# Patient Record
Sex: MALE | Race: WHITE | Employment: FULL TIME | URBAN - METROPOLITAN AREA
[De-identification: names, ages, dates, MRNs, and addresses within clinical notes are randomized per-mention and may not be internally consistent; named-entity substitution may affect disease eponyms.]

---

## 2017-01-01 ENCOUNTER — TELEPHONE (OUTPATIENT)
Dept: FAMILY MEDICINE CLINIC | Facility: CLINIC | Age: 47
End: 2017-01-01

## 2017-01-01 ENCOUNTER — HOSPITAL ENCOUNTER (OUTPATIENT)
Age: 47
Discharge: HOME | End: 2017-01-01
Attending: FAMILY MEDICINE

## 2017-01-01 VITALS
SYSTOLIC BLOOD PRESSURE: 135 MMHG | HEIGHT: 73 IN | DIASTOLIC BLOOD PRESSURE: 81 MMHG | OXYGEN SATURATION: 96 % | BODY MASS INDEX: 39.76 KG/M2 | HEART RATE: 69 BPM | RESPIRATION RATE: 16 BRPM | WEIGHT: 300 LBS | TEMPERATURE: 98 F

## 2017-01-01 DIAGNOSIS — H10.31 ACUTE CONJUNCTIVITIS OF RIGHT EYE, UNSPECIFIED ACUTE CONJUNCTIVITIS TYPE: Primary | ICD-10-CM

## 2017-01-01 PROCEDURE — 99204 OFFICE O/P NEW MOD 45 MIN: CPT

## 2017-01-01 PROCEDURE — 99213 OFFICE O/P EST LOW 20 MIN: CPT

## 2017-01-01 RX ORDER — DIPHENHYDRAMINE HCL 25 MG
25 CAPSULE ORAL EVERY 6 HOURS PRN
COMMUNITY

## 2017-01-01 RX ORDER — MULTIVITAMIN
1 TABLET ORAL DAILY
COMMUNITY

## 2017-01-01 RX ORDER — KETOROLAC TROMETHAMINE 4 MG/ML
1 SOLUTION/ DROPS OPHTHALMIC 4 TIMES DAILY
Qty: 1 BOTTLE | Refills: 0 | Status: SHIPPED | OUTPATIENT
Start: 2017-01-01 | End: 2017-01-04

## 2017-01-01 NOTE — ED PROVIDER NOTES
Patient Seen in: [facility]    History   Patient presents with:   Eye Visual Problem (opthalmic)    Stated Complaint: eye swelling    HPI    Zeus Segovia is a 42year old male who presented with chief complaint of right low Tab EC,  Take 1 tablet (81 mg total) by mouth daily. DiltiaZEM HCl 120 MG Oral Tab,  Take 180 mg by mouth daily.      Enalapril Maleate (VASOTEC) 20 MG Oral Tab,  TAKE 1 TABLET BY MOUTH TWICE DAILY   furosemide (LASIX) 40 MG Oral Tab,  TAKE 1 TABLET BY MO is normal in examination   Neck: Neck supple. Cardiovascular: Normal rate, regular rhythm and normal heart sounds. Pulmonary/Chest: Effort normal and breath sounds normal. No respiratory distress. He has no wheezes. Abdominal: Soft.  Bowel sounds are medications    Ketorolac Tromethamine 0.4 % Ophthalmic Solution  Place 1 drop into the right eye 4 (four) times daily. , Normal, Disp-1 Bottle, R-0

## 2017-01-01 NOTE — ED INITIAL ASSESSMENT (HPI)
Eye infection started on Thursday. Was treated on 12/30/16 at the [facility] for sinus infection and right eye conjunctivitis. Was prescribed Tobrex and Doxycycline. Presents today stating that his eye is not any better.   Continues to be red, painful, watery,

## 2017-01-01 NOTE — TELEPHONE ENCOUNTER
Pt calls [facility] of concerns of worsening eye sx. Pt dx with conjunctivitis on 12/30. Prescribed Tobramycin eye drops. Since starting eye drops pt states he has had intermittent continued eye discomfort, pain and blurriness.   Referred to IC for eye exam. In

## 2017-01-03 ENCOUNTER — TELEPHONE (OUTPATIENT)
Dept: FAMILY MEDICINE CLINIC | Facility: CLINIC | Age: 47
End: 2017-01-03

## 2017-01-03 DIAGNOSIS — H10.9 CONJUNCTIVITIS OF RIGHT EYE, UNSPECIFIED CONJUNCTIVITIS TYPE: Primary | ICD-10-CM

## 2017-01-03 NOTE — TELEPHONE ENCOUNTER
[facility] for Dr. [facility] - I spoke with pt. Pt needed the referral asap. Denies new symptoms since seen in [facility]. Pt informed me that he already spoke with Dr. Rosalina White, ophthalmologist and received an rx from him until he can be seen in his office.  I reviewed the

## 2017-01-03 NOTE — TELEPHONE ENCOUNTER
Pt states has blurred vision, pink eye, getting worse  Seen [facility] for pink eye yesterday  Advised to f/u with eye doctor- wont see pt without referral  Declined appt with pcp-wants to see opthalmology    Call dropped

## 2017-01-03 NOTE — TELEPHONE ENCOUNTER
Pt reports went to [address] [address] [facility] visit for right eye reddness, swelling and milky drainage. He was dx with conjunctivitis and sinusitis, taking eye gtts and oral abx.  Referred to f/u with opthalmologist Dr Ephraim Keith in [place], but needs referral befo

## 2017-04-06 ENCOUNTER — OFFICE VISIT (OUTPATIENT)
Dept: FAMILY MEDICINE CLINIC | Facility: CLINIC | Age: 47
End: 2017-04-06

## 2017-04-06 VITALS
SYSTOLIC BLOOD PRESSURE: 116 MMHG | DIASTOLIC BLOOD PRESSURE: 70 MMHG | HEART RATE: 65 BPM | BODY MASS INDEX: 40 KG/M2 | WEIGHT: 300 LBS

## 2017-04-06 DIAGNOSIS — I48.0 PAROXYSMAL ATRIAL FIBRILLATION (HCC): ICD-10-CM

## 2017-04-06 DIAGNOSIS — M17.11 PRIMARY OSTEOARTHRITIS OF RIGHT KNEE: Primary | ICD-10-CM

## 2017-04-06 PROCEDURE — 20610 DRAIN/INJ JOINT/BURSA W/O US: CPT | Performed by: FAMILY MEDICINE

## 2017-04-06 RX ORDER — DILTIAZEM HYDROCHLORIDE 180 MG/1
1 CAPSULE, EXTENDED RELEASE ORAL DAILY
COMMUNITY
Start: 2017-02-27 | End: 2020-08-12

## 2017-04-06 NOTE — PROGRESS NOTES
On my feet all day and I'm fine. When I go to sit up or all night it will wake me up.   Rt knee    Informed symptoms obtained patient's questions were answered he was sterilely prepped draped and anesthetized with 1% lidocaine we then injected his right kn

## 2017-06-06 ENCOUNTER — PRIOR ORIGINAL RECORDS (OUTPATIENT)
Dept: OTHER | Age: 47
End: 2017-06-06

## 2017-06-07 ENCOUNTER — PRIOR ORIGINAL RECORDS (OUTPATIENT)
Dept: OTHER | Age: 47
End: 2017-06-07

## 2017-06-07 ENCOUNTER — OFFICE VISIT (OUTPATIENT)
Dept: CARDIOLOGY CLINIC | Facility: HOSPITAL | Age: 47
End: 2017-06-07
Attending: INTERNAL MEDICINE
Payer: COMMERCIAL

## 2017-06-07 VITALS
OXYGEN SATURATION: 97 % | DIASTOLIC BLOOD PRESSURE: 74 MMHG | BODY MASS INDEX: 41 KG/M2 | HEART RATE: 73 BPM | WEIGHT: 310.69 LBS | SYSTOLIC BLOOD PRESSURE: 135 MMHG

## 2017-06-07 DIAGNOSIS — I10 BENIGN ESSENTIAL HTN: ICD-10-CM

## 2017-06-07 DIAGNOSIS — I48.0 PAF (PAROXYSMAL ATRIAL FIBRILLATION) (HCC): ICD-10-CM

## 2017-06-07 DIAGNOSIS — E86.0 DEHYDRATION: ICD-10-CM

## 2017-06-07 DIAGNOSIS — I48.0 PAROXYSMAL ATRIAL FIBRILLATION (HCC): ICD-10-CM

## 2017-06-07 DIAGNOSIS — I10 HTN (HYPERTENSION), BENIGN: Chronic | ICD-10-CM

## 2017-06-07 DIAGNOSIS — I48.91 ATRIAL FIBRILLATION (HCC): Primary | ICD-10-CM

## 2017-06-07 DIAGNOSIS — R00.0 RACING HEART BEAT: ICD-10-CM

## 2017-06-07 PROCEDURE — 93005 ELECTROCARDIOGRAM TRACING: CPT

## 2017-06-07 PROCEDURE — 36415 COLL VENOUS BLD VENIPUNCTURE: CPT | Performed by: NURSE PRACTITIONER

## 2017-06-07 PROCEDURE — 84443 ASSAY THYROID STIM HORMONE: CPT | Performed by: NURSE PRACTITIONER

## 2017-06-07 PROCEDURE — 99214 OFFICE O/P EST MOD 30 MIN: CPT | Performed by: NURSE PRACTITIONER

## 2017-06-07 PROCEDURE — 83735 ASSAY OF MAGNESIUM: CPT | Performed by: NURSE PRACTITIONER

## 2017-06-07 PROCEDURE — 85025 COMPLETE CBC W/AUTO DIFF WBC: CPT | Performed by: NURSE PRACTITIONER

## 2017-06-07 PROCEDURE — 93010 ELECTROCARDIOGRAM REPORT: CPT | Performed by: NURSE PRACTITIONER

## 2017-06-07 PROCEDURE — 99212 OFFICE O/P EST SF 10 MIN: CPT | Performed by: NURSE PRACTITIONER

## 2017-06-07 PROCEDURE — 80053 COMPREHEN METABOLIC PANEL: CPT | Performed by: NURSE PRACTITIONER

## 2017-06-07 NOTE — PATIENT INSTRUCTIONS
Continue all your same medications except only take furosemide with weight gain 3 pounds or more overnight or 5 pounds or more in a week or significant swelling or bloating.     Call if having any dizziness, lightheadedness, heart racing, palpitations, ches

## 2017-06-07 NOTE — PROGRESS NOTES
Max Son Patient Status:  Outpatient    1970 MRN [id number]   Location [address] [place] [address]  MD Yesenia Sommersasl Cates is a 42year old male who presents to clinic pe 0.4 06/07/2017 03:01 PM   TP 6.7 06/07/2017 03:01 PM   AST 27 06/07/2017 03:01 PM   ALT 42 06/07/2017 03:01 PM   TSH 1.42 11/14/2013 10:45 AM   DDIMER <0.27 11/14/2016 11:55 AM   MG 2.1 06/07/2017 03:01 PM   TROP <0.046 11/15/2016 07:02 AM       Clinical l chest pain in November 2016 with normal EF on echo and normal lexiscan stress test. Blood pressure normal. No sign of fluid overload, weight stable at home. Bun 38, cr 0.95, LFT's and electrolytes are normal. Wbc slightly elevated, hgb nml. Tsh nml.    Like

## 2017-08-24 ENCOUNTER — TELEPHONE (OUTPATIENT)
Dept: FAMILY MEDICINE CLINIC | Facility: CLINIC | Age: 47
End: 2017-08-24

## 2017-08-24 DIAGNOSIS — I48.0 PAROXYSMAL ATRIAL FIBRILLATION (HCC): Primary | ICD-10-CM

## 2017-09-22 ENCOUNTER — PRIOR ORIGINAL RECORDS (OUTPATIENT)
Dept: OTHER | Age: 47
End: 2017-09-22

## 2017-09-22 ENCOUNTER — OFFICE VISIT (OUTPATIENT)
Dept: CARDIOLOGY CLINIC | Facility: HOSPITAL | Age: 47
End: 2017-09-22
Attending: INTERNAL MEDICINE
Payer: COMMERCIAL

## 2017-09-22 ENCOUNTER — TELEPHONE (OUTPATIENT)
Dept: CARDIOLOGY CLINIC | Facility: HOSPITAL | Age: 47
End: 2017-09-22

## 2017-09-22 VITALS
OXYGEN SATURATION: 96 % | SYSTOLIC BLOOD PRESSURE: 137 MMHG | HEART RATE: 103 BPM | DIASTOLIC BLOOD PRESSURE: 86 MMHG | BODY MASS INDEX: 42 KG/M2 | WEIGHT: 315 LBS

## 2017-09-22 DIAGNOSIS — E66.09 CLASS 2 OBESITY DUE TO EXCESS CALORIES WITHOUT SERIOUS COMORBIDITY WITH BODY MASS INDEX (BMI) OF 39.0 TO 39.9 IN ADULT: ICD-10-CM

## 2017-09-22 DIAGNOSIS — I10 BENIGN ESSENTIAL HTN: ICD-10-CM

## 2017-09-22 DIAGNOSIS — R00.2 PALPITATIONS: ICD-10-CM

## 2017-09-22 DIAGNOSIS — I48.19 ATRIAL FIBRILLATION, PERSISTENT (HCC): Primary | ICD-10-CM

## 2017-09-22 DIAGNOSIS — R00.0 RACING HEART BEAT: ICD-10-CM

## 2017-09-22 DIAGNOSIS — I10 HTN (HYPERTENSION), BENIGN: ICD-10-CM

## 2017-09-22 DIAGNOSIS — G47.33 OSA (OBSTRUCTIVE SLEEP APNEA): ICD-10-CM

## 2017-09-22 LAB
ANION GAP SERPL CALC-SCNC: 6 MMOL/L (ref 0–18)
BASOPHILS # BLD: 0.1 K/UL (ref 0–0.2)
BASOPHILS NFR BLD: 1 %
BUN SERPL-MCNC: 22 MG/DL (ref 8–20)
BUN/CREAT SERPL: 25.6 (ref 10–20)
CALCIUM SERPL-MCNC: 9 MG/DL (ref 8.5–10.5)
CHLORIDE SERPL-SCNC: 107 MMOL/L (ref 95–110)
CO2 SERPL-SCNC: 24 MMOL/L (ref 22–32)
CREAT SERPL-MCNC: 0.86 MG/DL (ref 0.5–1.5)
EOSINOPHIL # BLD: 0.4 K/UL (ref 0–0.7)
EOSINOPHIL NFR BLD: 4 %
ERYTHROCYTE [DISTWIDTH] IN BLOOD BY AUTOMATED COUNT: 14.7 % (ref 11–15)
GLUCOSE SERPL-MCNC: 134 MG/DL (ref 70–99)
HCT VFR BLD AUTO: 46.4 % (ref 41–52)
HGB BLD-MCNC: 15.4 G/DL (ref 13.5–17.5)
LYMPHOCYTES # BLD: 3.5 K/UL (ref 1–4)
LYMPHOCYTES NFR BLD: 31 %
MAGNESIUM SERPL-MCNC: 2 MG/DL (ref 1.8–2.5)
MCH RBC QN AUTO: 27.8 PG (ref 27–32)
MCHC RBC AUTO-ENTMCNC: 33.2 G/DL (ref 32–37)
MCV RBC AUTO: 83.7 FL (ref 80–100)
MONOCYTES # BLD: 0.9 K/UL (ref 0–1)
MONOCYTES NFR BLD: 8 %
NEUTROPHILS # BLD AUTO: 6.4 K/UL (ref 1.8–7.7)
NEUTROPHILS NFR BLD: 56 %
OSMOLALITY UR CALC.SUM OF ELEC: 289 MOSM/KG (ref 275–295)
PLATELET # BLD AUTO: 171 K/UL (ref 140–400)
PMV BLD AUTO: 9.2 FL (ref 7.4–10.3)
POTASSIUM SERPL-SCNC: 3.9 MMOL/L (ref 3.3–5.1)
RBC # BLD AUTO: 5.54 M/UL (ref 4.5–5.9)
SODIUM SERPL-SCNC: 137 MMOL/L (ref 136–144)
WBC # BLD AUTO: 11.4 K/UL (ref 4–11)

## 2017-09-22 PROCEDURE — 99214 OFFICE O/P EST MOD 30 MIN: CPT | Performed by: NURSE PRACTITIONER

## 2017-09-22 PROCEDURE — 80048 BASIC METABOLIC PNL TOTAL CA: CPT | Performed by: NURSE PRACTITIONER

## 2017-09-22 PROCEDURE — 83735 ASSAY OF MAGNESIUM: CPT | Performed by: NURSE PRACTITIONER

## 2017-09-22 PROCEDURE — 99212 OFFICE O/P EST SF 10 MIN: CPT | Performed by: NURSE PRACTITIONER

## 2017-09-22 PROCEDURE — 85025 COMPLETE CBC W/AUTO DIFF WBC: CPT | Performed by: NURSE PRACTITIONER

## 2017-09-22 PROCEDURE — 36415 COLL VENOUS BLD VENIPUNCTURE: CPT | Performed by: NURSE PRACTITIONER

## 2017-09-22 PROCEDURE — 93010 ELECTROCARDIOGRAM REPORT: CPT | Performed by: NURSE PRACTITIONER

## 2017-09-22 PROCEDURE — 93005 ELECTROCARDIOGRAM TRACING: CPT

## 2017-09-22 NOTE — PROGRESS NOTES
Max Son Patient Status:  Outpatient    1970 MRN [id number]   Location [address] [place] [address]  MD Miranda Josue is a 42year old male who presents to clinic pe TP 6.7 06/07/2017 03:01 PM   AST 27 06/07/2017 03:01 PM   ALT 42 06/07/2017 03:01 PM   TSH 2.29 06/07/2017 03:01 PM   DDIMER <0.27 11/14/2016 11:55 AM   MG 2.0 09/22/2017 11:52 AM   TROP <0.046 11/15/2016 07:02 AM       Clinical labs drawn by MA: cbc, bm after hydrating with 4 L of fluid.   He had recurrent symptoms after taking half a dose of his furosemide for the last 2 days for increased bloating but denied associated shortness of breath, dyspnea on exertion, chest pain, lightheadedness, dizziness or sw same medications,except avoid furosemide    Call if having any dizziness, lightheadedness, heart racing, palpitations, chest pain, shortness of breath, coughing, swelling, weight gain or worsening symptoms.      Weigh yourself daily in the morning before br

## 2017-09-22 NOTE — TELEPHONE ENCOUNTER
Message left with Jovan young: his recent BMP and magnesium lab results were WNL, per NP Seun Stark.

## 2017-09-22 NOTE — PATIENT INSTRUCTIONS
Continue all your same medications,except avoid furosemide    Call if having any dizziness, lightheadedness, heart racing, palpitations, chest pain, shortness of breath, coughing, swelling, weight gain or worsening symptoms.      Weigh yourself daily in the

## 2017-10-12 LAB
ALBUMIN: 3.9 G/DL
BILIRUBIN TOTAL: 0.4 MG/DL
BUN: 38 MG/DL
CALCIUM: 9.3 MG/DL
CHLORIDE: 110 MEQ/L
CREATININE, SERUM: 0.95 MG/DL
GLOBULIN: 2.8 G/DL
GLUCOSE: 95 MG/DL
HEMATOCRIT: 44.9 %
HEMOGLOBIN: 14.8 G/DL
MAGNESIUM: 2.1 MG/DL
MCH: 27.8 PG
MCHC: 33 G/DL
MCV: 84.2 FL
PLATELETS: 178 K/UL
POTASSIUM, SERUM: 4.4 MEQ/L
PROTEIN, TOTAL: 6.7 G/DL
RED BLOOD COUNT: 5.34 X 10-6/U
SGOT (AST): 27 IU/L
SGPT (ALT): 42 IU/L
SODIUM: 141 MEQ/L
WHITE BLOOD COUNT: 12.5 X 10-3/U

## 2017-10-13 ENCOUNTER — PRIOR ORIGINAL RECORDS (OUTPATIENT)
Dept: OTHER | Age: 47
End: 2017-10-13

## 2017-10-13 ENCOUNTER — MYAURORA ACCOUNT LINK (OUTPATIENT)
Dept: OTHER | Age: 47
End: 2017-10-13

## 2018-03-09 ENCOUNTER — TELEPHONE (OUTPATIENT)
Dept: FAMILY MEDICINE CLINIC | Facility: CLINIC | Age: 48
End: 2018-03-09

## 2018-03-09 NOTE — TELEPHONE ENCOUNTER
Received letter from [facility] stating he is due for his physical due to atrial fibrillation.  Please schedule a physical with [facility]

## 2018-03-19 ENCOUNTER — OFFICE VISIT (OUTPATIENT)
Dept: FAMILY MEDICINE CLINIC | Facility: CLINIC | Age: 48
End: 2018-03-19

## 2018-03-19 VITALS
OXYGEN SATURATION: 98 % | HEART RATE: 74 BPM | SYSTOLIC BLOOD PRESSURE: 136 MMHG | RESPIRATION RATE: 16 BRPM | BODY MASS INDEX: 41.75 KG/M2 | WEIGHT: 315 LBS | TEMPERATURE: 98 F | HEIGHT: 73 IN | DIASTOLIC BLOOD PRESSURE: 76 MMHG

## 2018-03-19 DIAGNOSIS — J01.00 ACUTE NON-RECURRENT MAXILLARY SINUSITIS: Primary | ICD-10-CM

## 2018-03-19 PROCEDURE — 99213 OFFICE O/P EST LOW 20 MIN: CPT | Performed by: PHYSICIAN ASSISTANT

## 2018-03-19 RX ORDER — FLUTICASONE PROPIONATE 50 MCG
2 SPRAY, SUSPENSION (ML) NASAL DAILY
Qty: 1 INHALER | Refills: 0 | Status: SHIPPED | OUTPATIENT
Start: 2018-03-19 | End: 2018-03-19

## 2018-03-19 RX ORDER — DOXYCYCLINE HYCLATE 100 MG
100 TABLET ORAL 2 TIMES DAILY
Qty: 20 TABLET | Refills: 0 | Status: SHIPPED | OUTPATIENT
Start: 2018-03-19 | End: 2018-03-29

## 2018-03-19 RX ORDER — FLUTICASONE PROPIONATE 50 MCG
2 SPRAY, SUSPENSION (ML) NASAL DAILY
Qty: 1 INHALER | Refills: 0 | Status: SHIPPED | OUTPATIENT
Start: 2018-03-19 | End: 2021-11-17

## 2018-03-19 NOTE — PROGRESS NOTES
CHIEF COMPLAINT:   Patient presents with:  Sinusitis      HPI:   Zeus Segovia is a 41year old male who presents for sinus sxs for  5-6 days. Patient reports nasal congestion, maxillary and frontal sinus pain/pressure, sore throat, cough- typically dry. • Unspecified essential hypertension    • Ventral hernia 2012      Past Surgical History:  No date: APPENDECTOMY  04-: ELECTROCARDIOGRAM, COMPLETE      Comment: Scanned to Media Tab - Date of Service                04-  No date: HERNIA SURGER No results found for this or any previous visit (from the past 24 hour(s)).     ASSESSMENT AND PLAN:   Leda Gonsalez is a 41year old male who presents with symptoms that are consistent with    ASSESSMENT:   Acute non-recurrent maxillary sinusitis  (primary · Nasal congestion or blockage  · Pain or pressure in the face  · Thick, colored drainage from the nose  Other symptoms may include:  · Runny nose  · Fluid draining from the nose down the throat (postnasal drip)  · Headache  · Cough  · Pain  · Fever  Diagn · Confusion or trouble staying awake   Date Last Reviewed: 5/1/2017  © 8484-7005 [employer]. [address] [facility], [facility] [place]. All rights reserved. This information is not intended as a substitute for professional medical care.  Nic Tapia

## 2018-03-19 NOTE — PATIENT INSTRUCTIONS
-Cool mist humidifier at night  -Warm tea with honey  -Mucinex  -Flonase  -Tylenol    Acute Bacterial Rhinosinusitis (ABRS)    Acute bacterial rhinosinusitis (ABRS) is an infection of your nasal cavity and sinuses. It’s caused by bacteria.  Acute means that · Antibiotic medicine. This is for symptoms that last for at least 10 to 14 days. · Nasal corticosteroid medicine. Drops or spray used in the nose can lessen swelling and congestion. · Over-the-counter pain medicine.  This is to lessen sinus pain and pres

## 2018-04-25 LAB
BUN: 22 MG/DL
CALCIUM: 9 MG/DL
CHLORIDE: 107 MEQ/L
CREATININE, SERUM: 0.86 MG/DL
GLUCOSE: 134 MG/DL
HEMATOCRIT: 46.4 %
HEMOGLOBIN: 15.4 G/DL
MAGNESIUM: 2 MG/DL
PLATELETS: 171 K/UL
POTASSIUM, SERUM: 3.9 MEQ/L
RED BLOOD COUNT: 5.54 X 10-6/U
SODIUM: 137 MEQ/L
WHITE BLOOD COUNT: 11.4 X 10-3/U

## 2019-02-26 ENCOUNTER — PRIOR ORIGINAL RECORDS (OUTPATIENT)
Dept: OTHER | Age: 49
End: 2019-02-26

## 2019-02-28 VITALS
HEART RATE: 67 BPM | HEIGHT: 73 IN | BODY MASS INDEX: 41.75 KG/M2 | SYSTOLIC BLOOD PRESSURE: 124 MMHG | WEIGHT: 315 LBS | DIASTOLIC BLOOD PRESSURE: 82 MMHG

## 2019-03-18 ENCOUNTER — TELEPHONE (OUTPATIENT)
Dept: CARDIOLOGY | Age: 49
End: 2019-03-18

## 2019-03-21 RX ORDER — ENALAPRIL MALEATE 20 MG/1
TABLET ORAL
COMMUNITY
Start: 2019-02-18 | End: 2019-04-02 | Stop reason: SDUPTHER

## 2019-03-21 RX ORDER — DILTIAZEM HYDROCHLORIDE 180 MG/1
CAPSULE, EXTENDED RELEASE ORAL
COMMUNITY
Start: 2018-03-05 | End: 2019-04-02 | Stop reason: SDUPTHER

## 2019-03-21 RX ORDER — FUROSEMIDE 40 MG/1
1 TABLET ORAL DAILY
COMMUNITY
Start: 2017-11-27 | End: 2019-04-02 | Stop reason: CLARIF

## 2019-03-21 RX ORDER — CARVEDILOL 25 MG/1
TABLET ORAL
COMMUNITY
Start: 2018-03-05 | End: 2019-03-28 | Stop reason: SDUPTHER

## 2019-03-21 RX ORDER — FLECAINIDE ACETATE 150 MG/1
TABLET ORAL
COMMUNITY
Start: 2018-11-19 | End: 2019-04-02 | Stop reason: SDUPTHER

## 2019-03-25 PROBLEM — I48.91 AF (ATRIAL FIBRILLATION) (CMD): Status: ACTIVE | Noted: 2019-03-25

## 2019-03-25 PROBLEM — R55 SYNCOPE: Status: ACTIVE | Noted: 2019-03-25

## 2019-03-28 RX ORDER — CARVEDILOL 25 MG/1
TABLET ORAL
Qty: 60 TABLET | Refills: 0 | Status: SHIPPED | OUTPATIENT
Start: 2019-03-28 | End: 2019-04-02 | Stop reason: SDUPTHER

## 2019-04-02 ENCOUNTER — OFFICE VISIT (OUTPATIENT)
Dept: CARDIOLOGY | Age: 49
End: 2019-04-02

## 2019-04-02 ENCOUNTER — TELEPHONE (OUTPATIENT)
Dept: CARDIOLOGY | Age: 49
End: 2019-04-02

## 2019-04-02 VITALS
WEIGHT: 315 LBS | OXYGEN SATURATION: 96 % | BODY MASS INDEX: 41.75 KG/M2 | DIASTOLIC BLOOD PRESSURE: 80 MMHG | SYSTOLIC BLOOD PRESSURE: 120 MMHG | HEIGHT: 73 IN | HEART RATE: 65 BPM

## 2019-04-02 DIAGNOSIS — I10 BENIGN ESSENTIAL HTN: ICD-10-CM

## 2019-04-02 DIAGNOSIS — I48.0 PAROXYSMAL ATRIAL FIBRILLATION (CMD): ICD-10-CM

## 2019-04-02 DIAGNOSIS — I48.91 ATRIAL FIBRILLATION, UNSPECIFIED TYPE (CMD): Primary | ICD-10-CM

## 2019-04-02 PROCEDURE — 99214 OFFICE O/P EST MOD 30 MIN: CPT | Performed by: INTERNAL MEDICINE

## 2019-04-02 PROCEDURE — 93000 ELECTROCARDIOGRAM COMPLETE: CPT | Performed by: INTERNAL MEDICINE

## 2019-04-02 PROCEDURE — 3074F SYST BP LT 130 MM HG: CPT | Performed by: INTERNAL MEDICINE

## 2019-04-02 PROCEDURE — 3079F DIAST BP 80-89 MM HG: CPT | Performed by: INTERNAL MEDICINE

## 2019-04-02 RX ORDER — DILTIAZEM HYDROCHLORIDE 180 MG/1
180 CAPSULE, EXTENDED RELEASE ORAL DAILY
Qty: 90 CAPSULE | Refills: 3 | Status: SHIPPED | OUTPATIENT
Start: 2019-04-02 | End: 2021-02-22 | Stop reason: DRUGHIGH

## 2019-04-02 RX ORDER — CARVEDILOL 25 MG/1
25 TABLET ORAL 2 TIMES DAILY WITH MEALS
Qty: 180 TABLET | Refills: 3 | Status: SHIPPED | OUTPATIENT
Start: 2019-04-02 | End: 2019-07-01

## 2019-04-02 RX ORDER — ENALAPRIL MALEATE 20 MG/1
10 TABLET ORAL 2 TIMES DAILY
Qty: 180 TABLET | Refills: 3 | Status: SHIPPED | OUTPATIENT
Start: 2019-04-02 | End: 2019-04-02

## 2019-04-02 RX ORDER — FLECAINIDE ACETATE 150 MG/1
150 TABLET ORAL 2 TIMES DAILY
Qty: 180 TABLET | Refills: 3 | Status: SHIPPED | OUTPATIENT
Start: 2019-04-02 | End: 2019-04-02 | Stop reason: CLARIF

## 2019-04-02 RX ORDER — ENALAPRIL MALEATE 20 MG/1
20 TABLET ORAL 2 TIMES DAILY
Qty: 180 TABLET | Refills: 2 | Status: SHIPPED | OUTPATIENT
Start: 2019-04-02 | End: 2019-05-19 | Stop reason: SDUPTHER

## 2019-04-02 RX ORDER — DILTIAZEM HYDROCHLORIDE 180 MG/1
180 CAPSULE, COATED, EXTENDED RELEASE ORAL DAILY
Qty: 90 CAPSULE | Refills: 3 | Status: SHIPPED | OUTPATIENT
Start: 2019-04-02 | End: 2020-02-26 | Stop reason: SDUPTHER

## 2019-04-02 RX ORDER — CARVEDILOL 25 MG/1
25 TABLET ORAL 2 TIMES DAILY WITH MEALS
Qty: 180 TABLET | Refills: 3 | Status: SHIPPED | OUTPATIENT
Start: 2019-04-02 | End: 2019-04-02 | Stop reason: CLARIF

## 2019-04-02 RX ORDER — DILTIAZEM HYDROCHLORIDE EXTENDED-RELEASE TABLETS 180 MG/1
180 TABLET, EXTENDED RELEASE ORAL DAILY
Qty: 7 TABLET | Refills: 0 | Status: SHIPPED | OUTPATIENT
Start: 2019-04-02 | End: 2019-04-02 | Stop reason: CLARIF

## 2019-04-02 RX ORDER — FLECAINIDE ACETATE 150 MG/1
150 TABLET ORAL 2 TIMES DAILY
Qty: 180 TABLET | Refills: 1 | Status: SHIPPED | OUTPATIENT
Start: 2019-04-02 | End: 2019-07-01

## 2019-05-20 RX ORDER — ENALAPRIL MALEATE 20 MG/1
TABLET ORAL
Qty: 180 TABLET | Refills: 2 | Status: SHIPPED | OUTPATIENT
Start: 2019-05-20 | End: 2019-11-21 | Stop reason: SDUPTHER

## 2019-10-24 RX ORDER — FLECAINIDE ACETATE 150 MG/1
TABLET ORAL
Qty: 180 TABLET | Refills: 0 | Status: SHIPPED | OUTPATIENT
Start: 2019-10-24 | End: 2020-01-14

## 2019-10-24 RX ORDER — FLECAINIDE ACETATE 150 MG/1
1 TABLET ORAL 2 TIMES DAILY
Refills: 3 | COMMUNITY
Start: 2019-08-15 | End: 2020-01-14 | Stop reason: SDUPTHER

## 2019-11-22 RX ORDER — ENALAPRIL MALEATE 20 MG/1
20 TABLET ORAL 2 TIMES DAILY
Qty: 28 TABLET | Refills: 0 | Status: SHIPPED | OUTPATIENT
Start: 2019-11-22 | End: 2019-12-06

## 2020-01-14 ENCOUNTER — OFFICE VISIT (OUTPATIENT)
Dept: CARDIOLOGY | Age: 50
End: 2020-01-14

## 2020-01-14 VITALS
HEIGHT: 73 IN | DIASTOLIC BLOOD PRESSURE: 100 MMHG | HEART RATE: 84 BPM | BODY MASS INDEX: 41.75 KG/M2 | SYSTOLIC BLOOD PRESSURE: 142 MMHG | OXYGEN SATURATION: 96 % | WEIGHT: 315 LBS

## 2020-01-14 DIAGNOSIS — R55 SYNCOPE, UNSPECIFIED SYNCOPE TYPE: ICD-10-CM

## 2020-01-14 DIAGNOSIS — I10 BENIGN ESSENTIAL HTN: ICD-10-CM

## 2020-01-14 DIAGNOSIS — I48.0 PAROXYSMAL ATRIAL FIBRILLATION (CMD): Primary | ICD-10-CM

## 2020-01-14 PROCEDURE — 99214 OFFICE O/P EST MOD 30 MIN: CPT | Performed by: INTERNAL MEDICINE

## 2020-01-14 PROCEDURE — 3077F SYST BP >= 140 MM HG: CPT | Performed by: INTERNAL MEDICINE

## 2020-01-14 PROCEDURE — 3080F DIAST BP >= 90 MM HG: CPT | Performed by: INTERNAL MEDICINE

## 2020-01-14 PROCEDURE — 93000 ELECTROCARDIOGRAM COMPLETE: CPT | Performed by: INTERNAL MEDICINE

## 2020-01-14 RX ORDER — FLECAINIDE ACETATE 150 MG/1
150 TABLET ORAL 2 TIMES DAILY
COMMUNITY
End: 2020-01-27 | Stop reason: SDUPTHER

## 2020-01-14 ASSESSMENT — PATIENT HEALTH QUESTIONNAIRE - PHQ9
SUM OF ALL RESPONSES TO PHQ9 QUESTIONS 1 AND 2: 0
1. LITTLE INTEREST OR PLEASURE IN DOING THINGS: NOT AT ALL
2. FEELING DOWN, DEPRESSED OR HOPELESS: NOT AT ALL
SUM OF ALL RESPONSES TO PHQ9 QUESTIONS 1 AND 2: 0

## 2020-01-27 ENCOUNTER — TELEPHONE (OUTPATIENT)
Dept: FAMILY MEDICINE CLINIC | Facility: CLINIC | Age: 50
End: 2020-01-27

## 2020-01-27 RX ORDER — FLECAINIDE ACETATE 150 MG/1
TABLET ORAL
Qty: 180 TABLET | Refills: 1 | Status: SHIPPED | OUTPATIENT
Start: 2020-01-27 | End: 2020-07-20

## 2020-01-27 NOTE — TELEPHONE ENCOUNTER
[phone] Per [facility] would like to have patient come in for physical.     Please schedule appointment once patient returns call.

## 2020-02-12 DIAGNOSIS — I48.0 PAROXYSMAL ATRIAL FIBRILLATION (CMD): Primary | ICD-10-CM

## 2020-02-13 RX ORDER — ENALAPRIL MALEATE 20 MG/1
TABLET ORAL
Qty: 180 TABLET | Refills: 0 | Status: SHIPPED | OUTPATIENT
Start: 2020-02-13 | End: 2020-02-14 | Stop reason: SDUPTHER

## 2020-02-14 RX ORDER — ENALAPRIL MALEATE 20 MG/1
20 TABLET ORAL 2 TIMES DAILY
Qty: 60 TABLET | Refills: 0 | Status: SHIPPED | OUTPATIENT
Start: 2020-02-14 | End: 2020-05-13

## 2020-02-28 RX ORDER — DILTIAZEM HYDROCHLORIDE 180 MG/1
CAPSULE, EXTENDED RELEASE ORAL
Qty: 90 CAPSULE | Refills: 3 | Status: SHIPPED | OUTPATIENT
Start: 2020-02-28 | End: 2021-02-22

## 2020-03-27 RX ORDER — CARVEDILOL 25 MG/1
TABLET ORAL
Qty: 180 TABLET | Refills: 3 | Status: SHIPPED | OUTPATIENT
Start: 2020-03-27 | End: 2021-03-22

## 2020-05-13 RX ORDER — ENALAPRIL MALEATE 20 MG/1
TABLET ORAL
Qty: 180 TABLET | Refills: 3 | Status: SHIPPED | OUTPATIENT
Start: 2020-05-13 | End: 2021-05-07 | Stop reason: SDUPTHER

## 2020-07-20 RX ORDER — FLECAINIDE ACETATE 150 MG/1
TABLET ORAL
Qty: 180 TABLET | Refills: 2 | Status: SHIPPED | OUTPATIENT
Start: 2020-07-20 | End: 2020-09-30 | Stop reason: SDUPTHER

## 2020-08-10 NOTE — H&P (VIEW-ONLY)
Brook Mobley is a 54year old male. Patient referred from [facility]. Patient presents for complaints of pain and a bulge in the umbilical region. He underwent repair of an umbilical hernia in 9363. A ventralex patch was used at that time.  He has bruising or excessive bleeding    EXAM:    GENERAL: well developed, well nourished male, in no apparent distress  SKIN: anicteric  EYES: PERRLA, EOMI, sclera anicteric  HEENT: normocephalic; normal nose  NECK: supple, no JVD  RESPIRATORY: clear to percussi mm. Left kidney lower pole 18 mm  intermediate density structure may be a hemorrhagic cyst. Similarly left kidney upper pole 11.4 mm  intermediate density structure may be a hemorrhagic cyst. Less than 5 mm bilateral kidney low  density foci are too small to work/activity, etc.  We discussed the expectations after surgery including activity restrictions, weight limitations.  All of the patients questions were answered    The patient was given the booklet about hernia surgery      Thanks for referring the diane

## 2020-08-12 RX ORDER — KRILL/OM-3/DHA/EPA/PHOSPHO/AST 500MG-86MG
1 CAPSULE ORAL DAILY
COMMUNITY

## 2020-08-12 RX ORDER — COVID-19 ANTIGEN TEST
220 KIT MISCELLANEOUS AS NEEDED
COMMUNITY
End: 2021-11-17

## 2020-08-14 ENCOUNTER — APPOINTMENT (OUTPATIENT)
Dept: LAB | Facility: HOSPITAL | Age: 50
End: 2020-08-14
Payer: COMMERCIAL

## 2020-08-14 DIAGNOSIS — K43.9 VENTRAL HERNIA WITHOUT OBSTRUCTION OR GANGRENE: ICD-10-CM

## 2020-08-14 LAB
ANION GAP SERPL CALC-SCNC: 4 MMOL/L (ref 0–18)
ATRIAL RATE: 75 BPM
BUN BLD-MCNC: 31 MG/DL (ref 7–18)
BUN/CREAT SERPL: 28.2 (ref 10–20)
CALCIUM BLD-MCNC: 9.5 MG/DL (ref 8.5–10.1)
CHLORIDE SERPL-SCNC: 108 MMOL/L (ref 98–112)
CO2 SERPL-SCNC: 27 MMOL/L (ref 21–32)
CREAT BLD-MCNC: 1.1 MG/DL (ref 0.7–1.3)
GLUCOSE BLD-MCNC: 115 MG/DL (ref 70–99)
OSMOLALITY SERPL CALC.SUM OF ELEC: 295 MOSM/KG (ref 275–295)
P AXIS: 61 DEGREES
P-R INTERVAL: 194 MS
PATIENT FASTING Y/N/NP: YES
POTASSIUM SERPL-SCNC: 4.2 MMOL/L (ref 3.5–5.1)
Q-T INTERVAL: 416 MS
QRS DURATION: 134 MS
QTC CALCULATION (BEZET): 464 MS
R AXIS: -70 DEGREES
SODIUM SERPL-SCNC: 139 MMOL/L (ref 136–145)
T AXIS: 43 DEGREES
VENTRICULAR RATE: 75 BPM

## 2020-08-14 PROCEDURE — 36415 COLL VENOUS BLD VENIPUNCTURE: CPT

## 2020-08-14 PROCEDURE — 80048 BASIC METABOLIC PNL TOTAL CA: CPT

## 2020-08-14 PROCEDURE — 93005 ELECTROCARDIOGRAM TRACING: CPT

## 2020-08-14 PROCEDURE — 93010 ELECTROCARDIOGRAM REPORT: CPT | Performed by: INTERNAL MEDICINE

## 2020-08-17 ENCOUNTER — LAB ENCOUNTER (OUTPATIENT)
Dept: LAB | Facility: HOSPITAL | Age: 50
End: 2020-08-17
Attending: SURGERY
Payer: COMMERCIAL

## 2020-08-17 DIAGNOSIS — K43.9 VENTRAL HERNIA WITHOUT OBSTRUCTION OR GANGRENE: ICD-10-CM

## 2020-08-18 LAB — SARS-COV-2 RNA RESP QL NAA+PROBE: NOT DETECTED

## 2020-08-20 ENCOUNTER — ANESTHESIA (OUTPATIENT)
Dept: SURGERY | Facility: HOSPITAL | Age: 50
End: 2020-08-20
Payer: COMMERCIAL

## 2020-08-20 ENCOUNTER — ANESTHESIA EVENT (OUTPATIENT)
Dept: SURGERY | Facility: HOSPITAL | Age: 50
End: 2020-08-20
Payer: COMMERCIAL

## 2020-08-20 ENCOUNTER — HOSPITAL ENCOUNTER (OUTPATIENT)
Facility: HOSPITAL | Age: 50
Setting detail: HOSPITAL OUTPATIENT SURGERY
Discharge: HOME | End: 2020-08-20
Attending: SURGERY | Admitting: SURGERY
Payer: COMMERCIAL

## 2020-08-20 VITALS
RESPIRATION RATE: 18 BRPM | SYSTOLIC BLOOD PRESSURE: 150 MMHG | BODY MASS INDEX: 41.75 KG/M2 | HEART RATE: 77 BPM | DIASTOLIC BLOOD PRESSURE: 97 MMHG | OXYGEN SATURATION: 94 % | TEMPERATURE: 98 F | WEIGHT: 315 LBS | HEIGHT: 73 IN

## 2020-08-20 DIAGNOSIS — K43.9 VENTRAL HERNIA WITHOUT OBSTRUCTION OR GANGRENE: Primary | ICD-10-CM

## 2020-08-20 PROCEDURE — 8E0W4CZ ROBOTIC ASSISTED PROCEDURE OF TRUNK REGION, PERCUTANEOUS ENDOSCOPIC APPROACH: ICD-10-PCS | Performed by: SURGERY

## 2020-08-20 PROCEDURE — 0WUF4JZ SUPPLEMENT ABDOMINAL WALL WITH SYNTHETIC SUBSTITUTE, PERCUTANEOUS ENDOSCOPIC APPROACH: ICD-10-PCS | Performed by: SURGERY

## 2020-08-20 PROCEDURE — 88300 SURGICAL PATH GROSS: CPT | Performed by: SURGERY

## 2020-08-20 DEVICE — VENTRIO ST HERNIA PATCH
Type: IMPLANTABLE DEVICE | Site: ABDOMEN | Status: FUNCTIONAL
Brand: VENTRIO ST HERNIA PATCH

## 2020-08-20 RX ORDER — LIDOCAINE HYDROCHLORIDE 10 MG/ML
INJECTION, SOLUTION INFILTRATION; PERINEURAL AS NEEDED
Status: DISCONTINUED | OUTPATIENT
Start: 2020-08-20 | End: 2020-08-20 | Stop reason: HOSPADM

## 2020-08-20 RX ORDER — SODIUM CHLORIDE, SODIUM LACTATE, POTASSIUM CHLORIDE, CALCIUM CHLORIDE 600; 310; 30; 20 MG/100ML; MG/100ML; MG/100ML; MG/100ML
INJECTION, SOLUTION INTRAVENOUS CONTINUOUS
Status: DISCONTINUED | OUTPATIENT
Start: 2020-08-20 | End: 2020-08-20

## 2020-08-20 RX ORDER — ACETAMINOPHEN 500 MG
1000 TABLET ORAL ONCE
Status: DISCONTINUED | OUTPATIENT
Start: 2020-08-20 | End: 2020-08-20 | Stop reason: HOSPADM

## 2020-08-20 RX ORDER — TRAMADOL HYDROCHLORIDE 50 MG/1
50 TABLET ORAL EVERY 6 HOURS PRN
Qty: 30 TABLET | Refills: 0 | Status: SHIPPED | OUTPATIENT
Start: 2020-08-20 | End: 2020-08-30

## 2020-08-20 RX ORDER — ONDANSETRON 2 MG/ML
INJECTION INTRAMUSCULAR; INTRAVENOUS AS NEEDED
Status: DISCONTINUED | OUTPATIENT
Start: 2020-08-20 | End: 2020-08-20 | Stop reason: SURG

## 2020-08-20 RX ORDER — NALOXONE HYDROCHLORIDE 0.4 MG/ML
80 INJECTION, SOLUTION INTRAMUSCULAR; INTRAVENOUS; SUBCUTANEOUS AS NEEDED
Status: DISCONTINUED | OUTPATIENT
Start: 2020-08-20 | End: 2020-08-20

## 2020-08-20 RX ORDER — IBUPROFEN 800 MG/1
800 TABLET ORAL EVERY 8 HOURS PRN
Qty: 24 TABLET | Refills: 0 | Status: SHIPPED | OUTPATIENT
Start: 2020-08-20 | End: 2020-10-19

## 2020-08-20 RX ORDER — NEOSTIGMINE METHYLSULFATE 1 MG/ML
INJECTION INTRAVENOUS AS NEEDED
Status: DISCONTINUED | OUTPATIENT
Start: 2020-08-20 | End: 2020-08-20 | Stop reason: SURG

## 2020-08-20 RX ORDER — BUPIVACAINE HYDROCHLORIDE 5 MG/ML
INJECTION, SOLUTION EPIDURAL; INTRACAUDAL AS NEEDED
Status: DISCONTINUED | OUTPATIENT
Start: 2020-08-20 | End: 2020-08-20 | Stop reason: HOSPADM

## 2020-08-20 RX ORDER — GLYCOPYRROLATE 0.2 MG/ML
INJECTION, SOLUTION INTRAMUSCULAR; INTRAVENOUS AS NEEDED
Status: DISCONTINUED | OUTPATIENT
Start: 2020-08-20 | End: 2020-08-20 | Stop reason: SURG

## 2020-08-20 RX ORDER — TRAMADOL HYDROCHLORIDE 50 MG/1
50 TABLET ORAL EVERY 6 HOURS PRN
Status: DISCONTINUED | OUTPATIENT
Start: 2020-08-20 | End: 2020-08-20

## 2020-08-20 RX ORDER — METOCLOPRAMIDE HYDROCHLORIDE 5 MG/ML
INJECTION INTRAMUSCULAR; INTRAVENOUS AS NEEDED
Status: DISCONTINUED | OUTPATIENT
Start: 2020-08-20 | End: 2020-08-20 | Stop reason: SURG

## 2020-08-20 RX ORDER — LIDOCAINE HYDROCHLORIDE 10 MG/ML
INJECTION, SOLUTION EPIDURAL; INFILTRATION; INTRACAUDAL; PERINEURAL AS NEEDED
Status: DISCONTINUED | OUTPATIENT
Start: 2020-08-20 | End: 2020-08-20 | Stop reason: SURG

## 2020-08-20 RX ORDER — EPHEDRINE SULFATE 50 MG/ML
INJECTION, SOLUTION INTRAVENOUS AS NEEDED
Status: DISCONTINUED | OUTPATIENT
Start: 2020-08-20 | End: 2020-08-20 | Stop reason: SURG

## 2020-08-20 RX ORDER — ESMOLOL HYDROCHLORIDE 10 MG/ML
INJECTION INTRAVENOUS AS NEEDED
Status: DISCONTINUED | OUTPATIENT
Start: 2020-08-20 | End: 2020-08-20 | Stop reason: SURG

## 2020-08-20 RX ORDER — CISATRACURIUM BESYLATE 2 MG/ML
INJECTION INTRAVENOUS AS NEEDED
Status: DISCONTINUED | OUTPATIENT
Start: 2020-08-20 | End: 2020-08-20 | Stop reason: SURG

## 2020-08-20 RX ORDER — KETOROLAC TROMETHAMINE 30 MG/ML
INJECTION, SOLUTION INTRAMUSCULAR; INTRAVENOUS AS NEEDED
Status: DISCONTINUED | OUTPATIENT
Start: 2020-08-20 | End: 2020-08-20 | Stop reason: SURG

## 2020-08-20 RX ORDER — DEXAMETHASONE SODIUM PHOSPHATE 4 MG/ML
VIAL (ML) INJECTION AS NEEDED
Status: DISCONTINUED | OUTPATIENT
Start: 2020-08-20 | End: 2020-08-20 | Stop reason: SURG

## 2020-08-20 RX ORDER — ACETAMINOPHEN 500 MG
1000 TABLET ORAL ONCE
COMMUNITY

## 2020-08-20 RX ADMIN — SODIUM CHLORIDE, SODIUM LACTATE, POTASSIUM CHLORIDE, CALCIUM CHLORIDE: 600; 310; 30; 20 INJECTION, SOLUTION INTRAVENOUS at 12:36:00

## 2020-08-20 RX ADMIN — CISATRACURIUM BESYLATE 2 MG: 2 INJECTION INTRAVENOUS at 11:02:00

## 2020-08-20 RX ADMIN — EPHEDRINE SULFATE 10 MG: 50 INJECTION, SOLUTION INTRAVENOUS at 12:05:00

## 2020-08-20 RX ADMIN — DEXAMETHASONE SODIUM PHOSPHATE 8 MG: 4 MG/ML VIAL (ML) INJECTION at 11:06:00

## 2020-08-20 RX ADMIN — GLYCOPYRROLATE 0.2 MG: 0.2 INJECTION, SOLUTION INTRAMUSCULAR; INTRAVENOUS at 12:23:00

## 2020-08-20 RX ADMIN — NEOSTIGMINE METHYLSULFATE 2 MG: 1 INJECTION INTRAVENOUS at 12:23:00

## 2020-08-20 RX ADMIN — METOCLOPRAMIDE HYDROCHLORIDE 10 MG: 5 INJECTION INTRAMUSCULAR; INTRAVENOUS at 11:06:00

## 2020-08-20 RX ADMIN — EPHEDRINE SULFATE 10 MG: 50 INJECTION, SOLUTION INTRAVENOUS at 12:19:00

## 2020-08-20 RX ADMIN — CISATRACURIUM BESYLATE 4 MG: 2 INJECTION INTRAVENOUS at 11:05:00

## 2020-08-20 RX ADMIN — ONDANSETRON 4 MG: 2 INJECTION INTRAMUSCULAR; INTRAVENOUS at 12:24:00

## 2020-08-20 RX ADMIN — ESMOLOL HYDROCHLORIDE 30 MG: 10 INJECTION INTRAVENOUS at 11:02:00

## 2020-08-20 RX ADMIN — LIDOCAINE HYDROCHLORIDE 50 MG: 10 INJECTION, SOLUTION EPIDURAL; INFILTRATION; INTRACAUDAL; PERINEURAL at 11:02:00

## 2020-08-20 RX ADMIN — CISATRACURIUM BESYLATE 2 MG: 2 INJECTION INTRAVENOUS at 11:40:00

## 2020-08-20 RX ADMIN — KETOROLAC TROMETHAMINE 30 MG: 30 INJECTION, SOLUTION INTRAMUSCULAR; INTRAVENOUS at 12:24:00

## 2020-08-20 NOTE — ANESTHESIA POSTPROCEDURE EVALUATION
[address] Patient Status:  Hospital Outpatient Surgery   Age/Gender 54year old male MRN [id number]   Location [address] [facility] Attending Yamile Eisenberg MD   Hosp Day # 0 PCP Radha Agrawal.  DO Rosie       Anesthesia Post-op

## 2020-08-20 NOTE — BRIEF OP NOTE
Pre-Operative Diagnosis: Ventral hernia without obstruction or gangrene [K43.9]     Post-Operative Diagnosis: Ventral hernia without obstruction or gangrene [K43.9]      Procedure Performed:   Procedure(s):  XI ROBOT-ASSISTED LAPAROSCOPIC RECURRENT VENTRAL

## 2020-08-20 NOTE — INTERVAL H&P NOTE
Pre-op Diagnosis: Ventral hernia without obstruction or gangrene [K43.9]    The above referenced H&P was reviewed by Pricilla Quispe MD on 8/20/2020, the patient was examined and no significant changes have occurred in the patient's condition since the H&P

## 2020-08-20 NOTE — ANESTHESIA PREPROCEDURE EVALUATION
PRE-OP EVALUATION    Patient Name: Durga Davidsongary Barrett.    Pre-op Diagnosis: Ventral hernia without obstruction or gangrene [K43.9]    Procedure(s):  XI ROBOT-ASSISTED LAPAROSCOPIC RECURRENT VENTRAL HERNIA REPAIR WITH MESH    Surgeon(s) and Role:     Hoda Guy, Neuro/Psych    Negative neuro/psych ROS.                                 Past Surgical History:   Procedure Laterality Date   • APPENDECTOMY     • ELECTROCARDIOGRAM, COMPLETE  04-    Scanned to Media Tab - Date of Service 04-   • HERNIA CHACON

## 2020-08-20 NOTE — OPERATIVE REPORT
[facility]                                                         Operative Note    Ary Antunez Location: [facility]  Perioperative   CSN [id number] MRN [id number]   Admission Date 8/20/2020 Procedure Date 8/20/2020   Attending Physician No att. provider This provided coverage of the defect along with a several centimeter overlap.hemostasis achieved. Trochars were removed. Wounds were closed per the patient was awoken and taken to the recovery room in satisfactory condition.         Elizabeth Chilel MD  8/20/

## 2020-09-30 ENCOUNTER — TELEPHONE (OUTPATIENT)
Dept: CARDIOLOGY | Age: 50
End: 2020-09-30

## 2020-09-30 RX ORDER — FLECAINIDE ACETATE 150 MG/1
150 TABLET ORAL 2 TIMES DAILY
Qty: 60 TABLET | Refills: 0 | Status: SHIPPED | OUTPATIENT
Start: 2020-09-30 | End: 2020-12-16 | Stop reason: SDUPTHER

## 2020-12-16 RX ORDER — FLECAINIDE ACETATE 150 MG/1
150 TABLET ORAL 2 TIMES DAILY
Qty: 60 TABLET | Refills: 0 | Status: SHIPPED | OUTPATIENT
Start: 2020-12-16 | End: 2021-03-29

## 2021-01-12 ENCOUNTER — APPOINTMENT (OUTPATIENT)
Dept: CARDIOLOGY | Age: 51
End: 2021-01-12

## 2021-02-22 RX ORDER — DILTIAZEM HYDROCHLORIDE 180 MG/1
CAPSULE, COATED, EXTENDED RELEASE ORAL
Qty: 90 CAPSULE | Refills: 0 | Status: SHIPPED | OUTPATIENT
Start: 2021-02-22 | End: 2021-05-24

## 2021-03-09 ENCOUNTER — OFFICE VISIT (OUTPATIENT)
Dept: CARDIOLOGY | Age: 51
End: 2021-03-09

## 2021-03-09 VITALS
DIASTOLIC BLOOD PRESSURE: 88 MMHG | OXYGEN SATURATION: 95 % | HEIGHT: 73 IN | HEART RATE: 75 BPM | BODY MASS INDEX: 41.75 KG/M2 | SYSTOLIC BLOOD PRESSURE: 140 MMHG | WEIGHT: 315 LBS

## 2021-03-09 DIAGNOSIS — I48.0 PAROXYSMAL ATRIAL FIBRILLATION (CMD): Primary | ICD-10-CM

## 2021-03-09 DIAGNOSIS — I10 BENIGN ESSENTIAL HTN: ICD-10-CM

## 2021-03-09 DIAGNOSIS — Z79.899 ENCOUNTER FOR LONG-TERM (CURRENT) DRUG USE: ICD-10-CM

## 2021-03-09 PROCEDURE — 93000 ELECTROCARDIOGRAM COMPLETE: CPT | Performed by: INTERNAL MEDICINE

## 2021-03-09 PROCEDURE — 3077F SYST BP >= 140 MM HG: CPT | Performed by: INTERNAL MEDICINE

## 2021-03-09 PROCEDURE — 3079F DIAST BP 80-89 MM HG: CPT | Performed by: INTERNAL MEDICINE

## 2021-03-09 PROCEDURE — 99214 OFFICE O/P EST MOD 30 MIN: CPT | Performed by: INTERNAL MEDICINE

## 2021-03-09 ASSESSMENT — PATIENT HEALTH QUESTIONNAIRE - PHQ9
1. LITTLE INTEREST OR PLEASURE IN DOING THINGS: NOT AT ALL
SUM OF ALL RESPONSES TO PHQ9 QUESTIONS 1 AND 2: 0
CLINICAL INTERPRETATION OF PHQ9 SCORE: NO FURTHER SCREENING NEEDED
2. FEELING DOWN, DEPRESSED OR HOPELESS: NOT AT ALL
SUM OF ALL RESPONSES TO PHQ9 QUESTIONS 1 AND 2: 0
CLINICAL INTERPRETATION OF PHQ2 SCORE: NO FURTHER SCREENING NEEDED

## 2021-03-19 ENCOUNTER — APPOINTMENT (OUTPATIENT)
Dept: CARDIOLOGY | Age: 51
End: 2021-03-19
Attending: INTERNAL MEDICINE

## 2021-03-22 RX ORDER — CARVEDILOL 25 MG/1
TABLET ORAL
Qty: 180 TABLET | Refills: 3 | Status: SHIPPED | OUTPATIENT
Start: 2021-03-22

## 2021-03-26 ENCOUNTER — ANCILLARY PROCEDURE (OUTPATIENT)
Dept: CARDIOLOGY | Age: 51
End: 2021-03-26
Attending: INTERNAL MEDICINE

## 2021-03-26 DIAGNOSIS — I48.0 PAROXYSMAL ATRIAL FIBRILLATION (CMD): ICD-10-CM

## 2021-03-28 PROCEDURE — 95800 SLP STDY UNATTENDED: CPT | Performed by: SPECIALIST

## 2021-03-28 PROCEDURE — 95800 SLP STDY UNATTENDED: CPT | Performed by: INTERNAL MEDICINE

## 2021-03-29 RX ORDER — FLECAINIDE ACETATE 150 MG/1
150 TABLET ORAL 2 TIMES DAILY
Qty: 180 TABLET | Refills: 1 | Status: SHIPPED | OUTPATIENT
Start: 2021-03-29 | End: 2021-06-10 | Stop reason: SDUPTHER

## 2021-03-30 ENCOUNTER — TELEPHONE (OUTPATIENT)
Dept: CARDIOLOGY | Age: 51
End: 2021-03-30

## 2021-04-02 ENCOUNTER — IMMUNIZATION (OUTPATIENT)
Dept: LAB | Age: 51
End: 2021-04-02
Attending: HOSPITALIST
Payer: COMMERCIAL

## 2021-04-02 DIAGNOSIS — Z23 NEED FOR VACCINATION: Primary | ICD-10-CM

## 2021-04-02 PROCEDURE — 0001A SARSCOV2 VAC 30MCG/0.3ML IM: CPT

## 2021-04-24 ENCOUNTER — IMMUNIZATION (OUTPATIENT)
Dept: LAB | Age: 51
End: 2021-04-24
Attending: HOSPITALIST
Payer: COMMERCIAL

## 2021-04-24 DIAGNOSIS — Z23 NEED FOR VACCINATION: Primary | ICD-10-CM

## 2021-04-24 PROCEDURE — 0002A SARSCOV2 VAC 30MCG/0.3ML IM: CPT

## 2021-05-07 ENCOUNTER — TELEPHONE (OUTPATIENT)
Dept: CARDIOLOGY | Age: 51
End: 2021-05-07

## 2021-05-07 RX ORDER — ENALAPRIL MALEATE 20 MG/1
20 TABLET ORAL 2 TIMES DAILY
Qty: 60 TABLET | Refills: 3 | Status: SHIPPED | OUTPATIENT
Start: 2021-05-07 | End: 2021-05-10

## 2021-05-10 RX ORDER — ENALAPRIL MALEATE 20 MG/1
20 TABLET ORAL 2 TIMES DAILY
Qty: 180 TABLET | Refills: 3 | Status: SHIPPED | OUTPATIENT
Start: 2021-05-10

## 2021-05-24 RX ORDER — DILTIAZEM HYDROCHLORIDE 180 MG/1
CAPSULE, COATED, EXTENDED RELEASE ORAL
Qty: 90 CAPSULE | Refills: 3 | Status: SHIPPED | OUTPATIENT
Start: 2021-05-24

## 2021-06-10 ENCOUNTER — TELEPHONE (OUTPATIENT)
Dept: CARDIOLOGY | Age: 51
End: 2021-06-10

## 2021-06-10 ENCOUNTER — E-ADVICE (OUTPATIENT)
Dept: CARDIOLOGY | Age: 51
End: 2021-06-10

## 2021-06-10 RX ORDER — FLECAINIDE ACETATE 150 MG/1
150 TABLET ORAL 2 TIMES DAILY
Qty: 60 TABLET | Refills: 0 | Status: SHIPPED | OUTPATIENT
Start: 2021-06-10

## 2021-11-17 PROBLEM — E86.0 DEHYDRATION: Status: RESOLVED | Noted: 2017-06-07 | Resolved: 2021-11-17

## 2021-11-17 PROBLEM — I48.0 PAF (PAROXYSMAL ATRIAL FIBRILLATION) (HCC): Status: RESOLVED | Noted: 2017-06-07 | Resolved: 2021-11-17

## 2021-11-17 PROBLEM — I10 HTN (HYPERTENSION), BENIGN: Chronic | Status: RESOLVED | Noted: 2017-06-07 | Resolved: 2021-11-17

## 2021-11-17 PROBLEM — R00.2 PALPITATIONS: Chronic | Status: RESOLVED | Noted: 2017-09-22 | Resolved: 2021-11-17

## 2021-11-17 PROBLEM — R00.0 RACING HEART BEAT: Status: RESOLVED | Noted: 2017-06-07 | Resolved: 2021-11-17

## 2022-03-15 ENCOUNTER — APPOINTMENT (OUTPATIENT)
Dept: CARDIOLOGY | Age: 52
End: 2022-03-15

## 2022-05-31 ENCOUNTER — TELEPHONE (OUTPATIENT)
Dept: SURGERY | Facility: CLINIC | Age: 52
End: 2022-05-31

## 2022-05-31 NOTE — TELEPHONE ENCOUNTER
S/W nurse Eloise Duran  from the CV floor at [address] [place] [address] who was calling at the advice of DONALD for an appt for consult with [facility] for a second opinion for her friend who was seen and treated at another hospital for had a spontaneous kidney bleed and now scrotal swelling from a hydrocele. She is asking for an appt this week. I told her that I will have to ask ZH if he is willing to add pt on his schd this week as he has no available consult appts. I told her that I will call pt with an appt. I tried to send [facility] a secure chat msg but he was unavailable. I tasked this msg and I also placed this msg on his desk.

## 2022-06-01 NOTE — TELEPHONE ENCOUNTER
Tried to reach pt on the cell # and had to [place] urgently regarding an appt for today. Also tried the work # listed and was told he was not there today. I placed the pt in the 2:30 pm slot for today to hold it and I also sent him a my chart message. Meir Moore MD  Priyanka, Sonia Sa, APRN; [facility] Clinical Staff  Caller: Unspecified (Yesterday, 10:32 AM)  If they have records and discs from that hospitalization OK. Otherwise, offer a later appointment so that they can obtain the outside records as visit would be useless without them.

## 2024-09-25 PROBLEM — R00.2 PALPITATIONS: Status: ACTIVE | Noted: 2017-09-22

## 2024-09-25 PROBLEM — E11.65 TYPE 2 DIABETES MELLITUS WITH HYPERGLYCEMIA  (CMD): Status: ACTIVE | Noted: 2024-09-25

## 2024-09-25 PROBLEM — Z79.899 ENCOUNTER FOR LONG-TERM (CURRENT) DRUG USE: Status: RESOLVED | Noted: 2021-03-09 | Resolved: 2024-09-25

## 2024-11-18 ENCOUNTER — APPOINTMENT (OUTPATIENT)
Dept: ULTRASOUND IMAGING | Facility: HOSPITAL | Age: 54
End: 2024-11-18
Attending: EMERGENCY MEDICINE
Payer: COMMERCIAL

## 2024-11-18 ENCOUNTER — HOSPITAL ENCOUNTER (INPATIENT)
Facility: HOSPITAL | Age: 54
LOS: 21 days | Discharge: HOME HEALTH | End: 2024-12-09
Attending: EMERGENCY MEDICINE | Admitting: INTERNAL MEDICINE
Payer: COMMERCIAL

## 2024-11-18 DIAGNOSIS — F32.A DEPRESSION, UNSPECIFIED DEPRESSION TYPE: ICD-10-CM

## 2024-11-18 DIAGNOSIS — L03.116 CELLULITIS OF LEFT LOWER LEG: Primary | ICD-10-CM

## 2024-11-18 DIAGNOSIS — I82.4Y9 ACUTE DEEP VEIN THROMBOSIS (DVT) OF PROXIMAL VEIN OF LOWER EXTREMITY, UNSPECIFIED LATERALITY (HCC): ICD-10-CM

## 2024-11-18 LAB
ALBUMIN SERPL-MCNC: 4.1 G/DL (ref 3.2–4.8)
ALBUMIN/GLOB SERPL: 1.4 {RATIO} (ref 1–2)
ALP LIVER SERPL-CCNC: 72 U/L
ALT SERPL-CCNC: 38 U/L
ANION GAP SERPL CALC-SCNC: 5 MMOL/L (ref 0–18)
AST SERPL-CCNC: 29 U/L (ref ?–34)
BASOPHILS # BLD AUTO: 0.03 X10(3) UL (ref 0–0.2)
BASOPHILS NFR BLD AUTO: 0.3 %
BILIRUB SERPL-MCNC: 0.3 MG/DL (ref 0.3–1.2)
BUN BLD-MCNC: 18 MG/DL (ref 9–23)
BUN/CREAT SERPL: 17 (ref 10–20)
CALCIUM BLD-MCNC: 10.8 MG/DL (ref 8.7–10.4)
CHLORIDE SERPL-SCNC: 104 MMOL/L (ref 98–112)
CO2 SERPL-SCNC: 29 MMOL/L (ref 21–32)
CREAT BLD-MCNC: 1.06 MG/DL
DEPRECATED RDW RBC AUTO: 50.4 FL (ref 35.1–46.3)
EGFRCR SERPLBLD CKD-EPI 2021: 84 ML/MIN/1.73M2 (ref 60–?)
EOSINOPHIL # BLD AUTO: 0.06 X10(3) UL (ref 0–0.7)
EOSINOPHIL NFR BLD AUTO: 0.6 %
ERYTHROCYTE [DISTWIDTH] IN BLOOD BY AUTOMATED COUNT: 15.5 % (ref 11–15)
EST. AVERAGE GLUCOSE BLD GHB EST-MCNC: 117 MG/DL (ref 68–126)
GLOBULIN PLAS-MCNC: 3 G/DL (ref 2–3.5)
GLUCOSE BLD-MCNC: 98 MG/DL (ref 70–99)
GLUCOSE BLDC GLUCOMTR-MCNC: 145 MG/DL (ref 70–99)
HBA1C MFR BLD: 5.7 % (ref ?–5.7)
HCT VFR BLD AUTO: 40.9 %
HGB BLD-MCNC: 13.1 G/DL
IMM GRANULOCYTES # BLD AUTO: 0.06 X10(3) UL (ref 0–1)
IMM GRANULOCYTES NFR BLD: 0.6 %
LYMPHOCYTES # BLD AUTO: 1.53 X10(3) UL (ref 1–4)
LYMPHOCYTES NFR BLD AUTO: 15.1 %
MCH RBC QN AUTO: 28.1 PG (ref 26–34)
MCHC RBC AUTO-ENTMCNC: 32 G/DL (ref 31–37)
MCV RBC AUTO: 87.6 FL
MONOCYTES # BLD AUTO: 1.11 X10(3) UL (ref 0.1–1)
MONOCYTES NFR BLD AUTO: 10.9 %
NEUTROPHILS # BLD AUTO: 7.35 X10 (3) UL (ref 1.5–7.7)
NEUTROPHILS # BLD AUTO: 7.35 X10(3) UL (ref 1.5–7.7)
NEUTROPHILS NFR BLD AUTO: 72.5 %
NT-PROBNP SERPL-MCNC: 580 PG/ML (ref ?–125)
OSMOLALITY SERPL CALC.SUM OF ELEC: 288 MOSM/KG (ref 275–295)
PLATELET # BLD AUTO: 155 10(3)UL (ref 150–450)
POTASSIUM SERPL-SCNC: 3.8 MMOL/L (ref 3.5–5.1)
PROT SERPL-MCNC: 7.1 G/DL (ref 5.7–8.2)
RBC # BLD AUTO: 4.67 X10(6)UL
SODIUM SERPL-SCNC: 138 MMOL/L (ref 136–145)
WBC # BLD AUTO: 10.1 X10(3) UL (ref 4–11)

## 2024-11-18 PROCEDURE — 93971 EXTREMITY STUDY: CPT | Performed by: EMERGENCY MEDICINE

## 2024-11-18 RX ORDER — HYDRALAZINE HYDROCHLORIDE 50 MG/1
50 TABLET, FILM COATED ORAL 2 TIMES DAILY
Status: DISCONTINUED | OUTPATIENT
Start: 2024-11-18 | End: 2024-12-09

## 2024-11-18 RX ORDER — HYDRALAZINE HYDROCHLORIDE 10 MG/1
10 TABLET, FILM COATED ORAL 2 TIMES DAILY
COMMUNITY
End: 2024-11-18 | Stop reason: CLARIF

## 2024-11-18 RX ORDER — HYDROMORPHONE HYDROCHLORIDE 1 MG/ML
0.5 INJECTION, SOLUTION INTRAMUSCULAR; INTRAVENOUS; SUBCUTANEOUS EVERY 2 HOUR PRN
Status: DISCONTINUED | OUTPATIENT
Start: 2024-11-18 | End: 2024-12-09

## 2024-11-18 RX ORDER — CHLORAL HYDRATE 500 MG
1000 CAPSULE ORAL DAILY
COMMUNITY

## 2024-11-18 RX ORDER — FEXOFENADINE HCL 180 MG/1
180 TABLET ORAL DAILY
COMMUNITY

## 2024-11-18 RX ORDER — SENNOSIDES 8.6 MG
17.2 TABLET ORAL NIGHTLY PRN
Status: DISCONTINUED | OUTPATIENT
Start: 2024-11-18 | End: 2024-12-09

## 2024-11-18 RX ORDER — FLECAINIDE ACETATE 150 MG/1
150 TABLET ORAL 2 TIMES DAILY
COMMUNITY

## 2024-11-18 RX ORDER — HYDRALAZINE HYDROCHLORIDE 50 MG/1
50 TABLET, FILM COATED ORAL 2 TIMES DAILY
COMMUNITY

## 2024-11-18 RX ORDER — FLECAINIDE ACETATE 50 MG/1
150 TABLET ORAL 2 TIMES DAILY
Status: DISCONTINUED | OUTPATIENT
Start: 2024-11-18 | End: 2024-12-09

## 2024-11-18 RX ORDER — DILTIAZEM HYDROCHLORIDE 180 MG/1
180 CAPSULE, EXTENDED RELEASE ORAL NIGHTLY
Status: DISCONTINUED | OUTPATIENT
Start: 2024-11-18 | End: 2024-12-09

## 2024-11-18 RX ORDER — CARVEDILOL 25 MG/1
25 TABLET ORAL 2 TIMES DAILY WITH MEALS
Status: DISCONTINUED | OUTPATIENT
Start: 2024-11-18 | End: 2024-12-09

## 2024-11-18 RX ORDER — POLYETHYLENE GLYCOL 3350 17 G/17G
17 POWDER, FOR SOLUTION ORAL DAILY PRN
Status: DISCONTINUED | OUTPATIENT
Start: 2024-11-18 | End: 2024-12-09

## 2024-11-18 RX ORDER — ENALAPRIL MALEATE 20 MG/1
20 TABLET ORAL 2 TIMES DAILY
Status: DISCONTINUED | OUTPATIENT
Start: 2024-11-18 | End: 2024-12-09

## 2024-11-18 RX ORDER — IBUPROFEN 200 MG
4 TABLET ORAL DAILY PRN
COMMUNITY
End: 2024-12-09

## 2024-11-18 RX ORDER — ENOXAPARIN SODIUM 100 MG/ML
90 INJECTION SUBCUTANEOUS EVERY 12 HOURS SCHEDULED
Status: DISCONTINUED | OUTPATIENT
Start: 2024-11-18 | End: 2024-11-29

## 2024-11-18 RX ORDER — DILTIAZEM HYDROCHLORIDE 180 MG/1
180 CAPSULE, EXTENDED RELEASE ORAL DAILY
Status: DISCONTINUED | OUTPATIENT
Start: 2024-11-19 | End: 2024-11-18

## 2024-11-18 RX ORDER — FUROSEMIDE 40 MG/1
40 TABLET ORAL DAILY
Status: DISCONTINUED | OUTPATIENT
Start: 2024-11-19 | End: 2024-11-18

## 2024-11-18 RX ORDER — MAGNESIUM OXIDE 400 MG/1
200 TABLET ORAL NIGHTLY
Status: DISCONTINUED | OUTPATIENT
Start: 2024-11-18 | End: 2024-12-09

## 2024-11-18 RX ORDER — ASPIRIN 81 MG/1
81 TABLET ORAL DAILY
Status: DISCONTINUED | OUTPATIENT
Start: 2024-11-19 | End: 2024-11-19

## 2024-11-18 RX ORDER — FUROSEMIDE 40 MG/1
40 TABLET ORAL DAILY PRN
COMMUNITY

## 2024-11-18 RX ORDER — OXYCODONE HYDROCHLORIDE 5 MG/1
5 TABLET ORAL EVERY 4 HOURS PRN
Status: DISCONTINUED | OUTPATIENT
Start: 2024-11-18 | End: 2024-11-22

## 2024-11-18 RX ORDER — ACETAMINOPHEN 500 MG
500 TABLET ORAL EVERY 4 HOURS PRN
Status: DISCONTINUED | OUTPATIENT
Start: 2024-11-18 | End: 2024-12-09

## 2024-11-18 RX ORDER — ONDANSETRON 2 MG/ML
4 INJECTION INTRAMUSCULAR; INTRAVENOUS EVERY 6 HOURS PRN
Status: DISCONTINUED | OUTPATIENT
Start: 2024-11-18 | End: 2024-12-09

## 2024-11-18 RX ORDER — ADALIMUMAB 40MG/0.4ML
KIT SUBCUTANEOUS
COMMUNITY

## 2024-11-18 RX ORDER — HYDROMORPHONE HYDROCHLORIDE 1 MG/ML
1 INJECTION, SOLUTION INTRAMUSCULAR; INTRAVENOUS; SUBCUTANEOUS EVERY 2 HOUR PRN
Status: DISCONTINUED | OUTPATIENT
Start: 2024-11-18 | End: 2024-12-02

## 2024-11-18 RX ORDER — PROCHLORPERAZINE EDISYLATE 5 MG/ML
5 INJECTION INTRAMUSCULAR; INTRAVENOUS EVERY 8 HOURS PRN
Status: DISCONTINUED | OUTPATIENT
Start: 2024-11-18 | End: 2024-12-09

## 2024-11-18 RX ORDER — SIMETHICONE 80 MG
80 TABLET,CHEWABLE ORAL 3 TIMES DAILY PRN
Status: DISCONTINUED | OUTPATIENT
Start: 2024-11-18 | End: 2024-12-09

## 2024-11-18 RX ORDER — KETOROLAC TROMETHAMINE 15 MG/ML
15 INJECTION, SOLUTION INTRAMUSCULAR; INTRAVENOUS ONCE
Status: COMPLETED | OUTPATIENT
Start: 2024-11-18 | End: 2024-11-18

## 2024-11-18 NOTE — ED QUICK NOTES
Orders for admission, patient is aware of plan and ready to go upstairs. Any questions, please call ED CHELSEY Dobson at extension [phone].     Patient Covid vaccination status: Fully vaccinated     COVID Test Ordered in ED: None    COVID Suspicion at Admission: N/A    Running Infusions:      Mental Status/LOC at time of transport: A&Ox4    Other pertinent information:   CIWA score: N/A   NIH score:  N/A

## 2024-11-18 NOTE — ED PROVIDER NOTES
Patient Seen in: [facility] Emergency Department      History     Chief Complaint   Patient presents with    Swelling Edema     Stated Complaint: Leg Pain    Subjective:   HPI      Patient is a 54-year-old male with a history of type 2 diabetes psoriasis hypertension who had a quadricep injury repaired in 2023.  Over the last 2 to 3 days he is comparative redness and pain to his left lower leg.  He has had a fever.  No known injury.    Objective:     Past Medical History:    Appendicitis    Arrhythmia    AF    Atrial fibrillation (HCC)    Congestive heart disease (HCC)    age 30 yrs.  1 episode    Esophageal reflux    High blood pressure    NEREIDA (obstructive sleep apnea)    Intolerant to cpap    Osteoarthritis    Unspecified essential hypertension    Ventral hernia    Visual impairment    glasses for driving              Past Surgical History:   Procedure Laterality Date    Appendectomy      Electrocardiogram, complete  2014    Scanned to Media Tab - Date of Service 2014    Hernia surgery      () Ventral Hernia Repaired                Social History     Socioeconomic History    Marital status:    Tobacco Use    Smoking status: Former     Current packs/day: 0.00     Types: Cigarettes     Quit date: 2012     Years since quittin.2    Smokeless tobacco: Never   Vaping Use    Vaping status: Never Used   Substance and Sexual Activity    Alcohol use: Yes     Alcohol/week: 8.0 standard drinks of alcohol     Types: 8 Standard drinks or equivalent per week     Comment: rarely    Drug use: No                  Physical Exam     ED Triage Vitals [24 1112]   /75   Pulse 80   Resp 16   Temp (S) 98.8 °F (37.1 °C)   Temp src    SpO2 93 %   O2 Device None (Room air)       Current Vitals:   Vital Signs  BP: 135/75  Pulse: 80  Resp: 16  Temp: (S) 98.8 °F (37.1 °C) (tylenol taken at 0930)    Oxygen Therapy  SpO2: 93 %  O2 Device: None (Room air)      Constitutional: Oriented  to person, place, and time. Appears well-developed and well-nourished.   HEENT:   Head: Normocephalic and atraumatic.   Right Ear: External ear normal.   Left Ear: External ear normal.   Nose: Nose normal.   Mouth/Throat: Oropharynx is clear and moist.   Eyes: Conjunctivae and EOM are normal. Pupils are equal, round, and reactive to light.   Neck: Neck supple.   Cardiovascular: Normal rate, regular rhythm, normal heart sounds and intact distal pulses.    Pulmonary/Chest: Effort normal and breath sounds normal. No respiratory distress.   Abdominal: Soft. Bowel sounds are normal. Exhibits no distension and no mass. There is no tenderness. There is no rebound and no guarding.   Musculoskeletal: Left lower leg is erythematous in the pretibial area.  There is some minimal skin breakdown.  There is tenderness in this area.  There is some mild calf tenderness as well with some swelling.  Distal capillary refill sensation motor functions intact neurological: Alert and oriented to person, place, and time. Normal reflexes. No cranial nerve deficit. No motor os sensory defecits noted Coordination normal.   Skin: Skin is warm and dry.   Psychiatric: Normal mood and affect. Behavior is normal. Judgment and thought content normal.   Nursing note and vitals reviewed.      ED Course     Labs Reviewed   COMP METABOLIC PANEL (14) - Abnormal; Notable for the following components:       Result Value    Calcium, Total 10.8 (*)     All other components within normal limits   PRO BETA NATRIURETIC PEPTIDE - Abnormal; Notable for the following components:    Pro-Beta Natriuretic Peptide 580 (*)     All other components within normal limits   CBC WITH DIFFERENTIAL WITH PLATELET - Abnormal; Notable for the following components:    RDW-SD 50.4 (*)     RDW 15.5 (*)     Monocyte Absolute 1.11 (*)     All other components within normal limits   RAINBOW DRAW LAVENDER   RAINBOW DRAW LIGHT GREEN   RAINBOW DRAW BLUE                   MDM      Use of  independent historian:     I personally reviewed and interpreted the images :     No results found.    Vitals:    11/18/24 1112   BP: 135/75   Pulse: 80   Resp: 16   Temp: (S) 98.8 °F (37.1 °C)   SpO2: 93%   Weight: (!) 179.2 kg   Height: 185.4 cm (6' 1\")     *I personally reviewed and interpreted all ED vitals.    Pulse Ox: 93%, Room air, Normal         Differential Diagnosis/ Diagnostic Considerations: Left leg pain redness and swelling with history of fever consider cellulitis consider DVT consider consider arterial insufficiency.  Consider hyperglycemia    Medical Record Review: I personally reviewed available prior medical records for any recent pertinent discharge summaries, testing, and procedures and reviewed those reports and found [facility] endocrinology 9/25/2024 notes reviewed.  Started on Mounjaro..    Complicating Factors: The patient already has type 2 diabetes which contribute to the complexity of this ED evaluation.    Social determinants of health:    Prescription drug management:      Shared Decision Making:    ED Course: Antibiotics initiated patient seen in the ER by Dr. Parra the [facility] hospitalist will admit    Discussion of management with other healthcare providers:    Condition upon leaving the department: Stable          Medical Decision Making      Disposition and Plan     Clinical Impression:  1. Cellulitis of left lower leg         Disposition:  There is no disposition on file for this visit.  There is no disposition time on file for this visit.    Follow-up:  No follow-up provider specified.        Medications Prescribed:  Current Discharge Medication List              Supplementary Documentation:

## 2024-11-18 NOTE — RESPIRATORY THERAPY NOTE
Pt. has NEREIDA but does not use cpap or any treatment for it. Pt. refused hospital cpap machine while staying.

## 2024-11-18 NOTE — H&P
[facility]   INTERNAL MEDICINE HISTORY & PHYSICAL      CHIEF COMPLAINT   Swelling Edema    HISTORY OF THE PRESENT ILLNESS    Jovan Merino Sr. - 54 year old male presenting with above CC.    History obtained from patient w/ additional history from review of outside records in care everywhere.  Progressive pain, redness, swelling of LLE for past 5 days. Assoc with fevers. Progressively worse. No specific open sores or lesions but does have psoriasis and has dry flaky skin w some plaques. Is on humira for this    In ED, T 98.8, VSS. CMP/CBC okay. . Given IVP toradol, IV ancef. LLE doppler ordered. D/w Dr. Horne, admitting for cellulitis.    REVIEW OF SYSTEMS   Remainder of 12-point ROS negative unless discussed in HPI.     PATIENT HISTORIES  PMHx:  He has a past medical history of Appendicitis, Arrhythmia, Atrial fibrillation (HCC), Congestive heart disease (HCC), Esophageal reflux, High blood pressure, NEREIDA (obstructive sleep apnea) (9/22/2017), Osteoarthritis, Unspecified essential hypertension, Ventral hernia (2012), and Visual impairment.  PSHx:  He has a past surgical history that includes hernia surgery; appendectomy; and electrocardiogram, complete (04-).   FHx:  His family history includes Cancer in his maternal grandfather and maternal grandmother; Other (age of onset: 60) in his father.   SHx: He reports that he quit smoking about 12 years ago. He has never used smokeless tobacco. He reports current alcohol use of about 8.0 standard drinks of alcohol per week. He reports that he does not use drugs.    Allergies: He is allergic to hydrocodone.     Current Outpatient Medications   Medication Instructions    acetaminophen (TYLENOL EXTRA STRENGTH) 1,000 mg, Oral, Once    Adalimumab (HUMIRA, 2 PEN,) 40 MG/0.4ML Subcutaneous Auto-injector Kit Every 14 days    aspirin 81 mg, Oral, Daily    carvedilol (COREG) 25 mg, Oral, 2 times daily with meals, take 1 tablet (25MG)  by oral route 2 times  every day with food    dilTIAZem HCl ER Coated Beads 180 MG Oral Capsule SR 24 Hr TAKE 1 CAPSULE DAILY    diphenhydrAMINE (BENADRYL) 25 mg, Oral, Every 6 hours PRN    Enalapril Maleate (VASOTEC) 20 MG Oral Tab TAKE 1 TABLET BY MOUTH TWICE DAILY    Fexofenadine HCl (ALLEGRA OR) 180 mg, Oral, Daily    flecainide (TAMBOCOR) 150 mg, Oral, 2 times daily    furosemide (LASIX) 40 mg, Daily    Guselkumab (TREMFYA) 100 MG/ML Subcutaneous Solution Pen-injector Inject 100 mg subcutaneous every 8 weeks    Guselkumab (TREMFYA) 100 MG/ML Subcutaneous Solution Pen-injector Inject 100 mg subcutaneous every 8 weeks    hydrALAZINE (APRESOLINE) 10 mg, 2 times daily    Krill Oil 500 MG Oral Cap 1 capsule, Oral, Daily    Multiple Vitamin Oral Tab 1 tablet, Oral, Daily    triamcinolone 0.1 % External Cream APPLY TO THE AFFECTED AREA TWICE DAILY FOR 4 WEEKS       PHYSICAL EXAMINATION   Vitals: /75   Pulse 80   Temp (S) 98.8 °F (37.1 °C)   Resp 16   Ht 6' 1\" (1.854 m)   Wt (!) 395 lb (179.2 kg)   SpO2 93%   BMI 52.11 kg/m²   Gen: Appears ill, rigors, appears in pain  Eyes: PERRLA, normal conjunctivae  ENMT: Moist mucous membranes, trachea midline, no pharyngeal erythema, no thyromegaly  CV: RRR, no M/R/G, no peripheral edema  Resp: CTAB, non-labored respirations, symmetric expansion  GI: Soft, NT, ND, + BS, no rebound, no guarding  MSK:  No C/C/E, normal active/passive ROM in C-spine, 5/5 strength in all extremities  Skin: Psoriasis plaques. LLE swollen, warm, diffuse areas of demarcated erythema, very TTP  Neuro: CN 2-12 grossly intact, sensation intact   Psych: A&Ox3, appropriate mood, conversant w/ linear thought process     DATA REVIEW: LABORATORY VALUES & DIAGNOSTICS  Recent Labs   Lab 11/18/24  1132      K 3.8      CO2 29.0   BUN 18   CREATSERUM 1.06   ANIONGAP 5   GLU 98   CA 10.8*   TP 7.1   ALB 4.1   AST 29   ALT 38   ALKPHO 72   BILT 0.3   EGFRCR 84     Recent Labs   Lab 11/18/24  1132   WBC 10.1   HGB  13.1   HCT 40.9   .0   MCV 87.6   MOPERCENT 10.9   EOPERCENT 0.6   BAPERCENT 0.3     ASSESSMENT & PLAN   WilberJovan aldana - 54 year old male w MO, HTN, Afib, HFpEF, psoriasis admitted 11/18/2024 for LLE cellulitis, started on IV ancef.    LLE edema/erythema -> LLE cellulitis  Immunosuppression 2/2 psoriasis/humira  - Febrile at home. 98.8 in ER after APAP. HR 80s, WBC 10  [  ] F/u doppler to r/o DVT  - . Known HFpEF. Feels like legs aren't swollen & has lost weight. Lower c/f decompensated CHF  - Pain control: IVP dilaudid, PO oxy, APAP PRN  - Elevate extremity  - IV ancef (11/18 -  )    HFpEF - compensated  HTN  Parox Afib - low CV, not on AC  - Continue PTA lasix, flecainide, coreg, hydral, enalapril    Psoriasis  - On humira OP. Would hold until leg better    Morbid obesity w [facility]  - Body mass index is 52.11 kg/m².   - Healthy diet & wt loss encouraged  - Has lost quite a bit of weight with Mounjaro already    DM2 - currently controlled w mounjaro  - Okay for reg diet, follow BG on AM labs. Can hold on SSI/accuchecks for now, can add if issues controlling    ACP: Full Code  Ppx: DVT: Enox  Dispo  EDoD:  ~11/20    F/u:   - PCP:  Alvino Velez, DO    Available via epic secure chat or perfect serve 7A - 7P.    Narayan Parra MD   Internal Medicine - Hospitalist  [facility]

## 2024-11-19 LAB
ANION GAP SERPL CALC-SCNC: 7 MMOL/L (ref 0–18)
BUN BLD-MCNC: 16 MG/DL (ref 9–23)
BUN/CREAT SERPL: 15.2 (ref 10–20)
CALCIUM BLD-MCNC: 9.7 MG/DL (ref 8.7–10.4)
CHLORIDE SERPL-SCNC: 102 MMOL/L (ref 98–112)
CO2 SERPL-SCNC: 27 MMOL/L (ref 21–32)
CREAT BLD-MCNC: 1.05 MG/DL
DEPRECATED RDW RBC AUTO: 50.4 FL (ref 35.1–46.3)
EGFRCR SERPLBLD CKD-EPI 2021: 85 ML/MIN/1.73M2 (ref 60–?)
ERYTHROCYTE [DISTWIDTH] IN BLOOD BY AUTOMATED COUNT: 15.7 % (ref 11–15)
GLUCOSE BLD-MCNC: 109 MG/DL (ref 70–99)
GLUCOSE BLDC GLUCOMTR-MCNC: 107 MG/DL (ref 70–99)
GLUCOSE BLDC GLUCOMTR-MCNC: 110 MG/DL (ref 70–99)
GLUCOSE BLDC GLUCOMTR-MCNC: 124 MG/DL (ref 70–99)
GLUCOSE BLDC GLUCOMTR-MCNC: 95 MG/DL (ref 70–99)
HCT VFR BLD AUTO: 36 %
HGB BLD-MCNC: 11.6 G/DL
MCH RBC QN AUTO: 28.1 PG (ref 26–34)
MCHC RBC AUTO-ENTMCNC: 32.2 G/DL (ref 31–37)
MCV RBC AUTO: 87.2 FL
OSMOLALITY SERPL CALC.SUM OF ELEC: 284 MOSM/KG (ref 275–295)
PLATELET # BLD AUTO: 163 10(3)UL (ref 150–450)
POTASSIUM SERPL-SCNC: 3.6 MMOL/L (ref 3.5–5.1)
POTASSIUM SERPL-SCNC: 3.9 MMOL/L (ref 3.5–5.1)
RBC # BLD AUTO: 4.13 X10(6)UL
SODIUM SERPL-SCNC: 136 MMOL/L (ref 136–145)
WBC # BLD AUTO: 13.5 X10(3) UL (ref 4–11)

## 2024-11-19 RX ORDER — CALCIUM CARBONATE 500 MG/1
1000 TABLET, CHEWABLE ORAL 3 TIMES DAILY PRN
Status: DISCONTINUED | OUTPATIENT
Start: 2024-11-19 | End: 2024-12-09

## 2024-11-19 RX ORDER — KETOROLAC TROMETHAMINE 15 MG/ML
15 INJECTION, SOLUTION INTRAMUSCULAR; INTRAVENOUS EVERY 6 HOURS SCHEDULED
Status: DISPENSED | OUTPATIENT
Start: 2024-11-19 | End: 2024-11-21

## 2024-11-19 RX ORDER — POTASSIUM CHLORIDE 1500 MG/1
40 TABLET, EXTENDED RELEASE ORAL EVERY 4 HOURS
Status: COMPLETED | OUTPATIENT
Start: 2024-11-19 | End: 2024-11-19

## 2024-11-19 RX ORDER — TRAMADOL HYDROCHLORIDE 50 MG/1
50 TABLET ORAL EVERY 6 HOURS PRN
Status: DISCONTINUED | OUTPATIENT
Start: 2024-11-19 | End: 2024-12-02

## 2024-11-19 RX ORDER — FAMOTIDINE 10 MG/ML
20 INJECTION, SOLUTION INTRAVENOUS ONCE
Status: COMPLETED | OUTPATIENT
Start: 2024-11-19 | End: 2024-11-19

## 2024-11-19 RX ORDER — FAMOTIDINE 20 MG/1
20 TABLET, FILM COATED ORAL 2 TIMES DAILY PRN
Status: DISCONTINUED | OUTPATIENT
Start: 2024-11-19 | End: 2024-12-09

## 2024-11-19 NOTE — PAYOR COMM NOTE
ADMISSION REVIEW     Payor: [employer] OUT OF STATE PPO  Subscriber #:  [id number]  Authorization Number: [id number]    Admit date: 24  Admit time:  4:58 PM       REVIEW DOCUMENTATION:     ED Provider Notes        ED Provider Notes signed by Mert Horne MD at 2024  1:46 PM       Author: Mert Horne MD Service: -- Author Type: Physician    Filed: 2024  1:46 PM Date of Service: 2024  1:15 PM Status: Addendum    : Mert Horne MD (Physician)    Related Notes: Original Note by Mert Horne MD (Physician) filed at 2024  1:40 PM           Patient Seen in: [facility] Emergency Department      History     Chief Complaint   Patient presents with    Swelling Edema     Stated Complaint: Leg Pain    Subjective:   HPI      Patient is a 54-year-old male with a history of type 2 diabetes psoriasis hypertension who had a quadricep injury repaired in 2023.  Over the last 2 to 3 days he is comparative redness and pain to his left lower leg.  He has had a fever.  No known injury.    Objective:     Past Medical History:    Appendicitis    Arrhythmia    AF    Atrial fibrillation (HCC)    Congestive heart disease (HCC)    age 30 yrs.  1 episode    Esophageal reflux    High blood pressure    NEREIDA (obstructive sleep apnea)    Intolerant to cpap    Osteoarthritis    Unspecified essential hypertension    Ventral hernia    Visual impairment    glasses for driving              Past Surgical History:   Procedure Laterality Date    Appendectomy      Electrocardiogram, complete  2014    Scanned to Media Tab - Date of Service 2014    Hernia surgery      () Ventral Hernia Repaired                Social History     Socioeconomic History    Marital status:    Tobacco Use    Smoking status: Former     Current packs/day: 0.00     Types: Cigarettes     Quit date: 2012     Years since quittin.2    Smokeless tobacco: Never   Vaping Use    Vaping status: Never Used   Substance  and Sexual Activity    Alcohol use: Yes     Alcohol/week: 8.0 standard drinks of alcohol     Types: 8 Standard drinks or equivalent per week     Comment: rarely    Drug use: No                  Physical Exam     ED Triage Vitals [11/18/24 1112]   /75   Pulse 80   Resp 16   Temp (S) 98.8 °F (37.1 °C)   Temp src    SpO2 93 %   O2 Device None (Room air)       Current Vitals:   Vital Signs  BP: 135/75  Pulse: 80  Resp: 16  Temp: (S) 98.8 °F (37.1 °C) (tylenol taken at 0930)    Oxygen Therapy  SpO2: 93 %  O2 Device: None (Room air)      Constitutional: Oriented to person, place, and time. Appears well-developed and well-nourished.   HEENT:   Head: Normocephalic and atraumatic.   Right Ear: External ear normal.   Left Ear: External ear normal.   Nose: Nose normal.   Mouth/Throat: Oropharynx is clear and moist.   Eyes: Conjunctivae and EOM are normal. Pupils are equal, round, and reactive to light.   Neck: Neck supple.   Cardiovascular: Normal rate, regular rhythm, normal heart sounds and intact distal pulses.    Pulmonary/Chest: Effort normal and breath sounds normal. No respiratory distress.   Abdominal: Soft. Bowel sounds are normal. Exhibits no distension and no mass. There is no tenderness. There is no rebound and no guarding.   Musculoskeletal: Left lower leg is erythematous in the pretibial area.  There is some minimal skin breakdown.  There is tenderness in this area.  There is some mild calf tenderness as well with some swelling.  Distal capillary refill sensation motor functions intact neurological: Alert and oriented to person, place, and time. Normal reflexes. No cranial nerve deficit. No motor os sensory defecits noted Coordination normal.   Skin: Skin is warm and dry.   Psychiatric: Normal mood and affect. Behavior is normal. Judgment and thought content normal.   Nursing note and vitals reviewed.      ED Course     Labs Reviewed   COMP METABOLIC PANEL (14) - Abnormal; Notable for the following  components:       Result Value    Calcium, Total 10.8 (*)     All other components within normal limits   PRO BETA NATRIURETIC PEPTIDE - Abnormal; Notable for the following components:    Pro-Beta Natriuretic Peptide 580 (*)     All other components within normal limits   CBC WITH DIFFERENTIAL WITH PLATELET - Abnormal; Notable for the following components:    RDW-SD 50.4 (*)     RDW 15.5 (*)     Monocyte Absolute 1.11 (*)     All other components within normal limits   RAINBOW DRAW LAVENDER   RAINBOW DRAW LIGHT GREEN   RAINBOW DRAW BLUE       MDM      Use of independent historian:     I personally reviewed and interpreted the images :     No results found.    Vitals:    11/18/24 1112   BP: 135/75   Pulse: 80   Resp: 16   Temp: (S) 98.8 °F (37.1 °C)   SpO2: 93%   Weight: (!) 179.2 kg   Height: 185.4 cm (6' 1\")     *I personally reviewed and interpreted all ED vitals.    Pulse Ox: 93%, Room air, Normal         Differential Diagnosis/ Diagnostic Considerations: Left leg pain redness and swelling with history of fever consider cellulitis consider DVT consider consider arterial insufficiency.  Consider hyperglycemia    Medical Record Review: I personally reviewed available prior medical records for any recent pertinent discharge summaries, testing, and procedures and reviewed those reports and found [facility] endocrinology 9/25/2024 notes reviewed.  Started on Mounjaro..    Complicating Factors: The patient already has type 2 diabetes which contribute to the complexity of this ED evaluation.    Social determinants of health:    Prescription drug management:      Shared Decision Making:    ED Course: Antibiotics initiated patient seen in the ER by Dr. Parra the [facility] hospitalist will admit    Discussion of management with other healthcare providers:    Condition upon leaving the department: Stable          Medical Decision Making      Disposition and Plan     Clinical Impression:  1. Cellulitis of left lower leg          Disposition:  There is no disposition on file for this visit.  There is no disposition time on file for this visit.    Follow-up:  No follow-up provider specified.        Medications Prescribed:  Current Discharge Medication List              Supplementary Documentation:                                                             Signed by Mert Horne MD on 11/18/2024  1:46 PM         MEDICATIONS ADMINISTERED IN LAST 1 DAY:  acetaminophen (Tylenol Extra Strength) tab 500 mg       Date Action Dose Route User    11/19/2024 0651 Given 500 mg Oral Minnie Kelly RN    11/18/2024 1836 Given 500 mg Oral Zulema Simon RN          carvedilol (Coreg) tab 25 mg       Date Action Dose Route User    11/19/2024 0957 Given 25 mg Oral Zulema Simon RN    11/18/2024 1836 Given 25 mg Oral Zulema Simon RN          ceFAZolin (Ancef) 2g in 10mL IV syringe premix       Date Action Dose Route User    11/19/2024 1245 New Bag 2 g Intravenous Zulema Simon RN    11/19/2024 0515 New Bag 2 g Intravenous Minnie Kelly RN    11/18/2024 2123 New Bag 2 g Intravenous Minnie Kelly RN          dilTIAZem ER (CardIZEM CD) 24 hr cap 180 mg       Date Action Dose Route User    11/18/2024 2214 Given 180 mg Oral Minnie Kelly RN          enalapril (Vasotec) tab 20 mg       Date Action Dose Route User    11/19/2024 0957 Given 20 mg Oral Zulema Simon RN    11/18/2024 2206 Given 20 mg Oral Minnie Kelly RN          enoxaparin (Lovenox) 100 MG/ML SUBQ injection 90 mg       Date Action Dose Route User    11/19/2024 0958 Given 90 mg Subcutaneous (Right Lower Abdomen) Zulema Simon RN    11/18/2024 2122 Given 90 mg Subcutaneous (Left Lower Abdomen) Minnie Kelly RN          flecainide (Tambocor) tab 150 mg       Date Action Dose Route User    11/19/2024 0956 Given 150 mg Oral Zulema Simon RN    11/18/2024 2121 Given 150 mg Oral Minnie Kelly RN          hydrALAZINE (Apresoline) tab 50 mg       Date Action Dose Route User    11/19/2024  0957 Given 50 mg Oral Zulema Simon RN    11/18/2024 2122 Given 50 mg Oral Minnie Kelly RN          HYDROmorphone (Dilaudid) 1 MG/ML injection 1 mg       Date Action Dose Route User    11/19/2024 0956 Given 1 mg Intravenous Zulema Simon RN    11/19/2024 0209 Given 1 mg Intravenous Minnie Kelly RN    11/18/2024 2214 Given 1 mg Intravenous Minnie Kelly RN    11/18/2024 1836 Given 1 mg Intravenous Zulema Simon RN          HYDROmorphone (Dilaudid) 1 MG/ML injection 0.5 mg       Date Action Dose Route User    11/19/2024 0526 Given 0.5 mg Intravenous Minnie Kelly RN          magnesium oxide (Mag-Ox) tab 200 mg       Date Action Dose Route User    11/18/2024 2205 Given 200 mg Oral Minnie Kelly RN          potassium chloride (Klor-Con M20) tab 40 mEq       Date Action Dose Route User    11/19/2024 1127 Given 40 mEq Oral Zulema Simon RN          simethicone (Mylicon) chewable tab 80 mg       Date Action Dose Route User    11/18/2024 2205 Given 80 mg Oral Minnie Kelly RN          sodium chloride 0.9 % IV bolus 1,000 mL       Date Action Dose Route User    11/19/2024 0959 New Bag 1,000 mL Intravenous Zulema Simon RN          traMADol (Ultram) tab 50 mg       Date Action Dose Route User    11/19/2024 1126 Given 50 mg Oral Zulema Simon RN            Vitals (last day)       Date/Time Temp Pulse Resp BP SpO2 Weight O2 Device O2 Flow Rate (L/min) [facility]    11/19/24 1104 99.8 °F (37.7 °C) -- -- -- -- -- -- --     11/19/24 0952 100.2 °F (37.9 °C) 90 20 139/73 93 % -- None (Room air) -- AD    11/19/24 0615 -- -- -- -- -- 395 lb 3.2 oz (179.3 kg) -- -- AA    11/19/24 0615 99.8 °F (37.7 °C) -- -- -- -- -- -- --     11/19/24 0500 100.8 °F (38.2 °C) 92 20 133/73 95 % -- Nasal cannula 2 L/min     11/18/24 2234 100.2 °F (37.9 °C) -- -- -- -- -- -- --     11/18/24 2222 -- -- -- -- 90 % -- Nasal cannula 2 L/min     11/18/24 2218 -- -- -- -- 89 % -- None (Room air) --     11/18/24 2216 -- -- -- -- 88 % --  None (Room air) -- SH    11/18/24 2118 101.8 °F (38.8 °C) 93 19 152/84 90 % -- None (Room air) -- SH    11/18/24 1705 -- -- -- -- -- 394 lb 3.2 oz (178.8 kg) -- -- YG    11/18/24 1659 102 °F (38.9 °C) 101 16 169/78 92 % -- None (Room air) -- YG    11/18/24 1643 -- 82 -- 164/73 95 % -- None (Room air) -- HT    11/18/24 1112 98.8 °F (37.1 °C)  80 16 135/75 93 % 395 lb (179.2 kg) None (Room air) -- BD       11/18/2024 H&P  [facility]   INTERNAL MEDICINE HISTORY & PHYSICAL       CHIEF COMPLAINT   Swelling Edema     HISTORY OF THE PRESENT ILLNESS    Jovan GUTHRIE Wilber Barrett. - 54 year old male presenting with above CC.     History obtained from patient w/ additional history from review of outside records in care everywhere.  Progressive pain, redness, swelling of LLE for past 5 days. Assoc with fevers. Progressively worse. No specific open sores or lesions but does have psoriasis and has dry flaky skin w some plaques. Is on humira for this     In ED, T 98.8, VSS. CMP/CBC okay. . Given IVP toradol, IV ancef. LLE doppler ordered. D/w Dr. Horne, admitting for cellulitis.     REVIEW OF SYSTEMS   Remainder of 12-point ROS negative unless discussed in HPI.      PATIENT HISTORIES  PMHx:      He has a past medical history of Appendicitis, Arrhythmia, Atrial fibrillation (HCC), Congestive heart disease (HCC), Esophageal reflux, High blood pressure, NEREIDA (obstructive sleep apnea) (9/22/2017), Osteoarthritis, Unspecified essential hypertension, Ventral hernia (2012), and Visual impairment.  PSHx:      He has a past surgical history that includes hernia surgery; appendectomy; and electrocardiogram, complete (04-).   FHx:         His family history includes Cancer in his maternal grandfather and maternal grandmother; Other (age of onset: 60) in his father.   SHx:         He reports that he quit smoking about 12 years ago. He has never used smokeless tobacco. He reports current alcohol use of about 8.0 standard drinks of  alcohol per week. He reports that he does not use drugs.    Allergies:  He is allergic to hydrocodone.           Current Outpatient Medications   Medication Instructions    acetaminophen (TYLENOL EXTRA STRENGTH) 1,000 mg, Oral, Once    Adalimumab (HUMIRA, 2 PEN,) 40 MG/0.4ML Subcutaneous Auto-injector Kit Every 14 days    aspirin 81 mg, Oral, Daily    carvedilol (COREG) 25 mg, Oral, 2 times daily with meals, take 1 tablet (25MG)  by oral route 2 times every day with food    dilTIAZem HCl ER Coated Beads 180 MG Oral Capsule SR 24 Hr TAKE 1 CAPSULE DAILY    diphenhydrAMINE (BENADRYL) 25 mg, Oral, Every 6 hours PRN    Enalapril Maleate (VASOTEC) 20 MG Oral Tab TAKE 1 TABLET BY MOUTH TWICE DAILY    Fexofenadine HCl (ALLEGRA OR) 180 mg, Oral, Daily    flecainide (TAMBOCOR) 150 mg, Oral, 2 times daily    furosemide (LASIX) 40 mg, Daily    Guselkumab (TREMFYA) 100 MG/ML Subcutaneous Solution Pen-injector Inject 100 mg subcutaneous every 8 weeks    Guselkumab (TREMFYA) 100 MG/ML Subcutaneous Solution Pen-injector Inject 100 mg subcutaneous every 8 weeks    hydrALAZINE (APRESOLINE) 10 mg, 2 times daily    Krill Oil 500 MG Oral Cap 1 capsule, Oral, Daily    Multiple Vitamin Oral Tab 1 tablet, Oral, Daily    triamcinolone 0.1 % External Cream APPLY TO THE AFFECTED AREA TWICE DAILY FOR 4 WEEKS       PHYSICAL EXAMINATION   Vitals:       /75   Pulse 80   Temp (S) 98.8 °F (37.1 °C)   Resp 16   Ht 6' 1\" (1.854 m)   Wt (!) 395 lb (179.2 kg)   SpO2 93%   BMI 52.11 kg/m²   Gen:         Appears ill, rigors, appears in pain  Eyes:PERRLA, normal conjunctivae  ENMT:Moist mucous membranes, trachea midline, no pharyngeal erythema, no thyromegaly  CV:RRR, no M/R/G, no peripheral edema  Resp:CTAB, non-labored respirations, symmetric expansion  GI:Soft, NT, ND, + BS, no rebound, no guarding  MSK:        No C/C/E, normal active/passive ROM in C-spine, 5/5 strength in all extremities  Skin:         Psoriasis plaques. LLE swollen,  warm, diffuse areas of demarcated erythema, very TTP  Neuro:      CN 2-12 grossly intact, sensation intact   Psych:      A&Ox3, appropriate mood, conversant w/ linear thought process      DATA REVIEW: LABORATORY VALUES & DIAGNOSTICS      Recent Labs   Lab 11/18/24  1132      K 3.8      CO2 29.0   BUN 18   CREATSERUM 1.06   ANIONGAP 5   GLU 98   CA 10.8*   TP 7.1   ALB 4.1   AST 29   ALT 38   ALKPHO 72   BILT 0.3   EGFRCR 84          Recent Labs   Lab 11/18/24  1132   WBC 10.1   HGB 13.1   HCT 40.9   .0   MCV 87.6   MOPERCENT 10.9   EOPERCENT 0.6   BAPERCENT 0.3      ASSESSMENT & PLAN   Jovan Merino - 54 year old male w MO, HTN, Afib, HFpEF, psoriasis admitted 11/18/2024 for LLE cellulitis, started on IV ancef.     LLE edema/erythema -> LLE cellulitis  Immunosuppression 2/2 psoriasis/humira  - Febrile at home. 98.8 in ER after APAP. HR 80s, WBC 10  [  ] F/u doppler to r/o DVT  - . Known HFpEF. Feels like legs aren't swollen & has lost weight. Lower c/f decompensated CHF  - Pain control: IVP dilaudid, PO oxy, APAP PRN  - Elevate extremity  - IV ancef (11/18 -  )     HFpEF - compensated  HTN  Parox Afib - low CV, not on AC  - Continue PTA lasix, flecainide, coreg, hydral, enalapril     Psoriasis  - On humira OP. Would hold until leg better     [place] [facility] [place]  - Body mass index is 52.11 kg/m².   - Healthy diet & wt loss encouraged  - Has lost quite a bit of weight with Mounjaro already     DM2 - currently controlled w mounjaro  - Okay for reg diet, follow BG on AM labs. Can hold on SSI/accuchecks for now, can add if issues controlling     ACP: Full Code  Ppx: DVT: Enox  Dispo  EDoD:  ~11/20     F/u:   - PCP:  Alvino Velez, DO     Available via epic secure chat or perfect serve 7A - 7P.     Narayan Parra MD   Internal Medicine - Hospitalist    11/19/2024 Hospitalist     [facility]   INTERNAL MEDICINE PROGRESS NOTE     CHIEF COMPLAINT   Swelling Edema     SUBJECTIVE  24 HR  EVENTS   Febrile overnight  Still with pain  Needing IVP dilaudid  Headaches     INPATIENT MEDICATIONS  Scheduled Medications:    Scheduled Meds   enalapril, 20 mg, BID  flecainide, 150 mg, BID  hydrALAZINE, 50 mg, BID  carvedilol, 25 mg, BID with meals  enoxaparin, 90 mg, 2 times per day  ceFAZolin, 2 g, Q8H  magnesium oxide, 200 mg, Nightly  dilTIAZem HCl ER Coated Beads, 180 mg, Nightly        Drips:   IV Meds          PRN Medications:     PRN Meds   acetaminophen, 500 mg, Q4H PRN  melatonin, 3 mg, Nightly PRN  polyethylene glycol (PEG 3350), 17 g, Daily PRN  sennosides, 17.2 mg, Nightly PRN  guaiFENesin, 200 mg, Q4H PRN  ondansetron, 4 mg, Q6H PRN  prochlorperazine, 5 mg, Q8H PRN  HYDROmorphone, 1 mg, Q2H PRN  HYDROmorphone, 0.5 mg, Q2H PRN  oxyCODONE, 5 mg, Q4H PRN  simethicone, 80 mg, TID PRN          PHYSICAL EXAMINATION   Vitals:       /73 (BP Location: Right arm)   Pulse 92   Temp 99.8 °F (37.7 °C) (Oral)   Resp 20   Ht 6' 1\" (1.854 m)   Wt (!) 395 lb 3.2 oz (179.3 kg)   SpO2 95%   BMI 52.14 kg/m²   Gen:Appears ill, rigors, appears in pain  Eyes:PERRLA, normal conjunctivae  ENMT:Moist mucous membranes, trachea midline  CV:RRR, no M/R/G, no peripheral edema  Resp:CTAB, non-labored respirations, symmetric expansion  GI:Soft, NT, ND, + BS, no rebound, no guarding  MSK:        No C/C/E, normal active/passive ROM in C-spine, 5/5 strength in all extremities  Skin:Psoriasis plaques. LLE swollen, warm, diffuse areas of demarcated erythema, very TTP  Neuro:      CN 2-12 grossly intact, sensation intact   Psych:      A&Ox3, appropriate mood, conversant w/ linear thought process      DATA REVIEW: LABORATORY VALUES & DIAGNOSTICS       Recent Labs   Lab 11/18/24  1132 11/19/24  0534    136   K 3.8 3.6    102   CO2 29.0 27.0   BUN 18 16   CREATSERUM 1.06 1.05   ANIONGAP 5 7   GLU 98 109*   CA 10.8* 9.7   TP 7.1  --    ALB 4.1  --    AST 29  --    ALT 38  --    ALKPHO 72  --    BILT 0.3  --     EGFRCR 84 85           Recent Labs   Lab 11/18/24  1132 11/19/24  0534   WBC 10.1 13.5*   HGB 13.1 11.6*   HCT 40.9 36.0*   .0 163.0   MCV 87.6 87.2   MOPERCENT 10.9  --    EOPERCENT 0.6  --    BAPERCENT 0.3  --       ASSESSMENT & PLAN   Jovan Merino - 54 year old male w MO, HTN, Afib, HFpEF, psoriasis admitted 11/18/2024 for LLE cellulitis, started on IV ancef.     LLE edema/erythema -> LLE cellulitis  Immunosuppression 2/2 psoriasis/humira  - Febrile at home. 98.8 in ER after APAP. HR 80s, WBC 10  [  ] blood cx pending  - doppler neg. No DVT  - . Known HFpEF. Feels like legs aren't swollen & has lost weight. Lower c/f decompensated CHF  - Pain control: IVP dilaudid, PO oxy, APAP PRN  - Elevate extremity  - IV ancef (11/18 -  )  - 11/19 febrile, check Bcx. Lot of pain - add tramadol (itching w oxy/norc), add IV toradol scheduled. Headaches, feels dehydrated - will give 1L NS bolus     HFpEF - compensated  HTN  Parox Afib - low CV, not on AC  - Continue PTA lasix, flecainide, coreg, hydral, enalapril     Psoriasis  - On humira OP. Would hold until leg better     Morbid obesity w [facility]  - Body mass index is 52.14 kg/m².   - Healthy diet & wt loss encouraged  - Has lost quite a bit of weight with Mounjaro already     DM2 - currently controlled w mounjaro  - Okay for reg diet, follow BG on AM labs. Can hold on SSI/accuchecks for now, can add if issues controlling     ACP: Full Code  Ppx: DVT: Enox  Dispo  EDoD:  ~11/21     F/u:   - PCP:  Alvino Velez, DO     Available via epic secure chat or perfect serve 7A - 7P.     Narayan Parra MD   Internal Medicine - Hospitalist  [facility]     ADDENDUM  Friend updated per request  Continue with ancef.  Hold off on ID eval at this time.  Changes to pain regimen as above  Monitor electrolytes & vitals. Pt prone to Afib. No need for tele at this time, can check EKG & add tele if any changes to HR     Narayan Parra MD  11/19/24 11:46 AM

## 2024-11-19 NOTE — PROGRESS NOTES
[facility]   INTERNAL MEDICINE PROGRESS NOTE    CHIEF COMPLAINT   Swelling Edema    SUBJECTIVE  24 HR EVENTS   Febrile overnight  Still with pain  Needing IVP dilaudid  Headaches    INPATIENT MEDICATIONS  Scheduled Medications:  ketorolac, 15 mg, 4 times per day  potassium chloride, 40 mEq, Q4H  enalapril, 20 mg, BID  flecainide, 150 mg, BID  hydrALAZINE, 50 mg, BID  carvedilol, 25 mg, BID with meals  enoxaparin, 90 mg, 2 times per day  ceFAZolin, 2 g, Q8H  magnesium oxide, 200 mg, Nightly  dilTIAZem HCl ER Coated Beads, 180 mg, Nightly     Drips:       PRN Medications:   traMADol, 50 mg, Q6H PRN  acetaminophen, 500 mg, Q4H PRN  melatonin, 3 mg, Nightly PRN  polyethylene glycol (PEG 3350), 17 g, Daily PRN  sennosides, 17.2 mg, Nightly PRN  guaiFENesin, 200 mg, Q4H PRN  ondansetron, 4 mg, Q6H PRN  prochlorperazine, 5 mg, Q8H PRN  HYDROmorphone, 1 mg, Q2H PRN  HYDROmorphone, 0.5 mg, Q2H PRN  oxyCODONE, 5 mg, Q4H PRN  simethicone, 80 mg, TID PRN       PHYSICAL EXAMINATION   Vitals: /73 (BP Location: Right arm)   Pulse 90   Temp 99.8 °F (37.7 °C) (Oral)   Resp 20   Ht 6' 1\" (1.854 m)   Wt (!) 395 lb 3.2 oz (179.3 kg)   SpO2 93%   BMI 52.14 kg/m²   Gen: Appears ill, rigors, appears in pain  Eyes: PERRLA, normal conjunctivae  ENMT: Moist mucous membranes, trachea midline  CV: RRR, no M/R/G, no peripheral edema  Resp: CTAB, non-labored respirations, symmetric expansion  GI: Soft, NT, ND, + BS, no rebound, no guarding  MSK:  No C/C/E, normal active/passive ROM in C-spine, 5/5 strength in all extremities  Skin: Psoriasis plaques. LLE swollen, warm, diffuse areas of demarcated erythema, very TTP  Neuro: CN 2-12 grossly intact, sensation intact   Psych: A&Ox3, appropriate mood, conversant w/ linear thought process     DATA REVIEW: LABORATORY VALUES & DIAGNOSTICS  Recent Labs   Lab 11/18/24  1132 11/19/24  0534    136   K 3.8 3.6    102   CO2 29.0 27.0   BUN 18 16   CREATSERUM 1.06 1.05    ANIONGAP 5 7   GLU 98 109*   CA 10.8* 9.7   TP 7.1  --    ALB 4.1  --    AST 29  --    ALT 38  --    ALKPHO 72  --    BILT 0.3  --    EGFRCR 84 85     Recent Labs   Lab 11/18/24  1132 11/19/24  0534   WBC 10.1 13.5*   HGB 13.1 11.6*   HCT 40.9 36.0*   .0 163.0   MCV 87.6 87.2   MOPERCENT 10.9  --    EOPERCENT 0.6  --    BAPERCENT 0.3  --      ASSESSMENT & PLAN   WilberJovan aldana - 54 year old male w MO, HTN, Afib, HFpEF, psoriasis admitted 11/18/2024 for LLE cellulitis, started on IV ancef.    LLE edema/erythema -> LLE cellulitis  Immunosuppression 2/2 psoriasis/humira  - Febrile at home. 98.8 in ER after APAP. HR 80s, WBC 10  [  ] blood cx pending  - doppler neg. No DVT  - . Known HFpEF. Feels like legs aren't swollen & has lost weight. Lower c/f decompensated CHF  - Pain control: IVP dilaudid, PO oxy, APAP PRN  - Elevate extremity  - IV ancef (11/18 -  )  - 11/19 febrile, check Bcx. Lot of pain - add tramadol (itching w oxy/norc), add IV toradol scheduled. Headaches, feels dehydrated - will give 1L NS bolus    HFpEF - compensated  HTN  Parox Afib - low CV, not on AC  - Continue PTA lasix, flecainide, coreg, hydral, enalapril    Psoriasis  - On humira OP. Would hold until leg better    Morbid obesity w [facility] [place]  - Body mass index is 52.14 kg/m².   - Healthy diet & wt loss encouraged  - Has lost quite a bit of weight with Mounjaro already    DM2 - currently controlled w mounjaro  - Paty for reg diet, follow BG on AM labs. Can hold on SSI/accuchecks for now, can add if issues controlling    ACP: Full Code  Ppx: DVT: Enox  Dispo  EDoD:  ~11/21    F/u:   - PCP:  Alvino Velez, DO    Available via epic secure chat or perfect serve 7A - 7P.    Narayan Parra MD   Internal Medicine - Hospitalist  [facility]

## 2024-11-19 NOTE — PLAN OF CARE
Problem: Patient Centered Care  Goal: Patient preferences are identified and integrated in the patient's plan of care  Description: Interventions:  - What would you like us to know as we care for you? From home alone  - Provide timely, complete, and accurate information to patient/family  - Incorporate patient and family knowledge, values, beliefs, and cultural backgrounds into the planning and delivery of care  - Encourage patient/family to participate in care and decision-making at the level they choose  - Honor patient and family perspectives and choices  Outcome: Progressing     Problem: CARDIOVASCULAR - ADULT  Goal: Maintains optimal cardiac output and hemodynamic stability  Description: INTERVENTIONS:  - Monitor vital signs, rhythm, and trends  - Monitor for bleeding, hypotension and signs of decreased cardiac output  - Evaluate effectiveness of vasoactive medications to optimize hemodynamic stability  - Monitor arterial and/or venous puncture sites for bleeding and/or hematoma  - Assess quality of pulses, skin color and temperature  - Assess for signs of decreased coronary artery perfusion - ex. Angina  - Evaluate fluid balance, assess for edema, trend weights  Outcome: Progressing     Problem: RESPIRATORY - ADULT  Goal: Achieves optimal ventilation and oxygenation  Description: INTERVENTIONS:  - Assess for changes in respiratory status  - Assess for changes in mentation and behavior  - Position to facilitate oxygenation and minimize respiratory effort  - Oxygen supplementation based on oxygen saturation or ABGs  - Provide Smoking Cessation handout, if applicable  - Encourage broncho-pulmonary hygiene including cough, deep breathe, Incentive Spirometry  - Assess the need for suctioning and perform as needed  - Assess and instruct to report SOB or any respiratory difficulty  - Respiratory Therapy support as indicated  - Manage/alleviate anxiety  - Monitor for signs/symptoms of CO2 retention  Outcome:  Progressing     Problem: GASTROINTESTINAL - ADULT  Goal: Minimal or absence of nausea and vomiting  Description: INTERVENTIONS:  - Maintain adequate hydration with IV or PO as ordered and tolerated  - Nasogastric tube to low intermittent suction as ordered  - Evaluate effectiveness of ordered antiemetic medications  - Provide nonpharmacologic comfort measures as appropriate  - Advance diet as tolerated, if ordered  - Obtain nutritional consult as needed  - Evaluate fluid balance  Outcome: Progressing     Problem: GENITOURINARY - ADULT  Goal: Absence of urinary retention  Description: INTERVENTIONS:  - Assess patient’s ability to void and empty bladder  - Monitor intake/output and perform bladder scan as needed  - Follow urinary retention protocol/standard of care  - Consider collaborating with pharmacy to review patient's medication profile  - Implement strategies to promote bladder emptying  Outcome: Progressing     Problem: METABOLIC/FLUID AND ELECTROLYTES - ADULT  Goal: Glucose maintained within prescribed range  Description: INTERVENTIONS:  - Monitor Blood Glucose as ordered  - Assess for signs and symptoms of hyperglycemia and hypoglycemia  - Administer ordered medications to maintain glucose within target range  - Assess barriers to adequate nutritional intake and initiate nutrition consult as needed  - Instruct patient on self management of diabetes  Outcome: Progressing  Goal: Electrolytes maintained within normal limits  Description: INTERVENTIONS:  - Monitor labs and rhythm and assess patient for signs and symptoms of electrolyte imbalances  - Administer electrolyte replacement as ordered  - Monitor response to electrolyte replacements, including rhythm and repeat lab results as appropriate  - Fluid restriction as ordered  - Instruct patient on fluid and nutrition restrictions as appropriate  Outcome: Progressing     Problem: SKIN/TISSUE INTEGRITY - ADULT  Goal: Skin integrity remains intact  Description:  INTERVENTIONS  - Assess and document risk factors for pressure ulcer development  - Assess and document skin integrity  - Monitor for areas of redness and/or skin breakdown  - Initiate interventions, skin care algorithm/standards of care as needed  Outcome: Progressing  Goal: Incision(s), wounds(s) or drain site(s) healing without S/S of infection  Description: INTERVENTIONS:  - Assess and document risk factors for pressure ulcer development  - Assess and document skin integrity  - Assess and document dressing/incision, wound bed, drain sites and surrounding tissue  - Implement wound care per orders  - Initiate isolation precautions as appropriate  - Initiate Pressure Ulcer prevention bundle as indicated  Outcome: Progressing     Problem: MUSCULOSKELETAL - ADULT  Goal: Return mobility to safest level of function  Description: INTERVENTIONS:  - Assess patient stability and activity tolerance for standing, transferring and ambulating w/ or w/o assistive devices  - Assist with transfers and ambulation using safe patient handling equipment as needed  - Ensure adequate protection for wounds/incisions during mobilization  - Obtain PT/OT consults as needed  - Advance activity as appropriate  - Communicate ordered activity level and limitations with patient/family  Outcome: Progressing     Problem: PAIN - ADULT  Goal: Verbalizes/displays adequate comfort level or patient's stated pain goal  Description: INTERVENTIONS:  - Encourage pt to monitor pain and request assistance  - Assess pain using appropriate pain scale  - Administer analgesics based on type and severity of pain and evaluate response  - Implement non-pharmacological measures as appropriate and evaluate response  - Consider cultural and social influences on pain and pain management  - Manage/alleviate anxiety  - Utilize distraction and/or relaxation techniques  - Monitor for opioid side effects  - Notify MD/LIP if interventions unsuccessful or patient reports new  pain  - Anticipate increased pain with activity and pre-medicate as appropriate  Outcome: Progressing     Problem: SAFETY ADULT - FALL  Goal: Free from fall injury  Description: INTERVENTIONS:  - Assess pt frequently for physical needs  - Identify cognitive and physical deficits and behaviors that affect risk of falls.  - [place] fall precautions as indicated by assessment.  - Educate pt/family on patient safety including physical limitations  - Instruct pt to call for assistance with activity based on assessment  - Modify environment to reduce risk of injury  - Provide assistive devices as appropriate  - Consider OT/PT consult to assist with strengthening/mobility  - Encourage toileting schedule  Outcome: Progressing     Problem: DISCHARGE PLANNING  Goal: Discharge to home or other facility with appropriate resources  Description: INTERVENTIONS:  - Identify barriers to discharge w/pt and caregiver  - Include patient/family/discharge partner in discharge planning  - Arrange for needed discharge resources and transportation as appropriate  - Identify discharge learning needs (meds, wound care, etc)  - Arrange for interpreters to assist at discharge as needed  - Consider post-discharge preferences of patient/family/discharge partner  - Complete POLST form as appropriate  - Assess patient's ability to be responsible for managing their own health  - Refer to Case Management Department for coordinating discharge planning if the patient needs post-hospital services based on physician/LIP order or complex needs related to functional status, cognitive ability or social support system  Outcome: Progressing     Problem: Impaired Functional Mobility  Goal: Achieve highest/safest level of mobility/gait  Description: Interventions:  - Assess patient's functional ability and stability  - Promote increasing activity/tolerance for mobility and gait  - Educate and engage patient/family in tolerated activity level and  precautions  - Recommend use of  RW for transfers and ambulation  - Recommend patient transfer to bedside chair toward strongest side  - When transferring patient, block weaker knee for safety  - Recommend use of chair position in bed 3 times per day  Outcome: Progressing     Problem: Impaired Activities of Daily Living  Goal: Achieve highest/safest level of independence in self care  Description: Interventions:  - Assess ability and encourage patient to participate in ADLs to maximize function  - Promote sitting position while performing ADLs such as feeding, grooming, and bathing  - Educate and encourage patient/family in tolerated functional activity level and precautions during self-care  Outcome: Progressing     Problem: Patient/Family Goals  Goal: Patient/Family Long Term Goal  Description: Patient's Long Term Goal: Discharge from the hospital    Interventions:  - Monitor vital signs  - Monitor appropriate labs  - Monitor blood glucose levels  - Pain management  - Administer medications per order  - Follow MD orders  - Diagnostics per order  - Update / inform patient and family on plan of care  - Discharge planning  - See additional Care Plan goals for specific interventions  Outcome: Progressing  Goal: Patient/Family Short Term Goal  Description: Patient's Short Term Goal: Improve redness, swelling, and pain to left lower extremity     Interventions:   - Monitor vital signs  - Monitor appropriate labs  - Monitor blood glucose levels  - Pain management  - Administer medications per order  - Follow MD orders  - Diagnostics per order  - Update / inform patient and family on plan of care  - See additional Care Plan goals for specific interventions  Outcome: Progressing

## 2024-11-19 NOTE — PHYSICAL THERAPY NOTE
PHYSICAL THERAPY EVALUATION - INPATIENT     Room Number: 529/529-A  Evaluation Date: 11/19/2024  Type of Evaluation: Initial   Physician Order: PT Eval and Treat    Presenting Problem: left leg pain and cellulitis  Co-Morbidities : appendicitis, arrhythmia, atrial fibrillation, congestive heart disease, high blood pressure, NEREIDA, osteoarthritis, essential hypertension  Reason for Therapy: Mobility Dysfunction and Discharge Planning    PHYSICAL THERAPY ASSESSMENT   Patient is a 54 year old male admitted 11/18/2024 for left leg pain and cellulitis  Prior to admission, patient's baseline is Mod I for functional mobility with use of bariatric rolling walker for longer distances. Patient is currently functioning below baseline with bed mobility, transfers, gait, stair negotiation, standing prolonged periods, and performing household tasks.  Patient is requiring moderate assist as a result of the following impairments: decreased functional strength, decreased endurance/aerobic capacity, pain, decreased muscular endurance, and medical status.  Physical Therapy will continue to follow for duration of hospitalization.    Patient will benefit from continued skilled PT Services at discharge, however, anticipate discharge needs currently undetermined pending progress and pain management.    PLAN DURING HOSPITALIZATION  Nursing Mobility Recommendation : Lift Equipment  PT Device Recommendation: Mechanical lift  PT Treatment Plan: Bed mobility;Body mechanics;Coordination;Endurance;Energy conservation;Patient education;Gait training;Strengthening;Transfer training;Balance training  Rehab Potential : Good  Frequency (Obs): 3-5x/week     PHYSICAL THERAPY MEDICAL/SOCIAL HISTORY     Problem List  Principal Problem:    Cellulitis of left lower leg    HOME SITUATION  Type of Home: House  Home Layout: One level  Stairs to Enter : 2   Railing: No    Lives With: Alone    Drives: Yes   Patient Regularly Uses:  (bariatric rolling walker for  longer distances)     SUBJECTIVE  Agreeable     PHYSICAL THERAPY EXAMINATION   OBJECTIVE  Precautions: Bed/chair alarm  Fall Risk: High fall risk    PAIN ASSESSMENT  Rating: Unable to rate  Location: LLE  Management Techniques: Activity promotion;Body mechanics;Repositioning    COGNITION  Overall Cognitive Status:  WFL - within functional limits    RANGE OF MOTION AND STRENGTH ASSESSMENT  Upper extremity ROM and strength are within functional limits   Lower extremity ROM is within functional limits; LLE limited due to pain   Lower extremity strength is within functional limits; LLE not formally assessed due to increased pain     BALANCE  Static Sitting: Fair  Dynamic Sitting: Fair -  Static Standing: Not tested  Dynamic Standing: Not tested    AM-PAC '6-Clicks' INPATIENT SHORT FORM - BASIC MOBILITY  How much difficulty does the patient currently have...  Patient Difficulty: Turning over in bed (including adjusting bedclothes, sheets and blankets)?: A Lot   Patient Difficulty: Sitting down on and standing up from a chair with arms (e.g., wheelchair, bedside commode, etc.): A Lot   Patient Difficulty: Moving from lying on back to sitting on the side of the bed?: A Lot   How much help from another person does the patient currently need...   Help from Another: Moving to and from a bed to a chair (including a wheelchair)?: A Lot   Help from Another: Need to walk in hospital room?: Total   Help from Another: Climbing 3-5 steps with a railing?: Total     AM-PAC Score:  Raw Score: 10   Approx Degree of Impairment: 76.75%   Standardized Score (AM-PAC Scale): 32.29   CMS Modifier (G-Code): CL    FUNCTIONAL ABILITY STATUS  Functional Mobility/Gait Assessment  Gait Assistance: Not tested  Rolling: moderate assist  Supine to Sit: moderate assist, assist with LLE primarily. Patient tolerated static sitting approx 6 minutes with BUE support and CGA in order to maintain static sitting balance.  Sit to Supine: moderate  assist    Exercise/Education Provided:  Bed mobility  Body mechanics  Posture    Skilled Therapy Provided: Patient in bed upon arrival. RN approved activity. Educated patient on POC and benefits of mobilization. Agreeable to participate. Patient reporting LLE pain, not quantified per the pain scale. Patient benefits from increased time and assistance in order to complete functional mobility tasks. Out of bed mobility deferred per patient request due to pain. Patient tearful regarding current hospitalization and functional mobility decline due to pain; active listening and support provided.    The patient's Approx Degree of Impairment: 76.75% has been calculated based on documentation in the [facility] '6 clicks' Inpatient Basic Mobility Short Form.  Research supports that patients with this level of impairment may benefit from LTAC.  Final disposition will be made by interdisciplinary medical team.    Patient End of Session: In bed;Needs met;Call light within reach;RN aware of session/findings;All patient questions and concerns addressed;Hospital anti-slip socks;Alarm set    CURRENT GOALS  Goals to be met by: 12/3/24  Patient Goal Patient's self-stated goal is: go home   Goal #1 Patient is able to demonstrate supine - sit EOB @ level: minimum assistance     Goal #1   Current Status    Goal #2 Patient is able to demonstrate transfers Sit to/from Stand at assistance level: minimum assistance with walker - rolling     Goal #2  Current Status    Goal #3 Patient is able to ambulate 50 feet with assist device: walker - rolling at assistance level: minimum assistance   Goal #3   Current Status    Goal #4 Stair goal TBD pending progress   Goal #4   Current Status    Goal #5 Patient to demonstrate independence with home activity/exercise instructions provided to patient in preparation for discharge.   Goal #5   Current Status      Patient Evaluation Complexity Level:  History Moderate - 1 or 2 personal factors and/or  co-morbidities   Examination of body systems Moderate - addressing a total of 3 or more elements   Clinical Presentation  Moderate - Evolving   Clinical Decision Making  Moderate Complexity     Therapeutic Activity:  15 minutes

## 2024-11-19 NOTE — PLAN OF CARE
Problem: Patient Centered Care  Goal: Patient preferences are identified and integrated in the patient's plan of care  Description: Interventions:  - Provide timely, complete, and accurate information to patient/family  - Incorporate patient and family knowledge, values, beliefs, and cultural backgrounds into the planning and delivery of care  - Encourage patient/family to participate in care and decision-making at the level they choose  - Honor patient and family perspectives and choices  Outcome: Progressing     Problem: CARDIOVASCULAR - ADULT  Goal: Maintains optimal cardiac output and hemodynamic stability  Description: INTERVENTIONS:  - Monitor vital signs, rhythm, and trends  - Monitor for bleeding, hypotension and signs of decreased cardiac output  - Evaluate effectiveness of vasoactive medications to optimize hemodynamic stability  - Monitor arterial and/or venous puncture sites for bleeding and/or hematoma  - Assess quality of pulses, skin color and temperature  - Assess for signs of decreased coronary artery perfusion - ex. Angina  - Evaluate fluid balance, assess for edema, trend weights  Outcome: Progressing     Problem: RESPIRATORY - ADULT  Goal: Achieves optimal ventilation and oxygenation  Description: INTERVENTIONS:  - Assess for changes in respiratory status  - Assess for changes in mentation and behavior  - Position to facilitate oxygenation and minimize respiratory effort  - Oxygen supplementation based on oxygen saturation or ABGs  - Provide Smoking Cessation handout, if applicable  - Encourage broncho-pulmonary hygiene including cough, deep breathe, Incentive Spirometry  - Assess the need for suctioning and perform as needed  - Assess and instruct to report SOB or any respiratory difficulty  - Respiratory Therapy support as indicated  - Manage/alleviate anxiety  - Monitor for signs/symptoms of CO2 retention  Outcome: Progressing     Problem: GASTROINTESTINAL - ADULT  Goal: Minimal or absence  of nausea and vomiting  Description: INTERVENTIONS:  - Maintain adequate hydration with IV or PO as ordered and tolerated  - Nasogastric tube to low intermittent suction as ordered  - Evaluate effectiveness of ordered antiemetic medications  - Provide nonpharmacologic comfort measures as appropriate  - Advance diet as tolerated, if ordered  - Obtain nutritional consult as needed  - Evaluate fluid balance  Outcome: Progressing     Problem: GENITOURINARY - ADULT  Goal: Absence of urinary retention  Description: INTERVENTIONS:  - Assess patient’s ability to void and empty bladder  - Monitor intake/output and perform bladder scan as needed  - Follow urinary retention protocol/standard of care  - Consider collaborating with pharmacy to review patient's medication profile  - Implement strategies to promote bladder emptying  Outcome: Progressing     Problem: METABOLIC/FLUID AND ELECTROLYTES - ADULT  Goal: Glucose maintained within prescribed range  Description: INTERVENTIONS:  - Monitor Blood Glucose as ordered  - Assess for signs and symptoms of hyperglycemia and hypoglycemia  - Administer ordered medications to maintain glucose within target range  - Assess barriers to adequate nutritional intake and initiate nutrition consult as needed  - Instruct patient on self management of diabetes  Outcome: Progressing  Goal: Electrolytes maintained within normal limits  Description: INTERVENTIONS:  - Monitor labs and rhythm and assess patient for signs and symptoms of electrolyte imbalances  - Administer electrolyte replacement as ordered  - Monitor response to electrolyte replacements, including rhythm and repeat lab results as appropriate  - Fluid restriction as ordered  - Instruct patient on fluid and nutrition restrictions as appropriate  Outcome: Progressing     Problem: SKIN/TISSUE INTEGRITY - ADULT  Goal: Skin integrity remains intact  Description: INTERVENTIONS  - Assess and document risk factors for pressure ulcer  development  - Assess and document skin integrity  - Monitor for areas of redness and/or skin breakdown  - Initiate interventions, skin care algorithm/standards of care as needed  Outcome: Progressing     Problem: MUSCULOSKELETAL - ADULT  Goal: Return mobility to safest level of function  Description: INTERVENTIONS:  - Assess patient stability and activity tolerance for standing, transferring and ambulating w/ or w/o assistive devices  - Assist with transfers and ambulation using safe patient handling equipment as needed  - Ensure adequate protection for wounds/incisions during mobilization  - Obtain PT/OT consults as needed  - Advance activity as appropriate  - Communicate ordered activity level and limitations with patient/family  Outcome: Progressing

## 2024-11-19 NOTE — PROGRESS NOTES
[facility]   INTERNAL MEDICINE PROGRESS NOTE    CHIEF COMPLAINT   Swelling Edema    SUBJECTIVE  24 HR EVENTS   Febrile overnight  Still with pain  Needing IVP dilaudid  Headaches    INPATIENT MEDICATIONS  Scheduled Medications:  enalapril, 20 mg, BID  flecainide, 150 mg, BID  hydrALAZINE, 50 mg, BID  carvedilol, 25 mg, BID with meals  enoxaparin, 90 mg, 2 times per day  ceFAZolin, 2 g, Q8H  magnesium oxide, 200 mg, Nightly  dilTIAZem HCl ER Coated Beads, 180 mg, Nightly     Drips:       PRN Medications:   acetaminophen, 500 mg, Q4H PRN  melatonin, 3 mg, Nightly PRN  polyethylene glycol (PEG 3350), 17 g, Daily PRN  sennosides, 17.2 mg, Nightly PRN  guaiFENesin, 200 mg, Q4H PRN  ondansetron, 4 mg, Q6H PRN  prochlorperazine, 5 mg, Q8H PRN  HYDROmorphone, 1 mg, Q2H PRN  HYDROmorphone, 0.5 mg, Q2H PRN  oxyCODONE, 5 mg, Q4H PRN  simethicone, 80 mg, TID PRN       PHYSICAL EXAMINATION   Vitals: /73 (BP Location: Right arm)   Pulse 92   Temp 99.8 °F (37.7 °C) (Oral)   Resp 20   Ht 6' 1\" (1.854 m)   Wt (!) 395 lb 3.2 oz (179.3 kg)   SpO2 95%   BMI 52.14 kg/m²   Gen: Appears ill, rigors, appears in pain  Eyes: PERRLA, normal conjunctivae  ENMT: Moist mucous membranes, trachea midline  CV: RRR, no M/R/G, no peripheral edema  Resp: CTAB, non-labored respirations, symmetric expansion  GI: Soft, NT, ND, + BS, no rebound, no guarding  MSK:  No C/C/E, normal active/passive ROM in C-spine, 5/5 strength in all extremities  Skin: Psoriasis plaques. LLE swollen, warm, diffuse areas of demarcated erythema, very TTP  Neuro: CN 2-12 grossly intact, sensation intact   Psych: A&Ox3, appropriate mood, conversant w/ linear thought process     DATA REVIEW: LABORATORY VALUES & DIAGNOSTICS  Recent Labs   Lab 11/18/24  1132 11/19/24  0534    136   K 3.8 3.6    102   CO2 29.0 27.0   BUN 18 16   CREATSERUM 1.06 1.05   ANIONGAP 5 7   GLU 98 109*   CA 10.8* 9.7   TP 7.1  --    ALB 4.1  --    AST 29  --    ALT 38  --     ALKPHO 72  --    BILT 0.3  --    EGFRCR 84 85     Recent Labs   Lab 11/18/24  1132 11/19/24  0534   WBC 10.1 13.5*   HGB 13.1 11.6*   HCT 40.9 36.0*   .0 163.0   MCV 87.6 87.2   MOPERCENT 10.9  --    EOPERCENT 0.6  --    BAPERCENT 0.3  --      ASSESSMENT & PLAN   WilberJovan aldana - 54 year old male w MO, HTN, Afib, HFpEF, psoriasis admitted 11/18/2024 for LLE cellulitis, started on IV ancef.    LLE edema/erythema -> LLE cellulitis  Immunosuppression 2/2 psoriasis/humira  - Febrile at home. 98.8 in ER after APAP. HR 80s, WBC 10  [  ] blood cx pending  - doppler neg. No DVT  - . Known HFpEF. Feels like legs aren't swollen & has lost weight. Lower c/f decompensated CHF  - Pain control: IVP dilaudid, PO oxy, APAP PRN  - Elevate extremity  - IV ancef (11/18 -  )  - 11/19 febrile, check Bcx. Lot of pain - add tramadol (itching w oxy/norc), add IV toradol scheduled. Headaches, feels dehydrated - will give 1L NS bolus    HFpEF - compensated  HTN  Parox Afib - low CV, not on AC  - Continue PTA lasix, flecainide, coreg, hydral, enalapril    Psoriasis  - On humira OP. Would hold until leg better    Morbid obesity w [facility]  - Body mass index is 52.14 kg/m².   - Healthy diet & wt loss encouraged  - Has lost quite a bit of weight with Mounjaro already    DM2 - currently controlled w mounjaro  - Okay for reg diet, follow BG on AM labs. Can hold on SSI/accuchecks for now, can add if issues controlling    ACP: Full Code  Ppx: DVT: Enox  Dispo  EDoD:  ~11/21    F/u:   - PCP:  Alvino Velez, DO    Available via epic secure chat or perfect serve 7A - 7P.    Narayan Parra MD   Internal Medicine - Hospitalist  [facility]    ADDENDUM  Friend updated per request  Continue with ancef.  Hold off on ID eval at this time.  Changes to pain regimen as above  Monitor electrolytes & vitals. Pt prone to Afib. No need for tele at this time, can check EKG & add tele if any changes to HR    Narayan Parra MD  11/19/24 11:46 AM
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
## 2024-11-20 LAB
ANION GAP SERPL CALC-SCNC: 3 MMOL/L (ref 0–18)
BUN BLD-MCNC: 15 MG/DL (ref 9–23)
BUN/CREAT SERPL: 16.1 (ref 10–20)
CALCIUM BLD-MCNC: 9.8 MG/DL (ref 8.7–10.4)
CHLORIDE SERPL-SCNC: 105 MMOL/L (ref 98–112)
CO2 SERPL-SCNC: 26 MMOL/L (ref 21–32)
CREAT BLD-MCNC: 0.93 MG/DL
DEPRECATED RDW RBC AUTO: 50.7 FL (ref 35.1–46.3)
EGFRCR SERPLBLD CKD-EPI 2021: 99 ML/MIN/1.73M2 (ref 60–?)
ERYTHROCYTE [DISTWIDTH] IN BLOOD BY AUTOMATED COUNT: 15.7 % (ref 11–15)
GLUCOSE BLD-MCNC: 94 MG/DL (ref 70–99)
GLUCOSE BLDC GLUCOMTR-MCNC: 101 MG/DL (ref 70–99)
GLUCOSE BLDC GLUCOMTR-MCNC: 103 MG/DL (ref 70–99)
GLUCOSE BLDC GLUCOMTR-MCNC: 107 MG/DL (ref 70–99)
GLUCOSE BLDC GLUCOMTR-MCNC: 114 MG/DL (ref 70–99)
HCT VFR BLD AUTO: 35.9 %
HGB BLD-MCNC: 11.4 G/DL
MCH RBC QN AUTO: 27.8 PG (ref 26–34)
MCHC RBC AUTO-ENTMCNC: 31.8 G/DL (ref 31–37)
MCV RBC AUTO: 87.6 FL
OSMOLALITY SERPL CALC.SUM OF ELEC: 279 MOSM/KG (ref 275–295)
PLATELET # BLD AUTO: 158 10(3)UL (ref 150–450)
POTASSIUM SERPL-SCNC: 4 MMOL/L (ref 3.5–5.1)
RBC # BLD AUTO: 4.1 X10(6)UL
SODIUM SERPL-SCNC: 134 MMOL/L (ref 136–145)
UFH PPP CHRO-ACNC: 0.2 IU/ML
WBC # BLD AUTO: 14.5 X10(3) UL (ref 4–11)

## 2024-11-20 RX ORDER — DOCOSANOL 100 MG/G
CREAM TOPICAL 2 TIMES DAILY
Status: DISCONTINUED | OUTPATIENT
Start: 2024-11-20 | End: 2024-12-09

## 2024-11-20 RX ORDER — CETIRIZINE HYDROCHLORIDE 10 MG/1
10 TABLET ORAL DAILY
Status: DISCONTINUED | OUTPATIENT
Start: 2024-11-20 | End: 2024-12-09

## 2024-11-20 NOTE — PROGRESS NOTES
[facility]   INTERNAL MEDICINE PROGRESS NOTE    CHIEF COMPLAINT   Swelling Edema    SUBJECTIVE  24 HR EVENTS   Last febrile 1119 1600 100.9  Still with pain - Needing IVP dilaudid  Headaches, dark urine, still feels very dehydrated. 1L IVF bolus helped yesterday    INPATIENT MEDICATIONS  Scheduled Medications:  ketorolac, 15 mg, 4 times per day  enalapril, 20 mg, BID  flecainide, 150 mg, BID  hydrALAZINE, 50 mg, BID  carvedilol, 25 mg, BID with meals  enoxaparin, 90 mg, 2 times per day  ceFAZolin, 2 g, Q8H  magnesium oxide, 200 mg, Nightly  dilTIAZem HCl ER Coated Beads, 180 mg, Nightly     Drips:       PRN Medications:   traMADol, 50 mg, Q6H PRN  calcium carbonate, 1,000 mg, TID PRN  famotidine, 20 mg, BID PRN  acetaminophen, 500 mg, Q4H PRN  melatonin, 3 mg, Nightly PRN  polyethylene glycol (PEG 3350), 17 g, Daily PRN  sennosides, 17.2 mg, Nightly PRN  guaiFENesin, 200 mg, Q4H PRN  ondansetron, 4 mg, Q6H PRN  prochlorperazine, 5 mg, Q8H PRN  HYDROmorphone, 1 mg, Q2H PRN  HYDROmorphone, 0.5 mg, Q2H PRN  oxyCODONE, 5 mg, Q4H PRN  simethicone, 80 mg, TID PRN       PHYSICAL EXAMINATION   Vitals: /85 (BP Location: Right arm)   Pulse 91   Temp 99.9 °F (37.7 °C) (Oral)   Resp 18   Ht 6' 1\" (1.854 m)   Wt (!) 400 lb 3.2 oz (181.5 kg)   SpO2 95%   BMI 52.80 kg/m²   Gen: Appears ill, rigors, appears in pain  Eyes: PERRLA, normal conjunctivae  ENMT: Moist mucous membranes, trachea midline  CV: RRR, no M/R/G, no peripheral edema  Resp: CTAB, non-labored respirations, symmetric expansion  GI: Soft, NT, ND, + BS, no rebound, no guarding  MSK:  No C/C/E, normal active/passive ROM in C-spine  Skin: Psoriasis plaques. LLE swollen, warm, diffuse areas of demarcated erythema, very TTP  Neuro: CN 2-12 grossly intact, sensation intact   Psych: A&Ox3, appropriate mood, conversant w/ linear thought process     DATA REVIEW: LABORATORY VALUES & DIAGNOSTICS  Recent Labs   Lab 11/18/24  1132 11/19/24  0503  11/19/24  1751 11/20/24  0519    136  --  134*   K 3.8 3.6 3.9 4.0    102  --  105   CO2 29.0 27.0  --  26.0   BUN 18 16  --  15   CREATSERUM 1.06 1.05  --  0.93   ANIONGAP 5 7  --  3   GLU 98 109*  --  94   CA 10.8* 9.7  --  9.8   TP 7.1  --   --   --    ALB 4.1  --   --   --    AST 29  --   --   --    ALT 38  --   --   --    ALKPHO 72  --   --   --    BILT 0.3  --   --   --    EGFRCR 84 85  --  99     Recent Labs   Lab 11/18/24  1132 11/19/24  0534 11/20/24  0519   WBC 10.1 13.5* 14.5*   HGB 13.1 11.6* 11.4*   HCT 40.9 36.0* 35.9*   .0 163.0 158.0   MCV 87.6 87.2 87.6   MOPERCENT 10.9  --   --    EOPERCENT 0.6  --   --    BAPERCENT 0.3  --   --      ASSESSMENT & PLAN   Jovan Merino - 54 year old male w MO, HTN, Afib, HFpEF, psoriasis admitted 11/18/2024 for LLE cellulitis, started on IV ancef.    Sepsis 2/2 LLE cellulitis  Immunosuppression 2/2 psoriasis/humira  - Febrile to 102 here. HR 80s, WBC 14  [  ] blood cx pending  - doppler neg. No DVT  - . Known HFpEF. Feels like legs aren't swollen & has lost weight. Lower c/f decompensated CHF  - Pain control: IVP dilaudid, PO oxy, APAP PRN  - Elevate extremity  - IV ancef (11/18 -  )  - 11/19 febrile, check Bcx. Lot of pain - add tramadol (itching w oxy/norc), add IV toradol scheduled. Headaches, feels dehydrated - will give 1L NS bolus  - 11/20, rash better, still swollen, lot of pain. Still needing IV dilaudid    Headaches  - seems dehydrated. Dark urine. Fevers  - Give another 1L IVF    HFpEF - compensated  HTN  Parox Afib - low CV, not on AC  - Continue PTA lasix, flecainide, coreg, hydral, enalapril    Psoriasis  - On humira OP. Would hold until leg better    Morbid obesity w [facility]  - Body mass index is 52.8 kg/m².   - Healthy diet & wt loss encouraged  - Has lost quite a bit of weight with Mounjaro already    DM2 - currently controlled w mounjaro  - Okay for reg diet, follow BG on AM labs. Can hold on SSI/accuchecks for now, can add if  issues controlling    ACP: Full Code  Ppx: DVT: Enox  Dispo  EDoD:  ~11/22    F/u:   - PCP:  Alvino Velez, DO    Available via epic secure chat or perfect serve 7A - 7P.    Narayan Parra MD   Internal Medicine - Hospitalist  [facility]

## 2024-11-20 NOTE — PLAN OF CARE
Problem: Patient Centered Care  Goal: Patient preferences are identified and integrated in the patient's plan of care  Description: Interventions:  - What would you like us to know as we care for you? From home alone  - Provide timely, complete, and accurate information to patient/family  - Incorporate patient and family knowledge, values, beliefs, and cultural backgrounds into the planning and delivery of care  - Encourage patient/family to participate in care and decision-making at the level they choose  - Honor patient and family perspectives and choices  Outcome: Progressing     Problem: CARDIOVASCULAR - ADULT  Goal: Maintains optimal cardiac output and hemodynamic stability  Description: INTERVENTIONS:  - Monitor vital signs, rhythm, and trends  - Monitor for bleeding, hypotension and signs of decreased cardiac output  - Evaluate effectiveness of vasoactive medications to optimize hemodynamic stability  - Monitor arterial and/or venous puncture sites for bleeding and/or hematoma  - Assess quality of pulses, skin color and temperature  - Assess for signs of decreased coronary artery perfusion - ex. Angina  - Evaluate fluid balance, assess for edema, trend weights  Outcome: Progressing     Problem: RESPIRATORY - ADULT  Goal: Achieves optimal ventilation and oxygenation  Description: INTERVENTIONS:  - Assess for changes in respiratory status  - Assess for changes in mentation and behavior  - Position to facilitate oxygenation and minimize respiratory effort  - Oxygen supplementation based on oxygen saturation or ABGs  - Provide Smoking Cessation handout, if applicable  - Encourage broncho-pulmonary hygiene including cough, deep breathe, Incentive Spirometry  - Assess the need for suctioning and perform as needed  - Assess and instruct to report SOB or any respiratory difficulty  - Respiratory Therapy support as indicated  - Manage/alleviate anxiety  - Monitor for signs/symptoms of CO2 retention  Outcome:  Progressing     Problem: GASTROINTESTINAL - ADULT  Goal: Minimal or absence of nausea and vomiting  Description: INTERVENTIONS:  - Maintain adequate hydration with IV or PO as ordered and tolerated  - Nasogastric tube to low intermittent suction as ordered  - Evaluate effectiveness of ordered antiemetic medications  - Provide nonpharmacologic comfort measures as appropriate  - Advance diet as tolerated, if ordered  - Obtain nutritional consult as needed  - Evaluate fluid balance  Outcome: Progressing     Problem: GENITOURINARY - ADULT  Goal: Absence of urinary retention  Description: INTERVENTIONS:  - Assess patient’s ability to void and empty bladder  - Monitor intake/output and perform bladder scan as needed  - Follow urinary retention protocol/standard of care  - Consider collaborating with pharmacy to review patient's medication profile  - Implement strategies to promote bladder emptying  Outcome: Progressing     Problem: METABOLIC/FLUID AND ELECTROLYTES - ADULT  Goal: Glucose maintained within prescribed range  Description: INTERVENTIONS:  - Monitor Blood Glucose as ordered  - Assess for signs and symptoms of hyperglycemia and hypoglycemia  - Administer ordered medications to maintain glucose within target range  - Assess barriers to adequate nutritional intake and initiate nutrition consult as needed  - Instruct patient on self management of diabetes  Outcome: Progressing  Goal: Electrolytes maintained within normal limits  Description: INTERVENTIONS:  - Monitor labs and rhythm and assess patient for signs and symptoms of electrolyte imbalances  - Administer electrolyte replacement as ordered  - Monitor response to electrolyte replacements, including rhythm and repeat lab results as appropriate  - Fluid restriction as ordered  - Instruct patient on fluid and nutrition restrictions as appropriate  Outcome: Progressing     Problem: SKIN/TISSUE INTEGRITY - ADULT  Goal: Skin integrity remains intact  Description:  INTERVENTIONS  - Assess and document risk factors for pressure ulcer development  - Assess and document skin integrity  - Monitor for areas of redness and/or skin breakdown  - Initiate interventions, skin care algorithm/standards of care as needed  Outcome: Progressing  Goal: Incision(s), wounds(s) or drain site(s) healing without S/S of infection  Description: INTERVENTIONS:  - Assess and document risk factors for pressure ulcer development  - Assess and document skin integrity  - Assess and document dressing/incision, wound bed, drain sites and surrounding tissue  - Implement wound care per orders  - Initiate isolation precautions as appropriate  - Initiate Pressure Ulcer prevention bundle as indicated  Outcome: Progressing     Problem: MUSCULOSKELETAL - ADULT  Goal: Return mobility to safest level of function  Description: INTERVENTIONS:  - Assess patient stability and activity tolerance for standing, transferring and ambulating w/ or w/o assistive devices  - Assist with transfers and ambulation using safe patient handling equipment as needed  - Ensure adequate protection for wounds/incisions during mobilization  - Obtain PT/OT consults as needed  - Advance activity as appropriate  - Communicate ordered activity level and limitations with patient/family  Outcome: Progressing     Problem: PAIN - ADULT  Goal: Verbalizes/displays adequate comfort level or patient's stated pain goal  Description: INTERVENTIONS:  - Encourage pt to monitor pain and request assistance  - Assess pain using appropriate pain scale  - Administer analgesics based on type and severity of pain and evaluate response  - Implement non-pharmacological measures as appropriate and evaluate response  - Consider cultural and social influences on pain and pain management  - Manage/alleviate anxiety  - Utilize distraction and/or relaxation techniques  - Monitor for opioid side effects  - Notify MD/LIP if interventions unsuccessful or patient reports new  pain  - Anticipate increased pain with activity and pre-medicate as appropriate  Outcome: Progressing     Problem: SAFETY ADULT - FALL  Goal: Free from fall injury  Description: INTERVENTIONS:  - Assess pt frequently for physical needs  - Identify cognitive and physical deficits and behaviors that affect risk of falls.  - [place] fall precautions as indicated by assessment.  - Educate pt/family on patient safety including physical limitations  - Instruct pt to call for assistance with activity based on assessment  - Modify environment to reduce risk of injury  - Provide assistive devices as appropriate  - Consider OT/PT consult to assist with strengthening/mobility  - Encourage toileting schedule  Outcome: Progressing     Problem: DISCHARGE PLANNING  Goal: Discharge to home or other facility with appropriate resources  Description: INTERVENTIONS:  - Identify barriers to discharge w/pt and caregiver  - Include patient/family/discharge partner in discharge planning  - Arrange for needed discharge resources and transportation as appropriate  - Identify discharge learning needs (meds, wound care, etc)  - Arrange for interpreters to assist at discharge as needed  - Consider post-discharge preferences of patient/family/discharge partner  - Complete POLST form as appropriate  - Assess patient's ability to be responsible for managing their own health  - Refer to Case Management Department for coordinating discharge planning if the patient needs post-hospital services based on physician/LIP order or complex needs related to functional status, cognitive ability or social support system  Outcome: Progressing     Problem: Impaired Functional Mobility  Goal: Achieve highest/safest level of mobility/gait  Description: Interventions:  - Assess patient's functional ability and stability  - Promote increasing activity/tolerance for mobility and gait  - Educate and engage patient/family in tolerated activity level and  precautions  - Recommend use of  RW for transfers and ambulation  - Recommend patient transfer to bedside chair toward strongest side  - When transferring patient, block weaker knee for safety  - Recommend use of chair position in bed 3 times per day  Outcome: Progressing     Problem: Impaired Activities of Daily Living  Goal: Achieve highest/safest level of independence in self care  Description: Interventions:  - Assess ability and encourage patient to participate in ADLs to maximize function  - Promote sitting position while performing ADLs such as feeding, grooming, and bathing  - Educate and encourage patient/family in tolerated functional activity level and precautions during self-care  Outcome: Progressing     Problem: Patient/Family Goals  Goal: Patient/Family Long Term Goal  Description: Patient's Long Term Goal: Discharge from the hospital    Interventions:  - Monitor vital signs  - Monitor appropriate labs  - Monitor blood glucose levels  - Pain management  - Administer medications per order  - Follow MD orders  - Diagnostics per order  - Update / inform patient and family on plan of care  - Discharge planning  - See additional Care Plan goals for specific interventions  Outcome: Progressing  Goal: Patient/Family Short Term Goal  Description: Patient's Short Term Goal: Improve redness, swelling, and pain to left lower extremity     Interventions:   - Monitor vital signs  - Monitor appropriate labs  - Monitor blood glucose levels  - Pain management  - Administer medications per order  - Follow MD orders  - Diagnostics per order  - Update / inform patient and family on plan of care  - See additional Care Plan goals for specific interventions  Outcome: Progressing

## 2024-11-20 NOTE — PLAN OF CARE
Problem: Patient Centered Care  Goal: Patient preferences are identified and integrated in the patient's plan of care  Description: Interventions:  - What would you like us to know as we care for you? From home alone  - Provide timely, complete, and accurate information to patient/family  - Incorporate patient and family knowledge, values, beliefs, and cultural backgrounds into the planning and delivery of care  - Encourage patient/family to participate in care and decision-making at the level they choose  - Honor patient and family perspectives and choices  Outcome: Progressing     Problem: CARDIOVASCULAR - ADULT  Goal: Maintains optimal cardiac output and hemodynamic stability  Description: INTERVENTIONS:  - Monitor vital signs, rhythm, and trends  - Monitor for bleeding, hypotension and signs of decreased cardiac output  - Evaluate effectiveness of vasoactive medications to optimize hemodynamic stability  - Monitor arterial and/or venous puncture sites for bleeding and/or hematoma  - Assess quality of pulses, skin color and temperature  - Assess for signs of decreased coronary artery perfusion - ex. Angina  - Evaluate fluid balance, assess for edema, trend weights  Outcome: Progressing     Problem: RESPIRATORY - ADULT  Goal: Achieves optimal ventilation and oxygenation  Description: INTERVENTIONS:  - Assess for changes in respiratory status  - Assess for changes in mentation and behavior  - Position to facilitate oxygenation and minimize respiratory effort  - Oxygen supplementation based on oxygen saturation or ABGs  - Provide Smoking Cessation handout, if applicable  - Encourage broncho-pulmonary hygiene including cough, deep breathe, Incentive Spirometry  - Assess the need for suctioning and perform as needed  - Assess and instruct to report SOB or any respiratory difficulty  - Respiratory Therapy support as indicated  - Manage/alleviate anxiety  - Monitor for signs/symptoms of CO2 retention  Outcome:  Progressing     Problem: GASTROINTESTINAL - ADULT  Goal: Minimal or absence of nausea and vomiting  Description: INTERVENTIONS:  - Maintain adequate hydration with IV or PO as ordered and tolerated  - Nasogastric tube to low intermittent suction as ordered  - Evaluate effectiveness of ordered antiemetic medications  - Provide nonpharmacologic comfort measures as appropriate  - Advance diet as tolerated, if ordered  - Obtain nutritional consult as needed  - Evaluate fluid balance  Outcome: Progressing     Problem: GENITOURINARY - ADULT  Goal: Absence of urinary retention  Description: INTERVENTIONS:  - Assess patient’s ability to void and empty bladder  - Monitor intake/output and perform bladder scan as needed  - Follow urinary retention protocol/standard of care  - Consider collaborating with pharmacy to review patient's medication profile  - Implement strategies to promote bladder emptying  Outcome: Progressing     Problem: METABOLIC/FLUID AND ELECTROLYTES - ADULT  Goal: Glucose maintained within prescribed range  Description: INTERVENTIONS:  - Monitor Blood Glucose as ordered  - Assess for signs and symptoms of hyperglycemia and hypoglycemia  - Administer ordered medications to maintain glucose within target range  - Assess barriers to adequate nutritional intake and initiate nutrition consult as needed  - Instruct patient on self management of diabetes  Outcome: Progressing  Goal: Electrolytes maintained within normal limits  Description: INTERVENTIONS:  - Monitor labs and rhythm and assess patient for signs and symptoms of electrolyte imbalances  - Administer electrolyte replacement as ordered  - Monitor response to electrolyte replacements, including rhythm and repeat lab results as appropriate  - Fluid restriction as ordered  - Instruct patient on fluid and nutrition restrictions as appropriate  Outcome: Progressing     Problem: SKIN/TISSUE INTEGRITY - ADULT  Goal: Skin integrity remains intact  Description:  INTERVENTIONS  - Assess and document risk factors for pressure ulcer development  - Assess and document skin integrity  - Monitor for areas of redness and/or skin breakdown  - Initiate interventions, skin care algorithm/standards of care as needed  Outcome: Progressing  Goal: Incision(s), wounds(s) or drain site(s) healing without S/S of infection  Description: INTERVENTIONS:  - Assess and document risk factors for pressure ulcer development  - Assess and document skin integrity  - Assess and document dressing/incision, wound bed, drain sites and surrounding tissue  - Implement wound care per orders  - Initiate isolation precautions as appropriate  - Initiate Pressure Ulcer prevention bundle as indicated  Outcome: Progressing     Problem: MUSCULOSKELETAL - ADULT  Goal: Return mobility to safest level of function  Description: INTERVENTIONS:  - Assess patient stability and activity tolerance for standing, transferring and ambulating w/ or w/o assistive devices  - Assist with transfers and ambulation using safe patient handling equipment as needed  - Ensure adequate protection for wounds/incisions during mobilization  - Obtain PT/OT consults as needed  - Advance activity as appropriate  - Communicate ordered activity level and limitations with patient/family  Outcome: Progressing     Problem: PAIN - ADULT  Goal: Verbalizes/displays adequate comfort level or patient's stated pain goal  Description: INTERVENTIONS:  - Encourage pt to monitor pain and request assistance  - Assess pain using appropriate pain scale  - Administer analgesics based on type and severity of pain and evaluate response  - Implement non-pharmacological measures as appropriate and evaluate response  - Consider cultural and social influences on pain and pain management  - Manage/alleviate anxiety  - Utilize distraction and/or relaxation techniques  - Monitor for opioid side effects  - Notify MD/LIP if interventions unsuccessful or patient reports new  pain  - Anticipate increased pain with activity and pre-medicate as appropriate  Outcome: Progressing     Problem: SAFETY ADULT - FALL  Goal: Free from fall injury  Description: INTERVENTIONS:  - Assess pt frequently for physical needs  - Identify cognitive and physical deficits and behaviors that affect risk of falls.  - [place] fall precautions as indicated by assessment.  - Educate pt/family on patient safety including physical limitations  - Instruct pt to call for assistance with activity based on assessment  - Modify environment to reduce risk of injury  - Provide assistive devices as appropriate  - Consider OT/PT consult to assist with strengthening/mobility  - Encourage toileting schedule  Outcome: Progressing     Problem: DISCHARGE PLANNING  Goal: Discharge to home or other facility with appropriate resources  Description: INTERVENTIONS:  - Identify barriers to discharge w/pt and caregiver  - Include patient/family/discharge partner in discharge planning  - Arrange for needed discharge resources and transportation as appropriate  - Identify discharge learning needs (meds, wound care, etc)  - Arrange for interpreters to assist at discharge as needed  - Consider post-discharge preferences of patient/family/discharge partner  - Complete POLST form as appropriate  - Assess patient's ability to be responsible for managing their own health  - Refer to Case Management Department for coordinating discharge planning if the patient needs post-hospital services based on physician/LIP order or complex needs related to functional status, cognitive ability or social support system  Outcome: Progressing     Problem: Impaired Functional Mobility  Goal: Achieve highest/safest level of mobility/gait  Description: Interventions:  - Assess patient's functional ability and stability  - Promote increasing activity/tolerance for mobility and gait  - Educate and engage patient/family in tolerated activity level and  precautions  - Recommend use of  RW for transfers and ambulation  - Recommend patient transfer to bedside chair toward strongest side  - When transferring patient, block weaker knee for safety  - Recommend use of chair position in bed 3 times per day  Outcome: Progressing     Problem: Impaired Activities of Daily Living  Goal: Achieve highest/safest level of independence in self care  Description: Interventions:  - Assess ability and encourage patient to participate in ADLs to maximize function  - Promote sitting position while performing ADLs such as feeding, grooming, and bathing  - Educate and encourage patient/family in tolerated functional activity level and precautions during self-care  Outcome: Progressing     Problem: Patient/Family Goals  Goal: Patient/Family Long Term Goal  Description: Patient's Long Term Goal: Discharge from the hospital    Interventions:  - Monitor vital signs  - Monitor appropriate labs  - Monitor blood glucose levels  - Pain management  - Administer medications per order  - Follow MD orders  - Diagnostics per order  - Update / inform patient and family on plan of care  - Discharge planning  - See additional Care Plan goals for specific interventions  Outcome: Progressing  Goal: Patient/Family Short Term Goal  Description: Patient's Short Term Goal: Improve redness, swelling, and pain to left lower extremity     Interventions:   - Monitor vital signs  - Monitor appropriate labs  - Monitor blood glucose levels  - Pain management  - Administer medications per order  - Follow MD orders  - Diagnostics per order  - Update / inform patient and family on plan of care  - See additional Care Plan goals for specific interventions  Outcome: Progressing
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
## 2024-11-20 NOTE — PLAN OF CARE
Problem: Patient Centered Care  Goal: Patient preferences are identified and integrated in the patient's plan of care  Description: Interventions:  - What would you like us to know as we care for you? From home alone  - Provide timely, complete, and accurate information to patient/family  - Incorporate patient and family knowledge, values, beliefs, and cultural backgrounds into the planning and delivery of care  - Encourage patient/family to participate in care and decision-making at the level they choose  - Honor patient and family perspectives and choices  Outcome: Progressing     Problem: CARDIOVASCULAR - ADULT  Goal: Maintains optimal cardiac output and hemodynamic stability  Description: INTERVENTIONS:  - Monitor vital signs, rhythm, and trends  - Monitor for bleeding, hypotension and signs of decreased cardiac output  - Evaluate effectiveness of vasoactive medications to optimize hemodynamic stability  - Monitor arterial and/or venous puncture sites for bleeding and/or hematoma  - Assess quality of pulses, skin color and temperature  - Assess for signs of decreased coronary artery perfusion - ex. Angina  - Evaluate fluid balance, assess for edema, trend weights  Outcome: Progressing     Problem: RESPIRATORY - ADULT  Goal: Achieves optimal ventilation and oxygenation  Description: INTERVENTIONS:  - Assess for changes in respiratory status  - Assess for changes in mentation and behavior  - Position to facilitate oxygenation and minimize respiratory effort  - Oxygen supplementation based on oxygen saturation or ABGs  - Provide Smoking Cessation handout, if applicable  - Encourage broncho-pulmonary hygiene including cough, deep breathe, Incentive Spirometry  - Assess the need for suctioning and perform as needed  - Assess and instruct to report SOB or any respiratory difficulty  - Respiratory Therapy support as indicated  - Manage/alleviate anxiety  - Monitor for signs/symptoms of CO2 retention  Outcome:  Progressing     Problem: GASTROINTESTINAL - ADULT  Goal: Minimal or absence of nausea and vomiting  Description: INTERVENTIONS:  - Maintain adequate hydration with IV or PO as ordered and tolerated  - Nasogastric tube to low intermittent suction as ordered  - Evaluate effectiveness of ordered antiemetic medications  - Provide nonpharmacologic comfort measures as appropriate  - Advance diet as tolerated, if ordered  - Obtain nutritional consult as needed  - Evaluate fluid balance  Outcome: Progressing     Problem: GENITOURINARY - ADULT  Goal: Absence of urinary retention  Description: INTERVENTIONS:  - Assess patient’s ability to void and empty bladder  - Monitor intake/output and perform bladder scan as needed  - Follow urinary retention protocol/standard of care  - Consider collaborating with pharmacy to review patient's medication profile  - Implement strategies to promote bladder emptying  Outcome: Progressing     Problem: METABOLIC/FLUID AND ELECTROLYTES - ADULT  Goal: Glucose maintained within prescribed range  Description: INTERVENTIONS:  - Monitor Blood Glucose as ordered  - Assess for signs and symptoms of hyperglycemia and hypoglycemia  - Administer ordered medications to maintain glucose within target range  - Assess barriers to adequate nutritional intake and initiate nutrition consult as needed  - Instruct patient on self management of diabetes  Outcome: Progressing  Goal: Electrolytes maintained within normal limits  Description: INTERVENTIONS:  - Monitor labs and rhythm and assess patient for signs and symptoms of electrolyte imbalances  - Administer electrolyte replacement as ordered  - Monitor response to electrolyte replacements, including rhythm and repeat lab results as appropriate  - Fluid restriction as ordered  - Instruct patient on fluid and nutrition restrictions as appropriate  Outcome: Progressing     Problem: SKIN/TISSUE INTEGRITY - ADULT  Goal: Skin integrity remains intact  Description:  INTERVENTIONS  - Assess and document risk factors for pressure ulcer development  - Assess and document skin integrity  - Monitor for areas of redness and/or skin breakdown  - Initiate interventions, skin care algorithm/standards of care as needed  Outcome: Progressing  Goal: Incision(s), wounds(s) or drain site(s) healing without S/S of infection  Description: INTERVENTIONS:  - Assess and document risk factors for pressure ulcer development  - Assess and document skin integrity  - Assess and document dressing/incision, wound bed, drain sites and surrounding tissue  - Implement wound care per orders  - Initiate isolation precautions as appropriate  - Initiate Pressure Ulcer prevention bundle as indicated  Outcome: Progressing     Problem: MUSCULOSKELETAL - ADULT  Goal: Return mobility to safest level of function  Description: INTERVENTIONS:  - Assess patient stability and activity tolerance for standing, transferring and ambulating w/ or w/o assistive devices  - Assist with transfers and ambulation using safe patient handling equipment as needed  - Ensure adequate protection for wounds/incisions during mobilization  - Obtain PT/OT consults as needed  - Advance activity as appropriate  - Communicate ordered activity level and limitations with patient/family  Outcome: Progressing     Problem: PAIN - ADULT  Goal: Verbalizes/displays adequate comfort level or patient's stated pain goal  Description: INTERVENTIONS:  - Encourage pt to monitor pain and request assistance  - Assess pain using appropriate pain scale  - Administer analgesics based on type and severity of pain and evaluate response  - Implement non-pharmacological measures as appropriate and evaluate response  - Consider cultural and social influences on pain and pain management  - Manage/alleviate anxiety  - Utilize distraction and/or relaxation techniques  - Monitor for opioid side effects  - Notify MD/LIP if interventions unsuccessful or patient reports new  pain  - Anticipate increased pain with activity and pre-medicate as appropriate  Outcome: Progressing     Problem: SAFETY ADULT - FALL  Goal: Free from fall injury  Description: INTERVENTIONS:  - Assess pt frequently for physical needs  - Identify cognitive and physical deficits and behaviors that affect risk of falls.  - [place] fall precautions as indicated by assessment.  - Educate pt/family on patient safety including physical limitations  - Instruct pt to call for assistance with activity based on assessment  - Modify environment to reduce risk of injury  - Provide assistive devices as appropriate  - Consider OT/PT consult to assist with strengthening/mobility  - Encourage toileting schedule  Outcome: Progressing     Problem: DISCHARGE PLANNING  Goal: Discharge to home or other facility with appropriate resources  Description: INTERVENTIONS:  - Identify barriers to discharge w/pt and caregiver  - Include patient/family/discharge partner in discharge planning  - Arrange for needed discharge resources and transportation as appropriate  - Identify discharge learning needs (meds, wound care, etc)  - Arrange for interpreters to assist at discharge as needed  - Consider post-discharge preferences of patient/family/discharge partner  - Complete POLST form as appropriate  - Assess patient's ability to be responsible for managing their own health  - Refer to Case Management Department for coordinating discharge planning if the patient needs post-hospital services based on physician/LIP order or complex needs related to functional status, cognitive ability or social support system  Outcome: Progressing     Problem: Impaired Functional Mobility  Goal: Achieve highest/safest level of mobility/gait  Description: Interventions:  - Assess patient's functional ability and stability  - Promote increasing activity/tolerance for mobility and gait  - Educate and engage patient/family in tolerated activity level and  precautions  - Recommend use of  RW for transfers and ambulation  - Recommend patient transfer to bedside chair toward strongest side  - When transferring patient, block weaker knee for safety  - Recommend use of chair position in bed 3 times per day  Outcome: Progressing     Problem: Impaired Activities of Daily Living  Goal: Achieve highest/safest level of independence in self care  Description: Interventions:  - Assess ability and encourage patient to participate in ADLs to maximize function  - Promote sitting position while performing ADLs such as feeding, grooming, and bathing  - Educate and encourage patient/family in tolerated functional activity level and precautions during self-care  Outcome: Progressing     Problem: Patient/Family Goals  Goal: Patient/Family Long Term Goal  Description: Patient's Long Term Goal: Discharge from the hospital    Interventions:  - Monitor vital signs  - Monitor appropriate labs  - Monitor blood glucose levels  - Pain management  - Administer medications per order  - Follow MD orders  - Diagnostics per order  - Update / inform patient and family on plan of care  - Discharge planning  - See additional Care Plan goals for specific interventions  Outcome: Progressing  Goal: Patient/Family Short Term Goal  Description: Patient's Short Term Goal: Improve redness, swelling, and pain to left lower extremity     Interventions:   - Monitor vital signs  - Monitor appropriate labs  - Monitor blood glucose levels  - Pain management  - Administer medications per order  - Follow MD orders  - Diagnostics per order  - Update / inform patient and family on plan of care  - See additional Care Plan goals for specific interventions  Outcome: Progressing

## 2024-11-20 NOTE — DISCHARGE INSTRUCTIONS
Sometimes managing your health at home requires assistance.  The [facility] team has recognized your preference to use [facility].  They can be reached at [phone].  A representative from the home health agency will contact you or your family to schedule your first visit.          IV antibiotic pharmacy:     [facility]  Phone: [phone]  Fax: [phone]      Needs INR drawn in 2 days. Results to be sent to Dr. Sethi, please scheduled follow-up in 1-2 weeks after discharge.       Referrals for Individual Counseling & Psychiatry are listed below. All providers are in network with your insurance and offer virtual services. Please call to schedule an appointment.     Lv Mishra MA,   Master's-Level Clinician  [address]  [place], [place] [id number] [phone] phone    Ashley Navarro MA  Master's-Level Clinician  [address]  [place] [address]  [phone] phone    Jordyn Chavarria MA  Master's-Level Clinician  [address]  [place] [address]  [phone] phone    Asuncion Marrufo PSYD  Master's-Level Clinician, Psychologist  [address] [place] [address] [address]  [place], [place] [id number] [phone] phone    Adelina Almonte MA, DEVI, PHD  Master's-Level Clinician  [address]  [place], [place] [id number] [phone] phone    Psychiatry Referrals:    [facility]  [address]  [place] [address]  [phone]     [facility]   [address]  [place], [place] [address] [phone]

## 2024-11-20 NOTE — OCCUPATIONAL THERAPY NOTE
Attempted to see patient for follow up treat. Patient declining service at this time, stating that he is in too much pain in LLE and his L knee is not feeling up for it. Educated on importance of OOB activity and role of therapy in the hospital. Patient verbalizes understanding. Will continue to follow and follow up as appropriate.    Lorenza Lo, OTR/L  [facility]  [id number]

## 2024-11-20 NOTE — PAYOR COMM NOTE
--------------  CONTINUED STAY REVIEW  11/20  Payor: [employer] OUT OF STATE PPO  Subscriber #:  [id number]  Authorization Number: [id number]    Admit date: 11/18/24  Admit time:  4:58 PM    REVIEW DOCUMENTATION:      CHIEF COMPLAINT   Swelling Edema     SUBJECTIVE  24 HR EVENTS   Last febrile 1119 1600 100.9  Still with pain - Needing IVP dilaudid  Headaches, dark urine, still feels very dehydrated. 1L IVF bolus helped yesterday     INPATIENT MEDICATIONS  Scheduled Medications:    Scheduled Meds   ketorolac, 15 mg, 4 times per day  enalapril, 20 mg, BID  flecainide, 150 mg, BID  hydrALAZINE, 50 mg, BID  carvedilol, 25 mg, BID with meals  enoxaparin, 90 mg, 2 times per day  ceFAZolin, 2 g, Q8H  magnesium oxide, 200 mg, Nightly  dilTIAZem HCl ER Coated Beads, 180 mg, Nightly        Drips:   IV Meds          PRN Medications:     PRN Meds   traMADol, 50 mg, Q6H PRN  calcium carbonate, 1,000 mg, TID PRN  famotidine, 20 mg, BID PRN  acetaminophen, 500 mg, Q4H PRN  melatonin, 3 mg, Nightly PRN  polyethylene glycol (PEG 3350), 17 g, Daily PRN  sennosides, 17.2 mg, Nightly PRN  guaiFENesin, 200 mg, Q4H PRN  ondansetron, 4 mg, Q6H PRN  prochlorperazine, 5 mg, Q8H PRN  HYDROmorphone, 1 mg, Q2H PRN  HYDROmorphone, 0.5 mg, Q2H PRN  oxyCODONE, 5 mg, Q4H PRN  simethicone, 80 mg, TID PRN          PHYSICAL EXAMINATION   Vitals:       /85 (BP Location: Right arm)   Pulse 91   Temp 99.9 °F (37.7 °C) (Oral)   Resp 18   Ht 6' 1\" (1.854 m)   Wt (!) 400 lb 3.2 oz (181.5 kg)   SpO2 95%   BMI 52.80 kg/m²   Gen:Appears ill, rigors, appears in pain  Eyes:PERRLA, normal conjunctivae  ENMT:Moist mucous membranes, trachea midline  CV:RRR, no M/R/G, no peripheral edema  Resp:CTAB, non-labored respirations, symmetric expansion  GI:Soft, NT, ND, + BS, no rebound, no guarding  MSK:        No C/C/E, normal active/passive ROM in C-spine  Skin:Psoriasis plaques. LLE swollen, warm, diffuse areas of demarcated erythema, very TTP  Neuro:       CN 2-12 grossly intact, sensation intact   Psych:      A&Ox3, appropriate mood, conversant w/ linear thought process      DATA REVIEW: LABORATORY VALUES & DIAGNOSTICS         Recent Labs   Lab 11/18/24  1132 11/19/24  0534 11/19/24  1751 11/20/24  0519    136  --  134*   K 3.8 3.6 3.9 4.0    102  --  105   CO2 29.0 27.0  --  26.0   BUN 18 16  --  15   CREATSERUM 1.06 1.05  --  0.93   ANIONGAP 5 7  --  3   GLU 98 109*  --  94   CA 10.8* 9.7  --  9.8   TP 7.1  --   --   --    ALB 4.1  --   --   --    AST 29  --   --   --    ALT 38  --   --   --    ALKPHO 72  --   --   --    BILT 0.3  --   --   --    EGFRCR 84 85  --  99            Recent Labs   Lab 11/18/24  1132 11/19/24  0534 11/20/24  0519   WBC 10.1 13.5* 14.5*   HGB 13.1 11.6* 11.4*   HCT 40.9 36.0* 35.9*   .0 163.0 158.0   MCV 87.6 87.2 87.6   MOPERCENT 10.9  --   --    EOPERCENT 0.6  --   --    BAPERCENT 0.3  --   --       ASSESSMENT & PLAN   Wilber Jovan - 54 year old male w MO, HTN, Afib, HFpEF, psoriasis admitted 11/18/2024 for LLE cellulitis, started on IV ancef.     Sepsis 2/2 LLE cellulitis  Immunosuppression 2/2 psoriasis/humira  - Febrile to 102 here. HR 80s, WBC 14  [  ] blood cx pending  - doppler neg. No DVT  - . Known HFpEF. Feels like legs aren't swollen & has lost weight. Lower c/f decompensated CHF  - Pain control: IVP dilaudid, PO oxy, APAP PRN  - Elevate extremity  - IV ancef (11/18 -  )  - 11/19 febrile, check Bcx. Lot of pain - add tramadol (itching w oxy/norc), add IV toradol scheduled. Headaches, feels dehydrated - will give 1L NS bolus  - 11/20, rash better, still swollen, lot of pain. Still needing IV dilaudid     Headaches  - seems dehydrated. Dark urine. Fevers  - Give another 1L IVF     HFpEF - compensated  HTN  Parox Afib - low CV, not on AC  - Continue PTA lasix, flecainide, coreg, hydral, enalapril     Psoriasis  - On humira OP. Would hold until leg better     [place] [facility] [place]  - Body mass index is 52.8  kg/m².   - Healthy diet & wt loss encouraged  - Has lost quite a bit of weight with Mounjaro already     DM2 - currently controlled w mounjaro  - Okay for reg diet, follow BG on AM labs. Can hold on SSI/accuchecks for now, can add if issues controlling     ACP: Full Code  Ppx: DVT: Enox  Dispo  EDoD:  ~11/22     F/u:   - PCP:  Alvino Velez, DO     Available via epic secure chat or perfect serve 7A - 7P.       MEDICATIONS ADMINISTERED IN LAST 1 DAY:  acetaminophen (Tylenol Extra Strength) tab 500 mg       Date Action Dose Route User    11/19/2024 1620 Given 500 mg Oral Zulema Simon RN          carvedilol (Coreg) tab 25 mg       Date Action Dose Route User    11/20/2024 0900 Given 25 mg Oral Indira Hayward RN    11/19/2024 1825 Given 25 mg Oral Zulema Simon RN          ceFAZolin (Ancef) 2g in 10mL IV syringe premix       Date Action Dose Route User    11/20/2024 0547 New Bag 2 g Intravenous Minnie Kelly RN    11/19/2024 2123 New Bag 2 g Intravenous Minnie Kelly RN    11/19/2024 1245 New Bag 2 g Intravenous Zulema Simon RN          cetirizine (ZyrTEC) tab 10 mg       Date Action Dose Route User    11/20/2024 1032 Given 10 mg Oral Indira Hayward RN          dilTIAZem ER (CardIZEM CD) 24 hr cap 180 mg       Date Action Dose Route User    11/19/2024 2122 Given 180 mg Oral Minnie Kelly RN          enalapril (Vasotec) tab 20 mg       Date Action Dose Route User    11/20/2024 0956 Given 20 mg Oral Indira Hayward RN    11/19/2024 2122 Given 20 mg Oral Minnie Kelly RN          enoxaparin (Lovenox) 100 MG/ML SUBQ injection 90 mg       Date Action Dose Route User    11/20/2024 0900 Given 90 mg Subcutaneous (Left Upper Arm) Indira Hayward RN    11/19/2024 2123 Given 90 mg Subcutaneous (Left Lower Abdomen) Pustulka, Minnie, RN          famotidine (Pepcid) 20 mg/2mL injection 20 mg       Date Action Dose Route User    11/19/2024 1620 Given 20 mg Intravenous Zulema Simon, RN           flecainide (Tambocor) tab 150 mg       Date Action Dose Route User    11/20/2024 0956 Given 150 mg Oral Indira Hayward RN    11/19/2024 2123 Given 150 mg Oral Minnie Kelly RN          hydrALAZINE (Apresoline) tab 50 mg       Date Action Dose Route User    11/20/2024 0956 Given 50 mg Oral Indira Hayward RN    11/19/2024 2122 Given 50 mg Oral Minnie Kelly RN          HYDROmorphone (Dilaudid) 1 MG/ML injection 1 mg       Date Action Dose Route User    11/20/2024 0957 Given 1 mg Intravenous Indira Hayward RN    11/19/2024 2145 Given 1 mg Intravenous Minnie Kelly RN    11/19/2024 1620 Given 1 mg Intravenous Zulema Simon RN          ketorolac (Toradol) 15 MG/ML injection 15 mg       Date Action Dose Route User    11/20/2024 0547 Given 15 mg Intravenous Minnie Kelly RN    11/20/2024 0025 Given 15 mg Intravenous Minnie Kelly RN    11/19/2024 1825 Given 15 mg Intravenous Zulema Simon RN          magnesium oxide (Mag-Ox) tab 200 mg       Date Action Dose Route User    11/19/2024 2144 Given 200 mg Oral Minnie Kelly RN          oxyCODONE immediate release tab 5 mg       Date Action Dose Route User    11/20/2024 0359 Given 5 mg Oral Alecia Amado RN          potassium chloride (Klor-Con M20) tab 40 mEq       Date Action Dose Route User    11/19/2024 1619 Given 40 mEq Oral Zulema Simon RN    11/19/2024 1127 Given 40 mEq Oral Zulema Simon RN          sodium chloride 0.9 % IV bolus 1,000 mL       Date Action Dose Route User    11/20/2024 1032 New Bag 1,000 mL Intravenous Indira Hayward RN          traMADol (Ultram) tab 50 mg       Date Action Dose Route User    11/19/2024 1126 Given 50 mg Oral Zulema Simon RN            Vitals (last day)       Date/Time Temp Pulse Resp BP SpO2 Weight O2 Device O2 Flow Rate (L/min) [facility]    11/20/24 0940 97.5 °F (36.4 °C) -- 18 150/80 95 % -- Nasal cannula 2 L/min Y    11/20/24 0545 99.9 °F (37.7 °C) 91 18 160/85 95 % -- Nasal cannula 2  L/min OP    11/20/24 0528 -- -- -- -- -- 400 lb 3.2 oz (181.5 kg) -- -- DJ    11/19/24 2100 99 °F (37.2 °C) 79 20 129/79 97 % -- Nasal cannula 2 L/min OP    11/19/24 1935 -- 89 -- 125/69 -- -- -- -- AD    11/19/24 1905 100.1 °F (37.8 °C) -- -- -- -- -- -- --     11/19/24 1900 -- 95 -- -- -- -- -- -- MO    11/19/24 1822 -- 97 -- 167/78 -- -- -- --     11/19/24 1700 -- 104 -- -- -- -- -- --     11/19/24 1604 100.9 °F (38.3 °C) 106 20 161/86 92 % -- Nasal cannula 3 L/min AD    11/19/24 1500 -- 108 -- -- -- -- -- -- AD    11/19/24 1300 -- 105 -- -- -- -- -- --     11/19/24 1104 99.8 °F (37.7 °C) -- -- -- -- -- -- --     11/19/24 1030 -- 105 -- -- -- -- -- --     11/19/24 0952 100.2 °F (37.9 °C) 90 20 139/73 93 % -- None (Room air) -- AD    11/19/24 0615 -- -- -- -- -- 395 lb 3.2 oz (179.3 kg) -- -- AA    11/19/24 0615 99.8 °F (37.7 °C) -- -- -- -- -- -- --     11/19/24 0500 100.8 °F (38.2 °C) 92 20 133/73 95 % -- Nasal cannula 2 L/min OP          CIWA Scores (since admission)       None

## 2024-11-21 ENCOUNTER — APPOINTMENT (OUTPATIENT)
Dept: CT IMAGING | Facility: HOSPITAL | Age: 54
End: 2024-11-21
Attending: INTERNAL MEDICINE
Payer: COMMERCIAL

## 2024-11-21 LAB
ANION GAP SERPL CALC-SCNC: 5 MMOL/L (ref 0–18)
BUN BLD-MCNC: 17 MG/DL (ref 9–23)
BUN/CREAT SERPL: 20.7 (ref 10–20)
CALCIUM BLD-MCNC: 9.8 MG/DL (ref 8.7–10.4)
CHLORIDE SERPL-SCNC: 102 MMOL/L (ref 98–112)
CK SERPL-CCNC: 89 U/L
CO2 SERPL-SCNC: 28 MMOL/L (ref 21–32)
CREAT BLD-MCNC: 0.82 MG/DL
DEPRECATED RDW RBC AUTO: 49 FL (ref 35.1–46.3)
EGFRCR SERPLBLD CKD-EPI 2021: 106 ML/MIN/1.73M2 (ref 60–?)
ERYTHROCYTE [DISTWIDTH] IN BLOOD BY AUTOMATED COUNT: 15.2 % (ref 11–15)
GLUCOSE BLD-MCNC: 95 MG/DL (ref 70–99)
GLUCOSE BLDC GLUCOMTR-MCNC: 105 MG/DL (ref 70–99)
GLUCOSE BLDC GLUCOMTR-MCNC: 113 MG/DL (ref 70–99)
GLUCOSE BLDC GLUCOMTR-MCNC: 115 MG/DL (ref 70–99)
GLUCOSE BLDC GLUCOMTR-MCNC: 119 MG/DL (ref 70–99)
HCT VFR BLD AUTO: 34 %
HGB BLD-MCNC: 11.3 G/DL
MCH RBC QN AUTO: 28.8 PG (ref 26–34)
MCHC RBC AUTO-ENTMCNC: 33.2 G/DL (ref 31–37)
MCV RBC AUTO: 86.7 FL
OSMOLALITY SERPL CALC.SUM OF ELEC: 281 MOSM/KG (ref 275–295)
PLATELET # BLD AUTO: 169 10(3)UL (ref 150–450)
POTASSIUM SERPL-SCNC: 3.9 MMOL/L (ref 3.5–5.1)
RBC # BLD AUTO: 3.92 X10(6)UL
SODIUM SERPL-SCNC: 135 MMOL/L (ref 136–145)
WBC # BLD AUTO: 15.5 X10(3) UL (ref 4–11)

## 2024-11-21 PROCEDURE — 73701 CT LOWER EXTREMITY W/DYE: CPT | Performed by: INTERNAL MEDICINE

## 2024-11-21 RX ORDER — CLOTRIMAZOLE AND BETAMETHASONE DIPROPIONATE 10; .64 MG/G; MG/G
CREAM TOPICAL 2 TIMES DAILY
Status: DISCONTINUED | OUTPATIENT
Start: 2024-11-21 | End: 2024-12-09

## 2024-11-21 RX ORDER — VANCOMYCIN 1.75 GRAM/500 ML IN 0.9 % SODIUM CHLORIDE INTRAVENOUS
15 EVERY 12 HOURS
Status: DISCONTINUED | OUTPATIENT
Start: 2024-11-21 | End: 2024-11-21

## 2024-11-21 NOTE — CONSULTS
[facility]  part of [facility] Infectious Disease  Report of Consultation    Jovan Merino Sr. Patient Status:  Inpatient    1970 MRN [id number]   Location [facility] 5SW/SE Attending Narayan Parra MD   Hosp Day # 3 PCP Alvino Velez DO     Date of Admission:  2024  Date of Consult:  2024    Reason for Consultation:  LLE cellulitis    History of Present Illness:  Jovan Merino Sr. is a a(n) 54 year old male being seen at your request regarding complicated LLE cellulitis.  Patient is morbidly obese and presented to the hospital with progressive pain, swelling, erythema to the LLE x 5 days PTA.  Patient does have a h/o psoriasis also complicating presentation.      Patient has been on IV ancef since admission but pain continues and he had a fever again last night.  Patient was empirically escalated to IV zosyn and vancomycin today because of this.    Last temp was to 100.9F on 24.  Blood cultures are negative.  We are asked to see and assist.    History:  Past Medical History:    Appendicitis    Arrhythmia    AF    Atrial fibrillation (HCC)    Congestive heart disease (HCC)    age 30 yrs.  1 episode    Esophageal reflux    High blood pressure    NEREIDA (obstructive sleep apnea)    Intolerant to cpap    Osteoarthritis    Unspecified essential hypertension    Ventral hernia    Visual impairment    glasses for driving     Past Surgical History:   Procedure Laterality Date    Appendectomy      Electrocardiogram, complete  2014    Scanned to Media Tab - Date of Service 2014    Hernia surgery      () Ventral Hernia Repaired     Family History   Problem Relation Age of Onset    Other (Other[Other]) Father 60        Heart attack    Cancer Maternal Grandmother         Stomach CA    Cancer Maternal Grandfather         Ling CA- Smoker      reports that he quit smoking about 12 years ago. He has never used smokeless tobacco. He reports  current alcohol use of about 8.0 standard drinks of alcohol per week. He reports that he does not use drugs.    Allergies:  Allergies[1]    Medications:    Current Facility-Administered Medications:     vancomycin (Vancocin) 1.75 g in sodium chloride 0.9% 500mL IVPB premix, 15 mg/kg (Adjusted), Intravenous, Q12H    piperacillin-tazobactam (Zosyn) 3.375 g in dextrose 5% 100 mL IVPB-ADDV, 3.375 g, Intravenous, Q8H    magnesium hydroxide (Milk of Magnesia) 400 MG/5ML oral suspension 30 mL, 30 mL, Oral, Q6H PRN    docosanol (Abreva) 10 % cream, , Topical, BID    cetirizine (ZyrTEC) tab 10 mg, 10 mg, Oral, Daily    traMADol (Ultram) tab 50 mg, 50 mg, Oral, Q6H PRN    calcium carbonate (Tums) chewable tab 1,000 mg, 1,000 mg, Oral, TID PRN    famotidine (Pepcid) tab 20 mg, 20 mg, Oral, BID PRN    enalapril (Vasotec) tab 20 mg, 20 mg, Oral, BID    flecainide (Tambocor) tab 150 mg, 150 mg, Oral, BID    hydrALAZINE (Apresoline) tab 50 mg, 50 mg, Oral, BID    carvedilol (Coreg) tab 25 mg, 25 mg, Oral, BID with meals    acetaminophen (Tylenol Extra Strength) tab 500 mg, 500 mg, Oral, Q4H PRN    melatonin tab 3 mg, 3 mg, Oral, Nightly PRN    polyethylene glycol (PEG 3350) (Miralax) 17 g oral packet 17 g, 17 g, Oral, Daily PRN    sennosides (Senokot) tab 17.2 mg, 17.2 mg, Oral, Nightly PRN    guaiFENesin (Robitussin) 100 MG/5 ML oral liquid 200 mg, 200 mg, Oral, Q4H PRN    enoxaparin (Lovenox) 100 MG/ML SUBQ injection 90 mg, 90 mg, Subcutaneous, 2 times per day    ondansetron (Zofran) 4 MG/2ML injection 4 mg, 4 mg, Intravenous, Q6H PRN    prochlorperazine (Compazine) 10 MG/2ML injection 5 mg, 5 mg, Intravenous, Q8H PRN    HYDROmorphone (Dilaudid) 1 MG/ML injection 1 mg, 1 mg, Intravenous, Q2H PRN    HYDROmorphone (Dilaudid) 1 MG/ML injection 0.5 mg, 0.5 mg, Intravenous, Q2H PRN    oxyCODONE immediate release tab 5 mg, 5 mg, Oral, Q4H PRN    magnesium oxide (Mag-Ox) tab 200 mg, 200 mg, Oral, Nightly    simethicone (Mylicon)  chewable tab 80 mg, 80 mg, Oral, TID PRN    dilTIAZem ER (CardIZEM CD) 24 hr cap 180 mg, 180 mg, Oral, Nightly    Review of Systems:    Constitutional:  No fevers, chills, diaphoresis, weight changes.   HEENT:  No visual changes, oral ulcers, sore throat, difficulty swallowing.   Respiratory: Negative for cough, sputum, hemoptysis, chest pain, wheezing, dyspnea on exertion, or stridor.   Cardiovascular: Negative for chest pain, palpitations, irregular heart beats.   Gastrointestinal:  No abdominal pain, nausea, vomiting, diarrhea, or constipation.   Genitourinary:  No dysuria, hematuria, urine urgency or frequency.   Integument/breast: Negative for rash, skin lesions, and pruritus.   Hematologic/lymphatic: Negative for easy bruising, bleeding, and lymphadenopathy.   Musculoskeletal:  LLE pain, swelling, erythema.   Neurological: No focal neurologic deficits, seizures, tremors.   Psych:  No h/o anxiety, depression, other psych d/o.   Endocrine: No history of of diabetes, thyroid disorder.    Remainder of 12 point review of systems otherwise negative.    Vital signs in last 24 hours:  Patient Vitals for the past 24 hrs:   BP Temp Temp src Pulse Resp SpO2   11/21/24 0800 160/60 99.1 °F (37.3 °C) Oral -- 18 94 %   11/21/24 0636 154/84 -- -- -- -- --   11/21/24 0502 (!) 182/80 99.7 °F (37.6 °C) Oral 87 18 92 %   11/20/24 2159 (!) 163/86 99.5 °F (37.5 °C) Oral 89 18 92 %   11/20/24 1753 150/75 98 °F (36.7 °C) Oral -- 18 94 %   11/20/24 1633 113/80 100 °F (37.8 °C) Oral 91 20 94 %       Intake/Output:  I/O this shift:  In: 180 [P.O.:180]  Out: 700 [Urine:700]    Physical Exam:   General: Awake, alert, non-tox and in NAD.   Head: Normocephalic, without obvious abnormality, atraumatic.   Eyes: Conjunctivae/corneas clear.  No scleral icterus.  No conjunctival     hemorrhage.   Nose: Nares normal.   Throat:  Oropharynx clear, MMs moist.   Neck: Trachea ML, no masses.   Lungs: CTA b/l no rhonchi, rales, wheezes.   Chest wall:  No tenderness or deformity.   Heart: Regular rate and rhythm, normal S1S2, no murmurs.   Abdomen: Soft, NT/ND.  Bowel sounds present.  No organomegaly.   Extremity: LLE pain, erythema.   Skin: No rashes or lesions.   Neurological: No focal neurologic deficits.    Lab Data Review:  Lab Results   Component Value Date    WBC 15.5 11/21/2024    HGB 11.3 11/21/2024    HCT 34.0 11/21/2024    .0 11/21/2024    CREATSERUM 0.82 11/21/2024    BUN 17 11/21/2024     11/21/2024    K 3.9 11/21/2024     11/21/2024    CO2 28.0 11/21/2024    GLU 95 11/21/2024    CA 9.8 11/21/2024    CK 89 11/21/2024      Cultures:   Blood cultures x 2 negative    Radiology:  CONCLUSION:   1. No evidence of deep venous thrombosis in the left lower extremity.  The peroneal veins could not be visualized.     Assessment and Plan:    Sepsis presenting with fevers, leukocytosis, and LLE cellulitis as etiology of events  - Tm 102F during this admission  - Blood cultures are negative  - Dopplers negative for DVT  - CT ordered to evaluate for underlying abscess or deeper seated process - results pending  - IV ancef x 4 days given without significant improvement, escalated to IV vancomycin and zosyn today    2.  Leukocytosis due to the above  - At 15K today and slightly trending up  - Will trend    3.  Underlying psoriasis as a potential nidus for entry of bacteria  - At risk for MDROs due to humira use  - Check MRSA swab    4.  Morbid obesity complicating clinical course    5.  Disposition - inpatient.  I agree with empiric escalation of antibiotics but will switch from vancomycin to cubicin given likely difficulty to achieve adequate blood levels in this obese gentleman.  Will also switch zosyn to cefepime. IV.  Check MRSA swab.  Compression and elevation encouraged.  Add topical lotrisone.  Trending temps and WBCs.  Duration of IV antibiotics to be determined.  Will follow.    Kailee Calloway DO, [facility] Infectious  Disease  [phone]    11/21/2024  1:30 PM         [1]   Allergies  Allergen Reactions    Hydrocodone ITCHING and RASH

## 2024-11-21 NOTE — PHYSICAL THERAPY NOTE
PHYSICAL THERAPY TREATMENT NOTE - INPATIENT     Room Number: 529/529-A       Presenting Problem: left leg pain and cellulitis  Co-Morbidities : appendicitis, arrhythmia, atrial fibrillation, congestive heart disease, high blood pressure, NEREIDA, osteoarthritis, essential hypertension    Problem List  Principal Problem:    Cellulitis of left lower leg      PHYSICAL THERAPY ASSESSMENT   Primary care MD at [facility] approving therapy participation and mobility with therapy prior to left LE CT imaging allowing WBAT on left  LE at this time. Pt with need for BM  , RN staff also present during session for fxn transfer training.   Therapy may need updated WB status pending imaging results.     Patient demonstrates limited progress this session, goals  remain in progress.      Patient is requiring moderate assist as a result of the following impairments: decreased functional strength, decreased endurance/aerobic capacity, pain, impaired standing balance, decreased muscular endurance, medical status, and limited left LE  ROM.     Patient continues to function below baseline with bed mobility, transfers, standing prolonged periods, and performing household tasks.  Next session anticipate patient to progress bed mobility, transfers, gait, stair negotiation, standing prolonged periods, and performing household tasks.  Physical Therapy will continue to follow patient for duration of hospitalization.    Patient continues to benefit from continued skilled PT services this admission.  Pt pain of left LE is a significant limiting factor in progression of mobility . Pt left LE with swelling / redness .   DC Plan is currently undetermined and will be pending further acute management /pending LE CT imaging and progress with therapy. .    PLAN DURING HOSPITALIZATION  Nursing Mobility Recommendation :  (2A for SPT bed to )  PT Device Recommendation: Mechanical lift  PT Treatment Plan: Bed mobility;Body mechanics;Patient  education;Transfer training;Balance training;Stair training;Endurance;Energy conservation;Family education;Gait training  Frequency (Obs): 5x/week     SUBJECTIVE    \" It hurts \"     OBJECTIVE  Precautions: Bed/chair alarm    WEIGHT BEARING RESTRICTION  L Lower Extremity: -- (discussed with primary care MD 11/21 approved for today WBAT on left LE for mobility OOB ---MD Is ordering CT scan for left LE approving participation prior to results.   May need updated WB for left LE pending results)      PAIN ASSESSMENT   Rating: Other (Comment)  Location: \"severe\"  increased with activity left knee and lateral left distal LE  Management Techniques: Activity promotion;Body mechanics;Breathing techniques;Relaxation;Repositioning    BALANCE  Static Sitting: Fair +  Dynamic Sitting: Fair  Static Standing: Poor +  Dynamic Standing: Poor +         AM-PAC '6-Clicks' INPATIENT SHORT FORM - BASIC MOBILITY  How much difficulty does the patient currently have...  Patient Difficulty: Turning over in bed (including adjusting bedclothes, sheets and blankets)?: A Little   Patient Difficulty: Sitting down on and standing up from a chair with arms (e.g., wheelchair, bedside commode, etc.): A Lot   Patient Difficulty: Moving from lying on back to sitting on the side of the bed?: A Lot   How much help from another person does the patient currently need...   Help from Another: Moving to and from a bed to a chair (including a wheelchair)?: A Lot   Help from Another: Need to walk in hospital room?: Total   Help from Another: Climbing 3-5 steps with a railing?: Total     AM-PAC Score:  Raw Score: 11   Approx Degree of Impairment: 72.57%   Standardized Score (AM-PAC Scale): 33.86   CMS Modifier (G-Code): CL    FUNCTIONAL ABILITY STATUS  Functional Mobility/Gait Assessment  Gait Assistance: Not tested  Rolling: minimal assist  Supine to Sit: maximum assist--full support left LE   Sit to Supine: maximum assist--full support left LE   Sit to Stand:   Pt able to complete squat pivot transfers on right LE with very minimal tolerance for any WB on left LE for bed to from RC transfers ---2A was used for pt safety ---Min assist of 2 overall provided for pt safety     Skilled Therapy Provided: Fxn transfer training > Pt spent non billable time in bathroom --- Pt education  Education Provided To: Patient   Patient Education: Role of Physical Therapy;Plan of Care;Functional Transfer Techniques;Weight Bear Status          The patient's Approx Degree of Impairment: 72.57% has been calculated based on documentation in the [facility] '6 clicks' Inpatient Daily Activity Short Form.  Research supports that patients with this level of impairment may benefit from rehab facility   Final disposition will be made by interdisciplinary medical team.      Patient End of Session: In bed;Needs met;Call light within reach;RN aware of session/findings;All patient questions and concerns addressed    CURRENT GOALS   Goals to be met by: 12/3/24  Patient Goal Patient's self-stated goal is: go home   Goal #1 Patient is able to demonstrate supine - sit EOB @ level: minimum assistance      Goal #1   Current Status  in progress    Goal #2 Patient is able to demonstrate transfers Sit to/from Stand at assistance level: minimum assistance with walker - rolling      Goal #2  Current Status  in progress    Goal #3 Patient is able to ambulate 50 feet with assist device: walker - rolling at assistance level: minimum assistance   Goal #3   Current Status  NA pain is limiting factor    Goal #4 Stair goal TBD pending progress   Goal #4   Current Status  NA    Goal #5 Patient to demonstrate independence with home activity/exercise instructions provided to patient in preparation for discharge.   Goal #5   Current Status  awaiting CT imaging for left LE    Therapy activity 1 unit

## 2024-11-21 NOTE — PROGRESS NOTES
[facility] Pharmacy Dosing Service      Initial Pharmacokinetic Consult for Vancomycin Dosing     Jovan Merino Sr. is a 54 year old male who is being initiated on vancomycin therapy for cellulitis.  Pharmacy has been asked to dose vancomycin by MD Parra.  The initial treatment and monitoring approach will be steady state AUC strategy.        Weight and Temperature:    Wt Readings from Last 1 Encounters:   24 (!) 181.5 kg (400 lb 3.2 oz)        Temp Readings from Last 1 Encounters:   24 99.1 °F (37.3 °C) (Oral)      Labs:   Recent Labs   Lab 24  0534 24  0519 24  0525   CREATSERUM 1.05 0.93 0.82      Estimated Creatinine Clearance: 119.1 mL/min (based on SCr of 0.82 mg/dL).     Recent Labs   Lab 24  0534 24  0519 24  0525   WBC 13.5* 14.5* 15.5*          The Pharmacokinetic Target is:     to 600 mg-h/L and trough <=15 mg/L    Renal Dosing Considerations:    None     Assessment/Plan:   Initial/Loading dose: Will receive 1750 mg IV (15 mg/kg, capped at 2250 mg) x 1 initial dose.      Maintenance dose: Pharmacy will dose vancomycin at 1750 mg IV every 12 hours    Monitorin) Plan for vancomycin peak and trough to be obtained at steady state    2) Pharmacy will order SCr as clinically indicated to assess renal function.    3) Pharmacy will monitor for toxicity and efficacy, adjust vancomycin dose and/or frequency, and order vancomycin levels as appropriate per the Pharmacy and Therapeutics Committee approved protocol until discontinuation of the medication.       We appreciate the opportunity to assist in the care of this patient.     Lupe Helton, PharmD  2024  11:32 AM  [place]  Pharmacy Extension: [phone]

## 2024-11-21 NOTE — PLAN OF CARE
Problem: Patient Centered Care  Goal: Patient preferences are identified and integrated in the patient's plan of care  Description: Interventions:  - What would you like us to know as we care for you? From home alone  - Provide timely, complete, and accurate information to patient/family  - Incorporate patient and family knowledge, values, beliefs, and cultural backgrounds into the planning and delivery of care  - Encourage patient/family to participate in care and decision-making at the level they choose  - Honor patient and family perspectives and choices  Outcome: Progressing     Problem: CARDIOVASCULAR - ADULT  Goal: Maintains optimal cardiac output and hemodynamic stability  Description: INTERVENTIONS:  - Monitor vital signs, rhythm, and trends  - Monitor for bleeding, hypotension and signs of decreased cardiac output  - Evaluate effectiveness of vasoactive medications to optimize hemodynamic stability  - Monitor arterial and/or venous puncture sites for bleeding and/or hematoma  - Assess quality of pulses, skin color and temperature  - Assess for signs of decreased coronary artery perfusion - ex. Angina  - Evaluate fluid balance, assess for edema, trend weights  Outcome: Progressing     Problem: RESPIRATORY - ADULT  Goal: Achieves optimal ventilation and oxygenation  Description: INTERVENTIONS:  - Assess for changes in respiratory status  - Assess for changes in mentation and behavior  - Position to facilitate oxygenation and minimize respiratory effort  - Oxygen supplementation based on oxygen saturation or ABGs  - Provide Smoking Cessation handout, if applicable  - Encourage broncho-pulmonary hygiene including cough, deep breathe, Incentive Spirometry  - Assess the need for suctioning and perform as needed  - Assess and instruct to report SOB or any respiratory difficulty  - Respiratory Therapy support as indicated  - Manage/alleviate anxiety  - Monitor for signs/symptoms of CO2 retention  Outcome:  Progressing     Problem: GASTROINTESTINAL - ADULT  Goal: Minimal or absence of nausea and vomiting  Description: INTERVENTIONS:  - Maintain adequate hydration with IV or PO as ordered and tolerated  - Nasogastric tube to low intermittent suction as ordered  - Evaluate effectiveness of ordered antiemetic medications  - Provide nonpharmacologic comfort measures as appropriate  - Advance diet as tolerated, if ordered  - Obtain nutritional consult as needed  - Evaluate fluid balance  Outcome: Progressing     Problem: GENITOURINARY - ADULT  Goal: Absence of urinary retention  Description: INTERVENTIONS:  - Assess patient’s ability to void and empty bladder  - Monitor intake/output and perform bladder scan as needed  - Follow urinary retention protocol/standard of care  - Consider collaborating with pharmacy to review patient's medication profile  - Implement strategies to promote bladder emptying  Outcome: Progressing     Problem: METABOLIC/FLUID AND ELECTROLYTES - ADULT  Goal: Glucose maintained within prescribed range  Description: INTERVENTIONS:  - Monitor Blood Glucose as ordered  - Assess for signs and symptoms of hyperglycemia and hypoglycemia  - Administer ordered medications to maintain glucose within target range  - Assess barriers to adequate nutritional intake and initiate nutrition consult as needed  - Instruct patient on self management of diabetes  Outcome: Progressing  Goal: Electrolytes maintained within normal limits  Description: INTERVENTIONS:  - Monitor labs and rhythm and assess patient for signs and symptoms of electrolyte imbalances  - Administer electrolyte replacement as ordered  - Monitor response to electrolyte replacements, including rhythm and repeat lab results as appropriate  - Fluid restriction as ordered  - Instruct patient on fluid and nutrition restrictions as appropriate  Outcome: Progressing     Problem: SKIN/TISSUE INTEGRITY - ADULT  Goal: Skin integrity remains intact  Description:  INTERVENTIONS  - Assess and document risk factors for pressure ulcer development  - Assess and document skin integrity  - Monitor for areas of redness and/or skin breakdown  - Initiate interventions, skin care algorithm/standards of care as needed  Outcome: Progressing  Goal: Incision(s), wounds(s) or drain site(s) healing without S/S of infection  Description: INTERVENTIONS:  - Assess and document risk factors for pressure ulcer development  - Assess and document skin integrity  - Assess and document dressing/incision, wound bed, drain sites and surrounding tissue  - Implement wound care per orders  - Initiate isolation precautions as appropriate  - Initiate Pressure Ulcer prevention bundle as indicated  Outcome: Progressing     Problem: MUSCULOSKELETAL - ADULT  Goal: Return mobility to safest level of function  Description: INTERVENTIONS:  - Assess patient stability and activity tolerance for standing, transferring and ambulating w/ or w/o assistive devices  - Assist with transfers and ambulation using safe patient handling equipment as needed  - Ensure adequate protection for wounds/incisions during mobilization  - Obtain PT/OT consults as needed  - Advance activity as appropriate  - Communicate ordered activity level and limitations with patient/family  Outcome: Progressing     Problem: PAIN - ADULT  Goal: Verbalizes/displays adequate comfort level or patient's stated pain goal  Description: INTERVENTIONS:  - Encourage pt to monitor pain and request assistance  - Assess pain using appropriate pain scale  - Administer analgesics based on type and severity of pain and evaluate response  - Implement non-pharmacological measures as appropriate and evaluate response  - Consider cultural and social influences on pain and pain management  - Manage/alleviate anxiety  - Utilize distraction and/or relaxation techniques  - Monitor for opioid side effects  - Notify MD/LIP if interventions unsuccessful or patient reports new  pain  - Anticipate increased pain with activity and pre-medicate as appropriate  Outcome: Progressing     Problem: SAFETY ADULT - FALL  Goal: Free from fall injury  Description: INTERVENTIONS:  - Assess pt frequently for physical needs  - Identify cognitive and physical deficits and behaviors that affect risk of falls.  - [place] fall precautions as indicated by assessment.  - Educate pt/family on patient safety including physical limitations  - Instruct pt to call for assistance with activity based on assessment  - Modify environment to reduce risk of injury  - Provide assistive devices as appropriate  - Consider OT/PT consult to assist with strengthening/mobility  - Encourage toileting schedule  Outcome: Progressing     Problem: DISCHARGE PLANNING  Goal: Discharge to home or other facility with appropriate resources  Description: INTERVENTIONS:  - Identify barriers to discharge w/pt and caregiver  - Include patient/family/discharge partner in discharge planning  - Arrange for needed discharge resources and transportation as appropriate  - Identify discharge learning needs (meds, wound care, etc)  - Arrange for interpreters to assist at discharge as needed  - Consider post-discharge preferences of patient/family/discharge partner  - Complete POLST form as appropriate  - Assess patient's ability to be responsible for managing their own health  - Refer to Case Management Department for coordinating discharge planning if the patient needs post-hospital services based on physician/LIP order or complex needs related to functional status, cognitive ability or social support system  Outcome: Progressing     Problem: Impaired Functional Mobility  Goal: Achieve highest/safest level of mobility/gait  Description: Interventions:  - Assess patient's functional ability and stability  - Promote increasing activity/tolerance for mobility and gait  - Educate and engage patient/family in tolerated activity level and  precautions  - Recommend use of  RW for transfers and ambulation  - Recommend patient transfer to bedside chair toward strongest side  - When transferring patient, block weaker knee for safety  - Recommend use of chair position in bed 3 times per day  Outcome: Progressing     Problem: Impaired Activities of Daily Living  Goal: Achieve highest/safest level of independence in self care  Description: Interventions:  - Assess ability and encourage patient to participate in ADLs to maximize function  - Promote sitting position while performing ADLs such as feeding, grooming, and bathing  - Educate and encourage patient/family in tolerated functional activity level and precautions during self-care  Outcome: Progressing     Problem: Patient/Family Goals  Goal: Patient/Family Long Term Goal  Description: Patient's Long Term Goal: Discharge from the hospital    Interventions:  - Monitor vital signs  - Monitor appropriate labs  - Monitor blood glucose levels  - Pain management  - Administer medications per order  - Follow MD orders  - Diagnostics per order  - Update / inform patient and family on plan of care  - Discharge planning  - See additional Care Plan goals for specific interventions  Outcome: Progressing  Goal: Patient/Family Short Term Goal  Description: Patient's Short Term Goal: Improve redness, swelling, and pain to left lower extremity     Interventions:   - Monitor vital signs  - Monitor appropriate labs  - Monitor blood glucose levels  - Pain management  - Administer medications per order  - Follow MD orders  - Diagnostics per order  - Update / inform patient and family on plan of care  - See additional Care Plan goals for specific interventions  Outcome: Progressing

## 2024-11-21 NOTE — PROGRESS NOTES
[facility]   INTERNAL MEDICINE PROGRESS NOTE    CHIEF COMPLAINT   Swelling Edema    SUBJECTIVE  24 HR EVENTS   LLE more painful, swollen, WBC higher  Still with pain - Needing IVP dilaudid  Headaches, dark urine, still feels very dehydrated. 1L IVF bolus helped yesterday    INPATIENT MEDICATIONS  Scheduled Medications:  vancomycin, 20 mg/kg, Q12H  piperacillin-tazobactam, 3.375 g, Q8H  docosanol, , BID  cetirizine, 10 mg, Daily  ketorolac, 15 mg, 4 times per day  enalapril, 20 mg, BID  flecainide, 150 mg, BID  hydrALAZINE, 50 mg, BID  carvedilol, 25 mg, BID with meals  enoxaparin, 90 mg, 2 times per day  magnesium oxide, 200 mg, Nightly  dilTIAZem HCl ER Coated Beads, 180 mg, Nightly     Drips:       PRN Medications:   traMADol, 50 mg, Q6H PRN  calcium carbonate, 1,000 mg, TID PRN  famotidine, 20 mg, BID PRN  acetaminophen, 500 mg, Q4H PRN  melatonin, 3 mg, Nightly PRN  polyethylene glycol (PEG 3350), 17 g, Daily PRN  sennosides, 17.2 mg, Nightly PRN  guaiFENesin, 200 mg, Q4H PRN  ondansetron, 4 mg, Q6H PRN  prochlorperazine, 5 mg, Q8H PRN  HYDROmorphone, 1 mg, Q2H PRN  HYDROmorphone, 0.5 mg, Q2H PRN  oxyCODONE, 5 mg, Q4H PRN  simethicone, 80 mg, TID PRN       PHYSICAL EXAMINATION   Vitals: /84 (BP Location: Right arm)   Pulse 87   Temp 99.7 °F (37.6 °C) (Oral)   Resp 18   Ht 6' 1\" (1.854 m)   Wt (!) 400 lb 3.2 oz (181.5 kg)   SpO2 92%   BMI 52.80 kg/m²   Gen: Appears ill, appears in pain  Eyes: PERRLA, normal conjunctivae  ENMT: Moist mucous membranes, trachea midline  CV: RRR, no M/R/G, no peripheral edema  Resp: CTAB, non-labored respirations, symmetric expansion  GI: Soft, NT, ND, + BS, no rebound, no guarding  MSK:  No C/C/E, normal active/passive ROM in C-spine  Skin: Psoriasis plaques. LLE swollen, warm, diffuse areas of demarcated erythema, very TTP  Neuro: CN 2-12 grossly intact, sensation intact   Psych: A&Ox3, appropriate mood, conversant w/ linear thought process     DATA REVIEW:  LABORATORY VALUES & DIAGNOSTICS  Recent Labs   Lab 11/18/24  1132 11/19/24  0534 11/19/24  1751 11/20/24  0519 11/21/24  0525    136  --  134* 135*   K 3.8 3.6 3.9 4.0 3.9    102  --  105 102   CO2 29.0 27.0  --  26.0 28.0   BUN 18 16  --  15 17   CREATSERUM 1.06 1.05  --  0.93 0.82   ANIONGAP 5 7  --  3 5   GLU 98 109*  --  94 95   CA 10.8* 9.7  --  9.8 9.8   TP 7.1  --   --   --   --    ALB 4.1  --   --   --   --    AST 29  --   --   --   --    ALT 38  --   --   --   --    ALKPHO 72  --   --   --   --    BILT 0.3  --   --   --   --    EGFRCR 84 85  --  99 106     Recent Labs   Lab 11/18/24  1132 11/19/24  0534 11/20/24  0519 11/21/24  0525   WBC 10.1 13.5* 14.5* 15.5*   HGB 13.1 11.6* 11.4* 11.3*   HCT 40.9 36.0* 35.9* 34.0*   .0 163.0 158.0 169.0   MCV 87.6 87.2 87.6 86.7   MOPERCENT 10.9  --   --   --    EOPERCENT 0.6  --   --   --    BAPERCENT 0.3  --   --   --      ASSESSMENT & PLAN   Jovan Merino - 54 year old male w MO, HTN, Afib, HFpEF, psoriasis admitted 11/18/2024 for LLE cellulitis, started on IV ancef. Initially improving however 11/21 worse - abx broadened to vanc/zosyn, CT ordered. ID consulted    Sepsis 2/2 LLE cellulitis  Immunosuppression 2/2 psoriasis/humira  - Febrile to 102 here. HR 80s, WBC 14  [  ] blood cx pending  - doppler neg. No DVT  - . Known HFpEF. Feels like legs aren't swollen & has lost weight. Lower c/f decompensated CHF  - Pain control: IVP dilaudid, PO oxy, APAP PRN  - Elevate extremity  - IV ancef (11/18 - 21 )  - 11/19 febrile, check Bcx. Lot of pain - add tramadol (itching w oxy/norc), add IV toradol scheduled. Headaches, feels dehydrated - will give 1L NS bolus  - 11/20, rash better, still swollen, lot of pain. Still needing IV dilaudid  - 11/21: slightly worse, LE very swollen, WBC higher. Will switch ancef to vanco/zosyn for now. Consult ID - will d/w Dr. Calloway. Will check CT to eval for underlying abscess    Headaches  - seems dehydrated. Dark  urine. Fevers  - Give another 1L IVF    HFpEF - compensated  HTN  Parox Afib - low CV, not on AC  - Continue PTA lasix, flecainide, coreg, hydral, enalapril    Psoriasis  - On humira OP. Would hold until leg better    [place] [facility] [place] mass index is 52.8 kg/m².   - Healthy diet & wt loss encouraged  - Has lost quite a bit of weight with Mounjaro already    DM2 - currently controlled w mounjaro  - Okay for reg diet, follow BG on AM labs. Can hold on SSI/accuchecks for now, can add if issues controlling    ACP: Full Code  Ppx: DVT: Enox  Dispo  EDoD:  ~11/23 - 11/24    F/u:   - PCP:  Alvino Velez, DO    Available via epic secure chat or perfect serve 7A - 7P.    Narayan Parra MD   Internal Medicine - Hospitalist  [facility]    ADDENDUM  Friend Adelina BARBOSA updated per pt request.    Narayan Parra MD  11/21/24 9:23 AM

## 2024-11-21 NOTE — PAYOR COMM NOTE
CONTINUED STAY REVIEW    Payor: [employer] OUT [employer] STATE PPO  Subscriber #:  [id number]  Authorization Number: [id number]    Admit date: 11/18/24  Admit time:  4:58 PM    REVIEW DOCUMENTATION: 11/21    [facility]   INTERNAL MEDICINE PROGRESS NOTE     CHIEF COMPLAINT   Swelling Edema     SUBJECTIVE  24 HR EVENTS   LLE more painful, swollen, WBC higher  Still with pain - Needing IVP dilaudid  Headaches, dark urine, still feels very dehydrated. 1L IVF bolus helped yesterday     INPATIENT MEDICATIONS  Scheduled Medications:    Scheduled Meds   vancomycin, 20 mg/kg, Q12H  piperacillin-tazobactam, 3.375 g, Q8H  docosanol, , BID  cetirizine, 10 mg, Daily  ketorolac, 15 mg, 4 times per day  enalapril, 20 mg, BID  flecainide, 150 mg, BID  hydrALAZINE, 50 mg, BID  carvedilol, 25 mg, BID with meals  enoxaparin, 90 mg, 2 times per day  magnesium oxide, 200 mg, Nightly  dilTIAZem HCl ER Coated Beads, 180 mg, Nightly        Drips:   IV Meds          PRN Medications:     PRN Meds   traMADol, 50 mg, Q6H PRN  calcium carbonate, 1,000 mg, TID PRN  famotidine, 20 mg, BID PRN  acetaminophen, 500 mg, Q4H PRN  melatonin, 3 mg, Nightly PRN  polyethylene glycol (PEG 3350), 17 g, Daily PRN  sennosides, 17.2 mg, Nightly PRN  guaiFENesin, 200 mg, Q4H PRN  ondansetron, 4 mg, Q6H PRN  prochlorperazine, 5 mg, Q8H PRN  HYDROmorphone, 1 mg, Q2H PRN  HYDROmorphone, 0.5 mg, Q2H PRN  oxyCODONE, 5 mg, Q4H PRN  simethicone, 80 mg, TID PRN          PHYSICAL EXAMINATION   Vitals:       /84 (BP Location: Right arm)   Pulse 87   Temp 99.7 °F (37.6 °C) (Oral)   Resp 18   Ht 6' 1\" (1.854 m)   Wt (!) 400 lb 3.2 oz (181.5 kg)   SpO2 92%   BMI 52.80 kg/m²   Gen:Appears ill, appears in pain  Eyes:PERRLA, normal conjunctivae  ENMT:Moist mucous membranes, trachea midline  CV:RRR, no M/R/G, no peripheral edema  Resp:CTAB, non-labored respirations, symmetric expansion  GI:Soft, NT, ND, + BS, no rebound, no guarding  MSK:        No C/C/E, normal  active/passive ROM in C-spine  Skin:Psoriasis plaques. LLE swollen, warm, diffuse areas of demarcated erythema, very TTP  Neuro:      CN 2-12 grossly intact, sensation intact   Psych:      A&Ox3, appropriate mood, conversant w/ linear thought process      DATA REVIEW: LABORATORY VALUES & DIAGNOSTICS          Recent Labs   Lab 11/18/24  1132 11/19/24  0534 11/19/24  1751 11/20/24  0519 11/21/24  0525    136  --  134* 135*   K 3.8 3.6 3.9 4.0 3.9    102  --  105 102   CO2 29.0 27.0  --  26.0 28.0   BUN 18 16  --  15 17   CREATSERUM 1.06 1.05  --  0.93 0.82   ANIONGAP 5 7  --  3 5   GLU 98 109*  --  94 95   CA 10.8* 9.7  --  9.8 9.8   TP 7.1  --   --   --   --    ALB 4.1  --   --   --   --    AST 29  --   --   --   --    ALT 38  --   --   --   --    ALKPHO 72  --   --   --   --    BILT 0.3  --   --   --   --    EGFRCR 84 85  --  99 106             Recent Labs   Lab 11/18/24  1132 11/19/24  0534 11/20/24  0519 11/21/24  0525   WBC 10.1 13.5* 14.5* 15.5*   HGB 13.1 11.6* 11.4* 11.3*   HCT 40.9 36.0* 35.9* 34.0*   .0 163.0 158.0 169.0   MCV 87.6 87.2 87.6 86.7   MOPERCENT 10.9  --   --   --    EOPERCENT 0.6  --   --   --    BAPERCENT 0.3  --   --   --       ASSESSMENT & PLAN   Jovan Merino - 54 year old male w MO, HTN, Afib, HFpEF, psoriasis admitted 11/18/2024 for LLE cellulitis, started on IV ancef. Initially improving however 11/21 worse - abx broadened to vanc/zosyn, CT ordered. ID consulted     Sepsis 2/2 LLE cellulitis  Immunosuppression 2/2 psoriasis/humira  - Febrile to 102 here. HR 80s, WBC 14  [  ] blood cx pending  - doppler neg. No DVT  - . Known HFpEF. Feels like legs aren't swollen & has lost weight. Lower c/f decompensated CHF  - Pain control: IVP dilaudid, PO oxy, APAP PRN  - Elevate extremity  - IV ancef (11/18 - 21 )  - 11/19 febrile, check Bcx. Lot of pain - add tramadol (itching w oxy/norc), add IV toradol scheduled. Headaches, feels dehydrated - will give 1L NS bolus  -  11/20, rash better, still swollen, lot of pain. Still needing IV dilaudid  - 11/21: slightly worse, LE very swollen, WBC higher. Will switch ancef to vanco/zosyn for now. Consult ID - will d/w Dr. Calloway. Will check CT to eval for underlying abscess     Headaches  - seems dehydrated. Dark urine. Fevers  - Give another 1L IVF     HFpEF - compensated  HTN  Parox Afib - low CV, not on AC  - Continue PTA lasix, flecainide, coreg, hydral, enalapril     Psoriasis  - On humira OP. Would hold until leg better     [place] [facility] [place]  - Body mass index is 52.8 kg/m².   - Healthy diet & wt loss encouraged  - Has lost quite a bit of weight with Mounjaro already     DM2 - currently controlled w mounjaro  - Okay for reg diet, follow BG on AM labs. Can hold on SSI/accuchecks for now, can add if issues controlling     ACP: Full Code  Ppx: DVT: Enox  Dispo  EDoD:  ~11/23 - 11/24     F/u:   - PCP:  Alvino Velez, DO     Available via epic secure chat or perfect serve 7A - 7P.     Narayan Parra MD   Internal Medicine - [facility]     ADDENDUM  Friend Adelina BARBOSA updated per pt request.     Narayan Parra MD  11/21/24 9:23 AM          MEDICATIONS ADMINISTERED IN LAST 1 DAY:  calcium carbonate (Tums) chewable tab 1,000 mg       Date Action Dose Route User    11/20/2024 2218 Given 1,000 mg Oral Missy Mcconnell RN    11/20/2024 1755 Given 1,000 mg Oral Indira Hayward RN          carvedilol (Coreg) tab 25 mg       Date Action Dose Route User    11/21/2024 0815 Given 25 mg Oral Indira Hayward RN    11/20/2024 1755 Given 25 mg Oral Indira Hayward RN          ceFAZolin (Ancef) 2g in 10mL IV syringe premix       Date Action Dose Route User    11/21/2024 0504 New Bag 2 g Intravenous Missy Mcconnell RN    11/20/2024 2205 New Bag 2 g Intravenous Missy Mcconnell RN          ceFEPIme (Maxpime) 2 g in sodium chloride 0.9% 100 mL IVPB-MBP       Date Action Dose Route User    11/21/2024 1533 New Bag 2 g Intravenous  Indira Hayward RN          cetirizine (ZyrTEC) tab 10 mg       Date Action Dose Route User    11/21/2024 0815 Given 10 mg Oral Indira Hayward RN          DAPTOmycin (Cubicin) 1,000 mg in sodium chloride 0.9% PF IV syringe       Date Action Dose Route User    11/21/2024 1533 New Bag 1,000 mg Intravenous Indira Hayward RN          dilTIAZem ER (CardIZEM CD) 24 hr cap 180 mg       Date Action Dose Route User    11/20/2024 2205 Given 180 mg Oral Missy Mcconnell RN          docosanol (Abreva) 10 % cream       Date Action Dose Route User    11/21/2024 1023 Given (none) Topical Indira Hayward RN          enalapril (Vasotec) tab 20 mg       Date Action Dose Route User    11/21/2024 0815 Given 20 mg Oral Indira Hayward RN    11/20/2024 2204 Given 20 mg Oral Missy Mcconnell RN          enoxaparin (Lovenox) 100 MG/ML SUBQ injection 90 mg       Date Action Dose Route User    11/21/2024 0900 Given 90 mg Subcutaneous (Left Lower Abdomen) Indira Hayward RN    11/20/2024 2205 Given 90 mg Subcutaneous (Right Lower Abdomen) Missy Mcconnell RN          flecainide (Tambocor) tab 150 mg       Date Action Dose Route User    11/21/2024 0815 Given 150 mg Oral Indira Hayward RN    11/20/2024 2204 Given 150 mg Oral Missy Mcconnell RN          hydrALAZINE (Apresoline) tab 50 mg       Date Action Dose Route User    11/21/2024 0815 Given 50 mg Oral Indira Hayward RN    11/20/2024 2205 Given 50 mg Oral Missy Mcconnell RN          HYDROmorphone (Dilaudid) 1 MG/ML injection 1 mg       Date Action Dose Route User    11/21/2024 1354 Given 1 mg Intravenous Indira Hayward RN    11/21/2024 1009 Given 1 mg Intravenous Indira Hayward RN    11/21/2024 0342 Given 1 mg Intravenous Missy Mcconnell RN    11/20/2024 2205 Given 1 mg Intravenous Missy Mcconnell, RN          iopamidol 76% (ISOVUE-370) injection for power injector       Date Action Dose Route User    11/21/2024 1338 Given 100 mL Intravenous Woods,  Ann Marie          ketorolac (Toradol) 15 MG/ML injection 15 mg       Date Action Dose Route User    11/21/2024 0504 Given 15 mg Intravenous Missy Mcconnell RN    11/21/2024 0007 Given 15 mg Intravenous Missy Mcconnell RN    11/20/2024 1756 Given 15 mg Intravenous Indira Hayward RN          magnesium hydroxide (Milk of Magnesia) 400 MG/5ML oral suspension 30 mL       Date Action Dose Route User    11/21/2024 1009 Given 30 mL Oral Indira Hayward RN          magnesium oxide (Mag-Ox) tab 200 mg       Date Action Dose Route User    11/20/2024 2218 Given 200 mg Oral Missy Mcconnell RN          oxyCODONE immediate release tab 5 mg       Date Action Dose Route User    11/21/2024 0814 Given 5 mg Oral Indira Hayward RN          piperacillin-tazobactam (Zosyn) 3.375 g in dextrose 5% 100 mL IVPB-ADDV       Date Action Dose Route User    11/21/2024 1010 New Bag 3.375 g Intravenous Indira Hayward RN            Vitals (last day)       Date/Time Temp Pulse Resp BP SpO2 Weight O2 Device O2 Flow Rate (L/min) [facility]    11/21/24 0800 99.1 °F (37.3 °C) -- 18 160/60 94 % -- None (Room air) --     11/21/24 0636 -- -- -- 154/84 -- -- -- -- KG    11/21/24 0502 99.7 °F (37.6 °C) 87 18 182/80 92 % -- None (Room air) -- KG    11/20/24 2159 99.5 °F (37.5 °C) 89 18 163/86 92 % -- None (Room air) -- KG    11/20/24 1753 98 °F (36.7 °C) -- 18 150/75 94 % -- Nasal cannula 2 L/min     11/20/24 1633 100 °F (37.8 °C) 91 20 113/80 94 % -- Nasal cannula 2 L/min AL    11/20/24 0940 97.5 °F (36.4 °C) -- 18 150/80 95 % -- Nasal cannula 2 L/min     11/20/24 0545 99.9 °F (37.7 °C) 91 18 160/85 95 % -- Nasal cannula 2 L/min OP    11/20/24 0528 -- -- -- -- -- 400 lb 3.2 oz (181.5 kg) -- -- DJ

## 2024-11-21 NOTE — PLAN OF CARE
Problem: Patient Centered Care  Goal: Patient preferences are identified and integrated in the patient's plan of care  Description: Interventions:  - What would you like us to know as we care for you? From home alone  - Provide timely, complete, and accurate information to patient/family  - Incorporate patient and family knowledge, values, beliefs, and cultural backgrounds into the planning and delivery of care  - Encourage patient/family to participate in care and decision-making at the level they choose  - Honor patient and family perspectives and choices  Outcome: Progressing     Problem: CARDIOVASCULAR - ADULT  Goal: Maintains optimal cardiac output and hemodynamic stability  Description: INTERVENTIONS:  - Monitor vital signs, rhythm, and trends  - Monitor for bleeding, hypotension and signs of decreased cardiac output  - Evaluate effectiveness of vasoactive medications to optimize hemodynamic stability  - Monitor arterial and/or venous puncture sites for bleeding and/or hematoma  - Assess quality of pulses, skin color and temperature  - Assess for signs of decreased coronary artery perfusion - ex. Angina  - Evaluate fluid balance, assess for edema, trend weights  Outcome: Progressing     Problem: RESPIRATORY - ADULT  Goal: Achieves optimal ventilation and oxygenation  Description: INTERVENTIONS:  - Assess for changes in respiratory status  - Assess for changes in mentation and behavior  - Position to facilitate oxygenation and minimize respiratory effort  - Oxygen supplementation based on oxygen saturation or ABGs  - Provide Smoking Cessation handout, if applicable  - Encourage broncho-pulmonary hygiene including cough, deep breathe, Incentive Spirometry  - Assess the need for suctioning and perform as needed  - Assess and instruct to report SOB or any respiratory difficulty  - Respiratory Therapy support as indicated  - Manage/alleviate anxiety  - Monitor for signs/symptoms of CO2 retention  Outcome:  Progressing     Problem: GASTROINTESTINAL - ADULT  Goal: Minimal or absence of nausea and vomiting  Description: INTERVENTIONS:  - Maintain adequate hydration with IV or PO as ordered and tolerated  - Nasogastric tube to low intermittent suction as ordered  - Evaluate effectiveness of ordered antiemetic medications  - Provide nonpharmacologic comfort measures as appropriate  - Advance diet as tolerated, if ordered  - Obtain nutritional consult as needed  - Evaluate fluid balance  Outcome: Progressing     Problem: GENITOURINARY - ADULT  Goal: Absence of urinary retention  Description: INTERVENTIONS:  - Assess patient’s ability to void and empty bladder  - Monitor intake/output and perform bladder scan as needed  - Follow urinary retention protocol/standard of care  - Consider collaborating with pharmacy to review patient's medication profile  - Implement strategies to promote bladder emptying  Outcome: Progressing     Problem: METABOLIC/FLUID AND ELECTROLYTES - ADULT  Goal: Glucose maintained within prescribed range  Description: INTERVENTIONS:  - Monitor Blood Glucose as ordered  - Assess for signs and symptoms of hyperglycemia and hypoglycemia  - Administer ordered medications to maintain glucose within target range  - Assess barriers to adequate nutritional intake and initiate nutrition consult as needed  - Instruct patient on self management of diabetes  Outcome: Progressing  Goal: Electrolytes maintained within normal limits  Description: INTERVENTIONS:  - Monitor labs and rhythm and assess patient for signs and symptoms of electrolyte imbalances  - Administer electrolyte replacement as ordered  - Monitor response to electrolyte replacements, including rhythm and repeat lab results as appropriate  - Fluid restriction as ordered  - Instruct patient on fluid and nutrition restrictions as appropriate  Outcome: Progressing     Problem: SKIN/TISSUE INTEGRITY - ADULT  Goal: Skin integrity remains intact  Description:  INTERVENTIONS  - Assess and document risk factors for pressure ulcer development  - Assess and document skin integrity  - Monitor for areas of redness and/or skin breakdown  - Initiate interventions, skin care algorithm/standards of care as needed  Outcome: Progressing  Goal: Incision(s), wounds(s) or drain site(s) healing without S/S of infection  Description: INTERVENTIONS:  - Assess and document risk factors for pressure ulcer development  - Assess and document skin integrity  - Assess and document dressing/incision, wound bed, drain sites and surrounding tissue  - Implement wound care per orders  - Initiate isolation precautions as appropriate  - Initiate Pressure Ulcer prevention bundle as indicated  Outcome: Progressing     Problem: MUSCULOSKELETAL - ADULT  Goal: Return mobility to safest level of function  Description: INTERVENTIONS:  - Assess patient stability and activity tolerance for standing, transferring and ambulating w/ or w/o assistive devices  - Assist with transfers and ambulation using safe patient handling equipment as needed  - Ensure adequate protection for wounds/incisions during mobilization  - Obtain PT/OT consults as needed  - Advance activity as appropriate  - Communicate ordered activity level and limitations with patient/family  Outcome: Progressing     Problem: PAIN - ADULT  Goal: Verbalizes/displays adequate comfort level or patient's stated pain goal  Description: INTERVENTIONS:  - Encourage pt to monitor pain and request assistance  - Assess pain using appropriate pain scale  - Administer analgesics based on type and severity of pain and evaluate response  - Implement non-pharmacological measures as appropriate and evaluate response  - Consider cultural and social influences on pain and pain management  - Manage/alleviate anxiety  - Utilize distraction and/or relaxation techniques  - Monitor for opioid side effects  - Notify MD/LIP if interventions unsuccessful or patient reports new  pain  - Anticipate increased pain with activity and pre-medicate as appropriate  Outcome: Progressing     Problem: SAFETY ADULT - FALL  Goal: Free from fall injury  Description: INTERVENTIONS:  - Assess pt frequently for physical needs  - Identify cognitive and physical deficits and behaviors that affect risk of falls.  - [place] fall precautions as indicated by assessment.  - Educate pt/family on patient safety including physical limitations  - Instruct pt to call for assistance with activity based on assessment  - Modify environment to reduce risk of injury  - Provide assistive devices as appropriate  - Consider OT/PT consult to assist with strengthening/mobility  - Encourage toileting schedule  Outcome: Progressing     Problem: DISCHARGE PLANNING  Goal: Discharge to home or other facility with appropriate resources  Description: INTERVENTIONS:  - Identify barriers to discharge w/pt and caregiver  - Include patient/family/discharge partner in discharge planning  - Arrange for needed discharge resources and transportation as appropriate  - Identify discharge learning needs (meds, wound care, etc)  - Arrange for interpreters to assist at discharge as needed  - Consider post-discharge preferences of patient/family/discharge partner  - Complete POLST form as appropriate  - Assess patient's ability to be responsible for managing their own health  - Refer to Case Management Department for coordinating discharge planning if the patient needs post-hospital services based on physician/LIP order or complex needs related to functional status, cognitive ability or social support system  Outcome: Progressing     Problem: Impaired Functional Mobility  Goal: Achieve highest/safest level of mobility/gait  Description: Interventions:  - Assess patient's functional ability and stability  - Promote increasing activity/tolerance for mobility and gait  - Educate and engage patient/family in tolerated activity level and  precautions  - Recommend use of  RW for transfers and ambulation  - Recommend patient transfer to bedside chair toward strongest side  - When transferring patient, block weaker knee for safety  - Recommend use of chair position in bed 3 times per day  Outcome: Progressing     Problem: Impaired Activities of Daily Living  Goal: Achieve highest/safest level of independence in self care  Description: Interventions:  - Assess ability and encourage patient to participate in ADLs to maximize function  - Promote sitting position while performing ADLs such as feeding, grooming, and bathing  - Educate and encourage patient/family in tolerated functional activity level and precautions during self-care  Outcome: Progressing     Problem: Patient/Family Goals  Goal: Patient/Family Long Term Goal  Description: Patient's Long Term Goal: Discharge from the hospital    Interventions:  - Monitor vital signs  - Monitor appropriate labs  - Monitor blood glucose levels  - Pain management  - Administer medications per order  - Follow MD orders  - Diagnostics per order  - Update / inform patient and family on plan of care  - Discharge planning  - See additional Care Plan goals for specific interventions  Outcome: Progressing  Goal: Patient/Family Short Term Goal  Description: Patient's Short Term Goal: Improve redness, swelling, and pain to left lower extremity     Interventions:   - Monitor vital signs  - Monitor appropriate labs  - Monitor blood glucose levels  - Pain management  - Administer medications per order  - Follow MD orders  - Diagnostics per order  - Update / inform patient and family on plan of care  - See additional Care Plan goals for specific interventions  Outcome: Progressing

## 2024-11-21 NOTE — OCCUPATIONAL THERAPY NOTE
Attempt made for occupational therapy treatment. Pt currently off unit for CT of left lower extremity. Will continue to follow.

## 2024-11-22 LAB
ANION GAP SERPL CALC-SCNC: 6 MMOL/L (ref 0–18)
BUN BLD-MCNC: 14 MG/DL (ref 9–23)
BUN/CREAT SERPL: 17.7 (ref 10–20)
CALCIUM BLD-MCNC: 9.7 MG/DL (ref 8.7–10.4)
CHLORIDE SERPL-SCNC: 102 MMOL/L (ref 98–112)
CO2 SERPL-SCNC: 27 MMOL/L (ref 21–32)
CREAT BLD-MCNC: 0.79 MG/DL
CRP SERPL-MCNC: 18.9 MG/DL (ref ?–1)
DEPRECATED RDW RBC AUTO: 48.8 FL (ref 35.1–46.3)
EGFRCR SERPLBLD CKD-EPI 2021: 107 ML/MIN/1.73M2 (ref 60–?)
ERYTHROCYTE [DISTWIDTH] IN BLOOD BY AUTOMATED COUNT: 15.3 % (ref 11–15)
ERYTHROCYTE [SEDIMENTATION RATE] IN BLOOD: 102 MM/HR
GLUCOSE BLD-MCNC: 112 MG/DL (ref 70–99)
GLUCOSE BLDC GLUCOMTR-MCNC: 117 MG/DL (ref 70–99)
GLUCOSE BLDC GLUCOMTR-MCNC: 119 MG/DL (ref 70–99)
GLUCOSE BLDC GLUCOMTR-MCNC: 123 MG/DL (ref 70–99)
GLUCOSE BLDC GLUCOMTR-MCNC: 123 MG/DL (ref 70–99)
HCT VFR BLD AUTO: 34.9 %
HGB BLD-MCNC: 11 G/DL
MCH RBC QN AUTO: 27.3 PG (ref 26–34)
MCHC RBC AUTO-ENTMCNC: 31.5 G/DL (ref 31–37)
MCV RBC AUTO: 86.6 FL
MRSA DNA SPEC QL NAA+PROBE: NEGATIVE
OSMOLALITY SERPL CALC.SUM OF ELEC: 281 MOSM/KG (ref 275–295)
PLATELET # BLD AUTO: 238 10(3)UL (ref 150–450)
POTASSIUM SERPL-SCNC: 4.1 MMOL/L (ref 3.5–5.1)
RBC # BLD AUTO: 4.03 X10(6)UL
SODIUM SERPL-SCNC: 135 MMOL/L (ref 136–145)
WBC # BLD AUTO: 16 X10(3) UL (ref 4–11)

## 2024-11-22 RX ORDER — OXYCODONE HYDROCHLORIDE 5 MG/1
10 TABLET ORAL EVERY 4 HOURS PRN
Status: DISCONTINUED | OUTPATIENT
Start: 2024-11-22 | End: 2024-11-28

## 2024-11-22 RX ORDER — OXYCODONE HYDROCHLORIDE 5 MG/1
5 TABLET ORAL EVERY 4 HOURS PRN
Status: DISCONTINUED | OUTPATIENT
Start: 2024-11-22 | End: 2024-11-28

## 2024-11-22 RX ORDER — FUROSEMIDE 10 MG/ML
20 INJECTION INTRAMUSCULAR; INTRAVENOUS ONCE
Status: COMPLETED | OUTPATIENT
Start: 2024-11-22 | End: 2024-11-22

## 2024-11-22 NOTE — CONSULTS
[facility]  Orthopedic Surgery  Report of Consultation    Jovan Merino Sr. Patient Status:  Inpatient    1970 MRN [id number]   Location [facility] 5SW/SE Attending Linda Savage MD   Hosp Day # 4 PCP Eric Sethi DO     Reason for Consultation:  Right lower leg infection, evaluate for septic arthritis    History of Present Illness:  Jovan Merino Sr. is a a(n) 54 year old male with a history of congestive heart failure and psoriasis who presented to the hospital with right lower leg swelling and pain.  He reports that on  he began having pain in hs lower leg.  Denies any known inciting event.  Pain progressed and reports fevers on  and  and presented to the hospital on .  Admitted and placed on antibiotics.  Reports minimal if any improvement in the last 5 days, ID consulted yesterday and abx changed,  Knee CT scan done yesterday demonstrated a knee effusion and ortho consulted to rule out a septic knee.  Denies a history of lower leg infections in the past.    Of note, he had a Humira injection on  for his psoriasis    History:  Past Medical History:    Appendicitis    Arrhythmia    AF    Atrial fibrillation (HCC)    Congestive heart disease (HCC)    age 30 yrs.  1 episode    Esophageal reflux    High blood pressure    NEREIDA (obstructive sleep apnea)    Intolerant to cpap    Osteoarthritis    Unspecified essential hypertension    Ventral hernia    Visual impairment    glasses for driving     Past Surgical History:   Procedure Laterality Date    Appendectomy      Electrocardiogram, complete  2014    Scanned to Media Tab - Date of Service 2014    Hernia surgery      () Ventral Hernia Repaired     Family History   Problem Relation Age of Onset    Other (Other[Other]) Father 60        Heart attack    Cancer Maternal Grandmother         Stomach CA    Cancer Maternal Grandfather         Ling CA- Smoker      reports that he  quit smoking about 12 years ago. He has never used smokeless tobacco. He reports current alcohol use of about 8.0 standard drinks of alcohol per week. He reports that he does not use drugs.    Allergies:  Allergies[1]    Medications:  Prior to Admission medications    Medication Sig Start Date End Date Taking? Authorizing Provider   Adalimumab (HUMIRA, 2 PEN,) 40 MG/0.4ML Subcutaneous Auto-injector Kit Inject into the skin every 14 (fourteen) days.   Yes External/Patient, Reported   furosemide 40 MG Oral Tab Take 1 tablet (40 mg total) by mouth daily as needed.   Yes External/Patient, Reported   ibuprofen 200 MG Oral Tab Take 4 tablets (800 mg total) by mouth daily as needed for Pain.   Yes External/Patient, Reported   fexofenadine 180 MG Oral Tab Take 1 tablet (180 mg total) by mouth daily.   Yes External/Patient, Reported   Flecainide Acetate 150 MG Oral Tab Take 1 tablet (150 mg total) by mouth 2 (two) times daily.   Yes External/Patient, Reported   hydrALAZINE 50 MG Oral Tab Take 1 tablet (50 mg total) by mouth 2 (two) times daily.   Yes External/Patient, Reported   omega-3 fatty acids 1000 MG Oral Cap Take 1,000 mg by mouth daily.   Yes External/Patient, Reported   dilTIAZem HCl ER Coated Beads 180 MG Oral Capsule SR 24 Hr Take 1 capsule (180 mg total) by mouth daily.   Yes External/Patient, Reported   Multiple Vitamin Oral Tab Take 1 tablet by mouth daily.   Yes External/Patient, Reported   DiphenhydrAMINE HCl 25 MG Oral Cap Take 1 capsule (25 mg total) by mouth nightly as needed.   Yes External/Patient, Reported   Enalapril Maleate (VASOTEC) 20 MG Oral Tab TAKE 1 TABLET BY MOUTH TWICE DAILY 3/5/15  Yes Eric Stephenson MD   carvedilol 25 MG Oral Tab Take 1 tablet (25 mg total) by mouth 2 (two) times daily.   Yes External/Patient, Reported       Current Facility-Administered Medications:     oxyCODONE immediate release tab 5 mg, 5 mg, Oral, Q4H PRN **OR** oxyCODONE immediate release tab 10 mg, 10 mg, Oral,  Q4H PRN    magnesium hydroxide (Milk of Magnesia) 400 MG/5ML oral suspension 30 mL, 30 mL, Oral, Q6H PRN    ceFEPIme (Maxpime) 2 g in sodium chloride 0.9% 100 mL IVPB-MBP, 2 g, Intravenous, Q8H    DAPTOmycin (Cubicin) 1,000 mg in sodium chloride 0.9% PF IV syringe, 1,000 mg, Intravenous, Q24H    clotrimazole-betamethasone (Lotrisone) 1-0.05 % cream, , Topical, BID    docosanol (Abreva) 10 % cream, , Topical, BID    cetirizine (ZyrTEC) tab 10 mg, 10 mg, Oral, Daily    traMADol (Ultram) tab 50 mg, 50 mg, Oral, Q6H PRN    calcium carbonate (Tums) chewable tab 1,000 mg, 1,000 mg, Oral, TID PRN    famotidine (Pepcid) tab 20 mg, 20 mg, Oral, BID PRN    enalapril (Vasotec) tab 20 mg, 20 mg, Oral, BID    flecainide (Tambocor) tab 150 mg, 150 mg, Oral, BID    hydrALAZINE (Apresoline) tab 50 mg, 50 mg, Oral, BID    carvedilol (Coreg) tab 25 mg, 25 mg, Oral, BID with meals    acetaminophen (Tylenol Extra Strength) tab 500 mg, 500 mg, Oral, Q4H PRN    melatonin tab 3 mg, 3 mg, Oral, Nightly PRN    polyethylene glycol (PEG 3350) (Miralax) 17 g oral packet 17 g, 17 g, Oral, Daily PRN    sennosides (Senokot) tab 17.2 mg, 17.2 mg, Oral, Nightly PRN    guaiFENesin (Robitussin) 100 MG/5 ML oral liquid 200 mg, 200 mg, Oral, Q4H PRN    enoxaparin (Lovenox) 100 MG/ML SUBQ injection 90 mg, 90 mg, Subcutaneous, 2 times per day    ondansetron (Zofran) 4 MG/2ML injection 4 mg, 4 mg, Intravenous, Q6H PRN    prochlorperazine (Compazine) 10 MG/2ML injection 5 mg, 5 mg, Intravenous, Q8H PRN    HYDROmorphone (Dilaudid) 1 MG/ML injection 1 mg, 1 mg, Intravenous, Q2H PRN    HYDROmorphone (Dilaudid) 1 MG/ML injection 0.5 mg, 0.5 mg, Intravenous, Q2H PRN    magnesium oxide (Mag-Ox) tab 200 mg, 200 mg, Oral, Nightly    simethicone (Mylicon) chewable tab 80 mg, 80 mg, Oral, TID PRN    dilTIAZem ER (CardIZEM CD) 24 hr cap 180 mg, 180 mg, Oral, Nightly    Review of Systems:  Constitutional: negative  Eyes: negative  Ears, nose, mouth, throat, and face:  negative  Respiratory: negative  Cardiovascular: negative  Gastrointestinal: negative  Musculoskeletal:right lower leg swelling and pain  Neurological: negative    Physical Exam:  AOx3, NAD  L leg:  - significant swelling and redness overlying lower leg, no redness overlying knee  - minimal warmth over knee  - Active and passive ROM 0 - 60 degrees, minimal pain with passive knee motion  - 4 out of 5 motor strength quadriceps and hamstrings    L ankle:  - mild diffuse swelling, no significant redness  - no pain with passive motion  - firing gastroc and tibialis anterior musculature  - SILT sp, dp, tib, saph, and jina distribution    Vital Signs:  Blood pressure 138/69, pulse 84, temperature 99 °F (37.2 °C), temperature source Axillary, resp. rate 18, height 6' 1\" (1.854 m), weight (!) 400 lb 3.2 oz (181.5 kg), SpO2 92%.    Laboratory Data:  Recent Labs   Lab 11/18/24  1132 11/19/24  0534 11/22/24  0505   RBC 4.67   < > 4.03*   HGB 13.1   < > 11.0*   HCT 40.9   < > 34.9*   MCV 87.6   < > 86.6   MCH 28.1   < > 27.3   MCHC 32.0   < > 31.5   RDW 15.5*   < > 15.3*   NEPRELIM 7.35  --   --    WBC 10.1   < > 16.0*   .0   < > 238.0    < > = values in this interval not displayed.      No results for input(s): \"PTP\", \"INR\" in the last 168 hours.    Diagnostics:   US - no evidence of DVT  CT leg with contrast - Diffuse subcutaneous edema without evidence of abscess, knee effusion, partial torn quadriceps tendon, and varicose veins      Impression and Plan: 54M with a right lower leg infection, no concern for ankle or knee septic arthritis noted.    - continue abx per ID recs  - edema control  - continue medical management    Jorge Urrutia MD  11/22/2024         [1]   Allergies  Allergen Reactions    Hydrocodone ITCHING and RASH

## 2024-11-22 NOTE — PHYSICAL THERAPY NOTE
Attempted follow up treatment this AM, chart reviewed. Per CT result from 11/21 pt with \"Partially torn distal quadriceps tendon\"  . Ortho consult placed. Will defer treatment at this time until updated activity orders received.

## 2024-11-22 NOTE — PAYOR COMM NOTE
--------------  11/22: CONTINUED STAY REVIEW    Payor: [employer]  Subscriber #:  [id number]  Authorization Number: [id number]    Admit date: 11/18/24  Admit time:  4:58 PM    ID:    Subjective:  Patient seen/examined.  He is up in bed in NAD.  CT reviewed - torn quadriceps tendon noted with h/o trauma to this knee as well.  Large effusion noted.     Objective:  Blood pressure 152/82, pulse 91, temperature 97.7 °F (36.5 °C), temperature source Axillary, resp. rate 18, height 6' 1\" (1.854 m), weight (!) 400 lb 3.2 oz (181.5 kg), SpO2 90%.     Intake/Output:     Intake/Output Summary (Last 24 hours) at 11/22/2024 1148  Last data filed at 11/22/2024 1000      Gross per 24 hour   Intake 880 ml   Output 1800 ml   Net -920 ml         Physical Exam:  General: Awake, alert, non-tox, NAD.  HEENT:  Oropharynx clear, trachea ML.  Heart: RRR S1S2 no murmurs.  Lungs: Essentially CTA b/l, no rhonchi, rales, wheezes.  Abdomen: Soft, NT/ND.  BS present.  No organomegaly.  Extremity: LLE slightly less intense erythema today.  Neurological: No focal deficits.  Derm:  Psoriasis.     Lab Data Review:        Lab Results   Component Value Date     WBC 16.0 11/22/2024     HGB 11.0 11/22/2024     HCT 34.9 11/22/2024     .0 11/22/2024     CREATSERUM 0.79 11/22/2024           Cultures:   Blood cultures x 2 negative  MRSA screen pending    Radiology:  CONCLUSION:   1. Diffuse edema in the subcutaneous tissues compatible with history of cellulitis.  No abscess.   2. Moderate to large knee joint effusion.   3. Partially torn distal quadriceps tendon .   4. Multiple varicose veins.           Assessment and Plan:     Sepsis presenting with fevers, leukocytosis, and LLE cellulitis as etiology of events  - Tm 102F during this admission  - Blood cultures are negative  - Dopplers negative for DVT  - CT with R knee effusion and torn quadriceps tendon  - IV ancef x 4 days given without significant improvement->cubicin + cefepime on  11/21/24     2.  Leukocytosis due to the above  - At 16K today and continues to trend up  - Will trend     3.  Underlying psoriasis as a potential nidus for entry of bacteria  - At risk for MDROs due to humira use  - Check MRSA swab - pending     4.  Morbid obesity complicating clinical course     5.  Disposition - inpatient.  Continue IV daptomycin and cefepime as Rx.  Check MRSA swab.  Ortho to see and evaluate.  With large knee effusion ?septic arthritis component.  Orthopedics to see.  Trending temps and WBCs.  Will follow.     Kailee Calloway DO, FACOI  [facility] Infectious Disease    MEDICATIONS ADMINISTERED IN LAST 1 DAY:  calcium carbonate (Tums) chewable tab 1,000 mg       Date Action Dose Route User    11/21/2024 1728 Given 1,000 mg Oral Indira Hayward RN          carvedilol (Coreg) tab 25 mg       Date Action Dose Route User    11/22/2024 0807 Given 25 mg Oral Henny Rojas RN    11/21/2024 1728 Given 25 mg Oral Indira Hayward RN          ceFEPIme (Maxpime) 2 g in sodium chloride 0.9% 100 mL IVPB-MBP       Date Action Dose Route User    11/22/2024 1349 New Bag 2 g Intravenous Henny Rojas RN    11/22/2024 0502 New Bag 2 g Intravenous Isabel Ramsey RN    11/21/2024 2124 New Bag 2 g Intravenous Isabel Ramsey RN          cetirizine (ZyrTEC) tab 10 mg       Date Action Dose Route User    11/22/2024 0807 Given 10 mg Oral Henny Rojas RN          clotrimazole-betamethasone (Lotrisone) 1-0.05 % cream       Date Action Dose Route User    11/21/2024 2214 Given (none) Topical Isabel Ramsey RN          dilTIAZem ER (CardIZEM CD) 24 hr cap 180 mg       Date Action Dose Route User    11/21/2024 2124 Given 180 mg Oral Isabel Ramsey RN          docosanol (Abreva) 10 % cream       Date Action Dose Route User    11/22/2024 0816 Given (none) Topical Henny Rojas RN    11/22/2024 0452 Given (none) Topical Isabel Ramsey, RN           enalapril (Vasotec) tab 20 mg       Date Action Dose Route User    11/22/2024 0807 Given 20 mg Oral Henny Rojas, RN    11/21/2024 2124 Given 20 mg Oral Isabel Ramsey RN          enoxaparin (Lovenox) 100 MG/ML SUBQ injection 90 mg       Date Action Dose Route User    11/22/2024 0807 Given 90 mg Subcutaneous (Right Lower Abdomen) Henny Rojas, RN    11/21/2024 2124 Given 90 mg Subcutaneous (Right Lower Abdomen) Isabel Ramsey RN          flecainide (Tambocor) tab 150 mg       Date Action Dose Route User    11/22/2024 0807 Given 150 mg Oral Henny Rojas, RN    11/21/2024 2124 Given 150 mg Oral Isabel Ramsey RN          furosemide (Lasix) 10 mg/mL injection 20 mg       Date Action Dose Route User    11/22/2024 1213 Given 20 mg Intravenous Henny Rojas RN          hydrALAZINE (Apresoline) tab 50 mg       Date Action Dose Route User    11/22/2024 0807 Given 50 mg Oral Henny Rojas, RN    11/21/2024 2124 Given 50 mg Oral Isabel Ramsey RN          HYDROmorphone (Dilaudid) 1 MG/ML injection 1 mg       Date Action Dose Route User    11/22/2024 1317 Given 1 mg Intravenous Henny Rojas, RN    11/22/2024 1008 Given 1 mg Intravenous Henny Rojas RN    11/22/2024 0807 Given 1 mg Intravenous Henny Rojas RN    11/22/2024 0601 Given 1 mg Intravenous Isabel Ramsey RN    11/22/2024 0354 Given 1 mg Intravenous Isabel Ramsey RN    11/22/2024 0128 Given 1 mg Intravenous Isabel Ramsey RN    11/21/2024 2326 Given 1 mg Intravenous Isabel Ramsey RN    11/21/2024 2119 Given 1 mg Intravenous Isabel Ramsey RN    11/21/2024 1718 Given 1 mg Intravenous Indira Hayward, RN          magnesium hydroxide (Milk of Magnesia) 400 MG/5ML oral suspension 30 mL       Date Action Dose Route User    11/22/2024 1010 Given 30 mL Oral Henny Rojas, RN          magnesium oxide (Mag-Ox) tab 200 mg       Date Action Dose  Route User    11/21/2024 2214 Given 200 mg Oral Isabel Ramsey RN          oxyCODONE immediate release tab 5 mg       Date Action Dose Route User    11/21/2024 2219 Given 5 mg Oral Isabel Ramsey RN          oxyCODONE immediate release tab 10 mg       Date Action Dose Route User    11/22/2024 1135 Given 10 mg Oral Henny Rojas RN            Vitals (last day)       Date/Time Temp Pulse Resp BP SpO2 Weight O2 Device O2 Flow Rate (L/min) [facility]    11/22/24 1346 99 °F (37.2 °C) 84 18 138/69 92 % -- None (Room air) -- AP    11/22/24 0804 97.7 °F (36.5 °C) 91 18 152/82 90 % -- None (Room air) -- AP    11/22/24 0359 99.7 °F (37.6 °C) 87 18 125/81 93 % -- Nasal cannula 2 L/min     11/21/24 2119 99.9 °F (37.7 °C) 88 18 148/62 92 % -- None (Room air) -- GD    11/21/24 1806 99.1 °F (37.3 °C) -- -- -- -- -- -- --     11/21/24 1725 100 °F (37.8 °C) -- 18 161/76 94 % -- Nasal cannula 2 L/min     11/21/24 0800 99.1 °F (37.3 °C) -- 18 160/60 94 % -- None (Room air) -- YG    11/21/24 0636 -- -- -- 154/84 -- -- -- -- KG    11/21/24 0502 99.7 °F (37.6 °C) 87 18 182/80 92 % -- None (Room air) -- KG

## 2024-11-22 NOTE — PROGRESS NOTES
[facility]  part of [facility] Infectious Disease  Progress Note    Jovan Merino  Patient Status:  Inpatient    1970 MRN [id number]   Location [facility] 5SW/SE Attending Linda Savage MD   Hosp Day # 4 PCP Eric Sethi,      Subjective:  Patient seen/examined.  He is up in bed in NAD.  CT reviewed - torn quadriceps tendon noted with h/o trauma to this knee as well.  Large effusion noted.    Objective:  Blood pressure 152/82, pulse 91, temperature 97.7 °F (36.5 °C), temperature source Axillary, resp. rate 18, height 6' 1\" (1.854 m), weight (!) 400 lb 3.2 oz (181.5 kg), SpO2 90%.    Intake/Output:    Intake/Output Summary (Last 24 hours) at 2024 1148  Last data filed at 2024 1000  Gross per 24 hour   Intake 880 ml   Output 1800 ml   Net -920 ml       Physical Exam:  General: Awake, alert, non-tox, NAD.  HEENT:  Oropharynx clear, trachea ML.  Heart: RRR S1S2 no murmurs.  Lungs: Essentially CTA b/l, no rhonchi, rales, wheezes.  Abdomen: Soft, NT/ND.  BS present.  No organomegaly.  Extremity: LLE slightly less intense erythema today.  Neurological: No focal deficits.  Derm:  Psoriasis.    Lab Data Review:  Lab Results   Component Value Date    WBC 16.0 2024    HGB 11.0 2024    HCT 34.9 2024    .0 2024    CREATSERUM 0.79 2024    BUN 14 2024     2024    K 4.1 2024     2024    CO2 27.0 2024     2024    CA 9.7 2024    CRP 18.90 2024      Cultures:   Blood cultures x 2 negative  MRSA screen pending    Radiology:  CONCLUSION:   1. Diffuse edema in the subcutaneous tissues compatible with history of cellulitis.  No abscess.   2. Moderate to large knee joint effusion.   3. Partially torn distal quadriceps tendon .   4. Multiple varicose veins.           Assessment and Plan:     Sepsis presenting with fevers, leukocytosis, and LLE cellulitis  as etiology of events  - Tm 102F during this admission  - Blood cultures are negative  - Dopplers negative for DVT  - CT with R knee effusion and torn quadriceps tendon  - IV ancef x 4 days given without significant improvement->cubicin + cefepime on 11/21/24     2.  Leukocytosis due to the above  - At 16K today and continues to trend up  - Will trend     3.  Underlying psoriasis as a potential nidus for entry of bacteria  - At risk for MDROs due to humira use  - Check MRSA swab - pending     4.  Morbid obesity complicating clinical course     5.  Disposition - inpatient.  Continue IV daptomycin and cefepime as Rx.  Check MRSA swab.  Ortho to see and evaluate.  With large knee effusion ?septic arthritis component.  Orthopedics to see.  Trending temps and WBCs.  Will follow.    Kailee Calloway DO, [facility] Infectious Disease  [phone]    11/22/2024  11:48 AM

## 2024-11-22 NOTE — PROGRESS NOTES
[facility]   INTERNAL MEDICINE PROGRESS NOTE    CHIEF COMPLAINT   Swelling Edema    SUBJECTIVE  24 HR EVENTS     Still having a lot of LLE pain, very severe. Tmax 99 overnight.     INPATIENT MEDICATIONS  Scheduled Medications:  cefepime, 2 g, Q8H  DAPTOmycin, 1,000 mg, Q24H  clotrimazole-betamethasone, , BID  docosanol, , BID  cetirizine, 10 mg, Daily  enalapril, 20 mg, BID  flecainide, 150 mg, BID  hydrALAZINE, 50 mg, BID  carvedilol, 25 mg, BID with meals  enoxaparin, 90 mg, 2 times per day  magnesium oxide, 200 mg, Nightly  dilTIAZem HCl ER Coated Beads, 180 mg, Nightly     Drips:       PRN Medications:   oxyCODONE, 5 mg, Q4H PRN   Or  oxyCODONE, 10 mg, Q4H PRN  magnesium hydroxide, 30 mL, Q6H PRN  traMADol, 50 mg, Q6H PRN  calcium carbonate, 1,000 mg, TID PRN  famotidine, 20 mg, BID PRN  acetaminophen, 500 mg, Q4H PRN  melatonin, 3 mg, Nightly PRN  polyethylene glycol (PEG 3350), 17 g, Daily PRN  sennosides, 17.2 mg, Nightly PRN  guaiFENesin, 200 mg, Q4H PRN  ondansetron, 4 mg, Q6H PRN  prochlorperazine, 5 mg, Q8H PRN  HYDROmorphone, 1 mg, Q2H PRN  HYDROmorphone, 0.5 mg, Q2H PRN  simethicone, 80 mg, TID PRN       PHYSICAL EXAMINATION   Vitals: /82 (BP Location: Right arm)   Pulse 91   Temp 97.7 °F (36.5 °C) (Axillary)   Resp 18   Ht 6' 1\" (1.854 m)   Wt (!) 400 lb 3.2 oz (181.5 kg)   SpO2 90%   BMI 52.80 kg/m²     GEN: obese male, uncomfortable  HEENT: EOMI  Pulm: CTAB, no crackles or wheezes  CV: RRR, no murmurs  ABD: Soft, non-tender, non-distended, +BS  MSK: LLE swelling, very TTP  Neuro: Grossly normal, CN intact, sensory intact  SKIN: warm, dry, psoriatic plaques, LLE with edema, erythema  EXT: no edema    DATA REVIEW: LABORATORY VALUES & DIAGNOSTICS  Recent Labs   Lab 11/18/24  1132 11/19/24  0534 11/19/24  1751 11/20/24  0519 11/21/24  0525 11/22/24  0505    136  --  134* 135* 135*   K 3.8 3.6 3.9 4.0 3.9 4.1    102  --  105 102 102   CO2 29.0 27.0  --  26.0 28.0 27.0    BUN 18 16  --  15 17 14   CREATSERUM 1.06 1.05  --  0.93 0.82 0.79   ANIONGAP 5 7  --  3 5 6   GLU 98 109*  --  94 95 112*   CA 10.8* 9.7  --  9.8 9.8 9.7   TP 7.1  --   --   --   --   --    ALB 4.1  --   --   --   --   --    AST 29  --   --   --   --   --    ALT 38  --   --   --   --   --    ALKPHO 72  --   --   --   --   --    BILT 0.3  --   --   --   --   --    EGFRCR 84 85  --  99 106 107     Recent Labs   Lab 11/18/24  1132 11/19/24  0534 11/20/24  0519 11/21/24  0525 11/22/24  0505   WBC 10.1 13.5* 14.5* 15.5* 16.0*   HGB 13.1 11.6* 11.4* 11.3* 11.0*   HCT 40.9 36.0* 35.9* 34.0* 34.9*   .0 163.0 158.0 169.0 238.0   MCV 87.6 87.2 87.6 86.7 86.6   MOPERCENT 10.9  --   --   --   --    EOPERCENT 0.6  --   --   --   --    BAPERCENT 0.3  --   --   --   --      ASSESSMENT & PLAN   Jovan Merino - 54 year old male w MO, HTN, Afib, HFpEF, psoriasis admitted 11/18/2024 for LLE cellulitis, started on IV ancef. Initially improving however 11/21 worse - abx broadened to vanc/zosyn, CT ordered. ID consulted    Sepsis 2/2 LLE cellulitis  Immunosuppression 2/2 psoriasis/humira  - Febrile to 102 here. HR 80s, WBC 14  [  ] blood cx pending  - doppler neg. No DVT  - . Known HFpEF. Feels like legs aren't swollen & has lost weight. Lower c/f decompensated CHF  - Pain control: IVP dilaudid, PO oxy, APAP PRN  - Elevate extremity  - IV ancef (11/18 - 21 )  - 11/19 febrile, check Bcx. Lot of pain - add tramadol (itching w oxy/norc), add IV toradol scheduled. Headaches, feels dehydrated - will give 1L NS bolus  - 11/20, rash better, still swollen, lot of pain. Still needing IV dilaudid  - 11/21: slightly worse, LE very swollen, WBC higher. Will switch ancef to vanco/zosyn for now. Consult ID - will d/w Dr. Calloway. Will check CT to eval for underlying abscess  - elevated LLE on pillows  - will give dose of IV lasix    Partial quad tear  L knee effusion  - ortho consulted    Headaches  - seems dehydrated. Dark urine.  Fevers  - Give another 1L IVF    HFpEF - compensated  HTN  Parox Afib - low CV, not on AC  - Continue PTA lasix, flecainide, coreg, hydral, enalapril    Psoriasis  - On humira OP. Would hold until leg better    Morbid obesity w [facility]  - Body mass index is 52.8 kg/m².   - Healthy diet & wt loss encouraged  - Has lost quite a bit of weight with Mounjaro already    DM2 - currently controlled w mounjaro  - Okay for reg diet, follow BG on AM labs. Can hold on SSI/accuchecks for now, can add if issues controlling    ACP: Full Code  Ppx: DVT: Enox  Dispo  EDoD:  ~11/23 - 11/24    F/u:   - PCP:  Eric Sethi,     Available via epic secure chat or perfect serve 7A - 7P.    Linda Savage MD   Internal Medicine - Hospitalist  [facility]

## 2024-11-22 NOTE — PLAN OF CARE
Problem: Patient Centered Care  Goal: Patient preferences are identified and integrated in the patient's plan of care  Description: Interventions:  - What would you like us to know as we care for you? From home alone  - Provide timely, complete, and accurate information to patient/family  - Incorporate patient and family knowledge, values, beliefs, and cultural backgrounds into the planning and delivery of care  - Encourage patient/family to participate in care and decision-making at the level they choose  - Honor patient and family perspectives and choices  Outcome: Progressing     Problem: CARDIOVASCULAR - ADULT  Goal: Maintains optimal cardiac output and hemodynamic stability  Description: INTERVENTIONS:  - Monitor vital signs, rhythm, and trends  - Monitor for bleeding, hypotension and signs of decreased cardiac output  - Evaluate effectiveness of vasoactive medications to optimize hemodynamic stability  - Monitor arterial and/or venous puncture sites for bleeding and/or hematoma  - Assess quality of pulses, skin color and temperature  - Assess for signs of decreased coronary artery perfusion - ex. Angina  - Evaluate fluid balance, assess for edema, trend weights  Outcome: Progressing     Problem: RESPIRATORY - ADULT  Goal: Achieves optimal ventilation and oxygenation  Description: INTERVENTIONS:  - Assess for changes in respiratory status  - Assess for changes in mentation and behavior  - Position to facilitate oxygenation and minimize respiratory effort  - Oxygen supplementation based on oxygen saturation or ABGs  - Provide Smoking Cessation handout, if applicable  - Encourage broncho-pulmonary hygiene including cough, deep breathe, Incentive Spirometry  - Assess the need for suctioning and perform as needed  - Assess and instruct to report SOB or any respiratory difficulty  - Respiratory Therapy support as indicated  - Manage/alleviate anxiety  - Monitor for signs/symptoms of CO2 retention  Outcome:  Progressing     Problem: GASTROINTESTINAL - ADULT  Goal: Minimal or absence of nausea and vomiting  Description: INTERVENTIONS:  - Maintain adequate hydration with IV or PO as ordered and tolerated  - Nasogastric tube to low intermittent suction as ordered  - Evaluate effectiveness of ordered antiemetic medications  - Provide nonpharmacologic comfort measures as appropriate  - Advance diet as tolerated, if ordered  - Obtain nutritional consult as needed  - Evaluate fluid balance  Outcome: Progressing     Problem: GENITOURINARY - ADULT  Goal: Absence of urinary retention  Description: INTERVENTIONS:  - Assess patient’s ability to void and empty bladder  - Monitor intake/output and perform bladder scan as needed  - Follow urinary retention protocol/standard of care  - Consider collaborating with pharmacy to review patient's medication profile  - Implement strategies to promote bladder emptying  Outcome: Progressing     Problem: METABOLIC/FLUID AND ELECTROLYTES - ADULT  Goal: Glucose maintained within prescribed range  Description: INTERVENTIONS:  - Monitor Blood Glucose as ordered  - Assess for signs and symptoms of hyperglycemia and hypoglycemia  - Administer ordered medications to maintain glucose within target range  - Assess barriers to adequate nutritional intake and initiate nutrition consult as needed  - Instruct patient on self management of diabetes  Outcome: Progressing  Goal: Electrolytes maintained within normal limits  Description: INTERVENTIONS:  - Monitor labs and rhythm and assess patient for signs and symptoms of electrolyte imbalances  - Administer electrolyte replacement as ordered  - Monitor response to electrolyte replacements, including rhythm and repeat lab results as appropriate  - Fluid restriction as ordered  - Instruct patient on fluid and nutrition restrictions as appropriate  Outcome: Progressing     Problem: SKIN/TISSUE INTEGRITY - ADULT  Goal: Skin integrity remains intact  Description:  INTERVENTIONS  - Assess and document risk factors for pressure ulcer development  - Assess and document skin integrity  - Monitor for areas of redness and/or skin breakdown  - Initiate interventions, skin care algorithm/standards of care as needed  Outcome: Progressing  Goal: Incision(s), wounds(s) or drain site(s) healing without S/S of infection  Description: INTERVENTIONS:  - Assess and document risk factors for pressure ulcer development  - Assess and document skin integrity  - Assess and document dressing/incision, wound bed, drain sites and surrounding tissue  - Implement wound care per orders  - Initiate isolation precautions as appropriate  - Initiate Pressure Ulcer prevention bundle as indicated  Outcome: Progressing     Problem: MUSCULOSKELETAL - ADULT  Goal: Return mobility to safest level of function  Description: INTERVENTIONS:  - Assess patient stability and activity tolerance for standing, transferring and ambulating w/ or w/o assistive devices  - Assist with transfers and ambulation using safe patient handling equipment as needed  - Ensure adequate protection for wounds/incisions during mobilization  - Obtain PT/OT consults as needed  - Advance activity as appropriate  - Communicate ordered activity level and limitations with patient/family  Outcome: Progressing     Problem: Impaired Functional Mobility  Goal: Achieve highest/safest level of mobility/gait  Description: Interventions:  - Assess patient's functional ability and stability  - Promote increasing activity/tolerance for mobility and gait  - Educate and engage patient/family in tolerated activity level and precautions  - Recommend use of  RW for transfers and ambulation  - Recommend patient transfer to bedside chair toward strongest side  - When transferring patient, block weaker knee for safety  - Recommend use of chair position in bed 3 times per day  Outcome: Progressing     Problem: Impaired Activities of Daily Living  Goal: Achieve  highest/safest level of independence in self care  Description: Interventions:  - Assess ability and encourage patient to participate in ADLs to maximize function  - Promote sitting position while performing ADLs such as feeding, grooming, and bathing  - Educate and encourage patient/family in tolerated functional activity level and precautions during self-care  Outcome: Progressing     Problem: PAIN - ADULT  Goal: Verbalizes/displays adequate comfort level or patient's stated pain goal  Description: INTERVENTIONS:  - Encourage pt to monitor pain and request assistance  - Assess pain using appropriate pain scale  - Administer analgesics based on type and severity of pain and evaluate response  - Implement non-pharmacological measures as appropriate and evaluate response  - Consider cultural and social influences on pain and pain management  - Manage/alleviate anxiety  - Utilize distraction and/or relaxation techniques  - Monitor for opioid side effects  - Notify MD/LIP if interventions unsuccessful or patient reports new pain  - Anticipate increased pain with activity and pre-medicate as appropriate  Outcome: Progressing     Problem: SAFETY ADULT - FALL  Goal: Free from fall injury  Description: INTERVENTIONS:  - Assess pt frequently for physical needs  - Identify cognitive and physical deficits and behaviors that affect risk of falls.  - [place] fall precautions as indicated by assessment.  - Educate pt/family on patient safety including physical limitations  - Instruct pt to call for assistance with activity based on assessment  - Modify environment to reduce risk of injury  - Provide assistive devices as appropriate  - Consider OT/PT consult to assist with strengthening/mobility  - Encourage toileting schedule  Outcome: Progressing     Problem: Patient/Family Goals  Goal: Patient/Family Long Term Goal  Description: Patient's Long Term Goal: Discharge from the hospital    Interventions:  - Monitor vital signs  -  Monitor appropriate labs  - Monitor blood glucose levels  - Pain management  - Administer medications per order  - Follow MD orders  - Diagnostics per order  - Update / inform patient and family on plan of care  - Discharge planning  - See additional Care Plan goals for specific interventions  Outcome: Progressing  Goal: Patient/Family Short Term Goal  Description: Patient's Short Term Goal: Improve redness, swelling, and pain to left lower extremity     Interventions:   - Monitor vital signs  - Monitor appropriate labs  - Monitor blood glucose levels  - Pain management  - Administer medications per order  - Follow MD orders  - Diagnostics per order  - Update / inform patient and family on plan of care  - See additional Care Plan goals for specific interventions  Outcome: Progressing

## 2024-11-22 NOTE — PLAN OF CARE
Problem: Patient Centered Care  Goal: Patient preferences are identified and integrated in the patient's plan of care  Description: Interventions:  - What would you like us to know as we care for you? From home alone  - Provide timely, complete, and accurate information to patient/family  - Incorporate patient and family knowledge, values, beliefs, and cultural backgrounds into the planning and delivery of care  - Encourage patient/family to participate in care and decision-making at the level they choose  - Honor patient and family perspectives and choices  Outcome: Progressing     Problem: CARDIOVASCULAR - ADULT  Goal: Maintains optimal cardiac output and hemodynamic stability  Description: INTERVENTIONS:  - Monitor vital signs, rhythm, and trends  - Monitor for bleeding, hypotension and signs of decreased cardiac output  - Evaluate effectiveness of vasoactive medications to optimize hemodynamic stability  - Monitor arterial and/or venous puncture sites for bleeding and/or hematoma  - Assess quality of pulses, skin color and temperature  - Assess for signs of decreased coronary artery perfusion - ex. Angina  - Evaluate fluid balance, assess for edema, trend weights  Outcome: Progressing     Problem: RESPIRATORY - ADULT  Goal: Achieves optimal ventilation and oxygenation  Description: INTERVENTIONS:  - Assess for changes in respiratory status  - Assess for changes in mentation and behavior  - Position to facilitate oxygenation and minimize respiratory effort  - Oxygen supplementation based on oxygen saturation or ABGs  - Provide Smoking Cessation handout, if applicable  - Encourage broncho-pulmonary hygiene including cough, deep breathe, Incentive Spirometry  - Assess the need for suctioning and perform as needed  - Assess and instruct to report SOB or any respiratory difficulty  - Respiratory Therapy support as indicated  - Manage/alleviate anxiety  - Monitor for signs/symptoms of CO2 retention  Outcome:  Progressing     Problem: GASTROINTESTINAL - ADULT  Goal: Minimal or absence of nausea and vomiting  Description: INTERVENTIONS:  - Maintain adequate hydration with IV or PO as ordered and tolerated  - Nasogastric tube to low intermittent suction as ordered  - Evaluate effectiveness of ordered antiemetic medications  - Provide nonpharmacologic comfort measures as appropriate  - Advance diet as tolerated, if ordered  - Obtain nutritional consult as needed  - Evaluate fluid balance  Outcome: Progressing     Problem: GENITOURINARY - ADULT  Goal: Absence of urinary retention  Description: INTERVENTIONS:  - Assess patient’s ability to void and empty bladder  - Monitor intake/output and perform bladder scan as needed  - Follow urinary retention protocol/standard of care  - Consider collaborating with pharmacy to review patient's medication profile  - Implement strategies to promote bladder emptying  Outcome: Progressing     Problem: METABOLIC/FLUID AND ELECTROLYTES - ADULT  Goal: Glucose maintained within prescribed range  Description: INTERVENTIONS:  - Monitor Blood Glucose as ordered  - Assess for signs and symptoms of hyperglycemia and hypoglycemia  - Administer ordered medications to maintain glucose within target range  - Assess barriers to adequate nutritional intake and initiate nutrition consult as needed  - Instruct patient on self management of diabetes  Outcome: Progressing  Goal: Electrolytes maintained within normal limits  Description: INTERVENTIONS:  - Monitor labs and rhythm and assess patient for signs and symptoms of electrolyte imbalances  - Administer electrolyte replacement as ordered  - Monitor response to electrolyte replacements, including rhythm and repeat lab results as appropriate  - Fluid restriction as ordered  - Instruct patient on fluid and nutrition restrictions as appropriate  Outcome: Progressing     Problem: SKIN/TISSUE INTEGRITY - ADULT  Goal: Skin integrity remains intact  Description:  INTERVENTIONS  - Assess and document risk factors for pressure ulcer development  - Assess and document skin integrity  - Monitor for areas of redness and/or skin breakdown  - Initiate interventions, skin care algorithm/standards of care as needed  Outcome: Progressing  Goal: Incision(s), wounds(s) or drain site(s) healing without S/S of infection  Description: INTERVENTIONS:  - Assess and document risk factors for pressure ulcer development  - Assess and document skin integrity  - Assess and document dressing/incision, wound bed, drain sites and surrounding tissue  - Implement wound care per orders  - Initiate isolation precautions as appropriate  - Initiate Pressure Ulcer prevention bundle as indicated  Outcome: Progressing     Problem: MUSCULOSKELETAL - ADULT  Goal: Return mobility to safest level of function  Description: INTERVENTIONS:  - Assess patient stability and activity tolerance for standing, transferring and ambulating w/ or w/o assistive devices  - Assist with transfers and ambulation using safe patient handling equipment as needed  - Ensure adequate protection for wounds/incisions during mobilization  - Obtain PT/OT consults as needed  - Advance activity as appropriate  - Communicate ordered activity level and limitations with patient/family  Outcome: Progressing     Problem: PAIN - ADULT  Goal: Verbalizes/displays adequate comfort level or patient's stated pain goal  Description: INTERVENTIONS:  - Encourage pt to monitor pain and request assistance  - Assess pain using appropriate pain scale  - Administer analgesics based on type and severity of pain and evaluate response  - Implement non-pharmacological measures as appropriate and evaluate response  - Consider cultural and social influences on pain and pain management  - Manage/alleviate anxiety  - Utilize distraction and/or relaxation techniques  - Monitor for opioid side effects  - Notify MD/LIP if interventions unsuccessful or patient reports new  pain  - Anticipate increased pain with activity and pre-medicate as appropriate  Outcome: Progressing     Problem: SAFETY ADULT - FALL  Goal: Free from fall injury  Description: INTERVENTIONS:  - Assess pt frequently for physical needs  - Identify cognitive and physical deficits and behaviors that affect risk of falls.  - [place] fall precautions as indicated by assessment.  - Educate pt/family on patient safety including physical limitations  - Instruct pt to call for assistance with activity based on assessment  - Modify environment to reduce risk of injury  - Provide assistive devices as appropriate  - Consider OT/PT consult to assist with strengthening/mobility  - Encourage toileting schedule  Outcome: Progressing     Problem: DISCHARGE PLANNING  Goal: Discharge to home or other facility with appropriate resources  Description: INTERVENTIONS:  - Identify barriers to discharge w/pt and caregiver  - Include patient/family/discharge partner in discharge planning  - Arrange for needed discharge resources and transportation as appropriate  - Identify discharge learning needs (meds, wound care, etc)  - Arrange for interpreters to assist at discharge as needed  - Consider post-discharge preferences of patient/family/discharge partner  - Complete POLST form as appropriate  - Assess patient's ability to be responsible for managing their own health  - Refer to Case Management Department for coordinating discharge planning if the patient needs post-hospital services based on physician/LIP order or complex needs related to functional status, cognitive ability or social support system  Outcome: Progressing     Problem: Impaired Functional Mobility  Goal: Achieve highest/safest level of mobility/gait  Description: Interventions:  - Assess patient's functional ability and stability  - Promote increasing activity/tolerance for mobility and gait  - Educate and engage patient/family in tolerated activity level and  precautions  - Recommend use of  RW for transfers and ambulation  - Recommend patient transfer to bedside chair toward strongest side  - When transferring patient, block weaker knee for safety  - Recommend use of chair position in bed 3 times per day  Outcome: Progressing     Problem: Impaired Activities of Daily Living  Goal: Achieve highest/safest level of independence in self care  Description: Interventions:  - Assess ability and encourage patient to participate in ADLs to maximize function  - Promote sitting position while performing ADLs such as feeding, grooming, and bathing  - Educate and encourage patient/family in tolerated functional activity level and precautions during self-care  Outcome: Progressing     Problem: Patient/Family Goals  Goal: Patient/Family Long Term Goal  Description: Patient's Long Term Goal: Discharge from the hospital    Interventions:  - Monitor vital signs  - Monitor appropriate labs  - Monitor blood glucose levels  - Pain management  - Administer medications per order  - Follow MD orders  - Diagnostics per order  - Update / inform patient and family on plan of care  - Discharge planning  - See additional Care Plan goals for specific interventions  Outcome: Progressing  Goal: Patient/Family Short Term Goal  Description: Patient's Short Term Goal: Improve redness, swelling, and pain to left lower extremity     Interventions:   - Monitor vital signs  - Monitor appropriate labs  - Monitor blood glucose levels  - Pain management  - Administer medications per order  - Follow MD orders  - Diagnostics per order  - Update / inform patient and family on plan of care  - See additional Care Plan goals for specific interventions  Outcome: Progressing

## 2024-11-22 NOTE — PROGRESS NOTES
24 1334   Wound 24 Leg Left;Lateral;Lower   Date First Assessed/Time First Assessed: 24 1115   Present on Original Admission: Yes  Primary Wound Type: Cellulitis  Location: Leg  Wound Location Orientation: Left;Lateral;Lower  Wound Description (Comments): redness, some leakage from small ...   Wound Image    Site Assessment Fragile;Moist;Painful;Red;Edema   Closure Not approximated   Drainage Amount Scant   Drainage Description Serous   Treatments Cleansed   Dressing Aquacel Foam;Kerlix roll;Xeroform   Dressing Changed New   Non-staged Wound Description Partial thickness   Elizabeth-wound Assessment Clean;Dry;Fragile;Painful;Pink;Swelling   Wound Granulation Tissue Red   Wound Odor None   Wound Follow Up   Follow up needed Yes       WOUND CARE NOTE    History:  Past Medical History:    Appendicitis    Arrhythmia    AF    Atrial fibrillation (HCC)    Congestive heart disease (HCC)    age 30 yrs.  1 episode    Esophageal reflux    High blood pressure    NEREIDA (obstructive sleep apnea)    Intolerant to cpap    Osteoarthritis    Unspecified essential hypertension    Ventral hernia    Visual impairment    glasses for driving     Past Surgical History:   Procedure Laterality Date    Appendectomy      Electrocardiogram, complete  2014    Scanned to Media Tab - Date of Service 2014    Hernia surgery      () Ventral Hernia Repaired      Social History     Socioeconomic History    Marital status:    Tobacco Use    Smoking status: Former     Current packs/day: 0.00     Types: Cigarettes     Quit date: 2012     Years since quittin.2    Smokeless tobacco: Never   Vaping Use    Vaping status: Never Used   Substance and Sexual Activity    Alcohol use: Yes     Alcohol/week: 8.0 standard drinks of alcohol     Types: 8 Standard drinks or equivalent per week     Comment: rarely    Drug use: No     Social Drivers of Health     Food Insecurity: No Food Insecurity (2024)    Food  Insecurity     Food Insecurity: Never true   Transportation Needs: No Transportation Needs (11/18/2024)    Transportation Needs     Lack of Transportation: No   Housing Stability: Low Risk  (11/18/2024)    Housing Stability     Housing Instability: No     PLAN   Recommendations:  Dr. RODRIGUEZ currently on consult  Heels elevated using pillows, heel wedge or heel boots to offload heels    Wound(s)  Location: LEFT LOWER LEG  Cleansing  Saline   Dressings XEROFORM, 6X6 UNBORDERED FOAM  Secure with KERLIX ROLL  Frequency DAILY     OBJECTIVE   RN Consult new  Reason for Consult LEFT LEG      ASSESSMENT   Greyson Score:  Greyson Scale Score: 16    Chart Reviewed: yes    Wound(s):  Pt seen for above LLE wound. The skin is peeling off and weeping. Area cleansed with chg soap, saline. Xeroform was applied to open area, covered with 6x6 foam and secured with kerlix roll. Leg is elevated on a pillow. Discussed dressing/elevation w pt and rn.       Allergies: Hydrocodone    Labs:   Lab Results   Component Value Date    WBC 16.0 (H) 11/22/2024    HGB 11.0 (L) 11/22/2024    HCT 34.9 (L) 11/22/2024    .0 11/22/2024    CREATSERUM 0.79 11/22/2024    BUN 14 11/22/2024     (L) 11/22/2024    K 4.1 11/22/2024     11/22/2024    CO2 27.0 11/22/2024     (H) 11/22/2024    CA 9.7 11/22/2024    ALB 4.1 11/18/2024    ALKPHO 72 11/18/2024    BILT 0.3 11/18/2024    TP 7.1 11/18/2024    AST 29 11/18/2024    ALT 38 11/18/2024    MG 2.0 09/22/2017     No results found for: \"PREALBUMIN\"      Time Spent 30 Minutes.    Billie Keller, RN, BSN, [facility]  [facility]  [phone]

## 2024-11-23 LAB
ANION GAP SERPL CALC-SCNC: 4 MMOL/L (ref 0–18)
BUN BLD-MCNC: 16 MG/DL (ref 9–23)
BUN/CREAT SERPL: 19 (ref 10–20)
CALCIUM BLD-MCNC: 9.5 MG/DL (ref 8.7–10.4)
CHLORIDE SERPL-SCNC: 100 MMOL/L (ref 98–112)
CO2 SERPL-SCNC: 29 MMOL/L (ref 21–32)
CREAT BLD-MCNC: 0.84 MG/DL
DEPRECATED RDW RBC AUTO: 49.4 FL (ref 35.1–46.3)
EGFRCR SERPLBLD CKD-EPI 2021: 105 ML/MIN/1.73M2 (ref 60–?)
ERYTHROCYTE [DISTWIDTH] IN BLOOD BY AUTOMATED COUNT: 15.4 % (ref 11–15)
GLUCOSE BLD-MCNC: 117 MG/DL (ref 70–99)
GLUCOSE BLDC GLUCOMTR-MCNC: 109 MG/DL (ref 70–99)
GLUCOSE BLDC GLUCOMTR-MCNC: 123 MG/DL (ref 70–99)
GLUCOSE BLDC GLUCOMTR-MCNC: 127 MG/DL (ref 70–99)
GLUCOSE BLDC GLUCOMTR-MCNC: 130 MG/DL (ref 70–99)
HCT VFR BLD AUTO: 33.4 %
HGB BLD-MCNC: 10.4 G/DL
MCH RBC QN AUTO: 27.2 PG (ref 26–34)
MCHC RBC AUTO-ENTMCNC: 31.1 G/DL (ref 31–37)
MCV RBC AUTO: 87.4 FL
OSMOLALITY SERPL CALC.SUM OF ELEC: 278 MOSM/KG (ref 275–295)
PLATELET # BLD AUTO: 252 10(3)UL (ref 150–450)
POTASSIUM SERPL-SCNC: 4.4 MMOL/L (ref 3.5–5.1)
PROCALCITONIN SERPL-MCNC: 0.35 NG/ML (ref ?–0.05)
RBC # BLD AUTO: 3.82 X10(6)UL
SODIUM SERPL-SCNC: 133 MMOL/L (ref 136–145)
WBC # BLD AUTO: 14.6 X10(3) UL (ref 4–11)

## 2024-11-23 RX ORDER — BUTALBITAL, ACETAMINOPHEN AND CAFFEINE 50; 325; 40 MG/1; MG/1; MG/1
1 TABLET ORAL EVERY 4 HOURS PRN
Status: DISCONTINUED | OUTPATIENT
Start: 2024-11-23 | End: 2024-12-02

## 2024-11-23 RX ORDER — FUROSEMIDE 10 MG/ML
40 INJECTION INTRAMUSCULAR; INTRAVENOUS ONCE
Status: COMPLETED | OUTPATIENT
Start: 2024-11-23 | End: 2024-11-23

## 2024-11-23 RX ORDER — TIRZEPATIDE 5 MG/.5ML
5 INJECTION, SOLUTION SUBCUTANEOUS WEEKLY
COMMUNITY

## 2024-11-23 NOTE — PROGRESS NOTES
[facility]   INTERNAL MEDICINE PROGRESS NOTE    CHIEF COMPLAINT   Swelling Edema    SUBJECTIVE  24 HR EVENTS     Pain about the same. Tmax 99.5 overnight. Did urinate a lot after lasix. Tried elevating leg yesterday    INPATIENT MEDICATIONS  Scheduled Medications:  cefepime, 2 g, Q8H  DAPTOmycin, 1,000 mg, Q24H  clotrimazole-betamethasone, , BID  docosanol, , BID  cetirizine, 10 mg, Daily  enalapril, 20 mg, BID  flecainide, 150 mg, BID  hydrALAZINE, 50 mg, BID  carvedilol, 25 mg, BID with meals  enoxaparin, 90 mg, 2 times per day  magnesium oxide, 200 mg, Nightly  dilTIAZem HCl ER Coated Beads, 180 mg, Nightly     Drips:       PRN Medications:   oxyCODONE, 5 mg, Q4H PRN   Or  oxyCODONE, 10 mg, Q4H PRN  magnesium hydroxide, 30 mL, Q6H PRN  traMADol, 50 mg, Q6H PRN  calcium carbonate, 1,000 mg, TID PRN  famotidine, 20 mg, BID PRN  acetaminophen, 500 mg, Q4H PRN  melatonin, 3 mg, Nightly PRN  polyethylene glycol (PEG 3350), 17 g, Daily PRN  sennosides, 17.2 mg, Nightly PRN  guaiFENesin, 200 mg, Q4H PRN  ondansetron, 4 mg, Q6H PRN  prochlorperazine, 5 mg, Q8H PRN  HYDROmorphone, 1 mg, Q2H PRN  HYDROmorphone, 0.5 mg, Q2H PRN  simethicone, 80 mg, TID PRN       PHYSICAL EXAMINATION   Vitals: /66 (BP Location: Right arm)   Pulse 79   Temp 98.9 °F (37.2 °C) (Oral)   Resp 18   Ht 6' 1\" (1.854 m)   Wt (!) 400 lb 3.2 oz (181.5 kg)   SpO2 93%   BMI 52.80 kg/m²     GEN: obese male in NAD  HEENT: EOMI  Pulm: CTAB, no crackles or wheezes  CV: RRR, no murmurs, DP pulses present  ABD: Soft, non-tender, non-distended, +BS  MSK: LLE swelling, very TTP  Neuro: Grossly normal, CN intact, sensory intact  SKIN: warm, dry, psoriatic plaques, LLE with edema, erythema, warm to touch  EXT: no edema    DATA REVIEW: LABORATORY VALUES & DIAGNOSTICS  Recent Labs   Lab 11/18/24  1132 11/19/24  0534 11/19/24  1751 11/20/24  0519 11/21/24  0525 11/22/24  0505 11/23/24  0533    136  --  134* 135* 135* 133*   K 3.8 3.6 3.9  4.0 3.9 4.1 4.4    102  --  105 102 102 100   CO2 29.0 27.0  --  26.0 28.0 27.0 29.0   BUN 18 16  --  15 17 14 16   CREATSERUM 1.06 1.05  --  0.93 0.82 0.79 0.84   ANIONGAP 5 7  --  3 5 6 4   GLU 98 109*  --  94 95 112* 117*   CA 10.8* 9.7  --  9.8 9.8 9.7 9.5   TP 7.1  --   --   --   --   --   --    ALB 4.1  --   --   --   --   --   --    AST 29  --   --   --   --   --   --    ALT 38  --   --   --   --   --   --    ALKPHO 72  --   --   --   --   --   --    BILT 0.3  --   --   --   --   --   --    EGFRCR 84 85  --  99 106 107 105     Recent Labs   Lab 11/18/24  1132 11/19/24  0534 11/20/24  0519 11/21/24  0525 11/22/24  0505 11/23/24  0533   WBC 10.1 13.5* 14.5* 15.5* 16.0* 14.6*   HGB 13.1 11.6* 11.4* 11.3* 11.0* 10.4*   HCT 40.9 36.0* 35.9* 34.0* 34.9* 33.4*   .0 163.0 158.0 169.0 238.0 252.0   MCV 87.6 87.2 87.6 86.7 86.6 87.4   MOPERCENT 10.9  --   --   --   --   --    EOPERCENT 0.6  --   --   --   --   --    BAPERCENT 0.3  --   --   --   --   --      ASSESSMENT & PLAN   Jovan Merino - 54 year old male w MO, HTN, Afib, HFpEF, psoriasis admitted 11/18/2024 for LLE cellulitis, started on IV ancef. Initially improving however 11/21 worse - abx broadened to vanc/zosyn, CT ordered. ID consulted    Sepsis 2/2 LLE cellulitis  Immunosuppression 2/2 psoriasis/humira  - Febrile to 102 here. HR 80s, WBC 14  [  ] blood cx NGTD  - doppler neg. No DVT  - . Known HFpEF. Feels like legs aren't swollen & has lost weight  - Pain control: IVP dilaudid, PO oxy, APAP PRN  - IV ancef (11/18 - 21 )  - 11/19 febrile, check Bcx. Lot of pain - add tramadol (itching w oxy/norc), add IV toradol scheduled. Headaches, feels dehydrated - will give 1L NS bolus  - 11/20, rash better, still swollen, lot of pain. Still needing IV dilaudid  - 11/21: slightly worse, LE very swollen, WBC higher. Will switch ancef to vanco/zosyn for now. Consult ID - will d/w Dr. Calloway. CT negative 11/21 for underlying abscess  - discussed  elevate extremity on pillows  - will give another dose of IV Lasix today    Partial quad tear  L knee effusion  - ortho consulted, appreciate recs    Headaches  - fioricet PRN    HFpEF - compensated  HTN  Parox Afib - low CV, not on AC  - Continue PTA lasix, flecainide, coreg, hydral, enalapril    Psoriasis  - On humira OP. Would hold until leg better    [place] [facility] [place] Body mass index is 52.8 kg/m².   - Healthy diet & wt loss encouraged  - Has lost quite a bit of weight with Mounjaro already    DM2 - currently controlled w mounjaro  - Okay for reg diet, follow BG on AM labs. Can hold on SSI/accuchecks for now, can add if issues controlling    ACP: Full Code  Ppx: DVT: Enox  Dispo  EDoD:  ~11/23 - 11/24    F/u:   - PCP:  Eric Sethi, DO    Available via epic secure chat or perfect serve 7A - 7P.    Linda Savage MD   Internal Medicine - Hospitalist  [facility]

## 2024-11-23 NOTE — PROGRESS NOTES
[facility]  part of [facility] Infectious Disease Consult    Jovan Merino Sr. Patient Status:  Inpatient    1970 MRN [id number]   Location [facility] 5SW/SE Attending Linda Savage MD   Hosp Day # 5 PCP DO Jovan Agrawal Sr. Seen and examined,   Afebrile,  Previous entries noted,   States pain is better,   Moving leg,     History:  Past Medical History:    Appendicitis    Arrhythmia    AF    Atrial fibrillation (HCC)    Congestive heart disease (HCC)    age 30 yrs.  1 episode    Esophageal reflux    High blood pressure    NEREIDA (obstructive sleep apnea)    Intolerant to cpap    Osteoarthritis    Unspecified essential hypertension    Ventral hernia    Visual impairment    glasses for driving     Past Surgical History:   Procedure Laterality Date    Appendectomy      Electrocardiogram, complete  2014    Scanned to Media Tab - Date of Service 2014    Hernia surgery      () Ventral Hernia Repaired     Family History   Problem Relation Age of Onset    Other (Other[Other]) Father 60        Heart attack    Cancer Maternal Grandmother         Stomach CA    Cancer Maternal Grandfather         Ling CA- Smoker      reports that he quit smoking about 12 years ago. He has never used smokeless tobacco. He reports current alcohol use of about 8.0 standard drinks of alcohol per week. He reports that he does not use drugs.    Allergies:  Allergies[1]    Medications:    Current Facility-Administered Medications:     butalbital-acetaminophen-caffeine (Fioricet) -40 MG per tab 1 tablet, 1 tablet, Oral, Q4H PRN    oxyCODONE immediate release tab 5 mg, 5 mg, Oral, Q4H PRN **OR** oxyCODONE immediate release tab 10 mg, 10 mg, Oral, Q4H PRN    magnesium hydroxide (Milk of Magnesia) 400 MG/5ML oral suspension 30 mL, 30 mL, Oral, Q6H PRN    ceFEPIme (Maxpime) 2 g in sodium chloride 0.9% 100 mL IVPB-MBP, 2 g, Intravenous, Q8H    DAPTOmycin (Cubicin)  1,000 mg in sodium chloride 0.9% PF IV syringe, 1,000 mg, Intravenous, Q24H    clotrimazole-betamethasone (Lotrisone) 1-0.05 % cream, , Topical, BID    docosanol (Abreva) 10 % cream, , Topical, BID    cetirizine (ZyrTEC) tab 10 mg, 10 mg, Oral, Daily    traMADol (Ultram) tab 50 mg, 50 mg, Oral, Q6H PRN    calcium carbonate (Tums) chewable tab 1,000 mg, 1,000 mg, Oral, TID PRN    famotidine (Pepcid) tab 20 mg, 20 mg, Oral, BID PRN    enalapril (Vasotec) tab 20 mg, 20 mg, Oral, BID    flecainide (Tambocor) tab 150 mg, 150 mg, Oral, BID    hydrALAZINE (Apresoline) tab 50 mg, 50 mg, Oral, BID    carvedilol (Coreg) tab 25 mg, 25 mg, Oral, BID with meals    acetaminophen (Tylenol Extra Strength) tab 500 mg, 500 mg, Oral, Q4H PRN    melatonin tab 3 mg, 3 mg, Oral, Nightly PRN    polyethylene glycol (PEG 3350) (Miralax) 17 g oral packet 17 g, 17 g, Oral, Daily PRN    sennosides (Senokot) tab 17.2 mg, 17.2 mg, Oral, Nightly PRN    guaiFENesin (Robitussin) 100 MG/5 ML oral liquid 200 mg, 200 mg, Oral, Q4H PRN    enoxaparin (Lovenox) 100 MG/ML SUBQ injection 90 mg, 90 mg, Subcutaneous, 2 times per day    ondansetron (Zofran) 4 MG/2ML injection 4 mg, 4 mg, Intravenous, Q6H PRN    prochlorperazine (Compazine) 10 MG/2ML injection 5 mg, 5 mg, Intravenous, Q8H PRN    HYDROmorphone (Dilaudid) 1 MG/ML injection 1 mg, 1 mg, Intravenous, Q2H PRN    HYDROmorphone (Dilaudid) 1 MG/ML injection 0.5 mg, 0.5 mg, Intravenous, Q2H PRN    magnesium oxide (Mag-Ox) tab 200 mg, 200 mg, Oral, Nightly    simethicone (Mylicon) chewable tab 80 mg, 80 mg, Oral, TID PRN    dilTIAZem ER (CardIZEM CD) 24 hr cap 180 mg, 180 mg, Oral, Nightly    Review of Systems:   Constitutional: Negative for anorexia, chills, fatigue, fevers, malaise, night sweats and weight loss.  Eyes: Negative for visual disturbance, irritation and redness.  Ears, nose, mouth, throat, and face: Negative for hearing loss, tinnitus, nasal congestion, snoring, sore throat, hoarseness and  voice change.  Respiratory: Negative for cough, sputum, hemoptysis, chest pain, wheezing, dyspnea on exertion, or stridor.  Cardiovascular: Negative for chest pain, palpitations, irregular heart beats, syncope, fatigue, orthopnea, paroxysmal nocturnal dyspnea, lower extremity edema.  Gastrointestinal: Negative for dysphagia, odynophagia, reflux symptoms, nausea, vomiting, change in bowel habits, diarrhea, constipation and abdominal pain.  Integument/breast: Negative for rash, skin lesions, and pruritus.  Hematologic/lymphatic: Negative for easy bruising, bleeding, and lymphadenopathy.  Musculoskeletal: Negative for myalgias, arthralgias, muscle weakness.  Neurological: Negative for headaches, dizziness, seizures, memory problems, trouble swallowing, speech problems, gait problems and weakness.  Behavioral/Psych: Negative for active tobacco use.  Endocrine: No history of of diabetes, thyroid disorder.  All other review of systems are negative.    Vital signs in last 24 hours:  Patient Vitals for the past 24 hrs:   BP Temp Temp src Pulse Resp SpO2   11/23/24 0814 -- -- -- -- -- 93 %   11/23/24 0813 135/66 98.9 °F (37.2 °C) Oral 79 18 91 %   11/23/24 0533 131/67 99.4 °F (37.4 °C) Oral 81 16 92 %   11/23/24 0000 108/53 -- -- -- -- --   11/22/24 2057 100/58 99.5 °F (37.5 °C) Oral 83 16 92 %   11/22/24 1751 -- -- -- 85 -- --       Physical Exam:   General: alert, cooperative, oriented.  No respiratory distress.   Head: Normocephalic, without obvious abnormality, atraumatic.   Eyes: Conjunctivae/corneas clear.  No scleral icterus.  No conjunctival     hemorrhage.   Nose: Nares normal.   Throat: Lips, mucosa, and tongue normal.  No thrush noted.   Neck: Soft, supple neck;    Chest wall: No tenderness or deformity.   Heart: Regular rate and rhythm, normal S1S2, no murmur.   Abdomen: soft, non-tender, positive BS.   Extremity: LLE swelling erythema, ROM of L knee limited due to swelling > pain,    Skin: No rashes or  lesions.   Neurological: Alert, interactive, no focal deficits    Labs:  Lab Results   Component Value Date    WBC 14.6 11/23/2024    HGB 10.4 11/23/2024    HCT 33.4 11/23/2024    .0 11/23/2024    CREATSERUM 0.84 11/23/2024    BUN 16 11/23/2024     11/23/2024    K 4.4 11/23/2024     11/23/2024    CO2 29.0 11/23/2024     11/23/2024    CA 9.5 11/23/2024       Radiology:  Reviewed,       Cultures:  Reviewed,     Assessment and Plan:    1.  Sepsis with fevers, leukocytosis due to LLE Cellulitis,   - Blood cul are NGTD  - CT with no necrotizing Fascitis, abscess, R knee effusion and torn quadricepts,  - Pain management per PCP,   - CRP 18.90,   - On IV Cefepime and Cubicin, 11/21- present,     2.   Leukocytosis: sec to above, improving,     3.   Immunocompromised state: Sec to Psoriasis, Humira,     4.    Morbid obesity complicating clinical course     5.     Disposition - inpatient. A middle aged male with LLE Cellulitis in the presence of Lymphedema, Psoriasis and Humira,   - Follow Pending cul,   - Continue IV Daptomycin and IV Cefepime,Pharm to dose,   - Leg elevation,   - Compression wraps,     Discussed with patient, RN, all questions answered, will follow with further recommendations,     Thank you for consulting [facility] for Jovan Merino Sr..  If you have any questions or concerns please call [facility] Infectious Disease at [phone].     Sudhakar Ludwig MD  11/23/2024  5:02 PM         [1]   Allergies  Allergen Reactions    Hydrocodone ITCHING and RASH

## 2024-11-23 NOTE — PLAN OF CARE
Problem: Patient Centered Care  Goal: Patient preferences are identified and integrated in the patient's plan of care  Description: Interventions:  - What would you like us to know as we care for you?  - Provide timely, complete, and accurate information to patient/family  - Incorporate patient and family knowledge, values, beliefs, and cultural backgrounds into the planning and delivery of care  - Encourage patient/family to participate in care and decision-making at the level they choose  - Honor patient and family perspectives and choices  Outcome: Progressing     Problem: CARDIOVASCULAR - ADULT  Goal: Maintains optimal cardiac output and hemodynamic stability  Description: INTERVENTIONS:  - Monitor vital signs, rhythm, and trends  - Monitor for bleeding, hypotension and signs of decreased cardiac output  - Evaluate effectiveness of vasoactive medications to optimize hemodynamic stability  - Monitor arterial and/or venous puncture sites for bleeding and/or hematoma  - Assess quality of pulses, skin color and temperature  - Assess for signs of decreased coronary artery perfusion - ex. Angina  - Evaluate fluid balance, assess for edema, trend weights  Outcome: Progressing     Problem: RESPIRATORY - ADULT  Goal: Achieves optimal ventilation and oxygenation  Description: INTERVENTIONS:  - Assess for changes in respiratory status  - Assess for changes in mentation and behavior  - Position to facilitate oxygenation and minimize respiratory effort  - Oxygen supplementation based on oxygen saturation or ABGs  - Provide Smoking Cessation handout, if applicable  - Encourage broncho-pulmonary hygiene including cough, deep breathe, Incentive Spirometry  - Assess the need for suctioning and perform as needed  - Assess and instruct to report SOB or any respiratory difficulty  - Respiratory Therapy support as indicated  - Manage/alleviate anxiety  - Monitor for signs/symptoms of CO2 retention  Outcome: Progressing    Problem:  GENITOURINARY - ADULT  Goal: Absence of urinary retention  Description: INTERVENTIONS:  - Assess patient’s ability to void and empty bladder  - Monitor intake/output and perform bladder scan as needed  - Follow urinary retention protocol/standard of care  - Consider collaborating with pharmacy to review patient's medication profile  - Implement strategies to promote bladder emptying  Outcome: Progressing     Problem: METABOLIC/FLUID AND ELECTROLYTES - ADULT  Goal: Glucose maintained within prescribed range  Description: INTERVENTIONS:  - Monitor Blood Glucose as ordered  - Assess for signs and symptoms of hyperglycemia and hypoglycemia  - Administer ordered medications to maintain glucose within target range  - Assess barriers to adequate nutritional intake and initiate nutrition consult as needed  - Instruct patient on self management of diabetes  Outcome: Progressing  Goal: Electrolytes maintained within normal limits  Description: INTERVENTIONS:  - Monitor labs and rhythm and assess patient for signs and symptoms of electrolyte imbalances  - Administer electrolyte replacement as ordered  - Monitor response to electrolyte replacements, including rhythm and repeat lab results as appropriate  - Fluid restriction as ordered  - Instruct patient on fluid and nutrition restrictions as appropriate  Outcome: Progressing     Problem: SKIN/TISSUE INTEGRITY - ADULT  Goal: Skin integrity remains intact  Description: INTERVENTIONS  - Assess and document skin integrity  - Monitor for areas of redness and/or skin breakdown  - Initiate interventions, skin care algorithm/standards of care as needed  Outcome: Progressing  Goal: Incision(s), wounds(s) or drain site(s) healing without S/S of infection  Description: INTERVENTIONS:  - Assess and document skin integrity  - Assess and document dressing/incision, wound bed, drain sites and surrounding tissue  - Implement wound care per orders  - Initiate isolation precautions as  appropriate  - Initiate Pressure Ulcer prevention bundle as indicated  Outcome: Progressing     Problem: MUSCULOSKELETAL - ADULT  Goal: Return mobility to safest level of function  Description: INTERVENTIONS:  - Assess patient stability and activity tolerance for standing, transferring and ambulating w/ or w/o assistive devices  - Assist with transfers and ambulation using safe patient handling equipment as needed  - Ensure adequate protection for wounds/incisions during mobilization  - Obtain PT/OT consults as needed  - Advance activity as appropriate  - Communicate ordered activity level and limitations with patient/family  Outcome: Progressing     Problem: PAIN - ADULT  Goal: Verbalizes/displays adequate comfort level or patient's stated pain goal  Description: INTERVENTIONS:  - Encourage pt to monitor pain and request assistance  - Assess pain using appropriate pain scale  - Administer analgesics based on type and severity of pain and evaluate response  - Implement non-pharmacological measures as appropriate and evaluate response  - Consider cultural and social influences on pain and pain management  - Manage/alleviate anxiety  - Utilize distraction and/or relaxation techniques  - Monitor for opioid side effects  - Notify MD/LIP if interventions unsuccessful or patient reports new pain  - Anticipate increased pain with activity and pre-medicate as appropriate  Outcome: Progressing     Problem: SAFETY ADULT - FALL  Goal: Free from fall injury  Description: INTERVENTIONS:  - Assess pt frequently for physical needs  - Identify cognitive and physical deficits and behaviors that affect risk of falls.  - [place] fall precautions as indicated by assessment.  - Educate pt/family on patient safety including physical limitations  - Instruct pt to call for assistance with activity based on assessment  - Modify environment to reduce risk of injury  - Provide assistive devices as appropriate  - Consider OT/PT consult to  assist with strengthening/mobility  - Encourage toileting schedule  Outcome: Progressing     Problem: DISCHARGE PLANNING  Goal: Discharge to home or other facility with appropriate resources  Description: INTERVENTIONS:  - Identify barriers to discharge w/pt and caregiver  - Include patient/family/discharge partner in discharge planning  - Arrange for needed discharge resources and transportation as appropriate  - Identify discharge learning needs (meds, wound care, etc)  - Arrange for interpreters to assist at discharge as needed  - Consider post-discharge preferences of patient/family/discharge partner  - Complete POLST form as appropriate  - Assess patient's ability to be responsible for managing their own health  - Refer to Case Management Department for coordinating discharge planning if the patient needs post-hospital services based on physician/LIP order or complex needs related to functional status, cognitive ability or social support system  Outcome: Progressing     Problem: Impaired Functional Mobility  Goal: Achieve highest/safest level of mobility/gait  Description: Interventions:  - Assess patient's functional ability and stability  - Promote increasing activity/tolerance for mobility and gait  - Educate and engage patient/family in tolerated activity level and precautions  - Recommend use of  RW for transfers and ambulation  - Recommend patient transfer to bedside chair toward strongest side  - When transferring patient, block weaker knee for safety  - Recommend use of chair position in bed 3 times per day  Outcome: Progressing     Problem: Impaired Activities of Daily Living  Goal: Achieve highest/safest level of independence in self care  Description: Interventions:  - Assess ability and encourage patient to participate in ADLs to maximize function  - Promote sitting position while performing ADLs such as feeding, grooming, and bathing  - Educate and encourage patient/family in tolerated functional  activity level and precautions during self-care  Outcome: Progressing

## 2024-11-23 NOTE — PLAN OF CARE
Problem: Patient Centered Care  Goal: Patient preferences are identified and integrated in the patient's plan of care  Description: Interventions:  - What would you like us to know as we care for you? From home alone  - Provide timely, complete, and accurate information to patient/family  - Incorporate patient and family knowledge, values, beliefs, and cultural backgrounds into the planning and delivery of care  - Encourage patient/family to participate in care and decision-making at the level they choose  - Honor patient and family perspectives and choices  Outcome: Progressing     Problem: CARDIOVASCULAR - ADULT  Goal: Maintains optimal cardiac output and hemodynamic stability  Description: INTERVENTIONS:  - Monitor vital signs, rhythm, and trends  - Monitor for bleeding, hypotension and signs of decreased cardiac output  - Evaluate effectiveness of vasoactive medications to optimize hemodynamic stability  - Monitor arterial and/or venous puncture sites for bleeding and/or hematoma  - Assess quality of pulses, skin color and temperature  - Assess for signs of decreased coronary artery perfusion - ex. Angina  - Evaluate fluid balance, assess for edema, trend weights  Outcome: Progressing     Problem: RESPIRATORY - ADULT  Goal: Achieves optimal ventilation and oxygenation  Description: INTERVENTIONS:  - Assess for changes in respiratory status  - Assess for changes in mentation and behavior  - Position to facilitate oxygenation and minimize respiratory effort  - Oxygen supplementation based on oxygen saturation or ABGs  - Provide Smoking Cessation handout, if applicable  - Encourage broncho-pulmonary hygiene including cough, deep breathe, Incentive Spirometry  - Assess the need for suctioning and perform as needed  - Assess and instruct to report SOB or any respiratory difficulty  - Respiratory Therapy support as indicated  - Manage/alleviate anxiety  - Monitor for signs/symptoms of CO2 retention  Outcome:  Progressing     Problem: GASTROINTESTINAL - ADULT  Goal: Minimal or absence of nausea and vomiting  Description: INTERVENTIONS:  - Maintain adequate hydration with IV or PO as ordered and tolerated  - Nasogastric tube to low intermittent suction as ordered  - Evaluate effectiveness of ordered antiemetic medications  - Provide nonpharmacologic comfort measures as appropriate  - Advance diet as tolerated, if ordered  - Obtain nutritional consult as needed  - Evaluate fluid balance  Outcome: Progressing     Problem: GENITOURINARY - ADULT  Goal: Absence of urinary retention  Description: INTERVENTIONS:  - Assess patient’s ability to void and empty bladder  - Monitor intake/output and perform bladder scan as needed  - Follow urinary retention protocol/standard of care  - Consider collaborating with pharmacy to review patient's medication profile  - Implement strategies to promote bladder emptying  Outcome: Progressing     Problem: SKIN/TISSUE INTEGRITY - ADULT  Goal: Skin integrity remains intact  Description: INTERVENTIONS  - Assess and document risk factors for pressure ulcer development  - Assess and document skin integrity  - Monitor for areas of redness and/or skin breakdown  - Initiate interventions, skin care algorithm/standards of care as needed  Outcome: Progressing  Goal: Incision(s), wounds(s) or drain site(s) healing without S/S of infection  Description: INTERVENTIONS:  - Assess and document risk factors for pressure ulcer development  - Assess and document skin integrity  - Assess and document dressing/incision, wound bed, drain sites and surrounding tissue  - Implement wound care per orders  - Initiate isolation precautions as appropriate  - Initiate Pressure Ulcer prevention bundle as indicated  Outcome: Progressing     Problem: MUSCULOSKELETAL - ADULT  Goal: Return mobility to safest level of function  Description: INTERVENTIONS:  - Assess patient stability and activity tolerance for standing,  transferring and ambulating w/ or w/o assistive devices  - Assist with transfers and ambulation using safe patient handling equipment as needed  - Ensure adequate protection for wounds/incisions during mobilization  - Obtain PT/OT consults as needed  - Advance activity as appropriate  - Communicate ordered activity level and limitations with patient/family  Outcome: Progressing     Problem: Impaired Functional Mobility  Goal: Achieve highest/safest level of mobility/gait  Description: Interventions:  - Assess patient's functional ability and stability  - Promote increasing activity/tolerance for mobility and gait  - Educate and engage patient/family in tolerated activity level and precautions  - Recommend use of  RW for transfers and ambulation  - Recommend patient transfer to bedside chair toward strongest side  - When transferring patient, block weaker knee for safety  - Recommend use of chair position in bed 3 times per day  Outcome: Progressing     Problem: Impaired Activities of Daily Living  Goal: Achieve highest/safest level of independence in self care  Description: Interventions:  - Assess ability and encourage patient to participate in ADLs to maximize function  - Promote sitting position while performing ADLs such as feeding, grooming, and bathing  - Educate and encourage patient/family in tolerated functional activity level and precautions during self-care  Outcome: Progressing     Problem: PAIN - ADULT  Goal: Verbalizes/displays adequate comfort level or patient's stated pain goal  Description: INTERVENTIONS:  - Encourage pt to monitor pain and request assistance  - Assess pain using appropriate pain scale  - Administer analgesics based on type and severity of pain and evaluate response  - Implement non-pharmacological measures as appropriate and evaluate response  - Consider cultural and social influences on pain and pain management  - Manage/alleviate anxiety  - Utilize distraction and/or relaxation  techniques  - Monitor for opioid side effects  - Notify MD/LIP if interventions unsuccessful or patient reports new pain  - Anticipate increased pain with activity and pre-medicate as appropriate  Outcome: Progressing     Problem: SAFETY ADULT - FALL  Goal: Free from fall injury  Description: INTERVENTIONS:  - Assess pt frequently for physical needs  - Identify cognitive and physical deficits and behaviors that affect risk of falls.  - [place] fall precautions as indicated by assessment.  - Educate pt/family on patient safety including physical limitations  - Instruct pt to call for assistance with activity based on assessment  - Modify environment to reduce risk of injury  - Provide assistive devices as appropriate  - Consider OT/PT consult to assist with strengthening/mobility  - Encourage toileting schedule  Outcome: Progressing     Problem: DISCHARGE PLANNING  Goal: Discharge to home or other facility with appropriate resources  Description: INTERVENTIONS:  - Identify barriers to discharge w/pt and caregiver  - Include patient/family/discharge partner in discharge planning  - Arrange for needed discharge resources and transportation as appropriate  - Identify discharge learning needs (meds, wound care, etc)  - Arrange for interpreters to assist at discharge as needed  - Consider post-discharge preferences of patient/family/discharge partner  - Complete POLST form as appropriate  - Assess patient's ability to be responsible for managing their own health  - Refer to Case Management Department for coordinating discharge planning if the patient needs post-hospital services based on physician/LIP order or complex needs related to functional status, cognitive ability or social support system  Outcome: Progressing     Problem: Patient/Family Goals  Goal: Patient/Family Long Term Goal  Description: Patient's Long Term Goal: Discharge from the hospital    Interventions:  - Monitor vital signs  - Monitor appropriate  labs  - Monitor blood glucose levels  - Pain management  - Administer medications per order  - Follow MD orders  - Diagnostics per order  - Update / inform patient and family on plan of care  - Discharge planning  - See additional Care Plan goals for specific interventions  Outcome: Progressing  Goal: Patient/Family Short Term Goal  Description: Patient's Short Term Goal: Improve redness, swelling, and pain to left lower extremity     Interventions:   - Monitor vital signs  - Monitor appropriate labs  - Monitor blood glucose levels  - Pain management  - Administer medications per order  - Follow MD orders  - Diagnostics per order  - Update / inform patient and family on plan of care  - See additional Care Plan goals for specific interventions  Outcome: Progressing

## 2024-11-23 NOTE — PROGRESS NOTES
[facility]  Progress Note    Jovan Merino Sr. Patient Status:  Inpatient    1970 MRN [id number]   Location [facility] 5SW/SE Attending Linda Savage MD   Hosp Day # 5 PCP Eric Sethi DO     SUBJECTIVE:  INTERVAL HISTORY:  54 year old male with left lower leg cellulitis, CT showed large knee effusion and partial quad tendon tear.   Patient continues to struggle with pain in the left lower leg. Oral medications have not been helpful, dilaudid does take the edge off. Patient states he had a quad tendon rupture in 2022 that was repaired surgically. Prior to admission he was able to ambulate and ascend/descend stairs without difficulty.     OBJECTIVE:  Patient Vitals for the past 24 hrs:   BP Temp Temp src Pulse Resp SpO2   24 0814 -- -- -- -- -- 93 %   24 0813 135/66 98.9 °F (37.2 °C) Oral 79 18 91 %   24 0533 131/67 99.4 °F (37.4 °C) Oral 81 16 92 %   24 0000 108/53 -- -- -- -- --   24 2057 100/58 99.5 °F (37.5 °C) Oral 83 16 92 %   24 1751 -- -- -- 85 -- --   24 1346 138/69 99 °F (37.2 °C) Axillary 84 18 92 %       ORTHO EXAM:   Patient sitting comfortably in bed  Left lower extremity:  significant swelling and erythema of the left lower leg, dressing is clean dry and intact. Well healed surgical incision over the left knee. No erythema over the left knee, moderate effusion present. Active and passive ROM of the knee 0-60. Tenderness to palpation medially. Able to fire quad and hamstrings    Swelling of the left ankle and foot, no pain with active and passive range of motion. Light touch sensation in tact.     LABORATORY:  Recent Labs   Lab 24  1132 24  0534 24  0533   RBC 4.67   < > 3.82*   HGB 13.1   < > 10.4*   HCT 40.9   < > 33.4*   MCV 87.6   < > 87.4   MCH 28.1   < > 27.2   MCHC 32.0   < > 31.1   RDW 15.5*   < > 15.4*   NEPRELIM 7.35  --   --    WBC 10.1   < > 14.6*   .0   < > 252.0    < > = values in  this interval not displayed.      No results for input(s): \"PTP\", \"INR\" in the last 168 hours.      ASSESSMENT/PLAN:  Left lower extremity cellulitis, no concern for septic knee or ankle  Continue pain management  Continue DVT prophylaxis   WBAT LLE   Antibiotics per infections disease  Continue medical management      Myrtle Daniels PA-C  11/23/2024  12:15 PM

## 2024-11-24 ENCOUNTER — APPOINTMENT (OUTPATIENT)
Dept: ULTRASOUND IMAGING | Facility: HOSPITAL | Age: 54
End: 2024-11-24
Attending: PHYSICIAN ASSISTANT
Payer: COMMERCIAL

## 2024-11-24 PROBLEM — L40.9 PSORIASIS: Status: ACTIVE | Noted: 2024-11-24

## 2024-11-24 PROBLEM — D84.9 IMMUNOSUPPRESSION (HCC): Status: ACTIVE | Noted: 2024-11-24

## 2024-11-24 LAB
ANION GAP SERPL CALC-SCNC: 4 MMOL/L (ref 0–18)
BUN BLD-MCNC: 18 MG/DL (ref 9–23)
BUN/CREAT SERPL: 22.2 (ref 10–20)
C3 SERPL-MCNC: 188.8 MG/DL (ref 90–170)
C4 SERPL-MCNC: 42.7 MG/DL (ref 12–36)
CALCIUM BLD-MCNC: 9.8 MG/DL (ref 8.7–10.4)
CHLORIDE SERPL-SCNC: 100 MMOL/L (ref 98–112)
CK SERPL-CCNC: 134 U/L
CK SERPL-CCNC: 135 U/L
CO2 SERPL-SCNC: 32 MMOL/L (ref 21–32)
CREAT BLD-MCNC: 0.81 MG/DL
CRP SERPL-MCNC: 11.6 MG/DL (ref ?–1)
DEPRECATED RDW RBC AUTO: 50 FL (ref 35.1–46.3)
EGFRCR SERPLBLD CKD-EPI 2021: 106 ML/MIN/1.73M2 (ref 60–?)
ERYTHROCYTE [DISTWIDTH] IN BLOOD BY AUTOMATED COUNT: 15.2 % (ref 11–15)
GLUCOSE BLD-MCNC: 92 MG/DL (ref 70–99)
GLUCOSE BLDC GLUCOMTR-MCNC: 107 MG/DL (ref 70–99)
GLUCOSE BLDC GLUCOMTR-MCNC: 109 MG/DL (ref 70–99)
GLUCOSE BLDC GLUCOMTR-MCNC: 109 MG/DL (ref 70–99)
GLUCOSE BLDC GLUCOMTR-MCNC: 93 MG/DL (ref 70–99)
HCT VFR BLD AUTO: 35.4 %
HGB BLD-MCNC: 10.9 G/DL
MCH RBC QN AUTO: 27.3 PG (ref 26–34)
MCHC RBC AUTO-ENTMCNC: 30.8 G/DL (ref 31–37)
MCV RBC AUTO: 88.7 FL
OSMOLALITY SERPL CALC.SUM OF ELEC: 284 MOSM/KG (ref 275–295)
PLATELET # BLD AUTO: 256 10(3)UL (ref 150–450)
POTASSIUM SERPL-SCNC: 4.7 MMOL/L (ref 3.5–5.1)
RBC # BLD AUTO: 3.99 X10(6)UL
SODIUM SERPL-SCNC: 136 MMOL/L (ref 136–145)
WBC # BLD AUTO: 14.6 X10(3) UL (ref 4–11)

## 2024-11-24 PROCEDURE — 99223 1ST HOSP IP/OBS HIGH 75: CPT | Performed by: INTERNAL MEDICINE

## 2024-11-24 PROCEDURE — 93971 EXTREMITY STUDY: CPT | Performed by: PHYSICIAN ASSISTANT

## 2024-11-24 RX ORDER — IBUPROFEN 400 MG/1
400 TABLET, FILM COATED ORAL EVERY 6 HOURS PRN
Status: DISCONTINUED | OUTPATIENT
Start: 2024-11-24 | End: 2024-12-02

## 2024-11-24 NOTE — PLAN OF CARE
Problem: Patient Centered Care  Goal: Patient preferences are identified and integrated in the patient's plan of care  Description: Interventions:  - What would you like us to know as we care for you? From home alone  - Provide timely, complete, and accurate information to patient/family  - Incorporate patient and family knowledge, values, beliefs, and cultural backgrounds into the planning and delivery of care  - Encourage patient/family to participate in care and decision-making at the level they choose  - Honor patient and family perspectives and choices  Outcome: Progressing     Problem: CARDIOVASCULAR - ADULT  Goal: Maintains optimal cardiac output and hemodynamic stability  Description: INTERVENTIONS:  - Monitor vital signs, rhythm, and trends  - Monitor for bleeding, hypotension and signs of decreased cardiac output  - Evaluate effectiveness of vasoactive medications to optimize hemodynamic stability  - Monitor arterial and/or venous puncture sites for bleeding and/or hematoma  - Assess quality of pulses, skin color and temperature  - Assess for signs of decreased coronary artery perfusion - ex. Angina  - Evaluate fluid balance, assess for edema, trend weights  Outcome: Progressing     Problem: RESPIRATORY - ADULT  Goal: Achieves optimal ventilation and oxygenation  Description: INTERVENTIONS:  - Assess for changes in respiratory status  - Assess for changes in mentation and behavior  - Position to facilitate oxygenation and minimize respiratory effort  - Oxygen supplementation based on oxygen saturation or ABGs  - Provide Smoking Cessation handout, if applicable  - Encourage broncho-pulmonary hygiene including cough, deep breathe, Incentive Spirometry  - Assess the need for suctioning and perform as needed  - Assess and instruct to report SOB or any respiratory difficulty  - Respiratory Therapy support as indicated  - Manage/alleviate anxiety  - Monitor for signs/symptoms of CO2 retention  Outcome:  Progressing     Problem: GASTROINTESTINAL - ADULT  Goal: Minimal or absence of nausea and vomiting  Description: INTERVENTIONS:  - Maintain adequate hydration with IV or PO as ordered and tolerated  - Nasogastric tube to low intermittent suction as ordered  - Evaluate effectiveness of ordered antiemetic medications  - Provide nonpharmacologic comfort measures as appropriate  - Advance diet as tolerated, if ordered  - Obtain nutritional consult as needed  - Evaluate fluid balance  Outcome: Progressing     Problem: GENITOURINARY - ADULT  Goal: Absence of urinary retention  Description: INTERVENTIONS:  - Assess patient’s ability to void and empty bladder  - Monitor intake/output and perform bladder scan as needed  - Follow urinary retention protocol/standard of care  - Consider collaborating with pharmacy to review patient's medication profile  - Implement strategies to promote bladder emptying  Outcome: Progressing     Problem: METABOLIC/FLUID AND ELECTROLYTES - ADULT  Goal: Glucose maintained within prescribed range  Description: INTERVENTIONS:  - Monitor Blood Glucose as ordered  - Assess for signs and symptoms of hyperglycemia and hypoglycemia  - Administer ordered medications to maintain glucose within target range  - Assess barriers to adequate nutritional intake and initiate nutrition consult as needed  - Instruct patient on self management of diabetes  Outcome: Progressing  Goal: Electrolytes maintained within normal limits  Description: INTERVENTIONS:  - Monitor labs and rhythm and assess patient for signs and symptoms of electrolyte imbalances  - Administer electrolyte replacement as ordered  - Monitor response to electrolyte replacements, including rhythm and repeat lab results as appropriate  - Fluid restriction as ordered  - Instruct patient on fluid and nutrition restrictions as appropriate  Outcome: Progressing     Problem: SKIN/TISSUE INTEGRITY - ADULT  Goal: Skin integrity remains intact  Description:  INTERVENTIONS  - Assess and document risk factors for pressure ulcer development  - Assess and document skin integrity  - Monitor for areas of redness and/or skin breakdown  - Initiate interventions, skin care algorithm/standards of care as needed  Outcome: Progressing  Goal: Incision(s), wounds(s) or drain site(s) healing without S/S of infection  Description: INTERVENTIONS:  - Assess and document risk factors for pressure ulcer development  - Assess and document skin integrity  - Assess and document dressing/incision, wound bed, drain sites and surrounding tissue  - Implement wound care per orders  - Initiate isolation precautions as appropriate  - Initiate Pressure Ulcer prevention bundle as indicated  Outcome: Progressing     Problem: MUSCULOSKELETAL - ADULT  Goal: Return mobility to safest level of function  Description: INTERVENTIONS:  - Assess patient stability and activity tolerance for standing, transferring and ambulating w/ or w/o assistive devices  - Assist with transfers and ambulation using safe patient handling equipment as needed  - Ensure adequate protection for wounds/incisions during mobilization  - Obtain PT/OT consults as needed  - Advance activity as appropriate  - Communicate ordered activity level and limitations with patient/family  Outcome: Progressing     Problem: PAIN - ADULT  Goal: Verbalizes/displays adequate comfort level or patient's stated pain goal  Description: INTERVENTIONS:  - Encourage pt to monitor pain and request assistance  - Assess pain using appropriate pain scale  - Administer analgesics based on type and severity of pain and evaluate response  - Implement non-pharmacological measures as appropriate and evaluate response  - Consider cultural and social influences on pain and pain management  - Manage/alleviate anxiety  - Utilize distraction and/or relaxation techniques  - Monitor for opioid side effects  - Notify MD/LIP if interventions unsuccessful or patient reports new  pain  - Anticipate increased pain with activity and pre-medicate as appropriate  Outcome: Progressing     Problem: SAFETY ADULT - FALL  Goal: Free from fall injury  Description: INTERVENTIONS:  - Assess pt frequently for physical needs  - Identify cognitive and physical deficits and behaviors that affect risk of falls.  - [place] fall precautions as indicated by assessment.  - Educate pt/family on patient safety including physical limitations  - Instruct pt to call for assistance with activity based on assessment  - Modify environment to reduce risk of injury  - Provide assistive devices as appropriate  - Consider OT/PT consult to assist with strengthening/mobility  - Encourage toileting schedule  Outcome: Progressing     Problem: DISCHARGE PLANNING  Goal: Discharge to home or other facility with appropriate resources  Description: INTERVENTIONS:  - Identify barriers to discharge w/pt and caregiver  - Include patient/family/discharge partner in discharge planning  - Arrange for needed discharge resources and transportation as appropriate  - Identify discharge learning needs (meds, wound care, etc)  - Arrange for interpreters to assist at discharge as needed  - Consider post-discharge preferences of patient/family/discharge partner  - Complete POLST form as appropriate  - Assess patient's ability to be responsible for managing their own health  - Refer to Case Management Department for coordinating discharge planning if the patient needs post-hospital services based on physician/LIP order or complex needs related to functional status, cognitive ability or social support system  Outcome: Progressing     Problem: Impaired Functional Mobility  Goal: Achieve highest/safest level of mobility/gait  Description: Interventions:  - Assess patient's functional ability and stability  - Promote increasing activity/tolerance for mobility and gait  - Educate and engage patient/family in tolerated activity level and  precautions  - Recommend use of  RW for transfers and ambulation  - Recommend patient transfer to bedside chair toward strongest side  - When transferring patient, block weaker knee for safety  - Recommend use of chair position in bed 3 times per day  Outcome: Progressing     Problem: Impaired Activities of Daily Living  Goal: Achieve highest/safest level of independence in self care  Description: Interventions:  - Assess ability and encourage patient to participate in ADLs to maximize function  - Promote sitting position while performing ADLs such as feeding, grooming, and bathing  - Educate and encourage patient/family in tolerated functional activity level and precautions during self-care  Outcome: Progressing     Problem: Patient/Family Goals  Goal: Patient/Family Long Term Goal  Description: Patient's Long Term Goal: To discharge from the hospital    Interventions:  - Monitor vital signs  - Monitor appropriate labs  - Monitor blood glucose levels  - Pain management  - Administer medications per order  - Follow MD orders  - Diagnostics per order  - Update / inform patient and family on plan of care  - Discharge planning  - See additional Care Plan goals for specific interventions  Outcome: Progressing  Goal: Patient/Family Short Term Goal  Description: Patient's Short Term Goal: To improve redness, swelling, and pain to left lower extremity     Interventions:   - Monitor vital signs  - Monitor appropriate labs  - Monitor blood glucose levels  - Pain management  - Administer medications per order  - Follow MD orders  - Diagnostics per order  - Update / inform patient and family on plan of care  - See additional Care Plan goals for specific interventions  Outcome: Progressing     Problem: Diabetes/Glucose Control  Goal: Glucose maintained within prescribed range  Description: INTERVENTIONS:  - Monitor Blood Glucose as ordered  - Assess for signs and symptoms of hyperglycemia and hypoglycemia  - Administer ordered  medications to maintain glucose within target range  - Assess barriers to adequate nutritional intake and initiate nutrition consult as needed  - Instruct patient on self management of diabetes  Outcome: Progressing     Monitoring vital signs, stable at this time. No acute changes at this moment. Monitoring blood glucose levels. Pain medication provided as needed. Fall precautions in place- bed alarm on, bed locked in lowest position, call light within reach. Frequent rounding by nursing staff.

## 2024-11-24 NOTE — PLAN OF CARE
Problem: Patient Centered Care  Goal: Patient preferences are identified and integrated in the patient's plan of care  Description: Interventions:  - Provide timely, complete, and accurate information to patient/family  - Incorporate patient and family knowledge, values, beliefs, and cultural backgrounds into the planning and delivery of care  - Encourage patient/family to participate in care and decision-making at the level they choose  - Honor patient and family perspectives and choices  Outcome: Progressing     Problem: CARDIOVASCULAR - ADULT  Goal: Maintains optimal cardiac output and hemodynamic stability  Description: INTERVENTIONS:  - Monitor vital signs, rhythm, and trends  - Monitor for bleeding, hypotension and signs of decreased cardiac output  - Evaluate effectiveness of vasoactive medications to optimize hemodynamic stability  - Monitor arterial and/or venous puncture sites for bleeding and/or hematoma  - Assess quality of pulses, skin color and temperature  - Assess for signs of decreased coronary artery perfusion - ex. Angina  - Evaluate fluid balance, assess for edema, trend weights  Outcome: Progressing     Problem: RESPIRATORY - ADULT  Goal: Achieves optimal ventilation and oxygenation  Description: INTERVENTIONS:  - Assess for changes in respiratory status  - Assess for changes in mentation and behavior  - Position to facilitate oxygenation and minimize respiratory effort  - Oxygen supplementation based on oxygen saturation or ABGs  - Provide Smoking Cessation handout, if applicable  - Encourage broncho-pulmonary hygiene including cough, deep breathe, Incentive Spirometry  - Assess the need for suctioning and perform as needed  - Assess and instruct to report SOB or any respiratory difficulty  - Respiratory Therapy support as indicated  - Manage/alleviate anxiety  - Monitor for signs/symptoms of CO2 retention  Outcome: Progressing     Problem: GASTROINTESTINAL - ADULT  Goal: Minimal or absence  of nausea and vomiting  Description: INTERVENTIONS:  - Maintain adequate hydration with IV or PO as ordered and tolerated  - Nasogastric tube to low intermittent suction as ordered  - Evaluate effectiveness of ordered antiemetic medications  - Provide nonpharmacologic comfort measures as appropriate  - Advance diet as tolerated, if ordered  - Obtain nutritional consult as needed  - Evaluate fluid balance  Outcome: Progressing     Problem: GENITOURINARY - ADULT  Goal: Absence of urinary retention  Description: INTERVENTIONS:  - Assess patient’s ability to void and empty bladder  - Monitor intake/output and perform bladder scan as needed  - Follow urinary retention protocol/standard of care  - Consider collaborating with pharmacy to review patient's medication profile  - Implement strategies to promote bladder emptying  Outcome: Progressing     Problem: METABOLIC/FLUID AND ELECTROLYTES - ADULT  Goal: Glucose maintained within prescribed range  Description: INTERVENTIONS:  - Monitor Blood Glucose as ordered  - Assess for signs and symptoms of hyperglycemia and hypoglycemia  - Administer ordered medications to maintain glucose within target range  - Assess barriers to adequate nutritional intake and initiate nutrition consult as needed  - Instruct patient on self management of diabetes  Outcome: Progressing  Goal: Electrolytes maintained within normal limits  Description: INTERVENTIONS:  - Monitor labs and rhythm and assess patient for signs and symptoms of electrolyte imbalances  - Administer electrolyte replacement as ordered  - Monitor response to electrolyte replacements, including rhythm and repeat lab results as appropriate  - Fluid restriction as ordered  - Instruct patient on fluid and nutrition restrictions as appropriate  Outcome: Progressing     Problem: SKIN/TISSUE INTEGRITY - ADULT  Goal: Skin integrity remains intact  Description: INTERVENTIONS  - Assess and document risk factors for pressure ulcer  development  - Assess and document skin integrity  - Monitor for areas of redness and/or skin breakdown  - Initiate interventions, skin care algorithm/standards of care as needed  Outcome: Progressing    Problem: MUSCULOSKELETAL - ADULT  Goal: Return mobility to safest level of function  Description: INTERVENTIONS:  - Assess patient stability and activity tolerance for standing, transferring and ambulating w/ or w/o assistive devices  - Assist with transfers and ambulation using safe patient handling equipment as needed  - Ensure adequate protection for wounds/incisions during mobilization  - Obtain PT/OT consults as needed  - Advance activity as appropriate  - Communicate ordered activity level and limitations with patient/family  Outcome: Progressing     Problem: PAIN - ADULT  Goal: Verbalizes/displays adequate comfort level or patient's stated pain goal  Description: INTERVENTIONS:  - Encourage pt to monitor pain and request assistance  - Assess pain using appropriate pain scale  - Administer analgesics based on type and severity of pain and evaluate response  - Implement non-pharmacological measures as appropriate and evaluate response  - Consider cultural and social influences on pain and pain management  - Manage/alleviate anxiety  - Utilize distraction and/or relaxation techniques  - Monitor for opioid side effects  - Notify MD/LIP if interventions unsuccessful or patient reports new pain  - Anticipate increased pain with activity and pre-medicate as appropriate  Outcome: Progressing     Problem: SAFETY ADULT - FALL  Goal: Free from fall injury  Description: INTERVENTIONS:  - Assess pt frequently for physical needs  - Identify cognitive and physical deficits and behaviors that affect risk of falls.  - Margaret fall precautions as indicated by assessment.  - Educate pt/family on patient safety including physical limitations  - Instruct pt to call for assistance with activity based on assessment  - Modify  environment to reduce risk of injury  - Provide assistive devices as appropriate  - Consider OT/PT consult to assist with strengthening/mobility  - Encourage toileting schedule  Outcome: Progressing     Problem: DISCHARGE PLANNING  Goal: Discharge to home or other facility with appropriate resources  Description: INTERVENTIONS:  - Identify barriers to discharge w/pt and caregiver  - Include patient/family/discharge partner in discharge planning  - Arrange for needed discharge resources and transportation as appropriate  - Identify discharge learning needs (meds, wound care, etc)  - Arrange for interpreters to assist at discharge as needed  - Consider post-discharge preferences of patient/family/discharge partner  - Complete POLST form as appropriate  - Assess patient's ability to be responsible for managing their own health  - Refer to Case Management Department for coordinating discharge planning if the patient needs post-hospital services based on physician/LIP order or complex needs related to functional status, cognitive ability or social support system  Outcome: Progressing     Problem: Impaired Functional Mobility  Goal: Achieve highest/safest level of mobility/gait  Description: Interventions:  - Assess patient's functional ability and stability  - Promote increasing activity/tolerance for mobility and gait  - Educate and engage patient/family in tolerated activity level and precautions  - Recommend use of  RW for transfers and ambulation  - Recommend patient transfer to bedside chair toward strongest side  - When transferring patient, block weaker knee for safety  - Recommend use of chair position in bed 3 times per day  Outcome: Progressing     Problem: Impaired Activities of Daily Living  Goal: Achieve highest/safest level of independence in self care  Description: Interventions:  - Assess ability and encourage patient to participate in ADLs to maximize function  - Promote sitting position while performing  ADLs such as feeding, grooming, and bathing  - Educate and encourage patient/family in tolerated functional activity level and precautions during self-care  Outcome: Progressing      Problem: Diabetes/Glucose Control  Goal: Glucose maintained within prescribed range  Description: INTERVENTIONS:  - Monitor Blood Glucose as ordered  - Assess for signs and symptoms of hyperglycemia and hypoglycemia  - Administer ordered medications to maintain glucose within target range  - Assess barriers to adequate nutritional intake and initiate nutrition consult as needed  - Instruct patient on self management of diabetes  Outcome: Progressing

## 2024-11-24 NOTE — PROGRESS NOTES
[facility]  part of [facility] Infectious Disease Consult    Jovan Merino Sr. Patient Status:  Inpatient    1970 MRN [id number]   Location [facility] 5SW/SE Attending Linda Savage MD   Hosp Day # 6 PCP DO Jovan Agrawal Sr. Seen and examined,   Afebrile,  Previous entries noted,   States pain is better, 7/10 now instead of being 10/10 persistently,   Moving leg,     History:  Past Medical History:    Appendicitis    Arrhythmia    AF    Atrial fibrillation (HCC)    Congestive heart disease (HCC)    age 30 yrs.  1 episode    Esophageal reflux    High blood pressure    NEREIDA (obstructive sleep apnea)    Intolerant to cpap    Osteoarthritis    Unspecified essential hypertension    Ventral hernia    Visual impairment    glasses for driving     Past Surgical History:   Procedure Laterality Date    Appendectomy      Electrocardiogram, complete  2014    Scanned to Media Tab - Date of Service 2014    Hernia surgery      () Ventral Hernia Repaired     Family History   Problem Relation Age of Onset    Other (Other[Other]) Father 60        Heart attack    Cancer Maternal Grandmother         Stomach CA    Cancer Maternal Grandfather         Ling CA- Smoker      reports that he quit smoking about 12 years ago. He has never used smokeless tobacco. He reports current alcohol use of about 8.0 standard drinks of alcohol per week. He reports that he does not use drugs.    Allergies:  Allergies[1]    Medications:    Current Facility-Administered Medications:     ibuprofen (Motrin) tab 400 mg, 400 mg, Oral, Q6H PRN    butalbital-acetaminophen-caffeine (Fioricet) -40 MG per tab 1 tablet, 1 tablet, Oral, Q4H PRN    oxyCODONE immediate release tab 5 mg, 5 mg, Oral, Q4H PRN **OR** oxyCODONE immediate release tab 10 mg, 10 mg, Oral, Q4H PRN    magnesium hydroxide (Milk of Magnesia) 400 MG/5ML oral suspension 30 mL, 30 mL, Oral, Q6H PRN    ceFEPIme  (Maxpime) 2 g in sodium chloride 0.9% 100 mL IVPB-MBP, 2 g, Intravenous, Q8H    DAPTOmycin (Cubicin) 1,000 mg in sodium chloride 0.9% PF IV syringe, 1,000 mg, Intravenous, Q24H    clotrimazole-betamethasone (Lotrisone) 1-0.05 % cream, , Topical, BID    docosanol (Abreva) 10 % cream, , Topical, BID    cetirizine (ZyrTEC) tab 10 mg, 10 mg, Oral, Daily    traMADol (Ultram) tab 50 mg, 50 mg, Oral, Q6H PRN    calcium carbonate (Tums) chewable tab 1,000 mg, 1,000 mg, Oral, TID PRN    famotidine (Pepcid) tab 20 mg, 20 mg, Oral, BID PRN    enalapril (Vasotec) tab 20 mg, 20 mg, Oral, BID    flecainide (Tambocor) tab 150 mg, 150 mg, Oral, BID    hydrALAZINE (Apresoline) tab 50 mg, 50 mg, Oral, BID    carvedilol (Coreg) tab 25 mg, 25 mg, Oral, BID with meals    acetaminophen (Tylenol Extra Strength) tab 500 mg, 500 mg, Oral, Q4H PRN    melatonin tab 3 mg, 3 mg, Oral, Nightly PRN    polyethylene glycol (PEG 3350) (Miralax) 17 g oral packet 17 g, 17 g, Oral, Daily PRN    sennosides (Senokot) tab 17.2 mg, 17.2 mg, Oral, Nightly PRN    guaiFENesin (Robitussin) 100 MG/5 ML oral liquid 200 mg, 200 mg, Oral, Q4H PRN    enoxaparin (Lovenox) 100 MG/ML SUBQ injection 90 mg, 90 mg, Subcutaneous, 2 times per day    ondansetron (Zofran) 4 MG/2ML injection 4 mg, 4 mg, Intravenous, Q6H PRN    prochlorperazine (Compazine) 10 MG/2ML injection 5 mg, 5 mg, Intravenous, Q8H PRN    HYDROmorphone (Dilaudid) 1 MG/ML injection 1 mg, 1 mg, Intravenous, Q2H PRN    HYDROmorphone (Dilaudid) 1 MG/ML injection 0.5 mg, 0.5 mg, Intravenous, Q2H PRN    magnesium oxide (Mag-Ox) tab 200 mg, 200 mg, Oral, Nightly    simethicone (Mylicon) chewable tab 80 mg, 80 mg, Oral, TID PRN    dilTIAZem ER (CardIZEM CD) 24 hr cap 180 mg, 180 mg, Oral, Nightly    Review of Systems:   Constitutional: Negative for anorexia, chills, fatigue, fevers, malaise, night sweats and weight loss.  Eyes: Negative for visual disturbance, irritation and redness.  Ears, nose, mouth, throat,  and face: Negative for hearing loss, tinnitus, nasal congestion, snoring, sore throat, hoarseness and voice change.  Respiratory: Negative for cough, sputum, hemoptysis, chest pain, wheezing, dyspnea on exertion, or stridor.  Cardiovascular: Negative for chest pain, palpitations, irregular heart beats, syncope, fatigue, orthopnea, paroxysmal nocturnal dyspnea, lower extremity edema.  Gastrointestinal: Negative for dysphagia, odynophagia, reflux symptoms, nausea, vomiting, change in bowel habits, diarrhea, constipation and abdominal pain.  Integument/breast: Negative for rash, skin lesions, and pruritus.  Hematologic/lymphatic: Negative for easy bruising, bleeding, and lymphadenopathy.  Musculoskeletal: Negative for myalgias, arthralgias, muscle weakness.  Neurological: Negative for headaches, dizziness, seizures, memory problems, trouble swallowing, speech problems, gait problems and weakness.  Behavioral/Psych: Negative for active tobacco use.  Endocrine: No history of of diabetes, thyroid disorder.  All other review of systems are negative.    Vital signs in last 24 hours:  Patient Vitals for the past 24 hrs:   BP Temp Temp src Pulse Resp SpO2   11/23/24 0814 -- -- -- -- -- 93 %   11/23/24 0813 135/66 98.9 °F (37.2 °C) Oral 79 18 91 %   11/23/24 0533 131/67 99.4 °F (37.4 °C) Oral 81 16 92 %   11/23/24 0000 108/53 -- -- -- -- --   11/22/24 2057 100/58 99.5 °F (37.5 °C) Oral 83 16 92 %   11/22/24 1751 -- -- -- 85 -- --       Physical Exam:   General: alert, cooperative, oriented.  No respiratory distress.   Head: Normocephalic, without obvious abnormality, atraumatic.   Eyes: Conjunctivae/corneas clear.  No scleral icterus.  No conjunctival     hemorrhage.   Nose: Nares normal.   Throat: Lips, mucosa, and tongue normal.  No thrush noted.   Neck: Soft, supple neck;    Chest wall: No tenderness or deformity.   Heart: Regular rate and rhythm, normal S1S2, no murmur.   Abdomen: soft, non-tender, positive  BS.   Extremity: LLE swelling erythema, ROM of L knee limited due to swelling > pain,    Skin: No rashes or lesions.   Neurological: Alert, interactive, no focal deficits    Labs:  Lab Results   Component Value Date    WBC 14.6 11/24/2024    HGB 10.9 11/24/2024    HCT 35.4 11/24/2024    .0 11/24/2024    CREATSERUM 0.81 11/24/2024    BUN 18 11/24/2024     11/24/2024    K 4.7 11/24/2024     11/24/2024    CO2 32.0 11/24/2024    GLU 92 11/24/2024    CA 9.8 11/24/2024     11/24/2024       Radiology:  Reviewed,       Cultures:  Reviewed,     Assessment and Plan:    1.  Sepsis with fevers, leukocytosis due to LLE Cellulitis,   - Blood cul are NGTD  - CT with no necrotizing Fascitis, abscess, R knee effusion and torn quadricepts,  - Pain management per PCP,   - CRP 18.90, Will repeat,   - No DVT,   - On IV Cefepime and Cubicin, 11/21- present,     2.   Leukocytosis: sec to above, improving,     3.   Immunocompromised state: Sec to Psoriasis, Humira- last dose ~ 11/13,     4.    Morbid obesity complicating clinical course     5.     Disposition - inpatient. A middle aged male with LLE Cellulitis in the presence of Lymphedema, Psoriasis and Humira, persistent pain, venous dopplers noted, will order arterial dopplers too, ? Pain medications seeking behavior, un intentionally rocks both legs but when asked to move the leg, he has severe pain,   - Follow Pending cul,   - Continue IV Daptomycin and IV Cefepime,Pharm to dose,   - Leg elevation,   - Compression wraps,     Discussed with patient, RN, all questions answered, will follow with further recommendations,     Thank you for consulting [facility] for Jovan Merino Sr..  If you have any questions or concerns please call [facility] Infectious Disease at [phone].     Sudhakar Ludwig MD  11/23/2024  5:02 PM         [1]   Allergies  Allergen Reactions    Hydrocodone ITCHING and RASH

## 2024-11-24 NOTE — PROGRESS NOTES
[facility]   INTERNAL MEDICINE PROGRESS NOTE    CHIEF COMPLAINT   Swelling Edema    SUBJECTIVE  24 HR EVENTS     Feels like pain is worsening. Tmax 99.9.     INPATIENT MEDICATIONS  Scheduled Medications:  cefepime, 2 g, Q8H  DAPTOmycin, 1,000 mg, Q24H  clotrimazole-betamethasone, , BID  docosanol, , BID  cetirizine, 10 mg, Daily  enalapril, 20 mg, BID  flecainide, 150 mg, BID  hydrALAZINE, 50 mg, BID  carvedilol, 25 mg, BID with meals  enoxaparin, 90 mg, 2 times per day  magnesium oxide, 200 mg, Nightly  dilTIAZem HCl ER Coated Beads, 180 mg, Nightly     Drips:       PRN Medications:   butalbital-acetaminophen-caffeine, 1 tablet, Q4H PRN  oxyCODONE, 5 mg, Q4H PRN   Or  oxyCODONE, 10 mg, Q4H PRN  magnesium hydroxide, 30 mL, Q6H PRN  traMADol, 50 mg, Q6H PRN  calcium carbonate, 1,000 mg, TID PRN  famotidine, 20 mg, BID PRN  acetaminophen, 500 mg, Q4H PRN  melatonin, 3 mg, Nightly PRN  polyethylene glycol (PEG 3350), 17 g, Daily PRN  sennosides, 17.2 mg, Nightly PRN  guaiFENesin, 200 mg, Q4H PRN  ondansetron, 4 mg, Q6H PRN  prochlorperazine, 5 mg, Q8H PRN  HYDROmorphone, 1 mg, Q2H PRN  HYDROmorphone, 0.5 mg, Q2H PRN  simethicone, 80 mg, TID PRN       PHYSICAL EXAMINATION   Vitals: /74 (BP Location: Right arm)   Pulse 85   Temp 99.4 °F (37.4 °C) (Oral)   Resp 18   Ht 6' 1\" (1.854 m)   Wt (!) 400 lb 3.2 oz (181.5 kg)   SpO2 92%   BMI 52.80 kg/m²     GEN: obese male in NAD  HEENT: EOMI  Pulm: CTAB, no crackles or wheezes  CV: RRR, no murmurs, DP pulses present  ABD: Soft, non-tender, non-distended, +BS  MSK: LLE swelling, very TTP, LLE compartments not tense  Neuro: Grossly normal, CN intact, sensory intact  SKIN: warm, dry, psoriatic plaques, LLE with edema, erythema, warm to touch  EXT: no edema    DATA REVIEW: LABORATORY VALUES & DIAGNOSTICS  Recent Labs   Lab 11/18/24  1132 11/19/24  0534 11/20/24  0519 11/21/24  0525 11/22/24  0505 11/23/24  0533 11/24/24  0443      < > 134* 135* 135*  133* 136   K 3.8   < > 4.0 3.9 4.1 4.4 4.7      < > 105 102 102 100 100   CO2 29.0   < > 26.0 28.0 27.0 29.0 32.0   BUN 18   < > 15 17 14 16 18   CREATSERUM 1.06   < > 0.93 0.82 0.79 0.84 0.81   ANIONGAP 5   < > 3 5 6 4 4   GLU 98   < > 94 95 112* 117* 92   CA 10.8*   < > 9.8 9.8 9.7 9.5 9.8   TP 7.1  --   --   --   --   --   --    ALB 4.1  --   --   --   --   --   --    AST 29  --   --   --   --   --   --    ALT 38  --   --   --   --   --   --    ALKPHO 72  --   --   --   --   --   --    BILT 0.3  --   --   --   --   --   --    EGFRCR 84   < > 99 106 107 105 106    < > = values in this interval not displayed.     Recent Labs   Lab 11/18/24  1132 11/19/24  0534 11/20/24  0519 11/21/24  0525 11/22/24  0505 11/23/24  0533 11/24/24  0443   WBC 10.1   < > 14.5* 15.5* 16.0* 14.6* 14.6*   HGB 13.1   < > 11.4* 11.3* 11.0* 10.4* 10.9*   HCT 40.9   < > 35.9* 34.0* 34.9* 33.4* 35.4*   .0   < > 158.0 169.0 238.0 252.0 256.0   MCV 87.6   < > 87.6 86.7 86.6 87.4 88.7   MOPERCENT 10.9  --   --   --   --   --   --    EOPERCENT 0.6  --   --   --   --   --   --    BAPERCENT 0.3  --   --   --   --   --   --     < > = values in this interval not displayed.     ASSESSMENT & PLAN   Jovan Merino - 54 year old male w MO, HTN, Afib, HFpEF, psoriasis admitted 11/18/2024 for LLE cellulitis, started on IV ancef. Initially improving however 11/21 worse - abx broadened to vanc/zosyn, CT ordered. ID consulted    Sepsis 2/2 LLE cellulitis  Immunosuppression 2/2 psoriasis/humira  - Febrile to 102 here. HR 80s, WBC 14  - blood cx NG x 5 days  - doppler neg. No DVT  - . Known HFpEF. Feels like legs aren't swollen & has lost weight  - Pain control: IVP dilaudid, PO oxy, APAP PRN  - IV ancef (11/18 - 21 )  - 11/19 febrile, check Bcx. Lot of pain - add tramadol (itching w oxy/norc), add IV toradol scheduled. Headaches, feels dehydrated - will give 1L NS bolus  - 11/21: slightly worse, LE very swollen, WBC higher. Will switch ancef  to vanco/zosyn for now. Consult ID - will d/w Dr. Calloway. CT negative 11/21 for underlying abscess  - s/p IV lasix    Severe LLE pain and swelling  - possibly 2/2 cellulitis however not improving with IV abx  - no signs/symptoms of compartment syndrome  - continue pain control with PO and IV susana meds  - eval by ortho  - inflammatory markers significantly elevated  - CPK normal  - will consult rheumatology    Partial quad tear  L knee effusion  - ortho consulted, appreciate recs    Headaches  - fioricet PRN    HFpEF - compensated  HTN  Parox Afib - low CV, not on AC  - Continue PTA lasix, flecainide, coreg, hydral, enalapril    Psoriasis  - On humira OP. Would hold until leg better    Morbid obesity w [facility]  - Body mass index is 52.8 kg/m².   - Healthy diet & wt loss encouraged  - Has lost quite a bit of weight with Mounjaro already    DM2 - currently controlled w mounjaro  - Okay for reg diet, follow BG on AM labs. Can hold on SSI/accuchecks for now, can add if issues controlling    ACP: Full Code  Ppx: DVT: Enox  Dispo  EDoD:  ~11/25-11/27    F/u:   - PCP:  Eric Sethi, DO    Available via epic secure chat or perfect serve 7A - 7P.    Linda Savage MD   Internal Medicine - Hospitalist  [facility]

## 2024-11-24 NOTE — PROGRESS NOTES
[facility]  Progress Note    Jovan Merino Sr. Patient Status:  Inpatient    1970 MRN [id number]   Location [facility] 5SW/SE Attending Linda Savage MD   Hosp Day # 6 PCP Eric Sethi DO     SUBJECTIVE:  INTERVAL HISTORY:  54 year old male with left lower leg cellulitis, CT showed large knee effusion and partial quad tendon tear.   Patient continues to struggle with pain in the left lower leg, actually feels that pain is worse today and dilaudid is not helping as much. Has not been able to get out of bed other than to use the bathroom. Having more swelling into the foot. Also feels that his psoriasis is flaring as he is 1.5 weeks out from his last humira injection and having significant itching today    Patient states he had a quad tendon rupture in 2022 that was repaired surgically. Prior to admission he was able to ambulate and ascend/descend stairs without difficulty.     OBJECTIVE:  Patient Vitals for the past 24 hrs:   BP Temp Temp src Pulse Resp SpO2   24 0814 -- -- -- -- -- 93 %   24 0813 135/66 98.9 °F (37.2 °C) Oral 79 18 91 %   24 0533 131/67 99.4 °F (37.4 °C) Oral 81 16 92 %   24 0000 108/53 -- -- -- -- --   24 2057 100/58 99.5 °F (37.5 °C) Oral 83 16 92 %   24 1751 -- -- -- 85 -- --   24 1346 138/69 99 °F (37.2 °C) Axillary 84 18 92 %       ORTHO EXAM:   Patient sitting comfortably in bed  Left lower extremity:  increased swelling to the left lower leg compared to yesterday. Increased erythema posteriorly. Dressing is clean dry and intact. Well healed surgical incision over the left knee. No erythema over the left knee, moderate effusion present. Active and passive ROM of the knee 0-60. Tenderness to palpation medially. Able to fire quad and hamstrings    Swelling of the left ankle and foot, no pain with active and passive range of motion. Light touch sensation in tact.     LABORATORY:  Recent Labs   Lab 24  1132  11/19/24  0534 11/24/24  0443   RBC 4.67   < > 3.99*   HGB 13.1   < > 10.9*   HCT 40.9   < > 35.4*   MCV 87.6   < > 88.7   MCH 28.1   < > 27.3   MCHC 32.0   < > 30.8*   RDW 15.5*   < > 15.2*   NEPRELIM 7.35  --   --    WBC 10.1   < > 14.6*   .0   < > 256.0    < > = values in this interval not displayed.      No results for input(s): \"PTP\", \"INR\" in the last 168 hours.      ASSESSMENT/PLAN:  Left lower extremity cellulitis, still low concern for septic knee or ankle  Continue pain management  Continue DVT prophylaxis   STAT venous doppler LLE to r/o DVT given increased swelling and worsened pain  WBAT LLE   Antibiotics per infections disease  Continue medical management      Myrtle Daniels PA-C  11/24/2024

## 2024-11-24 NOTE — CONSULTS
RHEUMATOLOGY INPATIENT CONSULTATION    Jovan Merino . is a 54 year old male.    ASSESSMENT/PLAN:     #LLE Rash, suspected cellulitis in an immunocompromised patient       #Torn Quadriceps Tendon w/  Knee Effusion. H/O Ruptured Quad tendon surgical repair   #Sepsis suspected 2/2 Above  #Cutaneous Psoriasis        -Treated w/ Humira Outpt. Follows with [facility]. MARYA Mojica    Chronic:  #DMII   #HFpEF  #HTN  #GERD  #NEREIDA  #OA    DISCUSSION:    Rheumatology consulted for LLE rash suspicious for cellulitis.  Imaging notable for torn quadriceps tendon and associated knee effusion.  Knee is not overtly warm or erythematous but does have a chronic scar from his previous surgery.  Appreciate ID recommendations and currently on antibiotics.  Ortho following as well.  Will check additional autoimmune work.  Otherwise, would continue to hold Humira given infection.    PLAN:  -Continue to hold Humira   -Will check additional autoimmune workup  -Appreciate ID , Ortho recs       -Noting no concern for septic knee/ankle        - s/p IV ancef > IV vanc/zosyn > IV cefepime/cubicin    Thank you for asking us to see this nice patient and participate in their care. We will make further recommendations as their case develops.    Stefan Sutton DO,   11/24/24,   9:38 AM     HPI:     Chief Complaint   Patient presents with    Swelling Edema       I had the pleasure of seeing Jovan Merino . on 11/24/2024 as a new inpatient consultation for fever/rash. The patient was originally referred by Dr. Linda Savage.     54 year old male  w/ PMH Cutaneous Psoriasis on Humira, DMII, Quad Tendon Rupture s/p surgical repair, CHF, GERD, HTN, NEREIDA, OA. Admitted 11/18 for LLE starting ~5d PTA with fever and last fever 11/19.  Has had cutaneous psoriasis for several years without improvement on other immunologic's including Tremfya and Taltz, currently on Humira with dermatology.  Because he has had cutaneous psoriasis lesions for  some time, he is not sure exactly when the distal LLE erythema started.  Has associated pain around the LLE rash but no other significant myalgias/arthralgias including no other joint swelling.  Overall, while the pain still persists, it is slightly better than onset.  Has a scar along his left knee which was, per patient, the exit site of the quadricep tendon rupture surgical repair.  ROS otherwise negative for joint swelling, Raynaud's phenomenon, serositis, oral/nasal ulcers.  Symptoms not associated with morning stiffness.    Current Medications:  Humira q14d  Topical Triamicinolone 0.1%    Medication History:  Tremfya [id number]  Taltz- ineffective    HISTORY:  Past Medical History:    Appendicitis    Arrhythmia    AF    Atrial fibrillation (HCC)    Congestive heart disease (HCC)    age 30 yrs.  1 episode    Esophageal reflux    High blood pressure    NEREIDA (obstructive sleep apnea)    Intolerant to cpap    Osteoarthritis    Unspecified essential hypertension    Ventral hernia    Visual impairment    glasses for driving      Social Hx Reviewed   Family Hx Reviewed     Medications (Active prior to today's visit):  No current outpatient medications on file.     .cmed  Allergies:  Allergies[1]      ROS:   Review of Systems   Constitutional:  Negative for chills and fever.   HENT:  Negative for congestion, hearing loss, mouth sores, nosebleeds and trouble swallowing.    Eyes:  Negative for photophobia, pain, redness and visual disturbance.   Respiratory:  Negative for cough and shortness of breath.    Cardiovascular:  Negative for chest pain, palpitations and leg swelling.   Gastrointestinal:  Negative for abdominal pain, blood in stool, diarrhea and nausea.   Endocrine: Negative for cold intolerance and heat intolerance.   Genitourinary:  Negative for dysuria, frequency and hematuria.   Musculoskeletal:  Positive for gait problem and myalgias. Negative for arthralgias, back pain, joint swelling, neck pain and  neck stiffness.   Skin:  Positive for rash. Negative for color change.   Neurological:  Negative for dizziness, weakness, numbness and headaches.   Psychiatric/Behavioral:  Negative for confusion and dysphoric mood.         PHYSICAL EXAM:     /74 (BP Location: Right arm)   Pulse 85   Temp 99.4 °F (37.4 °C) (Oral)   Resp 18   Ht 6' 1\" (1.854 m)   Wt (!) 400 lb 3.2 oz (181.5 kg)   SpO2 92%   BMI 52.80 kg/m²   HEENT: Clear oropharynx, no oral ulcers, EOM intact, clear sclear, PERRLA, pleasant, no acute distress, no CAD,   No rashes  CVS: RRR, no murmurs  RS: CTAB, no crackles, no rhonchi  ABD: Soft Non tender, no HSM felt, BS positive  Joint exam:   no neck tendnerness, no synovitis   EXTREMITIES: distal LLE rash (media), R knee not warm to touch-has an anterior scar from previous surgery (media), psoriatic plaques affecting his hands, elbows, legs                NEURO: intact touch,    RELEVANT PRIOR LABS:   2004:  POC glucose 107  CBC with WBC 14.6, Hg 10.9 normocytic,  WNL  BMP with BUN 18, CR 0.81    2024:  Pro-Gonzalo 0.35 (high)  CBC: WBC 14.6, Hg 10.4 normocytic,  WNL    2024:   (high), CRP 18.9 (high)  CBC: WBC 16, Hg 11 normocytic,     2024:  MRSA PCR negative    CK 89 WNL    2024:  TSH 0.97 WNL  QuantiFERON-TB testing negative    Imagin2024 LLE CT:  Impression   CONCLUSION:  1. Diffuse edema in the subcutaneous tissues compatible with history of cellulitis.  No abscess.  2. Moderate to large knee joint effusion.  3. Partially torn distal quadriceps tendon .  4. Multiple varicose veins.            2024 LLE venous Doppler:  Impression   CONCLUSION:  1. No evidence of deep venous thrombosis in the left lower extremity.  The peroneal veins could not be visualized.                  [1]   Allergies  Allergen Reactions    Hydrocodone ITCHING and RASH

## 2024-11-25 LAB
ANION GAP SERPL CALC-SCNC: 3 MMOL/L (ref 0–18)
BUN BLD-MCNC: 18 MG/DL (ref 9–23)
BUN/CREAT SERPL: 24.3 (ref 10–20)
CALCIUM BLD-MCNC: 9.9 MG/DL (ref 8.7–10.4)
CHLORIDE SERPL-SCNC: 100 MMOL/L (ref 98–112)
CK SERPL-CCNC: 109 U/L
CO2 SERPL-SCNC: 31 MMOL/L (ref 21–32)
CREAT BLD-MCNC: 0.74 MG/DL
DEPRECATED RDW RBC AUTO: 48.4 FL (ref 35.1–46.3)
EGFRCR SERPLBLD CKD-EPI 2021: 109 ML/MIN/1.73M2 (ref 60–?)
ERYTHROCYTE [DISTWIDTH] IN BLOOD BY AUTOMATED COUNT: 15 % (ref 11–15)
GLUCOSE BLD-MCNC: 102 MG/DL (ref 70–99)
GLUCOSE BLDC GLUCOMTR-MCNC: 100 MG/DL (ref 70–99)
GLUCOSE BLDC GLUCOMTR-MCNC: 128 MG/DL (ref 70–99)
GLUCOSE BLDC GLUCOMTR-MCNC: 97 MG/DL (ref 70–99)
GLUCOSE BLDC GLUCOMTR-MCNC: 99 MG/DL (ref 70–99)
HCT VFR BLD AUTO: 36.2 %
HGB BLD-MCNC: 11.2 G/DL
MCH RBC QN AUTO: 27.3 PG (ref 26–34)
MCHC RBC AUTO-ENTMCNC: 30.9 G/DL (ref 31–37)
MCV RBC AUTO: 88.1 FL
OSMOLALITY SERPL CALC.SUM OF ELEC: 280 MOSM/KG (ref 275–295)
PLATELET # BLD AUTO: 291 10(3)UL (ref 150–450)
POTASSIUM SERPL-SCNC: 4.6 MMOL/L (ref 3.5–5.1)
RBC # BLD AUTO: 4.11 X10(6)UL
SODIUM SERPL-SCNC: 134 MMOL/L (ref 136–145)
WBC # BLD AUTO: 12.3 X10(3) UL (ref 4–11)

## 2024-11-25 PROCEDURE — 99233 SBSQ HOSP IP/OBS HIGH 50: CPT | Performed by: INTERNAL MEDICINE

## 2024-11-25 RX ORDER — FUROSEMIDE 10 MG/ML
40 INJECTION INTRAMUSCULAR; INTRAVENOUS ONCE
Status: COMPLETED | OUTPATIENT
Start: 2024-11-25 | End: 2024-11-25

## 2024-11-25 NOTE — PROGRESS NOTES
[facility]   INTERNAL MEDICINE PROGRESS NOTE    CHIEF COMPLAINT   Swelling Edema    SUBJECTIVE  24 HR EVENTS     Pain is the same. Tmax 98. tearful    INPATIENT MEDICATIONS  Scheduled Medications:  furosemide, 40 mg, Once  cefepime, 2 g, Q8H  DAPTOmycin, 1,000 mg, Q24H  clotrimazole-betamethasone, , BID  docosanol, , BID  cetirizine, 10 mg, Daily  enalapril, 20 mg, BID  flecainide, 150 mg, BID  hydrALAZINE, 50 mg, BID  carvedilol, 25 mg, BID with meals  enoxaparin, 90 mg, 2 times per day  magnesium oxide, 200 mg, Nightly  dilTIAZem HCl ER Coated Beads, 180 mg, Nightly     Drips:       PRN Medications:   ibuprofen, 400 mg, Q6H PRN  butalbital-acetaminophen-caffeine, 1 tablet, Q4H PRN  oxyCODONE, 5 mg, Q4H PRN   Or  oxyCODONE, 10 mg, Q4H PRN  magnesium hydroxide, 30 mL, Q6H PRN  traMADol, 50 mg, Q6H PRN  calcium carbonate, 1,000 mg, TID PRN  famotidine, 20 mg, BID PRN  acetaminophen, 500 mg, Q4H PRN  melatonin, 3 mg, Nightly PRN  polyethylene glycol (PEG 3350), 17 g, Daily PRN  sennosides, 17.2 mg, Nightly PRN  guaiFENesin, 200 mg, Q4H PRN  ondansetron, 4 mg, Q6H PRN  prochlorperazine, 5 mg, Q8H PRN  HYDROmorphone, 1 mg, Q2H PRN  HYDROmorphone, 0.5 mg, Q2H PRN  simethicone, 80 mg, TID PRN       PHYSICAL EXAMINATION   Vitals: /76 (BP Location: Right arm)   Pulse 81   Temp 98 °F (36.7 °C) (Axillary)   Resp 20   Ht 6' 1\" (1.854 m)   Wt (!) 400 lb 3.2 oz (181.5 kg)   SpO2 91%   BMI 52.80 kg/m²     GEN: obese male in NAD, tearful  HEENT: EOMI  Pulm: CTAB, no crackles or wheezes  CV: RRR, no murmurs, DP pulses present  ABD: Soft, non-tender, non-distended, +BS  MSK: LLE swelling, very TTP, LLE compartments not tense  Neuro: Grossly normal, CN intact, sensory intact  SKIN: warm, dry, psoriatic plaques, LLE with edema, erythema, warm to touch  EXT: no edema    DATA REVIEW: LABORATORY VALUES & DIAGNOSTICS  Recent Labs   Lab 11/21/24  0525 11/22/24  0505 11/23/24  0533 11/24/24  0443 11/25/24  0504   NA  135* 135* 133* 136 134*   K 3.9 4.1 4.4 4.7 4.6    102 100 100 100   CO2 28.0 27.0 29.0 32.0 31.0   BUN 17 14 16 18 18   CREATSERUM 0.82 0.79 0.84 0.81 0.74   ANIONGAP 5 6 4 4 3   GLU 95 112* 117* 92 102*   CA 9.8 9.7 9.5 9.8 9.9   EGFRCR 106 107 105 106 109     Recent Labs   Lab 11/21/24  0525 11/22/24  0505 11/23/24  0533 11/24/24  0443 11/25/24  0504   WBC 15.5* 16.0* 14.6* 14.6* 12.3*   HGB 11.3* 11.0* 10.4* 10.9* 11.2*   HCT 34.0* 34.9* 33.4* 35.4* 36.2*   .0 238.0 252.0 256.0 291.0   MCV 86.7 86.6 87.4 88.7 88.1     ASSESSMENT & PLAN   Wilber Jovan - 54 year old male w MO, HTN, Afib, HFpEF, psoriasis admitted 11/18/2024 for LLE cellulitis, started on IV ancef. Initially improving however 11/21 worse - abx broadened to vanc/zosyn, CT ordered. ID consulted    Sepsis 2/2 LLE cellulitis  Immunosuppression 2/2 psoriasis/humira  - Febrile to 102 here. HR 80s, WBC 14  - blood cx NG x 5 days  - doppler neg. No DVT  - . Known HFpEF. Feels like legs aren't swollen & has lost weight  - Pain control: IVP dilaudid, PO oxy, APAP PRN  - IV ancef (11/18 - 21 )  - 11/19 febrile, check Bcx. Lot of pain - add tramadol (itching w oxy/norc), add IV toradol scheduled. Headaches, feels dehydrated - will give 1L NS bolus  - 11/21: slightly worse, LE very swollen, WBC higher. Will switch ancef to vanco/zosyn for now. Consult ID - will d/w Dr. Calloway. CT negative 11/21 for underlying abscess    Severe LLE pain and swelling  - possibly 2/2 cellulitis however not improving with IV abx  - no signs/symptoms of compartment syndrome  - continue pain control with PO and IV susana meds  - eval by ortho  - inflammatory markers significantly elevated  - CPK normal  - s/p IV lasix, will give another dose today  - will consult rheumatology  - MRI LLE ordered    Partial quad tear  L knee effusion  - ortho consulted, appreciate recs    Headaches  - fioricet PRN    HFpEF - compensated  HTN  Parox Afib - low CV, not on AC  -  Continue PTA lasix, flecainide, coreg, hydral, enalapril    Psoriasis  - On humira OP. Would hold until leg better    [place] [facility]  - Body mass index is 52.8 kg/m².   - Healthy diet & wt loss encouraged  - Has lost quite a bit of weight with Mounjaro already    DM2 - currently controlled w mounjaro  - Okay for reg diet, follow BG on AM labs. Can hold on SSI/accuchecks for now, can add if issues controlling    ACP: Full Code  Ppx: DVT: Enox  Dispo  EDoD:  ~11/25-11/27    F/u:   - PCP:  Eric Sethi,     Available via epic secure chat or perfect serve 7A - 7P.    Linda Savage MD   Internal Medicine - Hospitalist  [facility]

## 2024-11-25 NOTE — PHYSICAL THERAPY NOTE
Physical Therapy Defer Note    Orders received and chart reviewed. Attempted to see patient for Physical Therapy services at 1100. Patient is not appropriate for therapy at this time secondary to patient pending BLE arterial duplex. Decision made to defer therapy following a discussion with the RN. Will re-schedule visit as patient is medically able to participate.    Antonella Franklin, PT, DPT  [facility]  Rehab Services - Physical Therapy  [id number]

## 2024-11-25 NOTE — PROGRESS NOTES
[facility]  part of [facility] Infectious Disease  Progress Note    Jovan GUTHRIE Wilber Barrett. Patient Status:  Inpatient    1970 MRN [id number]   Location [facility] 5SW/SE Attending Linda Savage MD   Hosp Day # 7 PCP Eric Sethi,      Subjective:  Patient seen/examined.  Weekend events reviewed.  Orthopedic and rheumatology input appreciated.      Objective:  Blood pressure 142/76, pulse 82, temperature 98.9 °F (37.2 °C), temperature source Oral, resp. rate 20, height 6' 1\" (1.854 m), weight (!) 400 lb 3.2 oz (181.5 kg), SpO2 95%.    Intake/Output:    Intake/Output Summary (Last 24 hours) at 2024 1112  Last data filed at 2024 1040  Gross per 24 hour   Intake 1291 ml   Output 2900 ml   Net -1609 ml       Physical Exam:  General: Awake, alert, non-tox, NAD.  HEENT:  Oropharynx clear, trachea ML.  Heart: RRR S1S2 no murmurs.  Lungs: Essentially CTA b/l, no rhonchi, rales, wheezes.  Abdomen: Soft, NT/ND.  BS present.  No organomegaly.  Extremity: No edema.  Neurological: No focal deficits.  Derm:  Warm, dry, free from rashes.    Lab Data Review:  Lab Results   Component Value Date    WBC 12.3 2024    HGB 11.2 2024    HCT 36.2 2024    .0 2024    CREATSERUM 0.74 2024    BUN 18 2024     2024    K 4.6 2024     2024    CO2 31.0 2024     2024    CA 9.9 2024    CRP 11.60 2024     2024        Cultures:   Blood cultures x 2 negative  MRSA screen pending    Radiology:  CONCLUSION:   1. Diffuse edema in the subcutaneous tissues compatible with history of cellulitis.  No abscess.   2. Moderate to large knee joint effusion.   3. Partially torn distal quadriceps tendon .   4. Multiple varicose veins.           Assessment and Plan:     Sepsis presenting with fevers, leukocytosis, and LLE cellulitis as etiology of events  - Tm 102F during this  admission  - Blood cultures are negative  - Dopplers negative for DVT  - CT with R knee effusion and torn quadriceps tendon  - IV ancef x 4 days given without significant improvement->cubicin + cefepime on 11/21/24     2.  Leukocytosis due to the above  - At 12K today and trending down  - Will trend     3.  Underlying psoriasis as a potential nidus for entry of bacteria  - At risk for MDROs due to humira use  - MRSA negative     4.  Morbid obesity complicating clinical course     5.  Disposition - inpatient.  Continue IV daptomycin and cefepime as Rx.  Ortho does not feel septic arthritis is a concern.  Rheum yun additional autoimmune labs.  Humira remains held due to infection.  Arterial dopplers pending for completeness.  At present, pain control appears to be greatest issue - patient is not moving much, not ambulating - this will need to be improved prior to discharge planning.  Will follow.    Kailee Calloway DO, [facility] Infectious Disease  [phone]    11/25/2024  11:12 AM

## 2024-11-25 NOTE — PLAN OF CARE
Problem: Patient Centered Care  Goal: Patient preferences are identified and integrated in the patient's plan of care  Description: Interventions:  - What would you like us to know as we care for you? From home alone  - Provide timely, complete, and accurate information to patient/family  - Incorporate patient and family knowledge, values, beliefs, and cultural backgrounds into the planning and delivery of care  - Encourage patient/family to participate in care and decision-making at the level they choose  - Honor patient and family perspectives and choices  Outcome: Progressing     Problem: CARDIOVASCULAR - ADULT  Goal: Maintains optimal cardiac output and hemodynamic stability  Description: INTERVENTIONS:  - Monitor vital signs, rhythm, and trends  - Monitor for bleeding, hypotension and signs of decreased cardiac output  - Evaluate effectiveness of vasoactive medications to optimize hemodynamic stability  - Monitor arterial and/or venous puncture sites for bleeding and/or hematoma  - Assess quality of pulses, skin color and temperature  - Assess for signs of decreased coronary artery perfusion - ex. Angina  - Evaluate fluid balance, assess for edema, trend weights  Outcome: Progressing     Problem: RESPIRATORY - ADULT  Goal: Achieves optimal ventilation and oxygenation  Description: INTERVENTIONS:  - Assess for changes in respiratory status  - Assess for changes in mentation and behavior  - Position to facilitate oxygenation and minimize respiratory effort  - Oxygen supplementation based on oxygen saturation or ABGs  - Provide Smoking Cessation handout, if applicable  - Encourage broncho-pulmonary hygiene including cough, deep breathe, Incentive Spirometry  - Assess the need for suctioning and perform as needed  - Assess and instruct to report SOB or any respiratory difficulty  - Respiratory Therapy support as indicated  - Manage/alleviate anxiety  - Monitor for signs/symptoms of CO2 retention  Outcome:  Progressing     Problem: GASTROINTESTINAL - ADULT  Goal: Minimal or absence of nausea and vomiting  Description: INTERVENTIONS:  - Maintain adequate hydration with IV or PO as ordered and tolerated  - Nasogastric tube to low intermittent suction as ordered  - Evaluate effectiveness of ordered antiemetic medications  - Provide nonpharmacologic comfort measures as appropriate  - Advance diet as tolerated, if ordered  - Obtain nutritional consult as needed  - Evaluate fluid balance  Outcome: Progressing     Problem: GENITOURINARY - ADULT  Goal: Absence of urinary retention  Description: INTERVENTIONS:  - Assess patient’s ability to void and empty bladder  - Monitor intake/output and perform bladder scan as needed  - Follow urinary retention protocol/standard of care  - Consider collaborating with pharmacy to review patient's medication profile  - Implement strategies to promote bladder emptying  Outcome: Progressing     Problem: METABOLIC/FLUID AND ELECTROLYTES - ADULT  Goal: Glucose maintained within prescribed range  Description: INTERVENTIONS:  - Monitor Blood Glucose as ordered  - Assess for signs and symptoms of hyperglycemia and hypoglycemia  - Administer ordered medications to maintain glucose within target range  - Assess barriers to adequate nutritional intake and initiate nutrition consult as needed  - Instruct patient on self management of diabetes  Outcome: Progressing  Goal: Electrolytes maintained within normal limits  Description: INTERVENTIONS:  - Monitor labs and rhythm and assess patient for signs and symptoms of electrolyte imbalances  - Administer electrolyte replacement as ordered  - Monitor response to electrolyte replacements, including rhythm and repeat lab results as appropriate  - Fluid restriction as ordered  - Instruct patient on fluid and nutrition restrictions as appropriate  Outcome: Progressing     Problem: SKIN/TISSUE INTEGRITY - ADULT  Goal: Skin integrity remains intact  Description:  INTERVENTIONS  - Assess and document risk factors for pressure ulcer development  - Assess and document skin integrity  - Monitor for areas of redness and/or skin breakdown  - Initiate interventions, skin care algorithm/standards of care as needed  Outcome: Progressing  Goal: Incision(s), wounds(s) or drain site(s) healing without S/S of infection  Description: INTERVENTIONS:  - Assess and document risk factors for pressure ulcer development  - Assess and document skin integrity  - Assess and document dressing/incision, wound bed, drain sites and surrounding tissue  - Implement wound care per orders  - Initiate isolation precautions as appropriate  - Initiate Pressure Ulcer prevention bundle as indicated  Outcome: Progressing     Problem: MUSCULOSKELETAL - ADULT  Goal: Return mobility to safest level of function  Description: INTERVENTIONS:  - Assess patient stability and activity tolerance for standing, transferring and ambulating w/ or w/o assistive devices  - Assist with transfers and ambulation using safe patient handling equipment as needed  - Ensure adequate protection for wounds/incisions during mobilization  - Obtain PT/OT consults as needed  - Advance activity as appropriate  - Communicate ordered activity level and limitations with patient/family  Outcome: Progressing     Problem: SAFETY ADULT - FALL  Goal: Free from fall injury  Description: INTERVENTIONS:  - Assess pt frequently for physical needs  - Identify cognitive and physical deficits and behaviors that affect risk of falls.  - [place] fall precautions as indicated by assessment.  - Educate pt/family on patient safety including physical limitations  - Instruct pt to call for assistance with activity based on assessment  - Modify environment to reduce risk of injury  - Provide assistive devices as appropriate  - Consider OT/PT consult to assist with strengthening/mobility  - Encourage toileting schedule  Outcome: Progressing     Problem: DISCHARGE  PLANNING  Goal: Discharge to home or other facility with appropriate resources  Description: INTERVENTIONS:  - Identify barriers to discharge w/pt and caregiver  - Include patient/family/discharge partner in discharge planning  - Arrange for needed discharge resources and transportation as appropriate  - Identify discharge learning needs (meds, wound care, etc)  - Arrange for interpreters to assist at discharge as needed  - Consider post-discharge preferences of patient/family/discharge partner  - Complete POLST form as appropriate  - Assess patient's ability to be responsible for managing their own health  - Refer to Case Management Department for coordinating discharge planning if the patient needs post-hospital services based on physician/LIP order or complex needs related to functional status, cognitive ability or social support system  Outcome: Progressing     Problem: Impaired Functional Mobility  Goal: Achieve highest/safest level of mobility/gait  Description: Interventions:  - Assess patient's functional ability and stability  - Promote increasing activity/tolerance for mobility and gait  - Educate and engage patient/family in tolerated activity level and precautions  - Recommend use of  RW for transfers and ambulation  - Recommend patient transfer to bedside chair toward strongest side  - When transferring patient, block weaker knee for safety  - Recommend use of chair position in bed 3 times per day  Outcome: Progressing     Problem: Impaired Activities of Daily Living  Goal: Achieve highest/safest level of independence in self care  Description: Interventions:  - Assess ability and encourage patient to participate in ADLs to maximize function  - Promote sitting position while performing ADLs such as feeding, grooming, and bathing  - Educate and encourage patient/family in tolerated functional activity level and precautions during self-care  Outcome: Progressing     Problem: Patient/Family Goals  Goal:  Patient/Family Long Term Goal  Description: Patient's Long Term Goal: Discharge from the hospital    Interventions:  - Monitor vital signs  - Monitor appropriate labs  - Monitor blood glucose levels  - Pain management  - Administer medications per order  - Follow MD orders  - Diagnostics per order  - Update / inform patient and family on plan of care  - Discharge planning  - See additional Care Plan goals for specific interventions  Outcome: Progressing  Goal: Patient/Family Short Term Goal  Description: Patient's Short Term Goal: Improve redness, swelling, and pain to left lower extremity     Interventions:   - Monitor vital signs  - Monitor appropriate labs  - Monitor blood glucose levels  - Pain management  - Administer medications per order  - Follow MD orders  - Diagnostics per order  - Update / inform patient and family on plan of care  - See additional Care Plan goals for specific interventions  Outcome: Progressing     Problem: Diabetes/Glucose Control  Goal: Glucose maintained within prescribed range  Description: INTERVENTIONS:  - Monitor Blood Glucose as ordered  - Assess for signs and symptoms of hyperglycemia and hypoglycemia  - Administer ordered medications to maintain glucose within target range  - Assess barriers to adequate nutritional intake and initiate nutrition consult as needed  - Instruct patient on self management of diabetes  Outcome: Progressing     Problem: PAIN - ADULT  Goal: Verbalizes/displays adequate comfort level or patient's stated pain goal  Description: INTERVENTIONS:  - Encourage pt to monitor pain and request assistance  - Assess pain using appropriate pain scale  - Administer analgesics based on type and severity of pain and evaluate response  - Implement non-pharmacological measures as appropriate and evaluate response  - Consider cultural and social influences on pain and pain management  - Manage/alleviate anxiety  - Utilize distraction and/or relaxation techniques  -  Monitor for opioid side effects  - Notify MD/LIP if interventions unsuccessful or patient reports new pain  - Anticipate increased pain with activity and pre-medicate as appropriate  Outcome: Not Progressing

## 2024-11-25 NOTE — SPIRITUAL CARE NOTE
Spiritual Care Visit Note    Patient Name: Jovan Merino . Date of Spiritual Care Visit: 24   : 1970 Primary Dx: Cellulitis of left lower leg       Referred By: Referral From: VICTORINO Duvall      Visit Type/Summary:     received referral for visit from VICTORINO montes.    - Spiritual Care:  remains available as needed for follow up.    Spiritual Care support can be requested via an Epic consult.   For urgent/immediate needs, please contact the On Call  at: [place]: ext [phone]    Chaplain Yue.

## 2024-11-25 NOTE — OCCUPATIONAL THERAPY NOTE
Attempt made for follow-up treatment. Pt is pending further imaging of lower extremities due to persistent pain. Will continue to follow.

## 2024-11-25 NOTE — PLAN OF CARE
Problem: Patient Centered Care  Goal: Patient preferences are identified and integrated in the patient's plan of care  Description: Interventions:  - What would you like us to know as we care for you? From home alone  - Provide timely, complete, and accurate information to patient/family  - Incorporate patient and family knowledge, values, beliefs, and cultural backgrounds into the planning and delivery of care  - Encourage patient/family to participate in care and decision-making at the level they choose  - Honor patient and family perspectives and choices  Outcome: Progressing     Problem: CARDIOVASCULAR - ADULT  Goal: Maintains optimal cardiac output and hemodynamic stability  Description: INTERVENTIONS:  - Monitor vital signs, rhythm, and trends  - Monitor for bleeding, hypotension and signs of decreased cardiac output  - Evaluate effectiveness of vasoactive medications to optimize hemodynamic stability  - Monitor arterial and/or venous puncture sites for bleeding and/or hematoma  - Assess quality of pulses, skin color and temperature  - Assess for signs of decreased coronary artery perfusion - ex. Angina  - Evaluate fluid balance, assess for edema, trend weights  Outcome: Progressing     Problem: RESPIRATORY - ADULT  Goal: Achieves optimal ventilation and oxygenation  Description: INTERVENTIONS:  - Assess for changes in respiratory status  - Assess for changes in mentation and behavior  - Position to facilitate oxygenation and minimize respiratory effort  - Oxygen supplementation based on oxygen saturation or ABGs  - Provide Smoking Cessation handout, if applicable  - Encourage broncho-pulmonary hygiene including cough, deep breathe, Incentive Spirometry  - Assess the need for suctioning and perform as needed  - Assess and instruct to report SOB or any respiratory difficulty  - Respiratory Therapy support as indicated  - Manage/alleviate anxiety  - Monitor for signs/symptoms of CO2 retention  Outcome:  Progressing     Problem: GASTROINTESTINAL - ADULT  Goal: Minimal or absence of nausea and vomiting  Description: INTERVENTIONS:  - Maintain adequate hydration with IV or PO as ordered and tolerated  - Nasogastric tube to low intermittent suction as ordered  - Evaluate effectiveness of ordered antiemetic medications  - Provide nonpharmacologic comfort measures as appropriate  - Advance diet as tolerated, if ordered  - Obtain nutritional consult as needed  - Evaluate fluid balance  Outcome: Progressing     Problem: GENITOURINARY - ADULT  Goal: Absence of urinary retention  Description: INTERVENTIONS:  - Assess patient’s ability to void and empty bladder  - Monitor intake/output and perform bladder scan as needed  - Follow urinary retention protocol/standard of care  - Consider collaborating with pharmacy to review patient's medication profile  - Implement strategies to promote bladder emptying  Outcome: Progressing     Problem: METABOLIC/FLUID AND ELECTROLYTES - ADULT  Goal: Glucose maintained within prescribed range  Description: INTERVENTIONS:  - Monitor Blood Glucose as ordered  - Assess for signs and symptoms of hyperglycemia and hypoglycemia  - Administer ordered medications to maintain glucose within target range  - Assess barriers to adequate nutritional intake and initiate nutrition consult as needed  - Instruct patient on self management of diabetes  Outcome: Progressing  Goal: Electrolytes maintained within normal limits  Description: INTERVENTIONS:  - Monitor labs and rhythm and assess patient for signs and symptoms of electrolyte imbalances  - Administer electrolyte replacement as ordered  - Monitor response to electrolyte replacements, including rhythm and repeat lab results as appropriate  - Fluid restriction as ordered  - Instruct patient on fluid and nutrition restrictions as appropriate  Outcome: Progressing     Problem: SKIN/TISSUE INTEGRITY - ADULT  Goal: Skin integrity remains intact  Description:  INTERVENTIONS  - Assess and document risk factors for pressure ulcer development  - Assess and document skin integrity  - Monitor for areas of redness and/or skin breakdown  - Initiate interventions, skin care algorithm/standards of care as needed  Outcome: Progressing  Goal: Incision(s), wounds(s) or drain site(s) healing without S/S of infection  Description: INTERVENTIONS:  - Assess and document risk factors for pressure ulcer development  - Assess and document skin integrity  - Assess and document dressing/incision, wound bed, drain sites and surrounding tissue  - Implement wound care per orders  - Initiate isolation precautions as appropriate  - Initiate Pressure Ulcer prevention bundle as indicated  Outcome: Progressing     Problem: MUSCULOSKELETAL - ADULT  Goal: Return mobility to safest level of function  Description: INTERVENTIONS:  - Assess patient stability and activity tolerance for standing, transferring and ambulating w/ or w/o assistive devices  - Assist with transfers and ambulation using safe patient handling equipment as needed  - Ensure adequate protection for wounds/incisions during mobilization  - Obtain PT/OT consults as needed  - Advance activity as appropriate  - Communicate ordered activity level and limitations with patient/family  Outcome: Progressing     Problem: PAIN - ADULT  Goal: Verbalizes/displays adequate comfort level or patient's stated pain goal  Description: INTERVENTIONS:  - Encourage pt to monitor pain and request assistance  - Assess pain using appropriate pain scale  - Administer analgesics based on type and severity of pain and evaluate response  - Implement non-pharmacological measures as appropriate and evaluate response  - Consider cultural and social influences on pain and pain management  - Manage/alleviate anxiety  - Utilize distraction and/or relaxation techniques  - Monitor for opioid side effects  - Notify MD/LIP if interventions unsuccessful or patient reports new  pain  - Anticipate increased pain with activity and pre-medicate as appropriate  Outcome: Progressing     Problem: SAFETY ADULT - FALL  Goal: Free from fall injury  Description: INTERVENTIONS:  - Assess pt frequently for physical needs  - Identify cognitive and physical deficits and behaviors that affect risk of falls.  - Lambert fall precautions as indicated by assessment.  - Educate pt/family on patient safety including physical limitations  - Instruct pt to call for assistance with activity based on assessment  - Modify environment to reduce risk of injury  - Provide assistive devices as appropriate  - Consider OT/PT consult to assist with strengthening/mobility  - Encourage toileting schedule  Outcome: Progressing     Problem: DISCHARGE PLANNING  Goal: Discharge to home or other facility with appropriate resources  Description: INTERVENTIONS:  - Identify barriers to discharge w/pt and caregiver  - Include patient/family/discharge partner in discharge planning  - Arrange for needed discharge resources and transportation as appropriate  - Identify discharge learning needs (meds, wound care, etc)  - Arrange for interpreters to assist at discharge as needed  - Consider post-discharge preferences of patient/family/discharge partner  - Complete POLST form as appropriate  - Assess patient's ability to be responsible for managing their own health  - Refer to Case Management Department for coordinating discharge planning if the patient needs post-hospital services based on physician/LIP order or complex needs related to functional status, cognitive ability or social support system  Outcome: Progressing     Problem: Impaired Functional Mobility  Goal: Achieve highest/safest level of mobility/gait  Description: Interventions:  - Assess patient's functional ability and stability  - Promote increasing activity/tolerance for mobility and gait  - Educate and engage patient/family in tolerated activity level and  precautions  - Recommend use of  RW for transfers and ambulation  - Recommend patient transfer to bedside chair toward strongest side  - When transferring patient, block weaker knee for safety  - Recommend use of chair position in bed 3 times per day  Outcome: Progressing     Problem: Impaired Activities of Daily Living  Goal: Achieve highest/safest level of independence in self care  Description: Interventions:  - Assess ability and encourage patient to participate in ADLs to maximize function  - Promote sitting position while performing ADLs such as feeding, grooming, and bathing  - Educate and encourage patient/family in tolerated functional activity level and precautions during self-care  Outcome: Progressing     Problem: Patient/Family Goals  Goal: Patient/Family Long Term Goal  Description: Patient's Long Term Goal: Discharge from the hospital    Interventions:  - Monitor vital signs  - Monitor appropriate labs  - Monitor blood glucose levels  - Pain management  - Administer medications per order  - Follow MD orders  - Diagnostics per order  - Update / inform patient and family on plan of care  - Discharge planning  - See additional Care Plan goals for specific interventions  Outcome: Progressing  Goal: Patient/Family Short Term Goal  Description: Patient's Short Term Goal: Improve redness, swelling, and pain to left lower extremity     Interventions:   - Monitor vital signs  - Monitor appropriate labs  - Monitor blood glucose levels  - Pain management  - Administer medications per order  - Follow MD orders  - Diagnostics per order  - Update / inform patient and family on plan of care  - See additional Care Plan goals for specific interventions  Outcome: Progressing     Problem: Diabetes/Glucose Control  Goal: Glucose maintained within prescribed range  Description: INTERVENTIONS:  - Monitor Blood Glucose as ordered  - Assess for signs and symptoms of hyperglycemia and hypoglycemia  - Administer ordered  medications to maintain glucose within target range  - Assess barriers to adequate nutritional intake and initiate nutrition consult as needed  - Instruct patient on self management of diabetes  Outcome: Progressing

## 2024-11-25 NOTE — PAYOR COMM NOTE
CONTINUED STAY REVIEW    Payor: [employer] OUT [employer] PPO  Subscriber #:  [id number]  Authorization Number: [id number]    Admit date: 11/18/24  Admit time:  4:58 PM    REVIEW DOCUMENTATION: 11/23-11/25 11/23/2024 Hospitalist Progress Note    [facility]   INTERNAL MEDICINE PROGRESS NOTE     CHIEF COMPLAINT   Swelling Edema     SUBJECTIVE  24 HR EVENTS      Pain about the same. Tmax 99.5 overnight. Did urinate a lot after lasix. Tried elevating leg yesterday     INPATIENT MEDICATIONS  Scheduled Medications:    Scheduled Meds   cefepime, 2 g, Q8H  DAPTOmycin, 1,000 mg, Q24H  clotrimazole-betamethasone, , BID  docosanol, , BID  cetirizine, 10 mg, Daily  enalapril, 20 mg, BID  flecainide, 150 mg, BID  hydrALAZINE, 50 mg, BID  carvedilol, 25 mg, BID with meals  enoxaparin, 90 mg, 2 times per day  magnesium oxide, 200 mg, Nightly  dilTIAZem HCl ER Coated Beads, 180 mg, Nightly        Drips:   IV Meds          PRN Medications:     PRN Meds   oxyCODONE, 5 mg, Q4H PRN   Or  oxyCODONE, 10 mg, Q4H PRN  magnesium hydroxide, 30 mL, Q6H PRN  traMADol, 50 mg, Q6H PRN  calcium carbonate, 1,000 mg, TID PRN  famotidine, 20 mg, BID PRN  acetaminophen, 500 mg, Q4H PRN  melatonin, 3 mg, Nightly PRN  polyethylene glycol (PEG 3350), 17 g, Daily PRN  sennosides, 17.2 mg, Nightly PRN  guaiFENesin, 200 mg, Q4H PRN  ondansetron, 4 mg, Q6H PRN  prochlorperazine, 5 mg, Q8H PRN  HYDROmorphone, 1 mg, Q2H PRN  HYDROmorphone, 0.5 mg, Q2H PRN  simethicone, 80 mg, TID PRN          PHYSICAL EXAMINATION   Vitals:       /66 (BP Location: Right arm)   Pulse 79   Temp 98.9 °F (37.2 °C) (Oral)   Resp 18   Ht 6' 1\" (1.854 m)   Wt (!) 400 lb 3.2 oz (181.5 kg)   SpO2 93%   BMI 52.80 kg/m²      GEN: obese male in NAD  HEENT: EOMI  Pulm: CTAB, no crackles or wheezes  CV: RRR, no murmurs, DP pulses present  ABD: Soft, non-tender, non-distended, +BS  MSK: LLE swelling, very TTP  Neuro: Grossly normal, CN intact, sensory intact  SKIN:  warm, dry, psoriatic plaques, LLE with edema, erythema, warm to touch  EXT: no edema     DATA REVIEW: LABORATORY VALUES & DIAGNOSTICS            Recent Labs   Lab 11/18/24  1132 11/19/24  0534 11/19/24  1751 11/20/24  0519 11/21/24  0525 11/22/24  0505 11/23/24  0533    136  --  134* 135* 135* 133*   K 3.8 3.6 3.9 4.0 3.9 4.1 4.4    102  --  105 102 102 100   CO2 29.0 27.0  --  26.0 28.0 27.0 29.0   BUN 18 16  --  15 17 14 16   CREATSERUM 1.06 1.05  --  0.93 0.82 0.79 0.84   ANIONGAP 5 7  --  3 5 6 4   GLU 98 109*  --  94 95 112* 117*   CA 10.8* 9.7  --  9.8 9.8 9.7 9.5   TP 7.1  --   --   --   --   --   --    ALB 4.1  --   --   --   --   --   --    AST 29  --   --   --   --   --   --    ALT 38  --   --   --   --   --   --    ALKPHO 72  --   --   --   --   --   --    BILT 0.3  --   --   --   --   --   --    EGFRCR 84 85  --  99 106 107 105               Recent Labs   Lab 11/18/24  1132 11/19/24  0534 11/20/24  0519 11/21/24  0525 11/22/24  0505 11/23/24  0533   WBC 10.1 13.5* 14.5* 15.5* 16.0* 14.6*   HGB 13.1 11.6* 11.4* 11.3* 11.0* 10.4*   HCT 40.9 36.0* 35.9* 34.0* 34.9* 33.4*   .0 163.0 158.0 169.0 238.0 252.0   MCV 87.6 87.2 87.6 86.7 86.6 87.4   MOPERCENT 10.9  --   --   --   --   --    EOPERCENT 0.6  --   --   --   --   --    BAPERCENT 0.3  --   --   --   --   --       ASSESSMENT & PLAN   Jovan Merino - 54 year old male w MO, HTN, Afib, HFpEF, psoriasis admitted 11/18/2024 for LLE cellulitis, started on IV ancef. Initially improving however 11/21 worse - abx broadened to vanc/zosyn, CT ordered. ID consulted     Sepsis 2/2 LLE cellulitis  Immunosuppression 2/2 psoriasis/humira  - Febrile to 102 here. HR 80s, WBC 14  [  ] blood cx NGTD  - doppler neg. No DVT  - . Known HFpEF. Feels like legs aren't swollen & has lost weight  - Pain control: IVP dilaudid, PO oxy, APAP PRN  - IV ancef (11/18 - 21 )  - 11/19 febrile, check Bcx. Lot of pain - add tramadol (itching w oxy/norc), add IV  toradol scheduled. Headaches, feels dehydrated - will give 1L NS bolus  - 11/20, rash better, still swollen, lot of pain. Still needing IV dilaudid  - 11/21: slightly worse, LE very swollen, WBC higher. Will switch ancef to vanco/zosyn for now. Consult ID - will d/w Dr. Calloway. CT negative 11/21 for underlying abscess  - discussed elevate extremity on pillows  - will give another dose of IV Lasix today     Partial quad tear  L knee effusion  - ortho consulted, appreciate recs     Headaches  - fioricet PRN     HFpEF - compensated  HTN  Parox Afib - low CV, not on AC  - Continue PTA lasix, flecainide, coreg, hydral, enalapril     Psoriasis  - On humira OP. Would hold until leg better     [place] [facility] [place]  - Body mass index is 52.8 kg/m².   - Healthy diet & wt loss encouraged  - Has lost quite a bit of weight with Mounjaro already     DM2 - currently controlled w mounjaro  - Okay for reg diet, follow BG on AM labs. Can hold on SSI/accuchecks for now, can add if issues controlling     ACP: Full Code  Ppx: DVT: Enox  Dispo  EDoD:  ~11/23 - 11/24     F/u:   - PCP:  Eric Sethi,      Available via epic secure chat or perfect serve 7A - 7P.     Linda Savage MD   Internal Medicine - Hospitalist  [facility]            11/24/2024 Hospitalist Progress Note   [facility]   INTERNAL MEDICINE PROGRESS NOTE     CHIEF COMPLAINT   Swelling Edema     SUBJECTIVE  24 HR EVENTS      Feels like pain is worsening. Tmax 99.9.      INPATIENT MEDICATIONS  Scheduled Medications:    Scheduled Meds   cefepime, 2 g, Q8H  DAPTOmycin, 1,000 mg, Q24H  clotrimazole-betamethasone, , BID  docosanol, , BID  cetirizine, 10 mg, Daily  enalapril, 20 mg, BID  flecainide, 150 mg, BID  hydrALAZINE, 50 mg, BID  carvedilol, 25 mg, BID with meals  enoxaparin, 90 mg, 2 times per day  magnesium oxide, 200 mg, Nightly  dilTIAZem HCl ER Coated Beads, 180 mg, Nightly        Drips:   IV Meds          PRN Medications:     PRN Meds    butalbital-acetaminophen-caffeine, 1 tablet, Q4H PRN  oxyCODONE, 5 mg, Q4H PRN   Or  oxyCODONE, 10 mg, Q4H PRN  magnesium hydroxide, 30 mL, Q6H PRN  traMADol, 50 mg, Q6H PRN  calcium carbonate, 1,000 mg, TID PRN  famotidine, 20 mg, BID PRN  acetaminophen, 500 mg, Q4H PRN  melatonin, 3 mg, Nightly PRN  polyethylene glycol (PEG 3350), 17 g, Daily PRN  sennosides, 17.2 mg, Nightly PRN  guaiFENesin, 200 mg, Q4H PRN  ondansetron, 4 mg, Q6H PRN  prochlorperazine, 5 mg, Q8H PRN  HYDROmorphone, 1 mg, Q2H PRN  HYDROmorphone, 0.5 mg, Q2H PRN  simethicone, 80 mg, TID PRN          PHYSICAL EXAMINATION   Vitals:       /74 (BP Location: Right arm)   Pulse 85   Temp 99.4 °F (37.4 °C) (Oral)   Resp 18   Ht 6' 1\" (1.854 m)   Wt (!) 400 lb 3.2 oz (181.5 kg)   SpO2 92%   BMI 52.80 kg/m²      GEN: obese male in NAD  HEENT: EOMI  Pulm: CTAB, no crackles or wheezes  CV: RRR, no murmurs, DP pulses present  ABD: Soft, non-tender, non-distended, +BS  MSK: LLE swelling, very TTP, LLE compartments not tense  Neuro: Grossly normal, CN intact, sensory intact  SKIN: warm, dry, psoriatic plaques, LLE with edema, erythema, warm to touch  EXT: no edema     DATA REVIEW: LABORATORY VALUES & DIAGNOSTICS            Recent Labs   Lab 11/18/24  1132 11/19/24  0534 11/20/24  0519 11/21/24  0525 11/22/24  0505 11/23/24  0533 11/24/24  0443      < > 134* 135* 135* 133* 136   K 3.8   < > 4.0 3.9 4.1 4.4 4.7      < > 105 102 102 100 100   CO2 29.0   < > 26.0 28.0 27.0 29.0 32.0   BUN 18   < > 15 17 14 16 18   CREATSERUM 1.06   < > 0.93 0.82 0.79 0.84 0.81   ANIONGAP 5   < > 3 5 6 4 4   GLU 98   < > 94 95 112* 117* 92   CA 10.8*   < > 9.8 9.8 9.7 9.5 9.8   TP 7.1  --   --   --   --   --   --    ALB 4.1  --   --   --   --   --   --    AST 29  --   --   --   --   --   --    ALT 38  --   --   --   --   --   --    ALKPHO 72  --   --   --   --   --   --    BILT 0.3  --   --   --   --   --   --    EGFRCR 84   < > 99 106 107 105 106    < >  = values in this interval not displayed.                Recent Labs   Lab 11/18/24  1132 11/19/24  0534 11/20/24  0519 11/21/24  0525 11/22/24  0505 11/23/24  0533 11/24/24  0443   WBC 10.1   < > 14.5* 15.5* 16.0* 14.6* 14.6*   HGB 13.1   < > 11.4* 11.3* 11.0* 10.4* 10.9*   HCT 40.9   < > 35.9* 34.0* 34.9* 33.4* 35.4*   .0   < > 158.0 169.0 238.0 252.0 256.0   MCV 87.6   < > 87.6 86.7 86.6 87.4 88.7   MOPERCENT 10.9  --   --   --   --   --   --    EOPERCENT 0.6  --   --   --   --   --   --    BAPERCENT 0.3  --   --   --   --   --   --     < > = values in this interval not displayed.      ASSESSMENT & PLAN   Jovan Merino - 54 year old male w MO, HTN, Afib, HFpEF, psoriasis admitted 11/18/2024 for LLE cellulitis, started on IV ancef. Initially improving however 11/21 worse - abx broadened to vanc/zosyn, CT ordered. ID consulted     Sepsis 2/2 LLE cellulitis  Immunosuppression 2/2 psoriasis/humira  - Febrile to 102 here. HR 80s, WBC 14  - blood cx NG x 5 days  - doppler neg. No DVT  - . Known HFpEF. Feels like legs aren't swollen & has lost weight  - Pain control: IVP dilaudid, PO oxy, APAP PRN  - IV ancef (11/18 - 21 )  - 11/19 febrile, check Bcx. Lot of pain - add tramadol (itching w oxy/norc), add IV toradol scheduled. Headaches, feels dehydrated - will give 1L NS bolus  - 11/21: slightly worse, LE very swollen, WBC higher. Will switch ancef to vanco/zosyn for now. Consult ID - will d/w Dr. Calloway. CT negative 11/21 for underlying abscess  - s/p IV lasix     Severe LLE pain and swelling  - possibly 2/2 cellulitis however not improving with IV abx  - no signs/symptoms of compartment syndrome  - continue pain control with PO and IV susana meds  - eval by ortho  - inflammatory markers significantly elevated  - CPK normal  - will consult rheumatology     Partial quad tear  L knee effusion  - ortho consulted, appreciate recs     Headaches  - fioricet PRN     HFpEF - compensated  HTN  Parox Afib - low CV,  not on AC  - Continue PTA lasix, flecainide, coreg, hydral, enalapril     Psoriasis  - On humira OP. Would hold until leg better     Morbid obesity w [facility] [place]  - Body mass index is 52.8 kg/m².   - Healthy diet & wt loss encouraged  - Has lost quite a bit of weight with Mounjaro already     DM2 - currently controlled w mounjaro  - Okay for reg diet, follow BG on AM labs. Can hold on SSI/accuchecks for now, can add if issues controlling     ACP: Full Code  Ppx: DVT: Enox  Dispo  EDoD:  ~11/25-11/27     F/u:   - PCP:  Eric Sethi,      Available via epic secure chat or perfect serve 7A - 7P.     Linda Savage MD   Internal Medicine - Hospitalist  [facility]       11/25/2024 Hospitalist Progress Note   [facility]   INTERNAL MEDICINE PROGRESS NOTE     CHIEF COMPLAINT   Swelling Edema     SUBJECTIVE  24 HR EVENTS      Pain is the same. Tmax 98. tearful     INPATIENT MEDICATIONS  Scheduled Medications:    Scheduled Meds   furosemide, 40 mg, Once  cefepime, 2 g, Q8H  DAPTOmycin, 1,000 mg, Q24H  clotrimazole-betamethasone, , BID  docosanol, , BID  cetirizine, 10 mg, Daily  enalapril, 20 mg, BID  flecainide, 150 mg, BID  hydrALAZINE, 50 mg, BID  carvedilol, 25 mg, BID with meals  enoxaparin, 90 mg, 2 times per day  magnesium oxide, 200 mg, Nightly  dilTIAZem HCl ER Coated Beads, 180 mg, Nightly        Drips:   IV Meds          PRN Medications:     PRN Meds   ibuprofen, 400 mg, Q6H PRN  butalbital-acetaminophen-caffeine, 1 tablet, Q4H PRN  oxyCODONE, 5 mg, Q4H PRN   Or  oxyCODONE, 10 mg, Q4H PRN  magnesium hydroxide, 30 mL, Q6H PRN  traMADol, 50 mg, Q6H PRN  calcium carbonate, 1,000 mg, TID PRN  famotidine, 20 mg, BID PRN  acetaminophen, 500 mg, Q4H PRN  melatonin, 3 mg, Nightly PRN  polyethylene glycol (PEG 3350), 17 g, Daily PRN  sennosides, 17.2 mg, Nightly PRN  guaiFENesin, 200 mg, Q4H PRN  ondansetron, 4 mg, Q6H PRN  prochlorperazine, 5 mg, Q8H PRN  HYDROmorphone, 1 mg, Q2H PRN  HYDROmorphone,  0.5 mg, Q2H PRN  simethicone, 80 mg, TID PRN          PHYSICAL EXAMINATION   Vitals:       /76 (BP Location: Right arm)   Pulse 81   Temp 98 °F (36.7 °C) (Axillary)   Resp 20   Ht 6' 1\" (1.854 m)   Wt (!) 400 lb 3.2 oz (181.5 kg)   SpO2 91%   BMI 52.80 kg/m²      GEN: obese male in NAD, tearful  HEENT: EOMI  Pulm: CTAB, no crackles or wheezes  CV: RRR, no murmurs, DP pulses present  ABD: Soft, non-tender, non-distended, +BS  MSK: LLE swelling, very TTP, LLE compartments not tense  Neuro: Grossly normal, CN intact, sensory intact  SKIN: warm, dry, psoriatic plaques, LLE with edema, erythema, warm to touch  EXT: no edema     DATA REVIEW: LABORATORY VALUES & DIAGNOSTICS          Recent Labs   Lab 11/21/24  0525 11/22/24  0505 11/23/24  0533 11/24/24  0443 11/25/24  0504   * 135* 133* 136 134*   K 3.9 4.1 4.4 4.7 4.6    102 100 100 100   CO2 28.0 27.0 29.0 32.0 31.0   BUN 17 14 16 18 18   CREATSERUM 0.82 0.79 0.84 0.81 0.74   ANIONGAP 5 6 4 4 3   GLU 95 112* 117* 92 102*   CA 9.8 9.7 9.5 9.8 9.9   EGFRCR 106 107 105 106 109              Recent Labs   Lab 11/21/24  0525 11/22/24  0505 11/23/24  0533 11/24/24  0443 11/25/24  0504   WBC 15.5* 16.0* 14.6* 14.6* 12.3*   HGB 11.3* 11.0* 10.4* 10.9* 11.2*   HCT 34.0* 34.9* 33.4* 35.4* 36.2*   .0 238.0 252.0 256.0 291.0   MCV 86.7 86.6 87.4 88.7 88.1      ASSESSMENT & PLAN   Jovan Merino - 54 year old male w MO, HTN, Afib, HFpEF, psoriasis admitted 11/18/2024 for LLE cellulitis, started on IV ancef. Initially improving however 11/21 worse - abx broadened to vanc/zosyn, CT ordered. ID consulted     Sepsis 2/2 LLE cellulitis  Immunosuppression 2/2 psoriasis/humira  - Febrile to 102 here. HR 80s, WBC 14  - blood cx NG x 5 days  - doppler neg. No DVT  - . Known HFpEF. Feels like legs aren't swollen & has lost weight  - Pain control: IVP dilaudid, PO oxy, APAP PRN  - IV ancef (11/18 - 21 )  - 11/19 febrile, check Bcx. Lot of pain - add tramadol  (itching w oxy/norc), add IV toradol scheduled. Headaches, feels dehydrated - will give 1L NS bolus  - 11/21: slightly worse, LE very swollen, WBC higher. Will switch ancef to vanco/zosyn for now. Consult ID - will d/w Dr. Calloway. CT negative 11/21 for underlying abscess     Severe LLE pain and swelling  - possibly 2/2 cellulitis however not improving with IV abx  - no signs/symptoms of compartment syndrome  - continue pain control with PO and IV susana meds  - eval by ortho  - inflammatory markers significantly elevated  - CPK normal  - s/p IV lasix, will give another dose today  - will consult rheumatology  - MRI LLE ordered     Partial quad tear  L knee effusion  - ortho consulted, appreciate recs     Headaches  - fioricet PRN     HFpEF - compensated  HTN  Parox Afib - low CV, not on AC  - Continue PTA lasix, flecainide, coreg, hydral, enalapril     Psoriasis  - On humira OP. Would hold until leg better     Morbid obesity w [facility] [place]  - Body mass index is 52.8 kg/m².   - Healthy diet & wt loss encouraged  - Has lost quite a bit of weight with Mounjaro already     DM2 - currently controlled w mounjaro  - Okay for reg diet, follow BG on AM labs. Can hold on SSI/accuchecks for now, can add if issues controlling     ACP: Full Code  Ppx: DVT: Enox  Dispo  EDoD:  ~11/25-11/27     F/u:   - PCP:  Eric Sethi,      Available via epic secure chat or perfect serve 7A - 7P.     Linda Savage MD   Internal Medicine - Hospitalist  [facility]    MEDICATIONS ADMINISTERED IN LAST 1 DAY:  carvedilol (Coreg) tab 25 mg       Date Action Dose Route User    11/25/2024 0819 Given 25 mg Oral Henny Rojas, RN    11/24/2024 1710 Given 25 mg Oral Cassandra Agrawal, CHELSEY          ceFEPIme (Maxpime) 2 g in sodium chloride 0.9% 100 mL IVPB-MBP       Date Action Dose Route User    11/25/2024 1358 New Bag 2 g Intravenous Henny Rojas, RN    11/25/2024 0600 New Bag 2 g Intravenous Elizabeth Low RN    11/24/2024  2110 New Bag 2 g Intravenous Elizabeth Low RN          cetirizine (ZyrTEC) tab 10 mg       Date Action Dose Route User    11/25/2024 0819 Given 10 mg Oral Henny Rojas RN          dilTIAZem ER (CardIZEM CD) 24 hr cap 180 mg       Date Action Dose Route User    11/24/2024 2110 Given 180 mg Oral Elizabeth Low RN          docosanol (Abreva) 10 % cream       Date Action Dose Route User    11/25/2024 0825 Given (none) Topical Henny Rojas RN    11/24/2024 2110 Given (none) Topical Elizabeth Low RN          enalapril (Vasotec) tab 20 mg       Date Action Dose Route User    11/25/2024 0819 Given 20 mg Oral Henny Rojas RN    11/24/2024 2110 Given 20 mg Oral Elizabeth Low RN          enoxaparin (Lovenox) 100 MG/ML SUBQ injection 90 mg       Date Action Dose Route User    11/25/2024 0820 Given 90 mg Subcutaneous (Right Lower Abdomen) Henny Rojas RN    11/24/2024 2110 Given 90 mg Subcutaneous (Left Lower Abdomen) Elizabeth Low RN          flecainide (Tambocor) tab 150 mg       Date Action Dose Route User    11/25/2024 0819 Given 150 mg Oral Henny Rojas RN    11/24/2024 2110 Given 150 mg Oral Elizabeth Low RN          furosemide (Lasix) 10 mg/mL injection 40 mg       Date Action Dose Route User    11/25/2024 1358 Given 40 mg Intravenous Henny Rojas RN          hydrALAZINE (Apresoline) tab 50 mg       Date Action Dose Route User    11/25/2024 0819 Given 50 mg Oral Henny Rojas RN    11/24/2024 2110 Given 50 mg Oral Elizabeth Low RN          HYDROmorphone (Dilaudid) 1 MG/ML injection 1 mg       Date Action Dose Route User    11/25/2024 1358 Given 1 mg Intravenous Henny Rojas RN    11/25/2024 1049 Given 1 mg Intravenous Henny Rojas RN    11/25/2024 0820 Given 1 mg Intravenous Henny Rojas RN    11/25/2024 9849 Given 1 mg Intravenous Elizabeth Low RN    11/25/2024 3277 Given 1 mg Intravenous  Kimberly Sanders RN    11/25/2024 0036 Given 1 mg Intravenous Elizabeth Low RN    11/24/2024 2049 Given 1 mg Intravenous Elizabeth Low RN    11/24/2024 1825 Given 1 mg Intravenous Cassandra Agrawal RN          magnesium oxide (Mag-Ox) tab 200 mg       Date Action Dose Route User    11/24/2024 2120 Given 200 mg Oral Elizabeth Low RN            Vitals (last day)       Date/Time Temp Pulse Resp BP SpO2 Weight O2 Device O2 Flow Rate (L/min) [facility]    11/25/24 1353 98 °F (36.7 °C) 81 20 -- 91 % -- None (Room air) -- AP    11/25/24 0816 98.9 °F (37.2 °C) 82 20 142/76 95 % -- Nasal cannula 2 L/min AP    11/25/24 0608 99 °F (37.2 °C) 78 20 133/66 94 % -- Nasal cannula 2 L/min CN    11/25/24 0332 98.7 °F (37.1 °C) 79 16 127/72 95 % -- Nasal cannula 2 L/min AJ    11/24/24 2054 98.8 °F (37.1 °C) 79 16 111/67 92 % -- None (Room air) -- CN    11/24/24 1705 98.6 °F (37 °C) 70 16 121/67 93 % -- None (Room air) -- GP    11/24/24 0846 99.4 °F (37.4 °C) 85 18 140/74 92 % -- None (Room air) -- GP    11/24/24 0454 98.6 °F (37 °C) 85 18 143/91 92 % -- None (Room air) -- AD

## 2024-11-25 NOTE — CDS QUERY
Dear Dr Fabian,  Can clinical validity of sepsis be clarified?  ( X ) Sepsis is valid, please provide additional supporting clinical indicators here:_____________  ( ) sepsis ruled out  ( )  Other (please specify): ___________________  Clinical indicators  Pulse 80 101 93 106  Temp 102 101.8 100.9   SpO2 88-89% RA 11/19 @ 16:64 93% 3L- PF ratio 228,  93-96% 2L -night   WBC 13.5 15.5 16 14.6 12.3  Blood culture - no growth  SIRS -3, SOFA- 0  Progress notes: Sepsis 2/2 LLE cellulitis  Risk factors:  54yr morbid obesity, Immunosuppression 2/2 psoriasis/humira  Treatment: IV ancef, cubicin, cefepime, IV fluid    Use of terms such as suspected, possible, or probable (associated with a specific diagnosis that is being evaluated, monitored, or treated as if it exists) are acceptable and can be coded in the inpatient setting, when documented at the time of discharge.   Please add any additional documentation to your progress note and continue to document this through discharge.       If you have any questions, please contact Clinical DocumentationSpecialist:  CORRINA Rhoades at [phone]     Thank You!     THIS FORM IS A PERMANENT PART OF THE MEDICAL RECORD

## 2024-11-25 NOTE — PROGRESS NOTES
RHEUMATOLOGY HOSPITAL FOLLOW-UP    Jovan Merino . is a 54 year old male.    ASSESSMENT/PLAN:     #LLE Rash, suspected cellulitis in an immunocompromised patient       #Torn Quadriceps Tendon w/  Knee Effusion. H/O Ruptured Quad tendon surgical repair   #Sepsis suspected 2/2 Above  #Cutaneous Psoriasis        -Treated w/ Humira Outpt. Follows with [facility]. MARYA Mojica     Chronic:  #DMII   #HFpEF  #HTN  #GERD  #NEREIDA  #OA     DISCUSSION:     Rheumatology following for LLE rash suspicious for cellulitis.  Imaging notable for torn quadriceps tendon and associated knee effusion.  Knee is not overtly warm or erythematous but does have a chronic scar from his previous surgery.  Appreciate ID recommendations and currently on antibiotics.  Ortho following as well.  Will check additional autoimmune wor-awaiting ANCA.  Otherwise, would continue to hold Humira given infection.     PLAN:  -Continue to hold Humira   -Will follow-up remaining autoimmune workup  - Discussed with hospitalist today regarding additional imaging with possible MRI given persistent symptoms  -Appreciate ID , Ortho recs       -Noting no concern for septic knee/ankle        - s/p IV ancef > IV vanc/zosyn > IV cefepime/cubicin       Thank you for asking us to see this nice patient and participate in his care.  We will make further recommendations as his case develops.    Stefan Sutton DO  11/25/2024   1:33 PM    SUBJECTIVE:     Chief Complaint   Patient presents with    Swelling Edema       Continues to have LLE pain/swelling  Spoke with patient and patient's friend this AM, per patient request    HISTORY:  Past Medical History:    Appendicitis    Arrhythmia    AF    Atrial fibrillation (HCC)    Congestive heart disease (HCC)    age 30 yrs.  1 episode    Esophageal reflux    High blood pressure    NEREIDA (obstructive sleep apnea)    Intolerant to cpap    Osteoarthritis    Unspecified essential hypertension    Ventral hernia    Visual  impairment    glasses for driving      Social Hx Reviewed   Family Hx Reviewed     Medications (Active prior to today's visit):  No current outpatient medications on file.     .cmed  Allergies:  Allergies[1]      ROS:   Review of Systems   Constitutional:  Negative for chills and fever.   HENT:  Negative for congestion, hearing loss, mouth sores, nosebleeds and trouble swallowing.    Eyes:  Negative for photophobia, pain, redness and visual disturbance.   Respiratory:  Negative for cough and shortness of breath.    Cardiovascular:  Negative for chest pain, palpitations and leg swelling.   Gastrointestinal:  Negative for abdominal pain, blood in stool, diarrhea and nausea.   Endocrine: Negative for cold intolerance and heat intolerance.   Genitourinary:  Negative for dysuria, frequency and hematuria.   Musculoskeletal:  Positive for arthralgias, gait problem and myalgias. Negative for back pain, joint swelling, neck pain and neck stiffness.   Skin:  Positive for rash. Negative for color change.   Neurological:  Negative for dizziness, weakness, numbness and headaches.   Psychiatric/Behavioral:  Negative for confusion and dysphoric mood.         PHYSICAL EXAM:     Vitals:    11/25/24 0816   BP: 142/76   Pulse: 82   Resp: 20   Temp: 98.9 °F (37.2 °C)     HEENT: Clear oropharynx, no oral ulcers, EOM intact, clear sclear, PERRLA, pleasant, no acute distress, no CAD,   No rashes  CVS: RRR, no murmurs  RS: CTAB, no crackles, no rhonchi  ABD: Soft Non tender, no HSM felt, BS positive  Joint exam:   no neck tendnerness, no synovitis   EXTREMITIES: distal LLE rash (stable), R knee not warm to touch-has an anterior scar from previous surgery (stable), psoriatic plaques affecting his hands, elbows, legs  NEURO: intact touch,    RELEVANT PRIOR LABS:   11/25/2024:   WNL    11/24/2024:  Aldolase _____ (in process),  WNL  CRP 11.6 (high)  C3 888.8 (high), C4 42.7 (high)  ANCA  _____ (in process)    POC glucose 107  CBC  with WBC 14.6, Hg 10.9 normocytic,  WNL  BMP with BUN 18, CR 0.81     2024:  Pro-Gonzalo 0.35 (high)  CBC: WBC 14.6, Hg 10.4 normocytic,  WNL     2024:   (high), CRP 18.9 (high)  CBC: WBC 16, Hg 11 normocytic,      2024:  MRSA PCR negative     CK 89 WNL     2024:  TSH 0.97 WNL  QuantiFERON-TB testing negative         Imagin2024 LLE CT:  Impression   CONCLUSION:  1. Diffuse edema in the subcutaneous tissues compatible with history of cellulitis.  No abscess.  2. Moderate to large knee joint effusion.  3. Partially torn distal quadriceps tendon .  4. Multiple varicose veins.              2024 LLE venous Doppler:  Impression   CONCLUSION:  1. No evidence of deep venous thrombosis in the left lower extremity.  The peroneal veins could not be visualized.                      [1]   Allergies  Allergen Reactions    Hydrocodone ITCHING and RASH

## 2024-11-26 ENCOUNTER — APPOINTMENT (OUTPATIENT)
Dept: MRI IMAGING | Facility: HOSPITAL | Age: 54
End: 2024-11-26
Attending: INTERNAL MEDICINE
Payer: COMMERCIAL

## 2024-11-26 LAB
ALDOLASE: 8.2 U/L
ANION GAP SERPL CALC-SCNC: 7 MMOL/L (ref 0–18)
ANTI-MPO ANTIBODIES: <0.2 UNITS
ANTI-PR3 ANTIBODIES: <0.2 UNITS
BUN BLD-MCNC: 17 MG/DL (ref 9–23)
BUN/CREAT SERPL: 21 (ref 10–20)
CALCIUM BLD-MCNC: 10.4 MG/DL (ref 8.7–10.4)
CHLORIDE SERPL-SCNC: 99 MMOL/L (ref 98–112)
CO2 SERPL-SCNC: 30 MMOL/L (ref 21–32)
CREAT BLD-MCNC: 0.81 MG/DL
DEPRECATED RDW RBC AUTO: 47.9 FL (ref 35.1–46.3)
EGFRCR SERPLBLD CKD-EPI 2021: 106 ML/MIN/1.73M2 (ref 60–?)
ERYTHROCYTE [DISTWIDTH] IN BLOOD BY AUTOMATED COUNT: 15 % (ref 11–15)
GLUCOSE BLD-MCNC: 98 MG/DL (ref 70–99)
GLUCOSE BLDC GLUCOMTR-MCNC: 109 MG/DL (ref 70–99)
GLUCOSE BLDC GLUCOMTR-MCNC: 143 MG/DL (ref 70–99)
HCT VFR BLD AUTO: 36.2 %
HGB BLD-MCNC: 11.7 G/DL
MCH RBC QN AUTO: 28.1 PG (ref 26–34)
MCHC RBC AUTO-ENTMCNC: 32.3 G/DL (ref 31–37)
MCV RBC AUTO: 86.8 FL
OSMOLALITY SERPL CALC.SUM OF ELEC: 284 MOSM/KG (ref 275–295)
PLATELET # BLD AUTO: 298 10(3)UL (ref 150–450)
POTASSIUM SERPL-SCNC: 4.4 MMOL/L (ref 3.5–5.1)
RBC # BLD AUTO: 4.17 X10(6)UL
SODIUM SERPL-SCNC: 136 MMOL/L (ref 136–145)
WBC # BLD AUTO: 12.7 X10(3) UL (ref 4–11)

## 2024-11-26 PROCEDURE — 73720 MRI LWR EXTREMITY W/O&W/DYE: CPT | Performed by: INTERNAL MEDICINE

## 2024-11-26 RX ORDER — GADOTERATE MEGLUMINE 376.9 MG/ML
20 INJECTION INTRAVENOUS
Status: COMPLETED | OUTPATIENT
Start: 2024-11-26 | End: 2024-11-26

## 2024-11-26 RX ORDER — SERTRALINE HYDROCHLORIDE 25 MG/1
25 TABLET, FILM COATED ORAL DAILY
Status: DISCONTINUED | OUTPATIENT
Start: 2024-11-26 | End: 2024-12-03

## 2024-11-26 RX ORDER — FUROSEMIDE 40 MG/1
40 TABLET ORAL DAILY
Status: DISCONTINUED | OUTPATIENT
Start: 2024-11-27 | End: 2024-12-09

## 2024-11-26 NOTE — PLAN OF CARE
Problem: Patient Centered Care  Goal: Patient preferences are identified and integrated in the patient's plan of care  Description: Interventions:  - What would you like us to know as we care for you? From home alone  - Provide timely, complete, and accurate information to patient/family  - Incorporate patient and family knowledge, values, beliefs, and cultural backgrounds into the planning and delivery of care  - Encourage patient/family to participate in care and decision-making at the level they choose  - Honor patient and family perspectives and choices  Outcome: Progressing     Problem: CARDIOVASCULAR - ADULT  Goal: Maintains optimal cardiac output and hemodynamic stability  Description: INTERVENTIONS:  - Monitor vital signs, rhythm, and trends  - Monitor for bleeding, hypotension and signs of decreased cardiac output  - Evaluate effectiveness of vasoactive medications to optimize hemodynamic stability  - Monitor arterial and/or venous puncture sites for bleeding and/or hematoma  - Assess quality of pulses, skin color and temperature  - Assess for signs of decreased coronary artery perfusion - ex. Angina  - Evaluate fluid balance, assess for edema, trend weights  Outcome: Progressing     Problem: RESPIRATORY - ADULT  Goal: Achieves optimal ventilation and oxygenation  Description: INTERVENTIONS:  - Assess for changes in respiratory status  - Assess for changes in mentation and behavior  - Position to facilitate oxygenation and minimize respiratory effort  - Oxygen supplementation based on oxygen saturation or ABGs  - Provide Smoking Cessation handout, if applicable  - Encourage broncho-pulmonary hygiene including cough, deep breathe, Incentive Spirometry  - Assess the need for suctioning and perform as needed  - Assess and instruct to report SOB or any respiratory difficulty  - Respiratory Therapy support as indicated  - Manage/alleviate anxiety  - Monitor for signs/symptoms of CO2 retention  Outcome:  Progressing     Problem: GASTROINTESTINAL - ADULT  Goal: Minimal or absence of nausea and vomiting  Description: INTERVENTIONS:  - Maintain adequate hydration with IV or PO as ordered and tolerated  - Nasogastric tube to low intermittent suction as ordered  - Evaluate effectiveness of ordered antiemetic medications  - Provide nonpharmacologic comfort measures as appropriate  - Advance diet as tolerated, if ordered  - Obtain nutritional consult as needed  - Evaluate fluid balance  Outcome: Progressing     Problem: METABOLIC/FLUID AND ELECTROLYTES - ADULT  Goal: Glucose maintained within prescribed range  Description: INTERVENTIONS:  - Monitor Blood Glucose as ordered  - Assess for signs and symptoms of hyperglycemia and hypoglycemia  - Administer ordered medications to maintain glucose within target range  - Assess barriers to adequate nutritional intake and initiate nutrition consult as needed  - Instruct patient on self management of diabetes  Outcome: Progressing  Goal: Electrolytes maintained within normal limits  Description: INTERVENTIONS:  - Monitor labs and rhythm and assess patient for signs and symptoms of electrolyte imbalances  - Administer electrolyte replacement as ordered  - Monitor response to electrolyte replacements, including rhythm and repeat lab results as appropriate  - Fluid restriction as ordered  - Instruct patient on fluid and nutrition restrictions as appropriate  Outcome: Progressing     Problem: SKIN/TISSUE INTEGRITY - ADULT  Goal: Skin integrity remains intact  Description: INTERVENTIONS  - Assess and document risk factors for pressure ulcer development  - Assess and document skin integrity  - Monitor for areas of redness and/or skin breakdown  - Initiate interventions, skin care algorithm/standards of care as needed  Outcome: Progressing     Problem: SAFETY ADULT - FALL  Goal: Free from fall injury  Description: INTERVENTIONS:  - Assess pt frequently for physical needs  - Identify  cognitive and physical deficits and behaviors that affect risk of falls.  - [place] fall precautions as indicated by assessment.  - Educate pt/family on patient safety including physical limitations  - Instruct pt to call for assistance with activity based on assessment  - Modify environment to reduce risk of injury  - Provide assistive devices as appropriate  - Consider OT/PT consult to assist with strengthening/mobility  - Encourage toileting schedule  Outcome: Progressing     Problem: DISCHARGE PLANNING  Goal: Discharge to home or other facility with appropriate resources  Description: INTERVENTIONS:  - Identify barriers to discharge w/pt and caregiver  - Include patient/family/discharge partner in discharge planning  - Arrange for needed discharge resources and transportation as appropriate  - Identify discharge learning needs (meds, wound care, etc)  - Arrange for interpreters to assist at discharge as needed  - Consider post-discharge preferences of patient/family/discharge partner  - Complete POLST form as appropriate  - Assess patient's ability to be responsible for managing their own health  - Refer to Case Management Department for coordinating discharge planning if the patient needs post-hospital services based on physician/LIP order or complex needs related to functional status, cognitive ability or social support system  Outcome: Progressing     Problem: Patient/Family Goals  Goal: Patient/Family Long Term Goal  Description: Patient's Long Term Goal: Discharge from the hospital    Interventions:  - Monitor vital signs  - Monitor appropriate labs  - Monitor blood glucose levels  - Pain management  - Administer medications per order  - Follow MD orders  - Diagnostics per order  - Update / inform patient and family on plan of care  - Discharge planning  - See additional Care Plan goals for specific interventions  Outcome: Progressing  Goal: Patient/Family Short Term Goal  Description: Patient's Short  Term Goal: Improve redness, swelling, and pain to left lower extremity     Interventions:   - Monitor vital signs  - Monitor appropriate labs  - Monitor blood glucose levels  - Pain management  - Administer medications per order  - Follow MD orders  - Diagnostics per order  - Update / inform patient and family on plan of care  - See additional Care Plan goals for specific interventions  Outcome: Progressing     Problem: Diabetes/Glucose Control  Goal: Glucose maintained within prescribed range  Description: INTERVENTIONS:  - Monitor Blood Glucose as ordered  - Assess for signs and symptoms of hyperglycemia and hypoglycemia  - Administer ordered medications to maintain glucose within target range  - Assess barriers to adequate nutritional intake and initiate nutrition consult as needed  - Instruct patient on self management of diabetes  Outcome: Progressing     Problem: SKIN/TISSUE INTEGRITY - ADULT  Goal: Incision(s), wounds(s) or drain site(s) healing without S/S of infection  Description: INTERVENTIONS:  - Assess and document risk factors for pressure ulcer development  - Assess and document skin integrity  - Assess and document dressing/incision, wound bed, drain sites and surrounding tissue  - Implement wound care per orders  - Initiate isolation precautions as appropriate  - Initiate Pressure Ulcer prevention bundle as indicated  Outcome: Not Progressing     Problem: MUSCULOSKELETAL - ADULT  Goal: Return mobility to safest level of function  Description: INTERVENTIONS:  - Assess patient stability and activity tolerance for standing, transferring and ambulating w/ or w/o assistive devices  - Assist with transfers and ambulation using safe patient handling equipment as needed  - Ensure adequate protection for wounds/incisions during mobilization  - Obtain PT/OT consults as needed  - Advance activity as appropriate  - Communicate ordered activity level and limitations with patient/family  Outcome: Not  Progressing     Problem: PAIN - ADULT  Goal: Verbalizes/displays adequate comfort level or patient's stated pain goal  Description: INTERVENTIONS:  - Encourage pt to monitor pain and request assistance  - Assess pain using appropriate pain scale  - Administer analgesics based on type and severity of pain and evaluate response  - Implement non-pharmacological measures as appropriate and evaluate response  - Consider cultural and social influences on pain and pain management  - Manage/alleviate anxiety  - Utilize distraction and/or relaxation techniques  - Monitor for opioid side effects  - Notify MD/LIP if interventions unsuccessful or patient reports new pain  - Anticipate increased pain with activity and pre-medicate as appropriate  Outcome: Not Progressing     Problem: Impaired Functional Mobility  Goal: Achieve highest/safest level of mobility/gait  Description: Interventions:  - Assess patient's functional ability and stability  - Promote increasing activity/tolerance for mobility and gait  - Educate and engage patient/family in tolerated activity level and precautions  - Recommend use of  RW for transfers and ambulation  - Recommend patient transfer to bedside chair toward strongest side  - When transferring patient, block weaker knee for safety  - Recommend use of chair position in bed 3 times per day  Outcome: Not Progressing     Problem: Impaired Activities of Daily Living  Goal: Achieve highest/safest level of independence in self care  Description: Interventions:  - Assess ability and encourage patient to participate in ADLs to maximize function  - Promote sitting position while performing ADLs such as feeding, grooming, and bathing  - Educate and encourage patient/family in tolerated functional activity level and precautions during self-care  Outcome: Not Progressing

## 2024-11-26 NOTE — PLAN OF CARE
Problem: Patient Centered Care  Goal: Patient preferences are identified and integrated in the patient's plan of care  Description: Interventions:  - What would you like us to know as we care for you? From home alone  - Provide timely, complete, and accurate information to patient/family  - Incorporate patient and family knowledge, values, beliefs, and cultural backgrounds into the planning and delivery of care  - Encourage patient/family to participate in care and decision-making at the level they choose  - Honor patient and family perspectives and choices  Outcome: Progressing     Problem: CARDIOVASCULAR - ADULT  Goal: Maintains optimal cardiac output and hemodynamic stability  Description: INTERVENTIONS:  - Monitor vital signs, rhythm, and trends  - Monitor for bleeding, hypotension and signs of decreased cardiac output  - Evaluate effectiveness of vasoactive medications to optimize hemodynamic stability  - Monitor arterial and/or venous puncture sites for bleeding and/or hematoma  - Assess quality of pulses, skin color and temperature  - Assess for signs of decreased coronary artery perfusion - ex. Angina  - Evaluate fluid balance, assess for edema, trend weights  Outcome: Progressing     Problem: RESPIRATORY - ADULT  Goal: Achieves optimal ventilation and oxygenation  Description: INTERVENTIONS:  - Assess for changes in respiratory status  - Assess for changes in mentation and behavior  - Position to facilitate oxygenation and minimize respiratory effort  - Oxygen supplementation based on oxygen saturation or ABGs  - Provide Smoking Cessation handout, if applicable  - Encourage broncho-pulmonary hygiene including cough, deep breathe, Incentive Spirometry  - Assess the need for suctioning and perform as needed  - Assess and instruct to report SOB or any respiratory difficulty  - Respiratory Therapy support as indicated  - Manage/alleviate anxiety  - Monitor for signs/symptoms of CO2 retention  Outcome:  Progressing     Problem: GASTROINTESTINAL - ADULT  Goal: Minimal or absence of nausea and vomiting  Description: INTERVENTIONS:  - Maintain adequate hydration with IV or PO as ordered and tolerated  - Nasogastric tube to low intermittent suction as ordered  - Evaluate effectiveness of ordered antiemetic medications  - Provide nonpharmacologic comfort measures as appropriate  - Advance diet as tolerated, if ordered  - Obtain nutritional consult as needed  - Evaluate fluid balance  Outcome: Progressing     Problem: GENITOURINARY - ADULT  Goal: Absence of urinary retention  Description: INTERVENTIONS:  - Assess patient’s ability to void and empty bladder  - Monitor intake/output and perform bladder scan as needed  - Follow urinary retention protocol/standard of care  - Consider collaborating with pharmacy to review patient's medication profile  - Implement strategies to promote bladder emptying  Outcome: Progressing     Problem: METABOLIC/FLUID AND ELECTROLYTES - ADULT  Goal: Glucose maintained within prescribed range  Description: INTERVENTIONS:  - Monitor Blood Glucose as ordered  - Assess for signs and symptoms of hyperglycemia and hypoglycemia  - Administer ordered medications to maintain glucose within target range  - Assess barriers to adequate nutritional intake and initiate nutrition consult as needed  - Instruct patient on self management of diabetes  Outcome: Progressing  Goal: Electrolytes maintained within normal limits  Description: INTERVENTIONS:  - Monitor labs and rhythm and assess patient for signs and symptoms of electrolyte imbalances  - Administer electrolyte replacement as ordered  - Monitor response to electrolyte replacements, including rhythm and repeat lab results as appropriate  - Fluid restriction as ordered  - Instruct patient on fluid and nutrition restrictions as appropriate  Outcome: Progressing     Problem: SKIN/TISSUE INTEGRITY - ADULT  Goal: Skin integrity remains intact  Description:  INTERVENTIONS  - Assess and document risk factors for pressure ulcer development  - Assess and document skin integrity  - Monitor for areas of redness and/or skin breakdown  - Initiate interventions, skin care algorithm/standards of care as needed  Outcome: Progressing  Goal: Incision(s), wounds(s) or drain site(s) healing without S/S of infection  Description: INTERVENTIONS:  - Assess and document risk factors for pressure ulcer development  - Assess and document skin integrity  - Assess and document dressing/incision, wound bed, drain sites and surrounding tissue  - Implement wound care per orders  - Initiate isolation precautions as appropriate  - Initiate Pressure Ulcer prevention bundle as indicated  Outcome: Progressing     Problem: MUSCULOSKELETAL - ADULT  Goal: Return mobility to safest level of function  Description: INTERVENTIONS:  - Assess patient stability and activity tolerance for standing, transferring and ambulating w/ or w/o assistive devices  - Assist with transfers and ambulation using safe patient handling equipment as needed  - Ensure adequate protection for wounds/incisions during mobilization  - Obtain PT/OT consults as needed  - Advance activity as appropriate  - Communicate ordered activity level and limitations with patient/family  Outcome: Progressing     Problem: PAIN - ADULT  Goal: Verbalizes/displays adequate comfort level or patient's stated pain goal  Description: INTERVENTIONS:  - Encourage pt to monitor pain and request assistance  - Assess pain using appropriate pain scale  - Administer analgesics based on type and severity of pain and evaluate response  - Implement non-pharmacological measures as appropriate and evaluate response  - Consider cultural and social influences on pain and pain management  - Manage/alleviate anxiety  - Utilize distraction and/or relaxation techniques  - Monitor for opioid side effects  - Notify MD/LIP if interventions unsuccessful or patient reports new  pain  - Anticipate increased pain with activity and pre-medicate as appropriate  Outcome: Progressing     Problem: SAFETY ADULT - FALL  Goal: Free from fall injury  Description: INTERVENTIONS:  - Assess pt frequently for physical needs  - Identify cognitive and physical deficits and behaviors that affect risk of falls.  - [place] fall precautions as indicated by assessment.  - Educate pt/family on patient safety including physical limitations  - Instruct pt to call for assistance with activity based on assessment  - Modify environment to reduce risk of injury  - Provide assistive devices as appropriate  - Consider OT/PT consult to assist with strengthening/mobility  - Encourage toileting schedule  Outcome: Progressing     Problem: DISCHARGE PLANNING  Goal: Discharge to home or other facility with appropriate resources  Description: INTERVENTIONS:  - Identify barriers to discharge w/pt and caregiver  - Include patient/family/discharge partner in discharge planning  - Arrange for needed discharge resources and transportation as appropriate  - Identify discharge learning needs (meds, wound care, etc)  - Arrange for interpreters to assist at discharge as needed  - Consider post-discharge preferences of patient/family/discharge partner  - Complete POLST form as appropriate  - Assess patient's ability to be responsible for managing their own health  - Refer to Case Management Department for coordinating discharge planning if the patient needs post-hospital services based on physician/LIP order or complex needs related to functional status, cognitive ability or social support system  Outcome: Progressing     Problem: Impaired Functional Mobility  Goal: Achieve highest/safest level of mobility/gait  Description: Interventions:  - Assess patient's functional ability and stability  - Promote increasing activity/tolerance for mobility and gait  - Educate and engage patient/family in tolerated activity level and  precautions  - Recommend use of  RW for transfers and ambulation  - Recommend patient transfer to bedside chair toward strongest side  - When transferring patient, block weaker knee for safety  - Recommend use of chair position in bed 3 times per day  Outcome: Progressing     Problem: Impaired Activities of Daily Living  Goal: Achieve highest/safest level of independence in self care  Description: Interventions:  - Assess ability and encourage patient to participate in ADLs to maximize function  - Promote sitting position while performing ADLs such as feeding, grooming, and bathing  - Educate and encourage patient/family in tolerated functional activity level and precautions during self-care  Outcome: Progressing     Problem: Patient/Family Goals  Goal: Patient/Family Long Term Goal  Description: Patient's Long Term Goal: Discharge from the hospital    Interventions:  - Monitor vital signs  - Monitor appropriate labs  - Monitor blood glucose levels  - Pain management  - Administer medications per order  - Follow MD orders  - Diagnostics per order  - Update / inform patient and family on plan of care  - Discharge planning  - See additional Care Plan goals for specific interventions  Outcome: Progressing  Goal: Patient/Family Short Term Goal  Description: Patient's Short Term Goal: Improve redness, swelling, and pain to left lower extremity     Interventions:   - Monitor vital signs  - Monitor appropriate labs  - Monitor blood glucose levels  - Pain management  - Administer medications per order  - Follow MD orders  - Diagnostics per order  - Update / inform patient and family on plan of care  - See additional Care Plan goals for specific interventions  Outcome: Progressing     Problem: Diabetes/Glucose Control  Goal: Glucose maintained within prescribed range  Description: INTERVENTIONS:  - Monitor Blood Glucose as ordered  - Assess for signs and symptoms of hyperglycemia and hypoglycemia  - Administer ordered  medications to maintain glucose within target range  - Assess barriers to adequate nutritional intake and initiate nutrition consult as needed  - Instruct patient on self management of diabetes  Outcome: Progressing

## 2024-11-26 NOTE — OCCUPATIONAL THERAPY NOTE
OCCUPATIONAL THERAPY TREATMENT NOTE - INPATIENT        Room Number: 529/529-A     Presenting Problem: cellulitis LLE    Problem List  Principal Problem:    Cellulitis of left lower leg  Active Problems:    Psoriasis    Immunosuppression (HCC)      OCCUPATIONAL THERAPY ASSESSMENT   Patient demonstrates limited progress this session, goals remain in progress.    Patient is requiring contact guard assist and moderate assist as a result of the following impairments: pain and medical status.    Patient continues to function below baseline with toileting, lower body dressing, bed mobility, transfers, and functional standing tolerance.  Next session anticipate patient to progress toileting, lower body dressing, and functional standing tolerance.  Occupational Therapy will continue to follow patient for duration of hospitalization.    Patient continues to benefit from continued skilled OT services: to promote return to prior level of function and safety with continuous assistance and gradual rehabilitative therapy pending LLE pain control.      PLAN DURING HOSPITALIZATION  OT Device Recommendations: TBD  OT Treatment Plan: ADL training;Energy conservation/work simplification techniques;Functional transfer training;Equipment eval/education;Compensatory technique education     SUBJECTIVE  I don't know what's wrong with my leg.     OBJECTIVE  Precautions: Bed/chair alarm    WEIGHT BEARING RESTRICTION  L Lower Extremity: -- (discussed with primary care MD  approved for today WBAT on left LE for mobility OOB ---MD Is ordering CT scan for left LE approving participation prior to results.   May need updated WB for left LE pending results)    PAIN ASSESSMENT  Ratin (7 with activity)  Location: left leg  Management Techniques: Relaxation; Repositioning; Ice; Nurse notified      ACTIVITIES OF DAILY LIVING ASSESSMENT  AM-PAC ‘6-Clicks’ Inpatient Daily Activity Short Form  How much help from another person does the patient  currently need…  -   Putting on and taking off regular lower body clothing?: A Lot  -   Bathing (including washing, rinsing, drying)?: A Little  -   Toileting, which includes using toilet, bedpan or urinal? : A Little  -   Putting on and taking off regular upper body clothing?: A Little  -   Taking care of personal grooming such as brushing teeth?: None  -   Eating meals?: None    AM-PAC Score:  Score: 19  Approx Degree of Impairment: 42.8%  Standardized Score (AM-PAC Scale): 40.22  CMS Modifier (G-Code): CK    FUNCTIONAL TRANSFER ASSESSMENT  -Sit to Stand: Edge of Bed  -Edge of Bed (elevated): Contact Guard Assist  -simulated commode transfer using RW: Contact Guard Assist   -Anticipate pt could require up to mod assist to complete sit to stand from lower bedside recliner     BED MOBILITY  Supine to Sit : Stand-by Assist    BALANCE ASSESSMENT  Static Sitting: Independent  Static Standing: Contact Guard Assist  Dynamic Sitting: SBA   Dynamic Standing: Contact Guard Assist     FUNCTIONAL ADL ASSESSMENT  -LB Dressing Seated: Moderate Assist (bed level to don left shoe)  -Pt demonstrated appropriate BUE strength, coordination, and ROM to complete grooming, eating, and UB dressing tasks independently in supported sitting.     Skilled Therapy Provided:   RN cleared pt for participation. Session coordinated with PT. Pt received laying in bed with no visitors present. Pt agreeable to participation in therapy. Pt benefited from increased time and RW for support.     To assess pt's participation during tasks while also providing intermittent education to maximize participation, OT facilitated the following: bed mobility, LB dressing and simulated commode transfer to bedside recliner. Pt completed tasks with functional levels listed above. Pt reporting increased pain with movement and required mod assist to don L shoe seated EOB. Pt would continue to benefit from standing grooming tasks and functional mobility tasks to  increase endurance and standing tolerance for ADLs at discharge.       EDUCATION PROVIDED  Patient Education : Role of Occupational Therapy; Plan of Care; Functional Transfer Techniques; Fall Prevention; Posture/Positioning; Proper Body Mechanics  Patient's Response to Education: Verbalized Understanding; Returned Demonstration    The patient's Approx Degree of Impairment: 42.8% has been calculated based on documentation in the [facility] '6 clicks' Inpatient Daily Activity Short Form.  Research supports that patients with this level of impairment may benefit from HHOT but anticipate pt could benefit from gradual rehab pending pain control to maximize return to PLOF.  Final disposition will be made by interdisciplinary medical team.    Patient End of Session: Up in chair;Needs met;Call light within reach;RN aware of session/findings;All patient questions and concerns addressed    OT Goals:  Patients self stated goal is: did not state     Patient will complete functional transfer with Mod I  Comment: ongoing - CGA    Patient will complete toileting with Mod I  Comment: ongoing     Patient will tolerate standing for 4 minutes in prep for adls with Mod I   Comment: ongoing     Patient will complete item retrieval with Mod I  Comment: ongoing           Goals  on: 12/3/24  Frequency:  3-5x/w  OT Session Time: 15 minutes  Self-Care Home Management: 15 minutes

## 2024-11-26 NOTE — PAYOR COMM NOTE
CONTINUED STAY REVIEW    Payor: [employer] OUT OF STATE PPO  Subscriber #:  [id number]  Authorization Number: [id number]    Admit date: 11/18/24  Admit time:  4:58 PM    REVIEW DOCUMENTATION: 11/26    [facility] Hospitalist Progress Note      CC: Hospital Follow up     PCP: Eric Sethi DO         Assessment/Plan:      Principal Problem:    Cellulitis of left lower leg  Active Problems:    Psoriasis    Immunosuppression (HCC)     Jovan Merino - 54 year old male w MO, HTN, Afib, HFpEF, psoriasis admitted 11/18/2024 for LLE cellulitis, started on IV ancef. Initially improving however 11/21 worse - abx broadened to vanc/zosyn, now cefepime and daptomycin. CT with cellulitis . ID/Rheum/Orhto consulted. MRI pending.      Sepsis 2/2 LLE cellulitis  Immunosuppression 2/2 psoriasis/Humira  - Febrile to 102 here. HR 80s, WBC 14  - blood cx NG x 5 days  - Arterial and venous doppler neg. No DVT  - . Known HFpEF. Feels like legs aren't swollen & has lost weight  - Pain control: IVP dilaudid, PO oxy, APAP PRN  - IV ancef (11/18 - 21 )  - 11/19 febrile, check Bcx. Lot of pain - add tramadol (itching w oxy/norc), add IV toradol scheduled.   - Headaches, feels dehydrated - will give 1L NS bolus  - 11/21: slightly worse, LE very swollen, WBC higher. Will switch ancef to vanco/zosyn for now. Consult ID - now cefepime and daptomycin  - CT negative 11/21 for underlying abscess  - Rheum/Ortho on consult   - No concern for compartment syndrome or joint involvement at this time  - f/u MRI  - trend inflammatory markers      Severe LLE pain and swelling  - possibly 2/2 cellulitis however not improving with IV abx  - no signs/symptoms of compartment syndrome  - continue pain control with PO and IV susana meds  - eval by ortho  - inflammatory markers significantly elevated  - CPK normal  - s/p IV lasix, x2  - will consult rheumatology  - MRI LLE ordered     Partial quad tear  L knee effusion  - ortho consulted, appreciate recs      Headaches  - fioricet PRN     HFpEF - compensated  HTN  Parox Afib - low CV, not on AC  - Continue PTA lasix, flecainide, coreg, hydral, enalapril     Psoriasis  - On humira OP. Would hold until leg better     Morbid obesity w [facility]  - Body mass index is 52.8 kg/m².   - Healthy diet & wt loss encouraged  - Has lost quite a bit of weight with Mounjaro already     DM2 - currently controlled w mounjaro  - Okay for reg diet, follow BG on AM labs. Can hold on SSI/accuchecks for now, can add if issues controlling     Depression   - seen by psych liaison  - Will start Zoloft     ACP: Full Code  Ppx: DVT: Enox  Dispo  EDoD:  ~11/28-11/29     F/u:   - PCP:  Eric Sethi,      Available via epic secure chat or perfect serve 7A - 7P.     Jan Fabian MD   Internal Medicine - Hospitalist  [facility]      Subjective:      No CP, SOB, or N/V.  Left leg with not much improvement.      OBJECTIVE:     Blood pressure 148/85, pulse 89, temperature 97.6 °F (36.4 °C), temperature source Axillary, resp. rate 20, height 6' 1\" (1.854 m), weight (!) 400 lb 3.2 oz (181.5 kg), SpO2 95%.     Temp:  [97.6 °F (36.4 °C)-99 °F (37.2 °C)] 97.6 °F (36.4 °C)  Pulse:  [81-89] 89  Resp:  [20] 20  BP: (135-148)/(72-85) 148/85  SpO2:  [91 %-95 %] 95 %        Intake/Output:     Intake/Output Summary (Last 24 hours) at 11/26/2024 1133  Last data filed at 11/26/2024 1028      Gross per 24 hour   Intake 1715 ml   Output 3950 ml   Net -2235 ml              Last 3 Weights   11/20/24 0528 (!) 400 lb 3.2 oz (181.5 kg)   11/19/24 0615 (!) 395 lb 3.2 oz (179.3 kg)   11/18/24 1705 (!) 394 lb 3.2 oz (178.8 kg)   11/18/24 1112 (!) 395 lb (179.2 kg)   11/17/21 1338 (!) 379 lb 3.2 oz (172 kg)   06/10/21 1222 (!) 364 lb (165.1 kg)         Exam   GEN: obese male in NAD, tearful  HEENT: EOMI  Pulm: CTAB, no crackles or wheezes  CV: RRR, no murmurs, DP pulses present  ABD: Soft, non-tender, non-distended, +BS  MSK: LLE swelling, very TTP, LLE compartments  not tense  Neuro: Grossly normal, CN intact, sensory intact  SKIN: warm, dry, psoriatic plaques, LLE with edema, erythema, warm to touch  EXT: left leg edema     Data Review:       Labs:            Recent Labs   Lab 11/24/24 0443 11/25/24 0504 11/26/24 0458   RBC 3.99* 4.11* 4.17*   HGB 10.9* 11.2* 11.7*   HCT 35.4* 36.2* 36.2*   MCV 88.7 88.1 86.8   MCH 27.3 27.3 28.1   MCHC 30.8* 30.9* 32.3   RDW 15.2* 15.0 15.0   WBC 14.6* 12.3* 12.7*   .0 291.0 298.0                  Recent Labs   Lab 11/24/24 0443 11/25/24 0504 11/26/24 0458   GLU 92 102* 98   BUN 18 18 17   CREATSERUM 0.81 0.74 0.81   EGFRCR 106 109 106   CA 9.8 9.9 10.4    134* 136   K 4.7 4.6 4.4    100 99   CO2 32.0 31.0 30.0         No results for input(s): \"ALT\", \"AST\", \"ALB\", \"AMYLASE\", \"LIPASE\", \"LDH\" in the last 168 hours.     Invalid input(s): \"ALPHOS\", \"TBIL\", \"DBIL\", \"TPROT\"        Imaging:  US VENOUS DOPPLER LEG LEFT - DIAG IMG (CPT=93971)     Result Date: 11/24/2024  CONCLUSION:          Nonvisualization of the left posterior tibial and peroneal veins.  Otherwise no DVT within the left lower extremity.   Dictated by (CST): Zeb Corona MD on 11/24/2024 at 2:08 PM     Finalized by (CST): Zeb Corona MD on 11/24/2024 at 2:09 PM               Meds:      Scheduled Medications    sertraline  25 mg Oral Daily    cefepime  2 g Intravenous Q8H    DAPTOmycin  1,000 mg Intravenous Q24H    clotrimazole-betamethasone   Topical BID    docosanol   Topical BID    cetirizine  10 mg Oral Daily    enalapril  20 mg Oral BID    flecainide  150 mg Oral BID    hydrALAZINE  50 mg Oral BID    carvedilol  25 mg Oral BID with meals    enoxaparin  90 mg Subcutaneous 2 times per day    magnesium oxide  200 mg Oral Nightly    dilTIAZem HCl ER Coated Beads  180 mg Oral Nightly         Medication Infusions           PRN Medications     ibuprofen    butalbital-acetaminophen-caffeine    oxyCODONE **OR** oxyCODONE    magnesium hydroxide    traMADol     calcium carbonate    famotidine    acetaminophen    melatonin    polyethylene glycol (PEG 3350)    sennosides    guaiFENesin    ondansetron    prochlorperazine    HYDROmorphone    HYDROmorphone    simethicone            MEDICATIONS ADMINISTERED IN LAST 1 DAY:  carvedilol (Coreg) tab 25 mg       Date Action Dose Route User    11/26/2024 0809 Given 25 mg Oral Henny Rojas RN    11/25/2024 1804 Given 25 mg Oral Henny Rojas RN          ceFEPIme (Maxpime) 2 g in sodium chloride 0.9% 100 mL IVPB-MBP       Date Action Dose Route User    11/26/2024 1403 New Bag 2 g Intravenous Henny Rojas RN    11/26/2024 0535 New Bag 2 g Intravenous Elizabeth Low RN    11/25/2024 2102 New Bag 2 g Intravenous Elizabeth Low RN          cetirizine (ZyrTEC) tab 10 mg       Date Action Dose Route User    11/26/2024 0809 Given 10 mg Oral Henny Rojas RN          DAPTOmycin (Cubicin) 1,000 mg in sodium chloride 0.9% PF IV syringe       Date Action Dose Route User    11/26/2024 1609 New Bag 1,000 mg Intravenous Henny Rojas RN    11/25/2024 1711 New Bag 1,000 mg Intravenous Henny Rojas RN          dilTIAZem ER (CardIZEM CD) 24 hr cap 180 mg       Date Action Dose Route User    11/25/2024 2103 Given 180 mg Oral Elizabeth Low RN          docosanol (Abreva) 10 % cream       Date Action Dose Route User    11/26/2024 0810 Given (none) Topical Henny Rojas RN    11/25/2024 2103 Given (none) Topical Elizabeth Low RN          enalapril (Vasotec) tab 20 mg       Date Action Dose Route User    11/26/2024 0808 Given 20 mg Oral Henny Rojas RN    11/25/2024 2102 Given 20 mg Oral Elizabeth Low RN          enoxaparin (Lovenox) 100 MG/ML SUBQ injection 90 mg       Date Action Dose Route User    11/26/2024 0809 Given 90 mg Subcutaneous (Right Lower Abdomen) Henny Rojas RN    11/25/2024 2103 Given 90 mg Subcutaneous (Left Lower Abdomen)  Elizabeth Low RN          flecainide (Tambocor) tab 150 mg       Date Action Dose Route User    11/26/2024 0809 Given 150 mg Oral Henny Rojas RN    11/25/2024 2103 Given 150 mg Oral Elizabeth Low RN          gadoterate meglumine (Dotarem) 10 MMOL/20ML injection 20 mL       Date Action Dose Route User    11/26/2024 1327 Given 20 mL Intravenous Gorge Bergeron          hydrALAZINE (Apresoline) tab 50 mg       Date Action Dose Route User    11/26/2024 0808 Given 50 mg Oral Henny Rojas RN    11/25/2024 2102 Given 50 mg Oral Elizabeth Low RN          HYDROmorphone (Dilaudid) 1 MG/ML injection 1 mg       Date Action Dose Route User    11/26/2024 1530 Given 1 mg Intravenous Henny Rojas RN    11/26/2024 1147 Given 1 mg Intravenous Henny Rojas RN    11/26/2024 0809 Given 1 mg Intravenous Henny Rojas RN    11/26/2024 0536 Given 1 mg Intravenous Elizabeth Low RN    11/26/2024 0242 Given 1 mg Intravenous Elizabeth Low RN    11/25/2024 2352 Given 1 mg Intravenous Elizabeth Low RN    11/25/2024 2103 Given 1 mg Intravenous Elizabeth Low RN    11/25/2024 1804 Given 1 mg Intravenous Henny Rojas RN          magnesium oxide (Mag-Ox) tab 200 mg       Date Action Dose Route User    11/25/2024 2119 Given 200 mg Oral Elizabeth Low RN          sertraline (Zoloft) tab 25 mg       Date Action Dose Route User    11/26/2024 1100 Given 25 mg Oral Henny Rojas RN            Vitals (last day)       Date/Time Temp Pulse Resp BP SpO2 Weight O2 Device O2 Flow Rate (L/min) [facility]    11/26/24 1357 98.8 °F (37.1 °C) 85 20 144/81 94 % -- None (Room air) --     11/26/24 0807 97.6 °F (36.4 °C) 89 20 148/85 -- -- -- --     11/26/24 0242 98.3 °F (36.8 °C) 84 20 135/72 95 % -- None (Room air) --     11/25/24 2057 99 °F (37.2 °C) 85 20 142/84 92 % -- Nasal cannula 2 L/min     11/25/24 1353 98 °F (36.7 °C) 81 20 -- 91 % -- None (Room air) --     11/25/24  0816 98.9 °F (37.2 °C) 82 20 142/76 95 % -- Nasal cannula 2 L/min AP    11/25/24 0608 99 °F (37.2 °C) 78 20 133/66 94 % -- Nasal cannula 2 L/min CN    11/25/24 0332 98.7 °F (37.1 °C) 79 16 127/72 95 % -- Nasal cannula 2 L/min AJ

## 2024-11-26 NOTE — PHYSICAL THERAPY NOTE
PHYSICAL THERAPY TREATMENT NOTE - INPATIENT     Room Number: 529/529-A       Presenting Problem: left leg pain and cellulitis  Co-Morbidities : appendicitis, arrhythmia, atrial fibrillation, congestive heart disease, high blood pressure, NEREIDA, osteoarthritis, essential hypertension    Problem List  Principal Problem:    Cellulitis of left lower leg  Active Problems:    Psoriasis    Immunosuppression (HCC)      PHYSICAL THERAPY ASSESSMENT   Patient demonstrates good  progress this session, goals  remain in progress.      Patient is requiring contact guard assist as a result of the following impairments: decreased functional strength, pain, impaired dynamic standing balance, decreased muscular endurance, medical status, and limited LLE ROM.     Patient continues to function below baseline with bed mobility, transfers, gait, stair negotiation, standing prolonged periods, and performing household tasks.  Next session anticipate patient to progress bed mobility, transfers, and gait.  Physical Therapy will continue to follow patient for duration of hospitalization.    Patient continues to benefit from continued skilled PT services: to promote return to prior level of function and safety with continuous assistance and gradual rehabilitative therapy  vs. HH PT, pending pain control and progress with IP PT.     PLAN DURING HOSPITALIZATION  Nursing Mobility Recommendation : 1 Assist  PT Device Recommendation: Rolling walker  PT Treatment Plan: Bed mobility;Body mechanics;Patient education;Transfer training;Balance training;Stair training;Endurance;Energy conservation;Family education;Gait training  Frequency (Obs): 5x/week     SUBJECTIVE  Agreeable to activity.     OBJECTIVE  Precautions: Bed/chair alarm    WEIGHT BEARING RESTRICTION  L Lower Extremity: Weight Bearing as Tolerated    PAIN ASSESSMENT   Ratin  Location: LLE  Management Techniques: Activity promotion;Body mechanics;Breathing techniques;Repositioning  (ice)    BALANCE  Static Sitting: Good  Dynamic Sitting: Fair +  Static Standing: Fair  Dynamic Standing: Fair -    AM-PAC '6-Clicks' INPATIENT SHORT FORM - BASIC MOBILITY  How much difficulty does the patient currently have...  Patient Difficulty: Turning over in bed (including adjusting bedclothes, sheets and blankets)?: A Little   Patient Difficulty: Sitting down on and standing up from a chair with arms (e.g., wheelchair, bedside commode, etc.): A Little   Patient Difficulty: Moving from lying on back to sitting on the side of the bed?: A Little   How much help from another person does the patient currently need...   Help from Another: Moving to and from a bed to a chair (including a wheelchair)?: A Little   Help from Another: Need to walk in hospital room?: A Little   Help from Another: Climbing 3-5 steps with a railing?: A Lot     AM-PAC Score:  Raw Score: 17   Approx Degree of Impairment: 50.57%   Standardized Score (AM-PAC Scale): 42.13   CMS Modifier (G-Code): CK    FUNCTIONAL ABILITY STATUS  Functional Mobility/Gait Assessment  Gait Assistance: Contact guard assist  Distance (ft): 3  Assistive Device: Rolling walker  Pattern: Shuffle;L Decreased stance time (slow, guarded)  Supine to Sit: stand-by assist  Sit to Stand: contact guard assist    Skilled Therapy Provided: Pt received resting in bed and agreeable to activity. Pt with c/o 7/10 pain of LLE. Demos bed mobility with SBA, STS transfer to/from bed and chair with RW and CGA, and steps to chair with RW and CGA, slow and guarded but overall steady gait, no LOB. Requires VC to back up to the chair prior to sitting down. Encouraged pt up to chair and ambulating frequently with nursing staff as able. Pt was left sitting in chair with BLE elevated and ice applied to LLE, needs within reach, handoff to RN complete.     The patient's Approx Degree of Impairment: 50.57% has been calculated based on documentation in the [facility] '6 clicks' Inpatient Daily Activity  Short Form.  Research supports that patients with this level of impairment may benefit from rehab facility.  Final disposition will be made by interdisciplinary medical team.    Patient End of Session: In bed;Needs met;Call light within reach;RN aware of session/findings;All patient questions and concerns addressed    CURRENT GOALS   Goals to be met by: 12/3/24 (updated 11/26)  Patient Goal Patient's self-stated goal is: go home   Goal #1 Patient is able to demonstrate supine - sit EOB @ level: minimum assistance   new goal - independent bed mobility   Goal #1   Current Status  Goal met 11/26   Goal #2 Patient is able to demonstrate transfers Sit to/from Stand at assistance level: minimum assistance with walker - rolling  New goal- STS with supervision and RW      Goal #2  Current Status  goal met 11/26   Goal #3 Patient is able to ambulate 50 feet with assist device: walker - rolling at assistance level: minimum assistance   Goal #3   Current Status   not met/progressing    Goal #4 Stair goal TBD pending progress   Goal #4   Current Status  NT   Goal #5 Patient to demonstrate independence with home activity/exercise instructions provided to patient in preparation for discharge.   Goal #5   Current Status  not met/progressing        Therapeutic Activity: 15 minutes

## 2024-11-26 NOTE — PROGRESS NOTES
RHEUMATOLOGY HOSPITAL FOLLOW-UP    Jovan Merino Sr. is a 54 year old male.    ASSESSMENT/PLAN:     #LLE Rash, suspected cellulitis in an immunocompromised patient       #Torn Quadriceps Tendon w/  Knee Effusion. H/O Ruptured Quad tendon surgical repair   #Sepsis suspected 2/2 Above  #Cutaneous Psoriasis        -Treated w/ Humira Outpt. Follows with [facility]. MARYA Mojica     Chronic:  #DMII   #HFpEF  #HTN  #GERD  #NEREIDA  #OA     DISCUSSION:     Rheumatology following for LLE rash suspicious for cellulitis.  Imaging notable for torn quadriceps tendon and associated knee effusion.  Knee is not overtly warm or erythematous but does have a chronic scar from his previous surgery.  Appreciate ID recommendations and currently on antibiotics.  Ortho following as well.  Will check additional autoimmune wor-awaiting ANCA.  Otherwise, would continue to hold Humira given infection.     PLAN:  -Continue to hold Humira   = MRI knee shows no osteomyelitis - just cellulitis   -Will follow-up remaining autoimmune workup- ANCA pending  -Appreciate ID , Ortho recs       -Noting no concern for septic knee/ankle        - s/p IV ancef > IV vanc/zosyn > IV cefepime/cubicin       Thank you for asking us to see this nice patient and participate in his care.  We will make further recommendations as his case develops.    rSi Ku MD  11/26/2024   5:56 PM        SUBJECTIVE:     Chief Complaint   Patient presents with    Swelling Edema       Not able to see this afternoon b/c pt. Was in mri     HISTORY:  Past Medical History:    Appendicitis    Arrhythmia    AF    Atrial fibrillation (HCC)    Congestive heart disease (HCC)    age 30 yrs.  1 episode    Esophageal reflux    High blood pressure    NEREIDA (obstructive sleep apnea)    Intolerant to cpap    Osteoarthritis    Unspecified essential hypertension    Ventral hernia    Visual impairment    glasses for driving      Social Hx Reviewed   Family Hx Reviewed      Medications (Active prior to today's visit):  No current outpatient medications on file.     .cmed  Allergies:  Allergies[1]      ROS:   Review of Systems   Constitutional:  Negative for chills and fever.   HENT:  Negative for congestion, hearing loss, mouth sores, nosebleeds and trouble swallowing.    Eyes:  Negative for photophobia, pain, redness and visual disturbance.   Respiratory:  Negative for cough and shortness of breath.    Cardiovascular:  Negative for chest pain, palpitations and leg swelling.   Gastrointestinal:  Negative for abdominal pain, blood in stool, diarrhea and nausea.   Endocrine: Negative for cold intolerance and heat intolerance.   Genitourinary:  Negative for dysuria, frequency and hematuria.   Musculoskeletal:  Positive for arthralgias, gait problem and myalgias. Negative for back pain, joint swelling, neck pain and neck stiffness.   Skin:  Positive for rash. Negative for color change.   Neurological:  Negative for dizziness, weakness, numbness and headaches.   Psychiatric/Behavioral:  Negative for confusion and dysphoric mood.         PHYSICAL EXAM:     Vitals:    11/26/24 0807   BP: 148/85   Pulse: 89   Resp: 20   Temp: 97.6 °F (36.4 °C)     HEENT: Clear oropharynx, no oral ulcers, EOM intact, clear sclear, PERRLA, pleasant, no acute distress, no CAD,   No rashes  CVS: RRR, no murmurs  RS: CTAB, no crackles, no rhonchi  ABD: Soft Non tender, no HSM felt, BS positive  Joint exam:   no neck tendnerness, no synovitis   EXTREMITIES: distal LLE rash (stable), R knee not warm to touch-has an anterior scar from previous surgery (stable), psoriatic plaques affecting his hands, elbows, legs  NEURO: intact touch,    RELEVANT PRIOR LABS:   11/25/2024:   WNL    11/24/2024:  Aldolase _____ (in process),  WNL  CRP 11.6 (high)  C3 888.8 (high), C4 42.7 (high)  ANCA  _____ (in process)    POC glucose 107  CBC with WBC 14.6, Hg 10.9 normocytic,  WNL  BMP with BUN 18, CR 0.81      2024:  Pro-Gonzalo 0.35 (high)  CBC: WBC 14.6, Hg 10.4 normocytic,  WNL     2024:   (high), CRP 18.9 (high)  CBC: WBC 16, Hg 11 normocytic,      2024:  MRSA PCR negative     CK 89 WNL     2024:  TSH 0.97 WNL  QuantiFERON-TB testing negative         Imagin2024 LLE CT:  Impression   CONCLUSION:  1. Diffuse edema in the subcutaneous tissues compatible with history of cellulitis.  No abscess.  2. Moderate to large knee joint effusion.  3. Partially torn distal quadriceps tendon .  4. Multiple varicose veins.              2024 LLE venous Doppler:  Impression   CONCLUSION:  1. No evidence of deep venous thrombosis in the left lower extremity.  The peroneal veins could not be visualized.                    [1]   Allergies  Allergen Reactions    Hydrocodone ITCHING and RASH

## 2024-11-26 NOTE — PROGRESS NOTES
[facility]  part of [facility] Infectious Disease Progress Note    Jovan Merino Sr. Patient Status:  Inpatient    1970 MRN [id number]   Location [facility] 5SW/SE Attending Jan Fabian MD   Hosp Day # 8 PCP Eric Sethi DO     Subjective:  Chart reviewed, no acute events.   MRI today.   Leg not much better;  and swollen per pt.  No fevers.     Objective:    Allergies:  Allergies[1]    Medications:    Current Facility-Administered Medications:     sertraline (Zoloft) tab 25 mg, 25 mg, Oral, Daily    [START ON 2024] furosemide (Lasix) tab 40 mg, 40 mg, Oral, Daily    ibuprofen (Motrin) tab 400 mg, 400 mg, Oral, Q6H PRN    butalbital-acetaminophen-caffeine (Fioricet) -40 MG per tab 1 tablet, 1 tablet, Oral, Q4H PRN    oxyCODONE immediate release tab 5 mg, 5 mg, Oral, Q4H PRN **OR** oxyCODONE immediate release tab 10 mg, 10 mg, Oral, Q4H PRN    magnesium hydroxide (Milk of Magnesia) 400 MG/5ML oral suspension 30 mL, 30 mL, Oral, Q6H PRN    ceFEPIme (Maxpime) 2 g in sodium chloride 0.9% 100 mL IVPB-MBP, 2 g, Intravenous, Q8H    DAPTOmycin (Cubicin) 1,000 mg in sodium chloride 0.9% PF IV syringe, 1,000 mg, Intravenous, Q24H    clotrimazole-betamethasone (Lotrisone) 1-0.05 % cream, , Topical, BID    docosanol (Abreva) 10 % cream, , Topical, BID    cetirizine (ZyrTEC) tab 10 mg, 10 mg, Oral, Daily    traMADol (Ultram) tab 50 mg, 50 mg, Oral, Q6H PRN    calcium carbonate (Tums) chewable tab 1,000 mg, 1,000 mg, Oral, TID PRN    famotidine (Pepcid) tab 20 mg, 20 mg, Oral, BID PRN    enalapril (Vasotec) tab 20 mg, 20 mg, Oral, BID    flecainide (Tambocor) tab 150 mg, 150 mg, Oral, BID    hydrALAZINE (Apresoline) tab 50 mg, 50 mg, Oral, BID    carvedilol (Coreg) tab 25 mg, 25 mg, Oral, BID with meals    acetaminophen (Tylenol Extra Strength) tab 500 mg, 500 mg, Oral, Q4H PRN    melatonin tab 3 mg, 3 mg, Oral, Nightly PRN    polyethylene glycol (PEG  3350) (Miralax) 17 g oral packet 17 g, 17 g, Oral, Daily PRN    sennosides (Senokot) tab 17.2 mg, 17.2 mg, Oral, Nightly PRN    guaiFENesin (Robitussin) 100 MG/5 ML oral liquid 200 mg, 200 mg, Oral, Q4H PRN    enoxaparin (Lovenox) 100 MG/ML SUBQ injection 90 mg, 90 mg, Subcutaneous, 2 times per day    ondansetron (Zofran) 4 MG/2ML injection 4 mg, 4 mg, Intravenous, Q6H PRN    prochlorperazine (Compazine) 10 MG/2ML injection 5 mg, 5 mg, Intravenous, Q8H PRN    HYDROmorphone (Dilaudid) 1 MG/ML injection 1 mg, 1 mg, Intravenous, Q2H PRN    HYDROmorphone (Dilaudid) 1 MG/ML injection 0.5 mg, 0.5 mg, Intravenous, Q2H PRN    magnesium oxide (Mag-Ox) tab 200 mg, 200 mg, Oral, Nightly    simethicone (Mylicon) chewable tab 80 mg, 80 mg, Oral, TID PRN    dilTIAZem ER (CardIZEM CD) 24 hr cap 180 mg, 180 mg, Oral, Nightly    Physical Exam:  General: Alert, orientated x3.  Cooperative.  No apparent distress.  Vital Signs:  Blood pressure 148/85, pulse 89, temperature 97.6 °F (36.4 °C), temperature source Axillary, resp. rate 20, height 6' 1\" (1.854 m), weight (!) 400 lb 3.2 oz (181.5 kg), SpO2 95%.   Temp (24hrs), Av.2 °F (36.8 °C), Min:97.6 °F (36.4 °C), Max:99 °F (37.2 °C)      HEENT: Exam is unremarkable.  Without scleral icterus.  Mucous membranes are moist. PERRLA.  Oropharynx is clear.  Lungs: Clear to auscultation bilaterally.  Cardiac: Regular rate and rhythm. No murmur.  Abdomen:  Soft, non-distended, non-tender, with no rebound or guarding.   Extremities:  No lower extremity edema noted.  Without clubbing or cyanosis.    Skin: Normal texture and turgor.  Neurologic: Cranial nerves are grossly intact.      Labs:  Lab Results   Component Value Date    WBC 12.7 2024    HGB 11.7 2024    HCT 36.2 2024    .0 2024    CREATSERUM 0.81 2024    BUN 17 2024     2024    K 4.4 2024    CL 99 2024    CO2 30.0 2024    GLU 98 2024    CA 10.4 2024              Radiology:  MRI pending    Assessment/Plan:    1.  LLE cellulitis  -admitted with fevers and leukocytosis  -temps as high as 102  -blood cultures NG  -dopplers neg for DVT  -CT with R knee effusions and torn quad tendon  -CRP 11.6 < 18.9   -arterial dopplers ordered and pending   -MRI today   -on IV Daptomycin and Cefepime, s/p IV Ancef x 4 days    2. Leukocytosis and fevers  -due to above  -Tmax past 24hrs 99  -WBC 12.7K , up a bit from yesterday    3. Immunocompromise  -due to hx of psoriasis, on Humira  -MRSA nares negative     DISPO: Continue IV Daptomycin and Cefepime, follow up MRI.  Continue to trend temps, WBC.  Will continue to follow with further recs.     If you have any questions or concerns please call [facility] at [phone].     GAGAN Allison  11/26/2024  12:53 PM         [1]   Allergies  Allergen Reactions    Hydrocodone ITCHING and RASH

## 2024-11-26 NOTE — PROGRESS NOTES
[facility] Hospitalist Progress Note     CC: Hospital Follow up    PCP: Eric Sethi DO       Assessment/Plan:     Principal Problem:    Cellulitis of left lower leg  Active Problems:    Psoriasis    Immunosuppression (HCC)    Jvoan Merino - 54 year old male w MO, HTN, Afib, HFpEF, psoriasis admitted 11/18/2024 for LLE cellulitis, started on IV ancef. Initially improving however 11/21 worse - abx broadened to vanc/zosyn, now cefepime and daptomycin. CT with cellulitis . ID/Rheum/Orhto consulted. MRI pending.      Sepsis 2/2 LLE cellulitis  Immunosuppression 2/2 psoriasis/Humira  - Febrile to 102 here. HR 80s, WBC 14  - blood cx NG x 5 days  - Arterial and venous doppler neg. No DVT  - . Known HFpEF. Feels like legs aren't swollen & has lost weight  - Pain control: IVP dilaudid, PO oxy, APAP PRN  - IV ancef (11/18 - 21 )  - 11/19 febrile, check Bcx. Lot of pain - add tramadol (itching w oxy/norc), add IV toradol scheduled.   - Headaches, feels dehydrated - will give 1L NS bolus  - 11/21: slightly worse, LE very swollen, WBC higher. Will switch ancef to vanco/zosyn for now. Consult ID - now cefepime and daptomycin  - CT negative 11/21 for underlying abscess  - Rheum/Ortho on consult   - No concern for compartment syndrome or joint involvement at this time  - f/u MRI  - trend inflammatory markers      Severe LLE pain and swelling  - possibly 2/2 cellulitis however not improving with IV abx  - no signs/symptoms of compartment syndrome  - continue pain control with PO and IV susana meds  - eval by ortho  - inflammatory markers significantly elevated  - CPK normal  - s/p IV lasix, x2  - will consult rheumatology  - MRI LLE ordered     Partial quad tear  L knee effusion  - ortho consulted, appreciate recs     Headaches  - fioricet PRN     HFpEF - compensated  HTN  Parox Afib - low CV, not on AC  - Continue PTA lasix, flecainide, coreg, hydral, enalapril     Psoriasis  - On humira OP. Would hold until leg better      Morbid obesity w [facility]  - Body mass index is 52.8 kg/m².   - Healthy diet & wt loss encouraged  - Has lost quite a bit of weight with Mounjaro already     DM2 - currently controlled w mounjaro  - Okay for reg diet, follow BG on AM labs. Can hold on SSI/accuchecks for now, can add if issues controlling     Depression   - seen by psych liaison  - Will start Zoloft    ACP: Full Code  Ppx: DVT: Enox  Dispo  EDoD:  ~11/28-11/29     F/u:   - PCP:  Eric Sethi DO     Available via epic secure chat or perfect serve 7A - 7P.     Jan Fabian MD   Internal Medicine - Hospitalist  [facility]     Subjective:     No CP, SOB, or N/V.  Left leg with not much improvement.     OBJECTIVE:    Blood pressure 148/85, pulse 89, temperature 97.6 °F (36.4 °C), temperature source Axillary, resp. rate 20, height 6' 1\" (1.854 m), weight (!) 400 lb 3.2 oz (181.5 kg), SpO2 95%.    Temp:  [97.6 °F (36.4 °C)-99 °F (37.2 °C)] 97.6 °F (36.4 °C)  Pulse:  [81-89] 89  Resp:  [20] 20  BP: (135-148)/(72-85) 148/85  SpO2:  [91 %-95 %] 95 %      Intake/Output:    Intake/Output Summary (Last 24 hours) at 11/26/2024 1133  Last data filed at 11/26/2024 1028  Gross per 24 hour   Intake 1715 ml   Output 3950 ml   Net -2235 ml       Last 3 Weights   11/20/24 0528 (!) 400 lb 3.2 oz (181.5 kg)   11/19/24 0615 (!) 395 lb 3.2 oz (179.3 kg)   11/18/24 1705 (!) 394 lb 3.2 oz (178.8 kg)   11/18/24 1112 (!) 395 lb (179.2 kg)   11/17/21 1338 (!) 379 lb 3.2 oz (172 kg)   06/10/21 1222 (!) 364 lb (165.1 kg)       Exam   GEN: obese male in NAD, tearful  HEENT: EOMI  Pulm: CTAB, no crackles or wheezes  CV: RRR, no murmurs, DP pulses present  ABD: Soft, non-tender, non-distended, +BS  MSK: LLE swelling, very TTP, LLE compartments not tense  Neuro: Grossly normal, CN intact, sensory intact  SKIN: warm, dry, psoriatic plaques, LLE with edema, erythema, warm to touch  EXT: left leg edema    Data Review:       Labs:     Recent Labs   Lab 11/24/24  8184  11/25/24  0504 11/26/24  0458   RBC 3.99* 4.11* 4.17*   HGB 10.9* 11.2* 11.7*   HCT 35.4* 36.2* 36.2*   MCV 88.7 88.1 86.8   MCH 27.3 27.3 28.1   MCHC 30.8* 30.9* 32.3   RDW 15.2* 15.0 15.0   WBC 14.6* 12.3* 12.7*   .0 291.0 298.0         Recent Labs   Lab 11/24/24  0443 11/25/24  0504 11/26/24 0458   GLU 92 102* 98   BUN 18 18 17   CREATSERUM 0.81 0.74 0.81   EGFRCR 106 109 106   CA 9.8 9.9 10.4    134* 136   K 4.7 4.6 4.4    100 99   CO2 32.0 31.0 30.0       No results for input(s): \"ALT\", \"AST\", \"ALB\", \"AMYLASE\", \"LIPASE\", \"LDH\" in the last 168 hours.    Invalid input(s): \"ALPHOS\", \"TBIL\", \"DBIL\", \"TPROT\"      Imaging:  US VENOUS DOPPLER LEG LEFT - DIAG IMG (CPT=93971)    Result Date: 11/24/2024  CONCLUSION:   Nonvisualization of the left posterior tibial and peroneal veins.  Otherwise no DVT within the left lower extremity.   Dictated by (CST): Zeb Corona MD on 11/24/2024 at 2:08 PM     Finalized by (CST): Zeb Corona MD on 11/24/2024 at 2:09 PM             Meds:      sertraline  25 mg Oral Daily    cefepime  2 g Intravenous Q8H    DAPTOmycin  1,000 mg Intravenous Q24H    clotrimazole-betamethasone   Topical BID    docosanol   Topical BID    cetirizine  10 mg Oral Daily    enalapril  20 mg Oral BID    flecainide  150 mg Oral BID    hydrALAZINE  50 mg Oral BID    carvedilol  25 mg Oral BID with meals    enoxaparin  90 mg Subcutaneous 2 times per day    magnesium oxide  200 mg Oral Nightly    dilTIAZem HCl ER Coated Beads  180 mg Oral Nightly         ibuprofen    butalbital-acetaminophen-caffeine    oxyCODONE **OR** oxyCODONE    magnesium hydroxide    traMADol    calcium carbonate    famotidine    acetaminophen    melatonin    polyethylene glycol (PEG 3350)    sennosides    guaiFENesin    ondansetron    prochlorperazine    HYDROmorphone    HYDROmorphone    simethicone

## 2024-11-27 ENCOUNTER — APPOINTMENT (OUTPATIENT)
Dept: PICC SERVICES | Facility: HOSPITAL | Age: 54
End: 2024-11-27
Attending: INTERNAL MEDICINE
Payer: COMMERCIAL

## 2024-11-27 LAB
ANION GAP SERPL CALC-SCNC: 4 MMOL/L (ref 0–18)
BUN BLD-MCNC: 18 MG/DL (ref 9–23)
BUN/CREAT SERPL: 20.5 (ref 10–20)
CALCIUM BLD-MCNC: 10.2 MG/DL (ref 8.7–10.4)
CHLORIDE SERPL-SCNC: 99 MMOL/L (ref 98–112)
CO2 SERPL-SCNC: 30 MMOL/L (ref 21–32)
CREAT BLD-MCNC: 0.88 MG/DL
CRP SERPL-MCNC: 6.3 MG/DL (ref ?–1)
DEPRECATED RDW RBC AUTO: 47.6 FL (ref 35.1–46.3)
EGFRCR SERPLBLD CKD-EPI 2021: 103 ML/MIN/1.73M2 (ref 60–?)
ERYTHROCYTE [DISTWIDTH] IN BLOOD BY AUTOMATED COUNT: 14.9 % (ref 11–15)
GLUCOSE BLD-MCNC: 103 MG/DL (ref 70–99)
GLUCOSE BLDC GLUCOMTR-MCNC: 103 MG/DL (ref 70–99)
GLUCOSE BLDC GLUCOMTR-MCNC: 112 MG/DL (ref 70–99)
GLUCOSE BLDC GLUCOMTR-MCNC: 121 MG/DL (ref 70–99)
GLUCOSE BLDC GLUCOMTR-MCNC: 127 MG/DL (ref 70–99)
HCT VFR BLD AUTO: 35.9 %
HGB BLD-MCNC: 11.5 G/DL
MCH RBC QN AUTO: 27.7 PG (ref 26–34)
MCHC RBC AUTO-ENTMCNC: 32 G/DL (ref 31–37)
MCV RBC AUTO: 86.5 FL
OSMOLALITY SERPL CALC.SUM OF ELEC: 278 MOSM/KG (ref 275–295)
PLATELET # BLD AUTO: 330 10(3)UL (ref 150–450)
POTASSIUM SERPL-SCNC: 4.5 MMOL/L (ref 3.5–5.1)
RBC # BLD AUTO: 4.15 X10(6)UL
SODIUM SERPL-SCNC: 133 MMOL/L (ref 136–145)
WBC # BLD AUTO: 12.8 X10(3) UL (ref 4–11)

## 2024-11-27 PROCEDURE — 02HV33Z INSERTION OF INFUSION DEVICE INTO SUPERIOR VENA CAVA, PERCUTANEOUS APPROACH: ICD-10-PCS | Performed by: HOSPITALIST

## 2024-11-27 PROCEDURE — 99233 SBSQ HOSP IP/OBS HIGH 50: CPT | Performed by: INTERNAL MEDICINE

## 2024-11-27 RX ORDER — CEFEPIME 1 G/50ML
2 INJECTION, SOLUTION INTRAVENOUS EVERY 8 HOURS
Qty: 4200 ML | Refills: 0 | Status: SHIPPED | OUTPATIENT
Start: 2024-11-27 | End: 2024-12-03

## 2024-11-27 RX ORDER — LIDOCAINE HYDROCHLORIDE 10 MG/ML
5 INJECTION, SOLUTION EPIDURAL; INFILTRATION; INTRACAUDAL; PERINEURAL
Status: COMPLETED | OUTPATIENT
Start: 2024-11-27 | End: 2024-11-27

## 2024-11-27 NOTE — PROGRESS NOTES
[facility]  part of [facility] Infectious Disease  Progress Note    Jovan CLEVELAND Merino Sr. Patient Status:  Inpatient    1970 MRN [id number]   Location [facility] 5SW/SE Attending Jan Fabian MD   Hosp Day # 9 PCP Eric Sethi,      Subjective:  Patient seen/examined.  Up in bed.  MRI reviewed.  Arterial dopplers pending.  Feeling slightly better with ACE wrap in place.    Objective:  Blood pressure 136/71, pulse 94, temperature 97.9 °F (36.6 °C), temperature source Oral, resp. rate 18, height 6' 1\" (1.854 m), weight (!) 400 lb 3.2 oz (181.5 kg), SpO2 91%.    Intake/Output:    Intake/Output Summary (Last 24 hours) at 2024 0855  Last data filed at 2024 0602  Gross per 24 hour   Intake 770 ml   Output 2350 ml   Net -1580 ml       Physical Exam:  General: Awake, alert, non-tox, NAD.  HEENT:  Oropharynx clear, trachea ML.  Heart: RRR S1S2 no murmurs.  Lungs: Essentially CTA b/l, no rhonchi, rales, wheezes.  Abdomen: Soft, NT/ND.  BS present.  No organomegaly.  Extremity: LLE erythema persists.  Neurological: No focal deficits.  Derm:  Warm, dry, free from rashes.    Lab Data Review:  Lab Results   Component Value Date    WBC 12.8 2024    HGB 11.5 2024    HCT 35.9 2024    .0 2024    CREATSERUM 0.88 2024    BUN 18 2024     2024    K 4.5 2024    CL 99 2024    CO2 30.0 2024     2024    CA 10.2 2024    CRP 6.30 2024      Cultures:   Blood cultures x 2 negative  MRSA screen negative    Radiology:  CONCLUSION:   1. Left lower extremity cellulitis.  No abscess.   2. Mild myositis of posterior superficial compartment with reactive enhancement.    3. No osteomyelitis.   4. Less pronounced superficial soft tissue edema in the right lower extremity seen at edge of field of view.       Assessment and Plan:     Sepsis presenting with fevers, leukocytosis, and  LLE cellulitis as etiology of events  - Tm 102F during this admission  - Blood cultures are negative  - Dopplers negative for DVT  - CT with R knee effusion and torn quadriceps tendon  - MRI with mild myositis  - IV ancef x 4 days given without significant improvement->cubicin + cefepime on 11/21/24     2.  Leukocytosis due to the above  - At 12K today and ~same  - Will trend     3.  Underlying psoriasis as a potential nidus for entry of bacteria  - At risk for MDROs due to humira use  - MRSA negative     4.  Some abnormalities in autoimmune labs  - Atypical PANCA equivocal  - Elevated compliment levels     5.  Disposition - very slow progress and pain remains significant.  Continue IV daptomycin and cefepime as Rx while here.  Ortho does not feel septic arthritis is a concern.  Humira remains held due to infection.  Arterial dopplers pending for completeness.  At present, pain control appears to be greatest issue - patient is not moving much, not ambulating - this will need to be improved prior to discharge planning.  Will place PICC today and anticipate longer course IV Rx given slow response to therapy.  Plan to use cefepime alone at discharge 2gm IV q8h x 2 weeks with definitive EOT to be determined.  Will discontinue daptomycin at discharge.  D/w patient. Social work to assist.    Kailee Calloway DO, [facility] Infectious Disease  [phone]    11/27/2024  8:55 AM

## 2024-11-27 NOTE — PAYOR COMM NOTE
CONTINUED STAY REVIEW    Payor: [employer] OUT [employer] STATE PPO  Subscriber #:  [id number]  Authorization Number: [id number]    Admit date: 11/18/24  Admit time:  4:58 PM    REVIEW DOCUMENTATION:  11/27    [facility] Hospitalist Progress Note      CC: Hospital Follow up     PCP: Eric Sethi DO         Assessment/Plan:      Principal Problem:    Cellulitis of left lower leg  Active Problems:    Psoriasis    Immunosuppression (HCC)     Jovan Merino - 54 year old male w MO, HTN, Afib, HFpEF, psoriasis admitted 11/18/2024 for LLE cellulitis, started on IV ancef. Initially improving however 11/21 worse - abx broadened to vanc/zosyn, now cefepime and daptomycin. CT with cellulitis . ID/Rheum/Orhto consulted. MRI pending.      Sepsis 2/2 LLE cellulitis  Immunosuppression 2/2 psoriasis/Humira  - Febrile to 102 here. HR 80s, WBC 14  - blood cx NG x 5 days  - Arterial and venous doppler neg. No DVT  - . Known HFpEF. Feels like legs aren't swollen & has lost weight  - Pain control: IVP dilaudid, PO oxy, APAP PRN  - IV ancef (11/18 - 21 )  - 11/19 febrile, check Bcx. Lot of pain - add tramadol (itching w oxy/norc), add IV toradol scheduled.   - Headaches, feels dehydrated - will give 1L NS bolus  - 11/21: slightly worse, LE very swollen, WBC higher. Will switch ancef to vanco/zosyn for now. Consult ID - now cefepime and daptomycin  - CT negative 11/21 for underlying abscess  - Rheum/Ortho on consult   - No concern for compartment syndrome or joint involvement at this time  - MRI with LLE cellulitis, mild myositis in posterior aspect, no abscess, no OM   - inflammatory markers down trending      Severe LLE pain and swelling  - possibly 2/2 cellulitis however not improving with IV abx  - no signs/symptoms of compartment syndrome  - continue pain control with PO and IV susana meds  - eval by ortho  - inflammatory markers significantly elevated  - CPK normal  - s/p IV lasix, x2  - will consult rheumatology  - MRI with LLE cellulitis,  mild myositis in posterior aspect, no abscess, no OM   - inflammatory markers down trending     Partial quad tear  L knee effusion  - ortho consulted, appreciate recs     Headaches  - fioricet PRN     HFpEF - compensated  HTN  Parox Afib - low CV, not on AC  - Continue PTA lasix, flecainide, coreg, hydral, enalapril     Psoriasis  - On humira OP. Would hold until leg better     Morbid obesity w [facility] [place]  - Body mass index is 52.8 kg/m².   - Healthy diet & wt loss encouraged  - Has lost quite a bit of weight with Mounjaro already     DM2 - currently controlled w mounjaro  - Okay for reg diet, follow BG on AM labs. Can hold on SSI/accuchecks for now, can add if issues controlling     Depression   - seen by psych liaison  - Will start Zoloft     ACP: Full Code  Ppx: DVT: [employer]  Dispo  EDoD:  ~11/29-11/30     F/u:   - PCP:  Eric Sethi,      Available via epic secure chat or perfect serve 7A - 7P.     Jan Fabian MD   Internal Medicine - [place] Hospitalist  [facility]      Subjective:      No CP, SOB, or N/V.  Left leg about the same.      OBJECTIVE:     Blood pressure 137/71, pulse 84, temperature 98.4 °F (36.9 °C), temperature source Oral, resp. rate 20, height 6' 1\" (1.854 m), weight (!) 400 lb 3.2 oz (181.5 kg), SpO2 92%.     Temp:  [97.9 °F (36.6 °C)-98.9 °F (37.2 °C)] 98.4 °F (36.9 °C)  Pulse:  [84-94] 84  Resp:  [18-20] 20  BP: (135-144)/(71-81) 137/71  SpO2:  [91 %-94 %] 92 %        Intake/Output:     Intake/Output Summary (Last 24 hours) at 11/27/2024 1016  Last data filed at 11/27/2024 0602      Gross per 24 hour   Intake 770 ml   Output 2350 ml   Net -1580 ml              Last 3 Weights   11/20/24 0528 (!) 400 lb 3.2 oz (181.5 kg)   11/19/24 0615 (!) 395 lb 3.2 oz (179.3 kg)   11/18/24 1705 (!) 394 lb 3.2 oz (178.8 kg)   11/18/24 1112 (!) 395 lb (179.2 kg)   11/17/21 1338 (!) 379 lb 3.2 oz (172 kg)   06/10/21 1222 (!) 364 lb (165.1 kg)         Exam   GEN: obese male in NAD, tearful  HEENT: EOMI  Pulm:  CTAB, no crackles or wheezes  CV: RRR, no murmurs, DP pulses present  ABD: Soft, non-tender, non-distended, +BS  MSK: LLE swelling, very TTP, LLE compartments not tense  Neuro: Grossly normal, CN intact, sensory intact  SKIN: warm, dry, psoriatic plaques, LLE with edema, erythema, warm to touch  EXT: left leg edema     Data Review:       Labs:            Recent Labs   Lab 11/25/24 0504 11/26/24 0458 11/27/24 0458   RBC 4.11* 4.17* 4.15*   HGB 11.2* 11.7* 11.5*   HCT 36.2* 36.2* 35.9*   MCV 88.1 86.8 86.5   MCH 27.3 28.1 27.7   MCHC 30.9* 32.3 32.0   RDW 15.0 15.0 14.9   WBC 12.3* 12.7* 12.8*   .0 298.0 330.0                  Recent Labs   Lab 11/25/24 0504 11/26/24 0458 11/27/24 0458   * 98 103*   BUN 18 17 18   CREATSERUM 0.74 0.81 0.88   EGFRCR 109 106 103   CA 9.9 10.4 10.2   * 136 133*   K 4.6 4.4 4.5    99 99   CO2 31.0 30.0 30.0         No results for input(s): \"ALT\", \"AST\", \"ALB\", \"AMYLASE\", \"LIPASE\", \"LDH\" in the last 168 hours.     Invalid input(s): \"ALPHOS\", \"TBIL\", \"DBIL\", \"TPROT\"        Imaging:  MRI LOWER LEG (W+WO), LEFT (CPT=73720)     Result Date: 11/26/2024  CONCLUSION:         1. Left lower extremity cellulitis.  No abscess. 2. Mild myositis of posterior superficial compartment with reactive enhancement.  3. No osteomyelitis. 4. Less pronounced superficial soft tissue edema in the right lower extremity seen at edge of field of view.    Dictated by (CST): Dexter Mendieta MD on 11/26/2024 at 1:54 PM     Finalized by (CST): Dexter Mendieta MD on 11/26/2024 at 2:03 PM           US VENOUS DOPPLER LEG LEFT - DIAG IMG (CPT=93971)     Result Date: 11/24/2024  CONCLUSION:          Nonvisualization of the left posterior tibial and peroneal veins.  Otherwise no DVT within the left lower extremity.   Dictated by (CST): Zeb Corona MD on 11/24/2024 at 2:08 PM     Finalized by (CST): Zeb Corona MD on 11/24/2024 at 2:09 PM               Meds:      Scheduled Medications     sertraline  25 mg Oral Daily    furosemide  40 mg Oral Daily    cefepime  2 g Intravenous Q8H    DAPTOmycin  1,000 mg Intravenous Q24H    clotrimazole-betamethasone   Topical BID    docosanol   Topical BID    cetirizine  10 mg Oral Daily    enalapril  20 mg Oral BID    flecainide  150 mg Oral BID    hydrALAZINE  50 mg Oral BID    carvedilol  25 mg Oral BID with meals    enoxaparin  90 mg Subcutaneous 2 times per day    magnesium oxide  200 mg Oral Nightly    dilTIAZem HCl ER Coated Beads  180 mg Oral Nightly         Medication Infusions           PRN Medications     ibuprofen    butalbital-acetaminophen-caffeine    oxyCODONE **OR** oxyCODONE    magnesium hydroxide    traMADol    calcium carbonate    famotidine    acetaminophen    melatonin    polyethylene glycol (PEG 3350)    sennosides    guaiFENesin    ondansetron    prochlorperazine    HYDROmorphone    HYDROmorphone    simethicone           MEDICATIONS ADMINISTERED IN LAST 1 DAY:  carvedilol (Coreg) tab 25 mg       Date Action Dose Route User    11/27/2024 0904 Given 25 mg Oral Araceli Rae RN    11/26/2024 1800 Given 25 mg Oral Henny Rojas RN          ceFEPIme (Maxpime) 2 g in sodium chloride 0.9% 100 mL IVPB-MBP       Date Action Dose Route User    11/27/2024 0602 New Bag 2 g Intravenous Mercy Toledo RN    11/26/2024 2044 New Bag 2 g Intravenous Mercy Toledo RN    11/26/2024 1403 New Bag 2 g Intravenous Henny Rojas RN          cetirizine (ZyrTEC) tab 10 mg       Date Action Dose Route User    11/27/2024 0904 Given 10 mg Oral Broucek, Araceli, RN          clotrimazole-betamethasone (Lotrisone) 1-0.05 % cream       Date Action Dose Route User    11/27/2024 0900 Given (none) Topical Araceli Rae RN    11/26/2024 2047 Given (none) Topical Mercy Toledo RN          DAPTOmycin (Cubicin) 1,000 mg in sodium chloride 0.9% PF IV syringe       Date Action Dose Route User    11/26/2024 1609 New Bag 1,000 mg Intravenous  Henny Rojas RN          dilTIAZem ER (CardIZEM CD) 24 hr cap 180 mg       Date Action Dose Route User    11/26/2024 2039 Given 180 mg Oral Mercy Toledo RN          docosanol (Abreva) 10 % cream       Date Action Dose Route User    11/27/2024 0905 Given (none) Topical Araceli Rae RN    11/26/2024 2048 Given (none) Topical Mercy Toledo RN          enalapril (Vasotec) tab 20 mg       Date Action Dose Route User    11/27/2024 0904 Given 20 mg Oral Araceli Rae RN    11/26/2024 2041 Given 20 mg Oral Mercy Toledo RN          enoxaparin (Lovenox) 100 MG/ML SUBQ injection 90 mg       Date Action Dose Route User    11/27/2024 0904 Given 90 mg Subcutaneous (Left Lower Abdomen) Araceli Rae RN    11/26/2024 2039 Given 90 mg Subcutaneous (Right Upper Arm) Mercy Toledo RN          flecainide (Tambocor) tab 150 mg       Date Action Dose Route User    11/27/2024 0912 Given 150 mg Oral Araceli Rae RN    11/26/2024 2039 Given 150 mg Oral Mercy Toledo RN          furosemide (Lasix) tab 40 mg       Date Action Dose Route User    11/27/2024 0904 Given 40 mg Oral Araceli Rae RN          gadoterate meglumine (Dotarem) 10 MMOL/20ML injection 20 mL       Date Action Dose Route User    11/26/2024 1327 Given 20 mL Intravenous Gorge Bergeron          hydrALAZINE (Apresoline) tab 50 mg       Date Action Dose Route User    11/27/2024 0904 Given 50 mg Oral Araceli Rae RN    11/26/2024 2041 Given 50 mg Oral Mercy Toledo RN          HYDROmorphone (Dilaudid) 1 MG/ML injection 1 mg       Date Action Dose Route User    11/27/2024 0904 Given 1 mg Intravenous Araceli Rae RN    11/27/2024 0405 Given 1 mg Intravenous Mercy Toledo RN    11/26/2024 2322 Given 1 mg Intravenous Mercy Toledo RN    11/26/2024 1530 Given 1 mg Intravenous Henny Rojas, CHELSEY          ibuprofen (Motrin) tab 400 mg       Date Action Dose Route User    11/27/2024 1122 Given  400 mg Oral Araceli Rae RN          magnesium oxide (Mag-Ox) tab 200 mg       Date Action Dose Route User    11/26/2024 2048 Given 200 mg Oral Mercy Toledo RN          oxyCODONE immediate release tab 5 mg       Date Action Dose Route User    11/26/2024 2039 Given 5 mg Oral Mercy Toledo RN          sertraline (Zoloft) tab 25 mg       Date Action Dose Route User    11/27/2024 0904 Given 25 mg Oral Araceli Rae RN            Vitals (last day)       Date/Time Temp Pulse Resp BP SpO2 Weight O2 Device O2 Flow Rate (L/min) [facility]    11/27/24 0902 98.4 °F (36.9 °C) 84 20 137/71 92 % -- None (Room air) -- AB    11/27/24 0409 97.9 °F (36.6 °C) 94 18 136/71 91 % -- None (Room air) -- EM    11/26/24 2037 98.9 °F (37.2 °C) 93 18 135/76 93 % -- None (Room air) -- EM    11/26/24 1357 98.8 °F (37.1 °C) 85 20 144/81 94 % -- None (Room air) -- AP    11/26/24 0807 97.6 °F (36.4 °C) 89 20 148/85 -- -- -- -- AP    11/26/24 0242 98.3 °F (36.8 °C) 84 20 135/72 95 % -- None (Room air) -- CN

## 2024-11-27 NOTE — PROGRESS NOTES
RHEUMATOLOGY HOSPITAL FOLLOW-UP    Jovan Merino Sr. is a 54 year old male.    ASSESSMENT/PLAN:     #LLE Rash, suspected cellulitis in an immunocompromised patient       #Torn Quadriceps Tendon w/  Knee Effusion. H/O Ruptured Quad tendon surgical repair   #Sepsis suspected 2/2 Above  #Cutaneous Psoriasis        -Treated w/ Humira Outpt. Follows with [facility]. MARYA Mojica     Chronic:  #DMII   #HFpEF  #HTN  #GERD  #NEREIDA  #OA     DISCUSSION:     Rheumatology following for LLE rash suspicious for cellulitis.  Imaging notable for torn quadriceps tendon and associated knee effusion.  Knee is not overtly warm or erythematous but does have a chronic scar from his previous surgery.  Appreciate ID recommendations and currently on antibiotics.  Ortho following as well.  Will check additional autoimmune wor-awaiting ANCA.  Otherwise, would continue to hold Humira given infection.     PLAN:  - Continue to hold Humira, he will resume once off Abx   - MRI knee shows no osteomyelitis, showed cellulitis, no abscess and mild myositis  - Found to have atypical p-ANCA.  This is typically seen in Crohn's or ulcerative colitis which he does not have  - Appreciate ID , Ortho recs       -Noting no concern for septic knee/ankle        - On Abx, will be having PICC line placed today        Thank you for asking us to see this nice patient and participate in his care.  Will sign off     Yumiko Kent MD  11/27/2024   4:07 PM        SUBJECTIVE:     Chief Complaint   Patient presents with    Swelling Edema       Not able to see this afternoon b/c pt. Was in mri     HISTORY:  Past Medical History:    Appendicitis    Arrhythmia    AF    Atrial fibrillation (HCC)    Congestive heart disease (HCC)    age 30 yrs.  1 episode    Esophageal reflux    High blood pressure    NEREIDA (obstructive sleep apnea)    Intolerant to cpap    Osteoarthritis    Unspecified essential hypertension    Ventral hernia    Visual impairment    glasses for  driving      Social Hx Reviewed   Family Hx Reviewed     Medications (Active prior to today's visit):  Current Outpatient Medications   Medication Sig Dispense Refill    cefepime HCl (MAXIPIME) 1 GM/50ML SOLN Inject 100 mL (2 g total) into the vein every 8 (eight) hours for 14 days. Check once weekly CBC, CMP, CRP and fax to [phone].  PICC care q week.  Flushes per [facility] protocol. 4200 mL 0     .cmed  Allergies:  Allergies[1]      ROS:   Review of Systems   Constitutional:  Negative for chills and fever.   HENT:  Negative for congestion, hearing loss, mouth sores, nosebleeds and trouble swallowing.    Eyes:  Negative for photophobia, pain, redness and visual disturbance.   Respiratory:  Negative for cough and shortness of breath.    Cardiovascular:  Negative for chest pain, palpitations and leg swelling.   Gastrointestinal:  Negative for abdominal pain, blood in stool, diarrhea and nausea.   Endocrine: Negative for cold intolerance and heat intolerance.   Genitourinary:  Negative for dysuria, frequency and hematuria.   Musculoskeletal:  Positive for arthralgias, gait problem and myalgias. Negative for back pain, joint swelling, neck pain and neck stiffness.   Skin:  Positive for rash. Negative for color change.   Neurological:  Negative for dizziness, weakness, numbness and headaches.   Psychiatric/Behavioral:  Negative for confusion and dysphoric mood.         PHYSICAL EXAM:     Vitals:    11/27/24 1334   BP: 118/70   Pulse: 81   Resp: 18   Temp: 97.7 °F (36.5 °C)     HEENT: Clear oropharynx, no oral ulcers, EOM intact, clear sclear, PERRLA, pleasant, no acute distress, no CAD,   No rashes  CVS: RRR, no murmurs  RS: CTAB, no crackles, no rhonchi  ABD: Soft Non tender, no HSM felt, BS positive  Joint exam:   no neck tendnerness, no synovitis   EXTREMITIES: distal LLE rash (stable), R knee not warm to touch-has an anterior scar from previous surgery (stable), psoriatic plaques affecting his hands, elbows,  legs  NEURO: intact touch,    RELEVANT PRIOR LABS:   2024:   WNL    2024:  Aldolase _____ (in process),  WNL  CRP 11.6 (high)  C3 888.8 (high), C4 42.7 (high)  ANCA  _____ (in process)    POC glucose 107  CBC with WBC 14.6, Hg 10.9 normocytic,  WNL  BMP with BUN 18, CR 0.81     2024:  Pro-Gonzalo 0.35 (high)  CBC: WBC 14.6, Hg 10.4 normocytic,  WNL     2024:   (high), CRP 18.9 (high)  CBC: WBC 16, Hg 11 normocytic,      2024:  MRSA PCR negative     CK 89 WNL     2024:  TSH 0.97 WNL  QuantiFERON-TB testing negative         Imagin2024 LLE CT:  Impression   CONCLUSION:  1. Diffuse edema in the subcutaneous tissues compatible with history of cellulitis.  No abscess.  2. Moderate to large knee joint effusion.  3. Partially torn distal quadriceps tendon .  4. Multiple varicose veins.              2024 LLE venous Doppler:  Impression   CONCLUSION:  1. No evidence of deep venous thrombosis in the left lower extremity.  The peroneal veins could not be visualized.                    [1]   Allergies  Allergen Reactions    Hydrocodone ITCHING and RASH

## 2024-11-27 NOTE — PLAN OF CARE
Problem: Patient Centered Care  Goal: Patient preferences are identified and integrated in the patient's plan of care  Description: Interventions:  - What would you like us to know as we care for you? From home alone  - Provide timely, complete, and accurate information to patient/family  - Incorporate patient and family knowledge, values, beliefs, and cultural backgrounds into the planning and delivery of care  - Encourage patient/family to participate in care and decision-making at the level they choose  - Honor patient and family perspectives and choices  Outcome: Progressing     Problem: CARDIOVASCULAR - ADULT  Goal: Maintains optimal cardiac output and hemodynamic stability  Description: INTERVENTIONS:  - Monitor vital signs, rhythm, and trends  - Monitor for bleeding, hypotension and signs of decreased cardiac output  - Evaluate effectiveness of vasoactive medications to optimize hemodynamic stability  - Monitor arterial and/or venous puncture sites for bleeding and/or hematoma  - Assess quality of pulses, skin color and temperature  - Assess for signs of decreased coronary artery perfusion - ex. Angina  - Evaluate fluid balance, assess for edema, trend weights  Outcome: Progressing     Problem: RESPIRATORY - ADULT  Goal: Achieves optimal ventilation and oxygenation  Description: INTERVENTIONS:  - Assess for changes in respiratory status  - Assess for changes in mentation and behavior  - Position to facilitate oxygenation and minimize respiratory effort  - Oxygen supplementation based on oxygen saturation or ABGs  - Provide Smoking Cessation handout, if applicable  - Encourage broncho-pulmonary hygiene including cough, deep breathe, Incentive Spirometry  - Assess the need for suctioning and perform as needed  - Assess and instruct to report SOB or any respiratory difficulty  - Respiratory Therapy support as indicated  - Manage/alleviate anxiety  - Monitor for signs/symptoms of CO2 retention  Outcome:  Progressing     Problem: GASTROINTESTINAL - ADULT  Goal: Minimal or absence of nausea and vomiting  Description: INTERVENTIONS:  - Maintain adequate hydration with IV or PO as ordered and tolerated  - Nasogastric tube to low intermittent suction as ordered  - Evaluate effectiveness of ordered antiemetic medications  - Provide nonpharmacologic comfort measures as appropriate  - Advance diet as tolerated, if ordered  - Obtain nutritional consult as needed  - Evaluate fluid balance  Outcome: Progressing     Problem: GENITOURINARY - ADULT  Goal: Absence of urinary retention  Description: INTERVENTIONS:  - Assess patient’s ability to void and empty bladder  - Monitor intake/output and perform bladder scan as needed  - Follow urinary retention protocol/standard of care  - Consider collaborating with pharmacy to review patient's medication profile  - Implement strategies to promote bladder emptying  Outcome: Progressing     Problem: METABOLIC/FLUID AND ELECTROLYTES - ADULT  Goal: Glucose maintained within prescribed range  Description: INTERVENTIONS:  - Monitor Blood Glucose as ordered  - Assess for signs and symptoms of hyperglycemia and hypoglycemia  - Administer ordered medications to maintain glucose within target range  - Assess barriers to adequate nutritional intake and initiate nutrition consult as needed  - Instruct patient on self management of diabetes  Outcome: Progressing  Goal: Electrolytes maintained within normal limits  Description: INTERVENTIONS:  - Monitor labs and rhythm and assess patient for signs and symptoms of electrolyte imbalances  - Administer electrolyte replacement as ordered  - Monitor response to electrolyte replacements, including rhythm and repeat lab results as appropriate  - Fluid restriction as ordered  - Instruct patient on fluid and nutrition restrictions as appropriate  Outcome: Progressing     Problem: SKIN/TISSUE INTEGRITY - ADULT  Goal: Skin integrity remains intact  Description:  INTERVENTIONS  - Assess and document risk factors for pressure ulcer development  - Assess and document skin integrity  - Monitor for areas of redness and/or skin breakdown  - Initiate interventions, skin care algorithm/standards of care as needed  Outcome: Progressing  Goal: Incision(s), wounds(s) or drain site(s) healing without S/S of infection  Description: INTERVENTIONS:  - Assess and document risk factors for pressure ulcer development  - Assess and document skin integrity  - Assess and document dressing/incision, wound bed, drain sites and surrounding tissue  - Implement wound care per orders  - Initiate isolation precautions as appropriate  - Initiate Pressure Ulcer prevention bundle as indicated  Outcome: Progressing     Problem: MUSCULOSKELETAL - ADULT  Goal: Return mobility to safest level of function  Description: INTERVENTIONS:  - Assess patient stability and activity tolerance for standing, transferring and ambulating w/ or w/o assistive devices  - Assist with transfers and ambulation using safe patient handling equipment as needed  - Ensure adequate protection for wounds/incisions during mobilization  - Obtain PT/OT consults as needed  - Advance activity as appropriate  - Communicate ordered activity level and limitations with patient/family  Outcome: Progressing     Problem: Impaired Functional Mobility  Goal: Achieve highest/safest level of mobility/gait  Description: Interventions:  - Assess patient's functional ability and stability  - Promote increasing activity/tolerance for mobility and gait  - Educate and engage patient/family in tolerated activity level and precautions  - Recommend use of  RW for transfers and ambulation  - Recommend patient transfer to bedside chair toward strongest side  - When transferring patient, block weaker knee for safety  - Recommend use of chair position in bed 3 times per day  Outcome: Progressing     Problem: Impaired Activities of Daily Living  Goal: Achieve  highest/safest level of independence in self care  Description: Interventions:  - Assess ability and encourage patient to participate in ADLs to maximize function  - Promote sitting position while performing ADLs such as feeding, grooming, and bathing  - Educate and encourage patient/family in tolerated functional activity level and precautions during self-care  Outcome: Progressing     Problem: PAIN - ADULT  Goal: Verbalizes/displays adequate comfort level or patient's stated pain goal  Description: INTERVENTIONS:  - Encourage pt to monitor pain and request assistance  - Assess pain using appropriate pain scale  - Administer analgesics based on type and severity of pain and evaluate response  - Implement non-pharmacological measures as appropriate and evaluate response  - Consider cultural and social influences on pain and pain management  - Manage/alleviate anxiety  - Utilize distraction and/or relaxation techniques  - Monitor for opioid side effects  - Notify MD/LIP if interventions unsuccessful or patient reports new pain  - Anticipate increased pain with activity and pre-medicate as appropriate  Outcome: Progressing     Problem: SAFETY ADULT - FALL  Goal: Free from fall injury  Description: INTERVENTIONS:  - Assess pt frequently for physical needs  - Identify cognitive and physical deficits and behaviors that affect risk of falls.  - [place] fall precautions as indicated by assessment.  - Educate pt/family on patient safety including physical limitations  - Instruct pt to call for assistance with activity based on assessment  - Modify environment to reduce risk of injury  - Provide assistive devices as appropriate  - Consider OT/PT consult to assist with strengthening/mobility  - Encourage toileting schedule  Outcome: Progressing     Problem: DISCHARGE PLANNING  Goal: Discharge to home or other facility with appropriate resources  Description: INTERVENTIONS:  - Identify barriers to discharge w/pt and  caregiver  - Include patient/family/discharge partner in discharge planning  - Arrange for needed discharge resources and transportation as appropriate  - Identify discharge learning needs (meds, wound care, etc)  - Arrange for interpreters to assist at discharge as needed  - Consider post-discharge preferences of patient/family/discharge partner  - Complete POLST form as appropriate  - Assess patient's ability to be responsible for managing their own health  - Refer to Case Management Department for coordinating discharge planning if the patient needs post-hospital services based on physician/LIP order or complex needs related to functional status, cognitive ability or social support system  Outcome: Progressing     Problem: Patient/Family Goals  Goal: Patient/Family Long Term Goal  Description: Patient's Long Term Goal: Discharge from the hospital    Interventions:  - Monitor vital signs  - Monitor appropriate labs  - Monitor blood glucose levels  - Pain management  - Administer medications per order  - Follow MD orders  - Diagnostics per order  - Update / inform patient and family on plan of care  - Discharge planning  - See additional Care Plan goals for specific interventions  Outcome: Progressing  Goal: Patient/Family Short Term Goal  Description: Patient's Short Term Goal: Improve redness, swelling, and pain to left lower extremity     Interventions:   - Monitor vital signs  - Monitor appropriate labs  - Monitor blood glucose levels  - Pain management  - Administer medications per order  - Follow MD orders  - Diagnostics per order  - Update / inform patient and family on plan of care  - See additional Care Plan goals for specific interventions  Outcome: Progressing     Problem: Diabetes/Glucose Control  Goal: Glucose maintained within prescribed range  Description: INTERVENTIONS:  - Monitor Blood Glucose as ordered  - Assess for signs and symptoms of hyperglycemia and hypoglycemia  - Administer ordered  medications to maintain glucose within target range  - Assess barriers to adequate nutritional intake and initiate nutrition consult as needed  - Instruct patient on self management of diabetes  Outcome: Progressing

## 2024-11-27 NOTE — PROGRESS NOTES
[facility] Hospitalist Progress Note     CC: Hospital Follow up    PCP: Eric Sethi DO       Assessment/Plan:     Principal Problem:    Cellulitis of left lower leg  Active Problems:    Psoriasis    Immunosuppression (HCC)    Jovan Merino - 54 year old male w MO, HTN, Afib, HFpEF, psoriasis admitted 11/18/2024 for LLE cellulitis, started on IV ancef. Initially improving however 11/21 worse - abx broadened to vanc/zosyn, now cefepime and daptomycin. CT with cellulitis . ID/Rheum/Orhto consulted. MRI pending.      Sepsis 2/2 LLE cellulitis  Immunosuppression 2/2 psoriasis/Humira  - Febrile to 102 here. HR 80s, WBC 14  - blood cx NG x 5 days  - Arterial and venous doppler neg. No DVT  - . Known HFpEF. Feels like legs aren't swollen & has lost weight  - Pain control: IVP dilaudid, PO oxy, APAP PRN  - IV ancef (11/18 - 21 )  - 11/19 febrile, check Bcx. Lot of pain - add tramadol (itching w oxy/norc), add IV toradol scheduled.   - Headaches, feels dehydrated - will give 1L NS bolus  - 11/21: slightly worse, LE very swollen, WBC higher. Will switch ancef to vanco/zosyn for now. Consult ID - now cefepime and daptomycin  - CT negative 11/21 for underlying abscess  - Rheum/Ortho on consult   - No concern for compartment syndrome or joint involvement at this time  - MRI with LLE cellulitis, mild myositis in posterior aspect, no abscess, no OM   - inflammatory markers down trending      Severe LLE pain and swelling  - possibly 2/2 cellulitis however not improving with IV abx  - no signs/symptoms of compartment syndrome  - continue pain control with PO and IV susana meds  - eval by ortho  - inflammatory markers significantly elevated  - CPK normal  - s/p IV lasix, x2  - will consult rheumatology  - MRI with LLE cellulitis, mild myositis in posterior aspect, no abscess, no OM   - inflammatory markers down trending     Partial quad tear  L knee effusion  - ortho consulted, appreciate recs     Headaches  - fioricet PRN     HFpEF  - compensated  HTN  Parox Afib - low CV, not on AC  - Continue PTA lasix, flecainide, coreg, hydral, enalapril     Psoriasis  - On humira OP. Would hold until leg better     Morbid obesity w snf  - Body mass index is 52.8 kg/m².   - Healthy diet & wt loss encouraged  - Has lost quite a bit of weight with Mounjaro already     DM2 - currently controlled w mounjaro  - Okay for reg diet, follow BG on AM labs. Can hold on SSI/accuchecks for now, can add if issues controlling     Depression   - seen by psych liaison  - Will start Zoloft    ACP: Full Code  Ppx: DVT: Enox  Dispo  EDoD:  ~11/29-11/30     F/u:   - PCP:  Eric Sethi,      Available via epic secure chat or perfect serve 7A - 7P.     Jan Fabian MD   Internal Medicine - Hospitalist  [facility]     Subjective:     No CP, SOB, or N/V.  Left leg about the same.     OBJECTIVE:    Blood pressure 137/71, pulse 84, temperature 98.4 °F (36.9 °C), temperature source Oral, resp. rate 20, height 6' 1\" (1.854 m), weight (!) 400 lb 3.2 oz (181.5 kg), SpO2 92%.    Temp:  [97.9 °F (36.6 °C)-98.9 °F (37.2 °C)] 98.4 °F (36.9 °C)  Pulse:  [84-94] 84  Resp:  [18-20] 20  BP: (135-144)/(71-81) 137/71  SpO2:  [91 %-94 %] 92 %      Intake/Output:    Intake/Output Summary (Last 24 hours) at 11/27/2024 1016  Last data filed at 11/27/2024 0602  Gross per 24 hour   Intake 770 ml   Output 2350 ml   Net -1580 ml       Last 3 Weights   11/20/24 0528 (!) 400 lb 3.2 oz (181.5 kg)   11/19/24 0615 (!) 395 lb 3.2 oz (179.3 kg)   11/18/24 1705 (!) 394 lb 3.2 oz (178.8 kg)   11/18/24 1112 (!) 395 lb (179.2 kg)   11/17/21 1338 (!) 379 lb 3.2 oz (172 kg)   06/10/21 1222 (!) 364 lb (165.1 kg)       Exam   GEN: obese male in NAD, tearful  HEENT: EOMI  Pulm: CTAB, no crackles or wheezes  CV: RRR, no murmurs, DP pulses present  ABD: Soft, non-tender, non-distended, +BS  MSK: LLE swelling, very TTP, LLE compartments not tense  Neuro: Grossly normal, CN intact, sensory intact  SKIN:  warm, dry, psoriatic plaques, LLE with edema, erythema, warm to touch  EXT: left leg edema    Data Review:       Labs:     Recent Labs   Lab 11/25/24  0504 11/26/24 0458 11/27/24 0458   RBC 4.11* 4.17* 4.15*   HGB 11.2* 11.7* 11.5*   HCT 36.2* 36.2* 35.9*   MCV 88.1 86.8 86.5   MCH 27.3 28.1 27.7   MCHC 30.9* 32.3 32.0   RDW 15.0 15.0 14.9   WBC 12.3* 12.7* 12.8*   .0 298.0 330.0         Recent Labs   Lab 11/25/24  0504 11/26/24 0458 11/27/24 0458   * 98 103*   BUN 18 17 18   CREATSERUM 0.74 0.81 0.88   EGFRCR 109 106 103   CA 9.9 10.4 10.2   * 136 133*   K 4.6 4.4 4.5    99 99   CO2 31.0 30.0 30.0       No results for input(s): \"ALT\", \"AST\", \"ALB\", \"AMYLASE\", \"LIPASE\", \"LDH\" in the last 168 hours.    Invalid input(s): \"ALPHOS\", \"TBIL\", \"DBIL\", \"TPROT\"      Imaging:  MRI LOWER LEG (W+WO), LEFT (CPT=73720)    Result Date: 11/26/2024  CONCLUSION:  1. Left lower extremity cellulitis.  No abscess. 2. Mild myositis of posterior superficial compartment with reactive enhancement.  3. No osteomyelitis. 4. Less pronounced superficial soft tissue edema in the right lower extremity seen at edge of field of view.    Dictated by (CST): Dexter Mendieta MD on 11/26/2024 at 1:54 PM     Finalized by (CST): Dexter Mendieta MD on 11/26/2024 at 2:03 PM          US VENOUS DOPPLER LEG LEFT - DIAG IMG (CPT=93971)    Result Date: 11/24/2024  CONCLUSION:   Nonvisualization of the left posterior tibial and peroneal veins.  Otherwise no DVT within the left lower extremity.   Dictated by (CST): Zeb Corona MD on 11/24/2024 at 2:08 PM     Finalized by (CST): Zeb Corona MD on 11/24/2024 at 2:09 PM             Meds:      sertraline  25 mg Oral Daily    furosemide  40 mg Oral Daily    cefepime  2 g Intravenous Q8H    DAPTOmycin  1,000 mg Intravenous Q24H    clotrimazole-betamethasone   Topical BID    docosanol   Topical BID    cetirizine  10 mg Oral Daily    enalapril  20 mg Oral BID    flecainide  150 mg Oral BID     hydrALAZINE  50 mg Oral BID    carvedilol  25 mg Oral BID with meals    enoxaparin  90 mg Subcutaneous 2 times per day    magnesium oxide  200 mg Oral Nightly    dilTIAZem HCl ER Coated Beads  180 mg Oral Nightly         ibuprofen    butalbital-acetaminophen-caffeine    oxyCODONE **OR** oxyCODONE    magnesium hydroxide    traMADol    calcium carbonate    famotidine    acetaminophen    melatonin    polyethylene glycol (PEG 3350)    sennosides    guaiFENesin    ondansetron    prochlorperazine    HYDROmorphone    HYDROmorphone    simethicone

## 2024-11-27 NOTE — CM/SW NOTE
11/27/24 1400   CM/SW Referral Data   Referral Source Physician   Reason for Referral Discharge planning   Informant Patient;EMR;Clinical Staff Member   Medical Hx   Does patient have an established PCP? Yes  (Eric Lemon/ Dr. Dain Gooden)   Patient Info   Advanced directives? No   Patient's Current Mental Status at Time of Assessment Alert;Oriented   Patient's Home Environment House   Number of Levels in Home 1   Number of Stair in Home 2 SARIAH   Patient lives with Alone   Patient Status Prior to Admission   Independent with ADLs and Mobility Yes   Services in place prior to admission DME/Supplies at home   Type of DME/Supplies Wheeled Walker   Discharge Needs   Anticipated D/C needs Infusion care;Home health care     SW received MD order for IV abx at discharge.    PICC placed today and final script entered - q8 cefepime for 2 weeks.    Pt admitted for cellulitis of LLE.  ID has been following case.    MRI of leg revealed partially torn distal quadriceps tendon.    Ortho saw pt- rec WBAT.      SW met with pt bedside to discuss DC options.    Pt confirmed address on file.  Pt stated he has not seen his PCP Dr. Sethi in \"awhile\" but has seen his endocrinologist Dr. Dain Gooden.    At baseline, pt is self-care.  Pt works full time and drives.    SW explained home infusion vs. outpatient (if daily dosing) vs. a MEGAN facility.    Pt stated he would like HHC- feels he is most limited by pain at this time and would not benefit from a MEGAN.    SW sent HHC/infusion/ referrals in [place].    Face to face certification/HHC orders uploaded to referral.    Pt stated that although he live alone, he has a close network of friends who can support him (brother and close friend who is an RN.    PLAN: DC home w/HHC and IV abx pending med [place]    / to remain available for support and/or discharge planning.     Georgie Landis MSW, LSW [id number]

## 2024-11-27 NOTE — PROGRESS NOTES
11/27/24 0904   Wound 11/22/24 Leg Left;Lateral;Lower   Date First Assessed/Time First Assessed: 11/22/24 1115   Present on Original Admission: Yes  Primary Wound Type: Cellulitis  Location: Leg  Wound Location Orientation: Left;Lateral;Lower  Wound Description (Comments): redness, some leakage from small ...   Wound Image    Site Assessment Clean;Dry;Intact;Edema;Fragile;Painful;Red   Closure Approximated   Drainage Amount None   Treatments Topical (Barrier/Moisturizer/Ointment)   Dressing Ace bandage   Dressing Changed New   Non-staged Wound Description Not applicable   Elizabeth-wound Assessment Clean;Dry;Intact;Edema;Painful;Pink   Wound Bed Epithelium (%) 100 %   State of Healing Epithelialized   Wound Odor None   Wound Follow Up   Follow up needed No     Pt seen for follow up. There is no open areas, no weeping, no drainage. Area to left lateral calf remains tender/red. Vaseline applied to dry skin, ace applied from toes to knee to help w edema. LLE elevated. Wound care signing off. Bedside RN present during evaluation.

## 2024-11-27 NOTE — PROGRESS NOTES
[facility] was consulted for PHQ-4 score =8 .     Patient with history of depression, recently started Zoloft 25 mg.  Patient seen 11/20 for PHQ follow up and was provided with resources for Psychiatry and Therapy.     No history or current concerns of suicidal ideation or safety risks noted. Pt declined additional referrals and had no further questions/concerns at this time.      JOHNNY Crabtree  [id number]

## 2024-11-28 LAB
ANION GAP SERPL CALC-SCNC: 4 MMOL/L (ref 0–18)
BUN BLD-MCNC: 20 MG/DL (ref 9–23)
BUN/CREAT SERPL: 23.5 (ref 10–20)
CALCIUM BLD-MCNC: 10 MG/DL (ref 8.7–10.4)
CHLORIDE SERPL-SCNC: 102 MMOL/L (ref 98–112)
CK SERPL-CCNC: 65 U/L
CO2 SERPL-SCNC: 29 MMOL/L (ref 21–32)
CREAT BLD-MCNC: 0.85 MG/DL
DEPRECATED RDW RBC AUTO: 48.4 FL (ref 35.1–46.3)
EGFRCR SERPLBLD CKD-EPI 2021: 105 ML/MIN/1.73M2 (ref 60–?)
ERYTHROCYTE [DISTWIDTH] IN BLOOD BY AUTOMATED COUNT: 15 % (ref 11–15)
GLUCOSE BLD-MCNC: 110 MG/DL (ref 70–99)
GLUCOSE BLDC GLUCOMTR-MCNC: 108 MG/DL (ref 70–99)
GLUCOSE BLDC GLUCOMTR-MCNC: 109 MG/DL (ref 70–99)
GLUCOSE BLDC GLUCOMTR-MCNC: 114 MG/DL (ref 70–99)
GLUCOSE BLDC GLUCOMTR-MCNC: 166 MG/DL (ref 70–99)
HCT VFR BLD AUTO: 34.8 %
HGB BLD-MCNC: 11.1 G/DL
MCH RBC QN AUTO: 27.7 PG (ref 26–34)
MCHC RBC AUTO-ENTMCNC: 31.9 G/DL (ref 31–37)
MCV RBC AUTO: 86.8 FL
OSMOLALITY SERPL CALC.SUM OF ELEC: 283 MOSM/KG (ref 275–295)
PLATELET # BLD AUTO: 343 10(3)UL (ref 150–450)
POTASSIUM SERPL-SCNC: 4.5 MMOL/L (ref 3.5–5.1)
RBC # BLD AUTO: 4.01 X10(6)UL
SODIUM SERPL-SCNC: 135 MMOL/L (ref 136–145)
WBC # BLD AUTO: 10.4 X10(3) UL (ref 4–11)

## 2024-11-28 NOTE — PROGRESS NOTES
[facility] Hospitalist Progress Note     CC: Hospital Follow up    PCP: Eric Sethi DO       Assessment/Plan:     Principal Problem:    Cellulitis of left lower leg  Active Problems:    Psoriasis    Immunosuppression (HCC)    Jovan Merino - 54 year old male w MO, HTN, Afib, HFpEF, psoriasis admitted 11/18/2024 for LLE cellulitis, started on IV ancef. Initially improving however 11/21 worse - abx broadened to vanc/zosyn, now cefepime and daptomycin. CT with cellulitis . ID/Rheum/Orhto consulted. MRI with LLE cellulitis, mild myositis in posterior aspect, no abscess, no OM.  PICC in place for extended IV abx coverage. Will need MEGAN.      Sepsis 2/2 LLE cellulitis  Immunosuppression 2/2 psoriasis/Humira  - Febrile to 102 here. HR 80s, WBC 14  - blood cx NG x 5 days  - Arterial and venous doppler neg. No DVT  - . Known HFpEF. Feels like legs aren't swollen & has lost weight  - Pain control: IVP dilaudid, PO oxy, APAP PRN  - IV ancef (11/18 - 21 )  - 11/19 febrile, check Bcx. Lot of pain - add tramadol (itching w oxy/norc), add IV toradol scheduled.   - Headaches, feels dehydrated - will give 1L NS bolus  - 11/21: slightly worse, LE very swollen, WBC higher. Will switch ancef to vanco/zosyn for now. Consult ID - now cefepime and daptomycin  - CT negative 11/21 for underlying abscess  - Rheum/Ortho on consult   - No concern for compartment syndrome or joint involvement at this time  - MRI with LLE cellulitis, mild myositis in posterior aspect, no abscess, no OM   - inflammatory markers down trending   - PICC in place for extended IV abx coverage  - Will need MEGAN     Severe LLE pain and swelling  - possibly 2/2 cellulitis however not improving with IV abx  - no signs/symptoms of compartment syndrome  - continue pain control with PO and IV susana meds  - eval by ortho  - inflammatory markers significantly elevated  - CPK normal  - s/p IV lasix, x2  - will consult rheumatology  - MRI with LLE cellulitis, mild myositis in  posterior aspect, no abscess, no OM   - inflammatory markers down trending     Partial quad tear  L knee effusion  - ortho consulted, appreciate recs     Headaches  - fioricet PRN     HFpEF - compensated  HTN  Parox Afib - low CV, not on AC  - Continue PTA lasix, flecainide, coreg, hydral, enalapril     Psoriasis  - On humira OP. Would hold until leg better     Morbid obesity w [facility]  - Body mass index is 52.8 kg/m².   - Healthy diet & wt loss encouraged  - Has lost quite a bit of weight with Mounjaro already     DM2 - currently controlled w mounjaro  - Okay for reg diet, follow BG on AM labs. Can hold on SSI/accuchecks for now, can add if issues controlling     Depression   - seen by psych liaison  - Will start Zoloft    ACP: Full Code  Ppx: DVT: Enox  Dispo  EDoD:  ~11/29-11/30     F/u:   - PCP:  Eric Sethi, DO     Available via epic secure chat or perfect serve 7A - 7P.     Jan Fabian MD   Internal Medicine - [facility]ist  [facility]     Subjective:     No CP, SOB, or N/V.  Left leg about the same.   More pain with standing or moving.   More itching last night.     OBJECTIVE:    Blood pressure 147/81, pulse 80, temperature 98.7 °F (37.1 °C), temperature source Oral, resp. rate 18, height 6' 1\" (1.854 m), weight (!) 400 lb 3.2 oz (181.5 kg), SpO2 92%.    Temp:  [97.7 °F (36.5 °C)-99.2 °F (37.3 °C)] 98.7 °F (37.1 °C)  Pulse:  [77-96] 80  Resp:  [18] 18  BP: (117-155)/(70-81) 147/81  SpO2:  [91 %-93 %] 92 %      Intake/Output:    Intake/Output Summary (Last 24 hours) at 11/28/2024 1233  Last data filed at 11/28/2024 0606  Gross per 24 hour   Intake 532 ml   Output 1300 ml   Net -768 ml       Last 3 Weights   11/20/24 0528 (!) 400 lb 3.2 oz (181.5 kg)   11/19/24 0615 (!) 395 lb 3.2 oz (179.3 kg)   11/18/24 1705 (!) 394 lb 3.2 oz (178.8 kg)   11/18/24 1112 (!) 395 lb (179.2 kg)   11/17/21 1338 (!) 379 lb 3.2 oz (172 kg)   06/10/21 1222 (!) 364 lb (165.1 kg)       Exam   GEN: obese male in NAD,  tearful  HEENT: EOMI  Pulm: CTAB, no crackles or wheezes  CV: RRR, no murmurs, DP pulses present  ABD: Soft, non-tender, non-distended, +BS  MSK: LLE swelling, very TTP, LLE compartments not tense  Neuro: Grossly normal, CN intact, sensory intact  SKIN: warm, dry, psoriatic plaques, LLE with edema, erythema, warm to touch  EXT: left leg edema    Data Review:       Labs:     Recent Labs   Lab 11/26/24 0458 11/27/24 0458 11/28/24 0435   RBC 4.17* 4.15* 4.01*   HGB 11.7* 11.5* 11.1*   HCT 36.2* 35.9* 34.8*   MCV 86.8 86.5 86.8   MCH 28.1 27.7 27.7   MCHC 32.3 32.0 31.9   RDW 15.0 14.9 15.0   WBC 12.7* 12.8* 10.4   .0 330.0 343.0         Recent Labs   Lab 11/26/24 0458 11/27/24 0458 11/28/24 0435   GLU 98 103* 110*   BUN 17 18 20   CREATSERUM 0.81 0.88 0.85   EGFRCR 106 103 105   CA 10.4 10.2 10.0    133* 135*   K 4.4 4.5 4.5   CL 99 99 102   CO2 30.0 30.0 29.0       No results for input(s): \"ALT\", \"AST\", \"ALB\", \"AMYLASE\", \"LIPASE\", \"LDH\" in the last 168 hours.    Invalid input(s): \"ALPHOS\", \"TBIL\", \"DBIL\", \"TPROT\"      Imaging:  MRI LOWER LEG (W+WO), LEFT (CPT=73720)    Result Date: 11/26/2024  CONCLUSION:  1. Left lower extremity cellulitis.  No abscess. 2. Mild myositis of posterior superficial compartment with reactive enhancement.  3. No osteomyelitis. 4. Less pronounced superficial soft tissue edema in the right lower extremity seen at edge of field of view.    Dictated by (CST): Dexter Mendieta MD on 11/26/2024 at 1:54 PM     Finalized by (CST): Dexter Mendieta MD on 11/26/2024 at 2:03 PM             Meds:      sertraline  25 mg Oral Daily    furosemide  40 mg Oral Daily    cefepime  2 g Intravenous Q8H    DAPTOmycin  1,000 mg Intravenous Q24H    clotrimazole-betamethasone   Topical BID    docosanol   Topical BID    cetirizine  10 mg Oral Daily    enalapril  20 mg Oral BID    flecainide  150 mg Oral BID    hydrALAZINE  50 mg Oral BID    carvedilol  25 mg Oral BID with meals    enoxaparin  90 mg  Subcutaneous 2 times per day    magnesium oxide  200 mg Oral Nightly    dilTIAZem HCl ER Coated Beads  180 mg Oral Nightly         ibuprofen    butalbital-acetaminophen-caffeine    magnesium hydroxide    traMADol    calcium carbonate    famotidine    acetaminophen    melatonin    polyethylene glycol (PEG 3350)    sennosides    guaiFENesin    ondansetron    prochlorperazine    HYDROmorphone    HYDROmorphone    simethicone

## 2024-11-28 NOTE — PLAN OF CARE
Problem: Patient Centered Care  Goal: Patient preferences are identified and integrated in the patient's plan of care  Description: Interventions:  - What would you like us to know as we care for you? From home alone  - Provide timely, complete, and accurate information to patient/family  - Incorporate patient and family knowledge, values, beliefs, and cultural backgrounds into the planning and delivery of care  - Encourage patient/family to participate in care and decision-making at the level they choose  - Honor patient and family perspectives and choices  Outcome: Progressing     Problem: CARDIOVASCULAR - ADULT  Goal: Maintains optimal cardiac output and hemodynamic stability  Description: INTERVENTIONS:  - Monitor vital signs, rhythm, and trends  - Monitor for bleeding, hypotension and signs of decreased cardiac output  - Evaluate effectiveness of vasoactive medications to optimize hemodynamic stability  - Monitor arterial and/or venous puncture sites for bleeding and/or hematoma  - Assess quality of pulses, skin color and temperature  - Assess for signs of decreased coronary artery perfusion - ex. Angina  - Evaluate fluid balance, assess for edema, trend weights  Outcome: Progressing     Problem: RESPIRATORY - ADULT  Goal: Achieves optimal ventilation and oxygenation  Description: INTERVENTIONS:  - Assess for changes in respiratory status  - Assess for changes in mentation and behavior  - Position to facilitate oxygenation and minimize respiratory effort  - Oxygen supplementation based on oxygen saturation or ABGs  - Provide Smoking Cessation handout, if applicable  - Encourage broncho-pulmonary hygiene including cough, deep breathe, Incentive Spirometry  - Assess the need for suctioning and perform as needed  - Assess and instruct to report SOB or any respiratory difficulty  - Respiratory Therapy support as indicated  - Manage/alleviate anxiety  - Monitor for signs/symptoms of CO2 retention  Outcome:  Progressing     Problem: GASTROINTESTINAL - ADULT  Goal: Minimal or absence of nausea and vomiting  Description: INTERVENTIONS:  - Maintain adequate hydration with IV or PO as ordered and tolerated  - Nasogastric tube to low intermittent suction as ordered  - Evaluate effectiveness of ordered antiemetic medications  - Provide nonpharmacologic comfort measures as appropriate  - Advance diet as tolerated, if ordered  - Obtain nutritional consult as needed  - Evaluate fluid balance  Outcome: Progressing     Problem: GENITOURINARY - ADULT  Goal: Absence of urinary retention  Description: INTERVENTIONS:  - Assess patient’s ability to void and empty bladder  - Monitor intake/output and perform bladder scan as needed  - Follow urinary retention protocol/standard of care  - Consider collaborating with pharmacy to review patient's medication profile  - Implement strategies to promote bladder emptying  Outcome: Progressing     Problem: METABOLIC/FLUID AND ELECTROLYTES - ADULT  Goal: Glucose maintained within prescribed range  Description: INTERVENTIONS:  - Monitor Blood Glucose as ordered  - Assess for signs and symptoms of hyperglycemia and hypoglycemia  - Administer ordered medications to maintain glucose within target range  - Assess barriers to adequate nutritional intake and initiate nutrition consult as needed  - Instruct patient on self management of diabetes  Outcome: Progressing  Goal: Electrolytes maintained within normal limits  Description: INTERVENTIONS:  - Monitor labs and rhythm and assess patient for signs and symptoms of electrolyte imbalances  - Administer electrolyte replacement as ordered  - Monitor response to electrolyte replacements, including rhythm and repeat lab results as appropriate  - Fluid restriction as ordered  - Instruct patient on fluid and nutrition restrictions as appropriate  Outcome: Progressing     Problem: SKIN/TISSUE INTEGRITY - ADULT  Goal: Skin integrity remains intact  Description:  INTERVENTIONS  - Assess and document risk factors for pressure ulcer development  - Assess and document skin integrity  - Monitor for areas of redness and/or skin breakdown  - Initiate interventions, skin care algorithm/standards of care as needed  Outcome: Progressing  Goal: Incision(s), wounds(s) or drain site(s) healing without S/S of infection  Description: INTERVENTIONS:  - Assess and document risk factors for pressure ulcer development  - Assess and document skin integrity  - Assess and document dressing/incision, wound bed, drain sites and surrounding tissue  - Implement wound care per orders  - Initiate isolation precautions as appropriate  - Initiate Pressure Ulcer prevention bundle as indicated  Outcome: Progressing     Problem: MUSCULOSKELETAL - ADULT  Goal: Return mobility to safest level of function  Description: INTERVENTIONS:  - Assess patient stability and activity tolerance for standing, transferring and ambulating w/ or w/o assistive devices  - Assist with transfers and ambulation using safe patient handling equipment as needed  - Ensure adequate protection for wounds/incisions during mobilization  - Obtain PT/OT consults as needed  - Advance activity as appropriate  - Communicate ordered activity level and limitations with patient/family  Outcome: Progressing     Problem: Impaired Functional Mobility  Goal: Achieve highest/safest level of mobility/gait  Description: Interventions:  - Assess patient's functional ability and stability  - Promote increasing activity/tolerance for mobility and gait  - Educate and engage patient/family in tolerated activity level and precautions  - Recommend use of  RW for transfers and ambulation  - Recommend patient transfer to bedside chair toward strongest side  - When transferring patient, block weaker knee for safety  - Recommend use of chair position in bed 3 times per day  Outcome: Progressing     Problem: Impaired Activities of Daily Living  Goal: Achieve  highest/safest level of independence in self care  Description: Interventions:  - Assess ability and encourage patient to participate in ADLs to maximize function  - Promote sitting position while performing ADLs such as feeding, grooming, and bathing  - Educate and encourage patient/family in tolerated functional activity level and precautions during self-care  Outcome: Progressing     Problem: PAIN - ADULT  Goal: Verbalizes/displays adequate comfort level or patient's stated pain goal  Description: INTERVENTIONS:  - Encourage pt to monitor pain and request assistance  - Assess pain using appropriate pain scale  - Administer analgesics based on type and severity of pain and evaluate response  - Implement non-pharmacological measures as appropriate and evaluate response  - Consider cultural and social influences on pain and pain management  - Manage/alleviate anxiety  - Utilize distraction and/or relaxation techniques  - Monitor for opioid side effects  - Notify MD/LIP if interventions unsuccessful or patient reports new pain  - Anticipate increased pain with activity and pre-medicate as appropriate  Outcome: Progressing     Problem: SAFETY ADULT - FALL  Goal: Free from fall injury  Description: INTERVENTIONS:  - Assess pt frequently for physical needs  - Identify cognitive and physical deficits and behaviors that affect risk of falls.  - [place] fall precautions as indicated by assessment.  - Educate pt/family on patient safety including physical limitations  - Instruct pt to call for assistance with activity based on assessment  - Modify environment to reduce risk of injury  - Provide assistive devices as appropriate  - Consider OT/PT consult to assist with strengthening/mobility  - Encourage toileting schedule  Outcome: Progressing     Problem: DISCHARGE PLANNING  Goal: Discharge to home or other facility with appropriate resources  Description: INTERVENTIONS:  - Identify barriers to discharge w/pt and  caregiver  - Include patient/family/discharge partner in discharge planning  - Arrange for needed discharge resources and transportation as appropriate  - Identify discharge learning needs (meds, wound care, etc)  - Arrange for interpreters to assist at discharge as needed  - Consider post-discharge preferences of patient/family/discharge partner  - Complete POLST form as appropriate  - Assess patient's ability to be responsible for managing their own health  - Refer to Case Management Department for coordinating discharge planning if the patient needs post-hospital services based on physician/LIP order or complex needs related to functional status, cognitive ability or social support system  Outcome: Progressing     Problem: Patient/Family Goals  Goal: Patient/Family Long Term Goal  Description: Patient's Long Term Goal: Discharge from the hospital    Interventions:  - Monitor vital signs  - Monitor appropriate labs  - Monitor blood glucose levels  - Pain management  - Administer medications per order  - Follow MD orders  - Diagnostics per order  - Update / inform patient and family on plan of care  - Discharge planning  - See additional Care Plan goals for specific interventions  Outcome: Progressing  Goal: Patient/Family Short Term Goal  Description: Patient's Short Term Goal: Improve redness, swelling, and pain to left lower extremity     Interventions:   - Monitor vital signs  - Monitor appropriate labs  - Monitor blood glucose levels  - Pain management  - Administer medications per order  - Follow MD orders  - Diagnostics per order  - Update / inform patient and family on plan of care  - See additional Care Plan goals for specific interventions  Outcome: Progressing     Problem: Diabetes/Glucose Control  Goal: Glucose maintained within prescribed range  Description: INTERVENTIONS:  - Monitor Blood Glucose as ordered  - Assess for signs and symptoms of hyperglycemia and hypoglycemia  - Administer ordered  medications to maintain glucose within target range  - Assess barriers to adequate nutritional intake and initiate nutrition consult as needed  - Instruct patient on self management of diabetes  Outcome: Progressing

## 2024-11-28 NOTE — PLAN OF CARE
Problem: Patient Centered Care  Goal: Patient preferences are identified and integrated in the patient's plan of care  Description: Interventions:  - What would you like us to know as we care for you? From home alone  - Provide timely, complete, and accurate information to patient/family  - Incorporate patient and family knowledge, values, beliefs, and cultural backgrounds into the planning and delivery of care  - Encourage patient/family to participate in care and decision-making at the level they choose  - Honor patient and family perspectives and choices  Outcome: Progressing     Problem: RESPIRATORY - ADULT  Goal: Achieves optimal ventilation and oxygenation  Description: INTERVENTIONS:  - Assess for changes in respiratory status  - Assess for changes in mentation and behavior  - Position to facilitate oxygenation and minimize respiratory effort  - Oxygen supplementation based on oxygen saturation or ABGs  - Provide Smoking Cessation handout, if applicable  - Encourage broncho-pulmonary hygiene including cough, deep breathe, Incentive Spirometry  - Assess the need for suctioning and perform as needed  - Assess and instruct to report SOB or any respiratory difficulty  - Respiratory Therapy support as indicated  - Manage/alleviate anxiety  - Monitor for signs/symptoms of CO2 retention  Outcome: Progressing     Problem: GENITOURINARY - ADULT  Goal: Absence of urinary retention  Description: INTERVENTIONS:  - Assess patient’s ability to void and empty bladder  - Monitor intake/output and perform bladder scan as needed  - Follow urinary retention protocol/standard of care  - Consider collaborating with pharmacy to review patient's medication profile  - Implement strategies to promote bladder emptying  Outcome: Progressing     Problem: METABOLIC/FLUID AND ELECTROLYTES - ADULT  Goal: Glucose maintained within prescribed range  Description: INTERVENTIONS:  - Monitor Blood Glucose as ordered  - Assess for signs and  symptoms of hyperglycemia and hypoglycemia  - Administer ordered medications to maintain glucose within target range  - Assess barriers to adequate nutritional intake and initiate nutrition consult as needed  - Instruct patient on self management of diabetes  Outcome: Progressing     Problem: SKIN/TISSUE INTEGRITY - ADULT  Goal: Skin integrity remains intact  Description: INTERVENTIONS  - Assess and document risk factors for pressure ulcer development  - Assess and document skin integrity  - Monitor for areas of redness and/or skin breakdown  - Initiate interventions, skin care algorithm/standards of care as needed  Outcome: Progressing     Problem: SAFETY ADULT - FALL  Goal: Free from fall injury  Description: INTERVENTIONS:  - Assess pt frequently for physical needs  - Identify cognitive and physical deficits and behaviors that affect risk of falls.  - [place] fall precautions as indicated by assessment.  - Educate pt/family on patient safety including physical limitations  - Instruct pt to call for assistance with activity based on assessment  - Modify environment to reduce risk of injury  - Provide assistive devices as appropriate  - Consider OT/PT consult to assist with strengthening/mobility  - Encourage toileting schedule  Outcome: Progressing     Problem: Impaired Activities of Daily Living  Goal: Achieve highest/safest level of independence in self care  Description: Interventions:  - Assess ability and encourage patient to participate in ADLs to maximize function  - Promote sitting position while performing ADLs such as feeding, grooming, and bathing  - Educate and encourage patient/family in tolerated functional activity level and precautions during self-care  Outcome: Progressing     Problem: Diabetes/Glucose Control  Goal: Glucose maintained within prescribed range  Description: INTERVENTIONS:  - Monitor Blood Glucose as ordered  - Assess for signs and symptoms of hyperglycemia and hypoglycemia  -  Administer ordered medications to maintain glucose within target range  - Assess barriers to adequate nutritional intake and initiate nutrition consult as needed  - Instruct patient on self management of diabetes  Outcome: Progressing

## 2024-11-28 NOTE — PROGRESS NOTES
[facility]  part of [facility] Infectious Disease Progress Note    Jovan CLEVELAND Merino Sr. Patient Status:  Inpatient    1970 MRN [id number]   Location [facility] 5SW/SE Attending Jan Fabian MD   Hosp Day # 10 PCP Eric Sethi DO     Subjective:  Chart reviewed, no acute events.    Continues to have pain in his L leg requiring IV pain meds.  He is frustrated as he doesn't know why the pain continues to be so bad.   No fevers.     Objective:    Allergies:  Allergies[1]    Medications:    Current Facility-Administered Medications:     sertraline (Zoloft) tab 25 mg, 25 mg, Oral, Daily    furosemide (Lasix) tab 40 mg, 40 mg, Oral, Daily    ibuprofen (Motrin) tab 400 mg, 400 mg, Oral, Q6H PRN    butalbital-acetaminophen-caffeine (Fioricet) -40 MG per tab 1 tablet, 1 tablet, Oral, Q4H PRN    oxyCODONE immediate release tab 5 mg, 5 mg, Oral, Q4H PRN **OR** oxyCODONE immediate release tab 10 mg, 10 mg, Oral, Q4H PRN    magnesium hydroxide (Milk of Magnesia) 400 MG/5ML oral suspension 30 mL, 30 mL, Oral, Q6H PRN    ceFEPIme (Maxpime) 2 g in sodium chloride 0.9% 100 mL IVPB-MBP, 2 g, Intravenous, Q8H    DAPTOmycin (Cubicin) 1,000 mg in sodium chloride 0.9% PF IV syringe, 1,000 mg, Intravenous, Q24H    clotrimazole-betamethasone (Lotrisone) 1-0.05 % cream, , Topical, BID    docosanol (Abreva) 10 % cream, , Topical, BID    cetirizine (ZyrTEC) tab 10 mg, 10 mg, Oral, Daily    traMADol (Ultram) tab 50 mg, 50 mg, Oral, Q6H PRN    calcium carbonate (Tums) chewable tab 1,000 mg, 1,000 mg, Oral, TID PRN    famotidine (Pepcid) tab 20 mg, 20 mg, Oral, BID PRN    enalapril (Vasotec) tab 20 mg, 20 mg, Oral, BID    flecainide (Tambocor) tab 150 mg, 150 mg, Oral, BID    hydrALAZINE (Apresoline) tab 50 mg, 50 mg, Oral, BID    carvedilol (Coreg) tab 25 mg, 25 mg, Oral, BID with meals    acetaminophen (Tylenol Extra Strength) tab 500 mg, 500 mg, Oral, Q4H PRN    melatonin tab 3 mg, 3 mg,  Oral, Nightly PRN    polyethylene glycol (PEG 3350) (Miralax) 17 g oral packet 17 g, 17 g, Oral, Daily PRN    sennosides (Senokot) tab 17.2 mg, 17.2 mg, Oral, Nightly PRN    guaiFENesin (Robitussin) 100 MG/5 ML oral liquid 200 mg, 200 mg, Oral, Q4H PRN    enoxaparin (Lovenox) 100 MG/ML SUBQ injection 90 mg, 90 mg, Subcutaneous, 2 times per day    ondansetron (Zofran) 4 MG/2ML injection 4 mg, 4 mg, Intravenous, Q6H PRN    prochlorperazine (Compazine) 10 MG/2ML injection 5 mg, 5 mg, Intravenous, Q8H PRN    HYDROmorphone (Dilaudid) 1 MG/ML injection 1 mg, 1 mg, Intravenous, Q2H PRN    HYDROmorphone (Dilaudid) 1 MG/ML injection 0.5 mg, 0.5 mg, Intravenous, Q2H PRN    magnesium oxide (Mag-Ox) tab 200 mg, 200 mg, Oral, Nightly    simethicone (Mylicon) chewable tab 80 mg, 80 mg, Oral, TID PRN    dilTIAZem ER (CardIZEM CD) 24 hr cap 180 mg, 180 mg, Oral, Nightly    Physical Exam:  General: Alert, orientated x3.  Cooperative.  No apparent distress.  Vital Signs:  Blood pressure 155/77, pulse 85, temperature 99 °F (37.2 °C), temperature source Oral, resp. rate 18, height 6' 1\" (1.854 m), weight (!) 400 lb 3.2 oz (181.5 kg), SpO2 91%.   Temp (24hrs), Av.6 °F (37 °C), Min:97.7 °F (36.5 °C), Max:99.2 °F (37.3 °C)      HEENT: Exam is unremarkable.  Without scleral icterus.  Mucous membranes are moist. PERRLA.  Oropharynx is clear.  Lungs: Clear to auscultation bilaterally.  Cardiac: Regular rate and rhythm. No murmur.  Abdomen:  Soft, non-distended, non-tender, with no rebound or guarding.   Extremities:  No lower extremity edema noted.  Without clubbing or cyanosis.    Skin: L lower leg with mild edema and ongoing bright erythema, +warmth and TTP  Neurologic: Cranial nerves are grossly intact.      Labs:  Lab Results   Component Value Date    WBC 10.4 2024    HGB 11.1 2024    HCT 34.8 2024    .0 2024    CREATSERUM 0.85 2024    BUN 20 2024     2024    K 4.5 2024      11/28/2024    CO2 29.0 11/28/2024     11/28/2024    CA 10.0 11/28/2024    CK 65 11/28/2024             Radiology:  MRI pending    Assessment/Plan:    1.  LLE cellulitis  -admitted with fevers and leukocytosis  -temps as high as 102  -blood cultures NG  -dopplers neg for DVT  -CT with R knee effusions and torn quad tendon  -CRP 11.6 < 18.9   -arterial dopplers ordered and pending   -MRI with mild myositis   -on IV Daptomycin and Cefepime, s/p IV Ancef x 4 days    2. Leukocytosis and fevers  -due to above  -Tmax past 24hrs 99  -WBC down today at 10.4K    3. Immunocompromise  -due to hx of psoriasis, on Humira  -MRSA nares negative   -some abnormalities noted on autoimmune labs, rheum following    DISPO: PICC in place.  Follow up arterial dopplers. Continue IV Daptomycin and Cefepime while here, and rx left yesterday for IV Cefepime 2gm Q 8hrs x 2 weeks but ultimately EOT to be determined pending progress.  DC planning for MEGAN. Continue to trend WBC and temps.  Will continue to follow     If you have any questions or concerns please call [facility] at [phone].     GAGAN Allison  11/26/2024  12:53 PM         [1]   Allergies  Allergen Reactions    Hydrocodone ITCHING and RASH

## 2024-11-28 NOTE — PROGRESS NOTES
[facility]  Orthopedic Surgery  Progress Note    Jovan Merino Sr. Patient Status:  Inpatient    1970 MRN [id number]   Location [facility] 5SW/SE Attending Jan Fabian MD   Hosp Day # 10 PCP Eric Sethi DO       Admitted for LLE cellulitis. Ortho consulted for possible septic knee or ankle    SUBJECTIVE:  INTERVAL HISTORY:  Patient does not have good pain control. He takes IV Dilaudid.   He states Oxy makes him itchy more than baseline. Patient denies chest pain and shortness of breath.    OBJECTIVE:  Vitals:    24 1749 24 2051 24 0653 24 0900   BP: 117/72 146/76 155/77 147/81   BP Location: Left arm Left arm Left arm Right arm   Pulse: 77 96 85 80   Resp:  18 18 18   Temp:  99.2 °F (37.3 °C) 99 °F (37.2 °C) 98.7 °F (37.1 °C)   TempSrc:  Oral Oral Oral   SpO2:  93% 91% 92%   Weight:       Height:           ORTHO EXAM:  LLE lower extremity:  Neurovascular intact, mild edema.  Thigh soft, non-tender. No signs of DVT.  Moves foot and toes without pain. ACE wrap intact without any drainage.    Labs:   Recent Labs   Lab 24   RBC 4.17* 4.15* 4.01*   HGB 11.7* 11.5* 11.1*   HCT 36.2* 35.9* 34.8*   MCV 86.8 86.5 86.8   MCH 28.1 27.7 27.7   MCHC 32.3 32.0 31.9   RDW 15.0 14.9 15.0   WBC 12.7* 12.8* 10.4   .0 330.0 343.0         Recent Labs   Lab 24  043   GLU 98 103* 110*   BUN 17 18 20   CREATSERUM 0.81 0.88 0.85   EGFRCR 106 103 105   CA 10.4 10.2 10.0    133* 135*   K 4.4 4.5 4.5   CL 99 99 102   CO2 30.0 30.0 29.0     No results for input(s): \"PTP\", \"INR\" in the last 168 hours.    Imaging:  No recent    Meds:      sertraline  25 mg Oral Daily    furosemide  40 mg Oral Daily    cefepime  2 g Intravenous Q8H    DAPTOmycin  1,000 mg Intravenous Q24H    clotrimazole-betamethasone   Topical BID    docosanol   Topical BID    cetirizine  10 mg Oral Daily    enalapril  20 mg Oral  BID    flecainide  150 mg Oral BID    hydrALAZINE  50 mg Oral BID    carvedilol  25 mg Oral BID with meals    enoxaparin  90 mg Subcutaneous 2 times per day    magnesium oxide  200 mg Oral Nightly    dilTIAZem HCl ER Coated Beads  180 mg Oral Nightly         ibuprofen    butalbital-acetaminophen-caffeine    oxyCODONE **OR** oxyCODONE    magnesium hydroxide    traMADol    calcium carbonate    famotidine    acetaminophen    melatonin    polyethylene glycol (PEG 3350)    sennosides    guaiFENesin    ondansetron    prochlorperazine    HYDROmorphone    HYDROmorphone    simethicone      ASSESSMENT/PLAN:  LLE Pain   HX of Left partial quad tendon rupture s/p repair Jan 2022  Sepsis due to LLE Cellulitis - no current concern for septic knee or ankle  Immunocompromised on Humira    Discussed taking Tramadol and Ibuprofen scheduled and keep Dilaudid PRN  DC Oxycodone due to severe itching   Continue DVT prophylaxis   WBAT LLE   ABX per ID. Has PICC Line placed   Discharge planning: SUBAC[facility] REHAB  Continue medical management  Will follow PRN  No outpatient Ortho follow up is needed at this time.      Emily Chau, APRN  11/28/2024  11:21 AM

## 2024-11-29 ENCOUNTER — APPOINTMENT (OUTPATIENT)
Dept: ULTRASOUND IMAGING | Facility: HOSPITAL | Age: 54
End: 2024-11-29
Attending: INTERNAL MEDICINE
Payer: COMMERCIAL

## 2024-11-29 LAB
ANION GAP SERPL CALC-SCNC: 4 MMOL/L (ref 0–18)
BUN BLD-MCNC: 23 MG/DL (ref 9–23)
BUN/CREAT SERPL: 25.6 (ref 10–20)
CALCIUM BLD-MCNC: 10.3 MG/DL (ref 8.7–10.4)
CHLORIDE SERPL-SCNC: 101 MMOL/L (ref 98–112)
CO2 SERPL-SCNC: 30 MMOL/L (ref 21–32)
CREAT BLD-MCNC: 0.9 MG/DL
DEPRECATED RDW RBC AUTO: 47.8 FL (ref 35.1–46.3)
EGFRCR SERPLBLD CKD-EPI 2021: 103 ML/MIN/1.73M2 (ref 60–?)
ERYTHROCYTE [DISTWIDTH] IN BLOOD BY AUTOMATED COUNT: 15 % (ref 11–15)
GLUCOSE BLD-MCNC: 93 MG/DL (ref 70–99)
GLUCOSE BLDC GLUCOMTR-MCNC: 104 MG/DL (ref 70–99)
GLUCOSE BLDC GLUCOMTR-MCNC: 108 MG/DL (ref 70–99)
GLUCOSE BLDC GLUCOMTR-MCNC: 124 MG/DL (ref 70–99)
GLUCOSE BLDC GLUCOMTR-MCNC: 99 MG/DL (ref 70–99)
HCT VFR BLD AUTO: 35 %
HGB BLD-MCNC: 11.4 G/DL
MCH RBC QN AUTO: 28.1 PG (ref 26–34)
MCHC RBC AUTO-ENTMCNC: 32.6 G/DL (ref 31–37)
MCV RBC AUTO: 86.4 FL
OSMOLALITY SERPL CALC.SUM OF ELEC: 283 MOSM/KG (ref 275–295)
PLATELET # BLD AUTO: 353 10(3)UL (ref 150–450)
POTASSIUM SERPL-SCNC: 4.3 MMOL/L (ref 3.5–5.1)
RBC # BLD AUTO: 4.05 X10(6)UL
SODIUM SERPL-SCNC: 135 MMOL/L (ref 136–145)
WBC # BLD AUTO: 11.6 X10(3) UL (ref 4–11)

## 2024-11-29 PROCEDURE — 93971 EXTREMITY STUDY: CPT | Performed by: INTERNAL MEDICINE

## 2024-11-29 PROCEDURE — 93926 LOWER EXTREMITY STUDY: CPT | Performed by: INTERNAL MEDICINE

## 2024-11-29 NOTE — PROGRESS NOTES
[facility] Hospitalist Progress Note     CC: Hospital Follow up    PCP: Eric Sethi DO       Assessment/Plan:     Principal Problem:    Cellulitis of left lower leg  Active Problems:    Psoriasis    Immunosuppression (HCC)    Jovan Merino - 54 year old male w MO, HTN, Afib, HFpEF, psoriasis admitted 11/18/2024 for LLE cellulitis, started on IV ancef. Initially improving however 11/21 worse - abx broadened to vanc/zosyn, now cefepime and daptomycin. CT with cellulitis . ID/Rheum/Orhto consulted. MRI with LLE cellulitis, mild myositis in posterior aspect, no abscess, no OM.  PICC in place for extended IV abx coverage. Will need MEGAN.      Sepsis 2/2 LLE cellulitis  Immunosuppression 2/2 psoriasis/Humira  - Febrile to 102 here. HR 80s, WBC 14  - blood cx NG x 5 days  - . Known HFpEF. Feels like legs aren't swollen & has lost weight  - Pain control: IVP dilaudid, PO oxy, APAP PRN  - IV ancef (11/18 - 21 )  - 11/19 febrile, check Bcx. Lot of pain - add tramadol (itching w oxy/norc), add IV toradol scheduled.   - Headaches, feels dehydrated - will give 1L NS bolus  - 11/21: slightly worse, LE very swollen, WBC higher. Will switch ancef to vanco/zosyn for now. Consult ID - now cefepime and daptomycin  - CT negative 11/21 for underlying abscess  - Rheum/Ortho on consult   - No concern for compartment syndrome or joint involvement at this time  - MRI with LLE cellulitis, mild myositis in posterior aspect, no abscess, no OM   - inflammatory markers down trending   - PICC in place for extended IV abx coverage  - Will need MEGAN     Severe LLE pain and swelling  - possibly 2/2 cellulitis however not improving with IV abx  - no signs/symptoms of compartment syndrome  - continue pain control with PO and IV pain meds  - eval by ortho  - inflammatory markers significantly elevated now down trended   - CPK normal  - s/p IV lasix, x2  - will consult rheumatology  - LLE venous doppler 11/18/24 and 11/24/24 negative for dvt  - LLE  venous doppler 11/29/24 Left popliteal v. Positive DVT    - LLE arterial doppler 11/29/24 pending   - MRI with LLE cellulitis, mild myositis in posterior aspect, no abscess, no OM      DVT  - LLE, Left popliteal v  - full dose lovenox started     Partial quad tear  L knee effusion  - ortho consulted, appreciate recs     Headaches  - fioricet PRN     HFpEF - compensated  HTN  Parox Afib - low CV, not on AC  - Continue PTA lasix, flecainide, coreg, hydral, enalapril     Psoriasis  - On humira OP. Would hold until leg better     Morbid obesity w [facility]  - Body mass index is 52.8 kg/m².   - Healthy diet & wt loss encouraged  - Has lost quite a bit of weight with Mounjaro already     DM2 - currently controlled w mounjaro  - Paty for reg diet, follow BG on AM labs. Can hold on SSI/accuchecks for now, can add if issues controlling     Depression   - seen by psych liaison  - Will start Zoloft    ACP: Full Code  Ppx: DVT: Enox  Dispo  EDoD:  ~12/1-2     F/u:   - PCP:  Eric Sethi DO     Available via epic secure chat or perfect serve 7A - 7P.     Jan Fabian MD   Internal Medicine - Hospitalist  [facility]     Subjective:     No CP, SOB, or N/V.  Left leg about the same.   More pain with standing or moving.   Walked to nurse station.     OBJECTIVE:    Blood pressure 149/67, pulse 91, temperature 98.4 °F (36.9 °C), temperature source Oral, resp. rate 18, height 6' 1\" (1.854 m), weight (!) 400 lb 3.2 oz (181.5 kg), SpO2 94%.    Temp:  [97.6 °F (36.4 °C)-98.6 °F (37 °C)] 98.4 °F (36.9 °C)  Pulse:  [75-91] 91  Resp:  [18] 18  BP: (115-149)/(67-78) 149/67  SpO2:  [91 %-98 %] 94 %      Intake/Output:    Intake/Output Summary (Last 24 hours) at 11/29/2024 1208  Last data filed at 11/29/2024 1112  Gross per 24 hour   Intake 542 ml   Output 3050 ml   Net -2508 ml       Last 3 Weights   11/20/24 0528 (!) 400 lb 3.2 oz (181.5 kg)   11/19/24 0615 (!) 395 lb 3.2 oz (179.3 kg)   11/18/24 1705 (!) 394 lb 3.2 oz (178.8 kg)    11/18/24 1112 (!) 395 lb (179.2 kg)   11/17/21 1338 (!) 379 lb 3.2 oz (172 kg)   06/10/21 1222 (!) 364 lb (165.1 kg)       Exam   GEN: obese male in NAD, tearful  HEENT: EOMI  Pulm: CTAB, no crackles or wheezes  CV: RRR, no murmurs, DP pulses present  ABD: Soft, non-tender, non-distended, +BS  MSK: LLE swelling, very TTP, LLE compartments not tense  Neuro: Grossly normal, CN intact, sensory intact  SKIN: warm, dry, psoriatic plaques, LLE with edema, erythema, warm to touch  EXT: left leg edema    Data Review:       Labs:     Recent Labs   Lab 11/27/24 0458 11/28/24 0435 11/29/24  0627   RBC 4.15* 4.01* 4.05*   HGB 11.5* 11.1* 11.4*   HCT 35.9* 34.8* 35.0*   MCV 86.5 86.8 86.4   MCH 27.7 27.7 28.1   MCHC 32.0 31.9 32.6   RDW 14.9 15.0 15.0   WBC 12.8* 10.4 11.6*   .0 343.0 353.0         Recent Labs   Lab 11/27/24 0458 11/28/24 0435 11/29/24  0627   * 110* 93   BUN 18 20 23   CREATSERUM 0.88 0.85 0.90   EGFRCR 103 105 103   CA 10.2 10.0 10.3   * 135* 135*   K 4.5 4.5 4.3   CL 99 102 101   CO2 30.0 29.0 30.0       No results for input(s): \"ALT\", \"AST\", \"ALB\", \"AMYLASE\", \"LIPASE\", \"LDH\" in the last 168 hours.    Invalid input(s): \"ALPHOS\", \"TBIL\", \"DBIL\", \"TPROT\"      Imaging:  MRI LOWER LEG (W+WO), LEFT (CPT=73720)    Result Date: 11/26/2024  CONCLUSION:  1. Left lower extremity cellulitis.  No abscess. 2. Mild myositis of posterior superficial compartment with reactive enhancement.  3. No osteomyelitis. 4. Less pronounced superficial soft tissue edema in the right lower extremity seen at edge of field of view.    Dictated by (CST): Dexter Mendieta MD on 11/26/2024 at 1:54 PM     Finalized by (CST): Dexter Mendieta MD on 11/26/2024 at 2:03 PM             Meds:      enoxaparin  1 mg/kg Subcutaneous 2 times per day    sertraline  25 mg Oral Daily    furosemide  40 mg Oral Daily    cefepime  2 g Intravenous Q8H    DAPTOmycin  1,000 mg Intravenous Q24H    clotrimazole-betamethasone   Topical BID     docosanol   Topical BID    cetirizine  10 mg Oral Daily    enalapril  20 mg Oral BID    flecainide  150 mg Oral BID    hydrALAZINE  50 mg Oral BID    carvedilol  25 mg Oral BID with meals    magnesium oxide  200 mg Oral Nightly    dilTIAZem HCl ER Coated Beads  180 mg Oral Nightly         ibuprofen    butalbital-acetaminophen-caffeine    magnesium hydroxide    traMADol    calcium carbonate    famotidine    acetaminophen    melatonin    polyethylene glycol (PEG 3350)    sennosides    guaiFENesin    ondansetron    prochlorperazine    HYDROmorphone    HYDROmorphone    simethicone

## 2024-11-29 NOTE — PLAN OF CARE
Problem: Patient Centered Care  Goal: Patient preferences are identified and integrated in the patient's plan of care  Description: Interventions:  - What would you like us to know as we care for you? From home alone  - Provide timely, complete, and accurate information to patient/family  - Incorporate patient and family knowledge, values, beliefs, and cultural backgrounds into the planning and delivery of care  - Encourage patient/family to participate in care and decision-making at the level they choose  - Honor patient and family perspectives and choices  Outcome: Progressing     Problem: CARDIOVASCULAR - ADULT  Goal: Maintains optimal cardiac output and hemodynamic stability  Description: INTERVENTIONS:  - Monitor vital signs, rhythm, and trends  - Monitor for bleeding, hypotension and signs of decreased cardiac output  - Evaluate effectiveness of vasoactive medications to optimize hemodynamic stability  - Monitor arterial and/or venous puncture sites for bleeding and/or hematoma  - Assess quality of pulses, skin color and temperature  - Assess for signs of decreased coronary artery perfusion - ex. Angina  - Evaluate fluid balance, assess for edema, trend weights  Outcome: Progressing     Problem: RESPIRATORY - ADULT  Goal: Achieves optimal ventilation and oxygenation  Description: INTERVENTIONS:  - Assess for changes in respiratory status  - Assess for changes in mentation and behavior  - Position to facilitate oxygenation and minimize respiratory effort  - Oxygen supplementation based on oxygen saturation or ABGs  - Provide Smoking Cessation handout, if applicable  - Encourage broncho-pulmonary hygiene including cough, deep breathe, Incentive Spirometry  - Assess the need for suctioning and perform as needed  - Assess and instruct to report SOB or any respiratory difficulty  - Respiratory Therapy support as indicated  - Manage/alleviate anxiety  - Monitor for signs/symptoms of CO2 retention  Outcome:  Progressing     Problem: GASTROINTESTINAL - ADULT  Goal: Minimal or absence of nausea and vomiting  Description: INTERVENTIONS:  - Maintain adequate hydration with IV or PO as ordered and tolerated  - Nasogastric tube to low intermittent suction as ordered  - Evaluate effectiveness of ordered antiemetic medications  - Provide nonpharmacologic comfort measures as appropriate  - Advance diet as tolerated, if ordered  - Obtain nutritional consult as needed  - Evaluate fluid balance  Outcome: Progressing     Problem: GENITOURINARY - ADULT  Goal: Absence of urinary retention  Description: INTERVENTIONS:  - Assess patient’s ability to void and empty bladder  - Monitor intake/output and perform bladder scan as needed  - Follow urinary retention protocol/standard of care  - Consider collaborating with pharmacy to review patient's medication profile  - Implement strategies to promote bladder emptying  Outcome: Progressing     Problem: METABOLIC/FLUID AND ELECTROLYTES - ADULT  Goal: Glucose maintained within prescribed range  Description: INTERVENTIONS:  - Monitor Blood Glucose as ordered  - Assess for signs and symptoms of hyperglycemia and hypoglycemia  - Administer ordered medications to maintain glucose within target range  - Assess barriers to adequate nutritional intake and initiate nutrition consult as needed  - Instruct patient on self management of diabetes  Outcome: Progressing  Goal: Electrolytes maintained within normal limits  Description: INTERVENTIONS:  - Monitor labs and rhythm and assess patient for signs and symptoms of electrolyte imbalances  - Administer electrolyte replacement as ordered  - Monitor response to electrolyte replacements, including rhythm and repeat lab results as appropriate  - Fluid restriction as ordered  - Instruct patient on fluid and nutrition restrictions as appropriate  Outcome: Progressing     Problem: SKIN/TISSUE INTEGRITY - ADULT  Goal: Skin integrity remains intact  Description:  INTERVENTIONS  - Assess and document risk factors for pressure ulcer development  - Assess and document skin integrity  - Monitor for areas of redness and/or skin breakdown  - Initiate interventions, skin care algorithm/standards of care as needed  Outcome: Progressing  Goal: Incision(s), wounds(s) or drain site(s) healing without S/S of infection  Description: INTERVENTIONS:  - Assess and document risk factors for pressure ulcer development  - Assess and document skin integrity  - Assess and document dressing/incision, wound bed, drain sites and surrounding tissue  - Implement wound care per orders  - Initiate isolation precautions as appropriate  - Initiate Pressure Ulcer prevention bundle as indicated  Outcome: Progressing     Problem: MUSCULOSKELETAL - ADULT  Goal: Return mobility to safest level of function  Description: INTERVENTIONS:  - Assess patient stability and activity tolerance for standing, transferring and ambulating w/ or w/o assistive devices  - Assist with transfers and ambulation using safe patient handling equipment as needed  - Ensure adequate protection for wounds/incisions during mobilization  - Obtain PT/OT consults as needed  - Advance activity as appropriate  - Communicate ordered activity level and limitations with patient/family  Outcome: Progressing     Problem: Impaired Functional Mobility  Goal: Achieve highest/safest level of mobility/gait  Description: Interventions:  - Assess patient's functional ability and stability  - Promote increasing activity/tolerance for mobility and gait  - Educate and engage patient/family in tolerated activity level and precautions  - Recommend use of  RW for transfers and ambulation  - Recommend patient transfer to bedside chair toward strongest side  - When transferring patient, block weaker knee for safety  - Recommend use of chair position in bed 3 times per day  Outcome: Progressing     Problem: Impaired Activities of Daily Living  Goal: Achieve  highest/safest level of independence in self care  Description: Interventions:  - Assess ability and encourage patient to participate in ADLs to maximize function  - Promote sitting position while performing ADLs such as feeding, grooming, and bathing  - Educate and encourage patient/family in tolerated functional activity level and precautions during self-care  Outcome: Progressing     Problem: PAIN - ADULT  Goal: Verbalizes/displays adequate comfort level or patient's stated pain goal  Description: INTERVENTIONS:  - Encourage pt to monitor pain and request assistance  - Assess pain using appropriate pain scale  - Administer analgesics based on type and severity of pain and evaluate response  - Implement non-pharmacological measures as appropriate and evaluate response  - Consider cultural and social influences on pain and pain management  - Manage/alleviate anxiety  - Utilize distraction and/or relaxation techniques  - Monitor for opioid side effects  - Notify MD/LIP if interventions unsuccessful or patient reports new pain  - Anticipate increased pain with activity and pre-medicate as appropriate  Outcome: Progressing     Problem: SAFETY ADULT - FALL  Goal: Free from fall injury  Description: INTERVENTIONS:  - Assess pt frequently for physical needs  - Identify cognitive and physical deficits and behaviors that affect risk of falls.  - [place] fall precautions as indicated by assessment.  - Educate pt/family on patient safety including physical limitations  - Instruct pt to call for assistance with activity based on assessment  - Modify environment to reduce risk of injury  - Provide assistive devices as appropriate  - Consider OT/PT consult to assist with strengthening/mobility  - Encourage toileting schedule  Outcome: Progressing     Problem: DISCHARGE PLANNING  Goal: Discharge to home or other facility with appropriate resources  Description: INTERVENTIONS:  - Identify barriers to discharge w/pt and  caregiver  - Include patient/family/discharge partner in discharge planning  - Arrange for needed discharge resources and transportation as appropriate  - Identify discharge learning needs (meds, wound care, etc)  - Arrange for interpreters to assist at discharge as needed  - Consider post-discharge preferences of patient/family/discharge partner  - Complete POLST form as appropriate  - Assess patient's ability to be responsible for managing their own health  - Refer to Case Management Department for coordinating discharge planning if the patient needs post-hospital services based on physician/LIP order or complex needs related to functional status, cognitive ability or social support system  Outcome: Progressing     Problem: Patient/Family Goals  Goal: Patient/Family Long Term Goal  Description: Patient's Long Term Goal: Discharge from the hospital    Interventions:  - Monitor vital signs  - Monitor appropriate labs  - Monitor blood glucose levels  - Pain management  - Administer medications per order  - Follow MD orders  - Diagnostics per order  - Update / inform patient and family on plan of care  - Discharge planning  - See additional Care Plan goals for specific interventions  Outcome: Progressing  Goal: Patient/Family Short Term Goal  Description: Patient's Short Term Goal: Improve redness, swelling, and pain to left lower extremity     Interventions:   - Monitor vital signs  - Monitor appropriate labs  - Monitor blood glucose levels  - Pain management  - Administer medications per order  - Follow MD orders  - Diagnostics per order  - Update / inform patient and family on plan of care  - See additional Care Plan goals for specific interventions  Outcome: Progressing     Problem: Diabetes/Glucose Control  Goal: Glucose maintained within prescribed range  Description: INTERVENTIONS:  - Monitor Blood Glucose as ordered  - Assess for signs and symptoms of hyperglycemia and hypoglycemia  - Administer ordered  medications to maintain glucose within target range  - Assess barriers to adequate nutritional intake and initiate nutrition consult as needed  - Instruct patient on self management of diabetes  Outcome: Progressing     Patient is presently resting in the bed. Alert x 4. Vital signs taken and stable. Fall risk precautions are followed at all times. Patient is medicated as needed for pain or discomfort. Left lower extremity is elevated on pillow at all times. Patient was seen by PT & OT on this morning. Patient had ultrasound arterial duplex done of left lower extremity and venous ultrasound done of left lower extremity. Patient receives intravenous antibiotics per order. Tolerates diet well. Dressing was applied to left lower extremity per order. Call light within reach at all times.

## 2024-11-29 NOTE — PLAN OF CARE
Problem: Patient Centered Care  Goal: Patient preferences are identified and integrated in the patient's plan of care  Description: Interventions:  - What would you like us to know as we care for you? From home alone  - Provide timely, complete, and accurate information to patient/family  - Incorporate patient and family knowledge, values, beliefs, and cultural backgrounds into the planning and delivery of care  - Encourage patient/family to participate in care and decision-making at the level they choose  - Honor patient and family perspectives and choices  Outcome: Progressing     Problem: CARDIOVASCULAR - ADULT  Goal: Maintains optimal cardiac output and hemodynamic stability  Description: INTERVENTIONS:  - Monitor vital signs, rhythm, and trends  - Monitor for bleeding, hypotension and signs of decreased cardiac output  - Evaluate effectiveness of vasoactive medications to optimize hemodynamic stability  - Monitor arterial and/or venous puncture sites for bleeding and/or hematoma  - Assess quality of pulses, skin color and temperature  - Assess for signs of decreased coronary artery perfusion - ex. Angina  - Evaluate fluid balance, assess for edema, trend weights  Outcome: Progressing     Problem: GASTROINTESTINAL - ADULT  Goal: Minimal or absence of nausea and vomiting  Description: INTERVENTIONS:  - Maintain adequate hydration with IV or PO as ordered and tolerated  - Nasogastric tube to low intermittent suction as ordered  - Evaluate effectiveness of ordered antiemetic medications  - Provide nonpharmacologic comfort measures as appropriate  - Advance diet as tolerated, if ordered  - Obtain nutritional consult as needed  - Evaluate fluid balance  Outcome: Progressing     Problem: GENITOURINARY - ADULT  Goal: Absence of urinary retention  Description: INTERVENTIONS:  - Assess patient’s ability to void and empty bladder  - Monitor intake/output and perform bladder scan as needed  - Follow urinary retention  protocol/standard of care  - Consider collaborating with pharmacy to review patient's medication profile  - Implement strategies to promote bladder emptying  Outcome: Progressing     Problem: METABOLIC/FLUID AND ELECTROLYTES - ADULT  Goal: Glucose maintained within prescribed range  Description: INTERVENTIONS:  - Monitor Blood Glucose as ordered  - Assess for signs and symptoms of hyperglycemia and hypoglycemia  - Administer ordered medications to maintain glucose within target range  - Assess barriers to adequate nutritional intake and initiate nutrition consult as needed  - Instruct patient on self management of diabetes  Outcome: Progressing  Goal: Electrolytes maintained within normal limits  Description: INTERVENTIONS:  - Monitor labs and rhythm and assess patient for signs and symptoms of electrolyte imbalances  - Administer electrolyte replacement as ordered  - Monitor response to electrolyte replacements, including rhythm and repeat lab results as appropriate  - Fluid restriction as ordered  - Instruct patient on fluid and nutrition restrictions as appropriate  Outcome: Progressing     Problem: SKIN/TISSUE INTEGRITY - ADULT  Goal: Skin integrity remains intact  Description: INTERVENTIONS  - Assess and document risk factors for pressure ulcer development  - Assess and document skin integrity  - Monitor for areas of redness and/or skin breakdown  - Initiate interventions, skin care algorithm/standards of care as needed  Outcome: Progressing  Goal: Incision(s), wounds(s) or drain site(s) healing without S/S of infection  Description: INTERVENTIONS:  - Assess and document risk factors for pressure ulcer development  - Assess and document skin integrity  - Assess and document dressing/incision, wound bed, drain sites and surrounding tissue  - Implement wound care per orders  - Initiate isolation precautions as appropriate  - Initiate Pressure Ulcer prevention bundle as indicated  Outcome: Progressing  LLE pain  controlled with dilaudid. Dressing changed per MD order.

## 2024-11-29 NOTE — OCCUPATIONAL THERAPY NOTE
OCCUPATIONAL THERAPY TREATMENT NOTE - INPATIENT        Room Number: 529/529-A     Presenting Problem: cellulitis LLE    Problem List  Principal Problem:    Cellulitis of left lower leg  Active Problems:    Psoriasis    Immunosuppression (HCC)      OCCUPATIONAL THERAPY ASSESSMENT   Patient demonstrates fair progress this session, goals remain in progress.    Patient is requiring moderate assist as a result of the following impairments: LLE pain and decreased AROM.    Patient continues to function near baseline with ADLs.  Next session anticipate patient to progress functional transfers.  Occupational Therapy will continue to follow patient for duration of hospitalization.    Patient continues to benefit from continued skilled OT services: at discharge to promote prior level of function and safety with additional support and return home   PLAN DURING HOSPITALIZATION  OT Device Recommendations: TBD  OT Treatment Plan: ADL training;Energy conservation/work simplification techniques;Functional transfer training;Equipment eval/education;Compensatory technique education     SUBJECTIVE  \"I have that much pain after dilaudid.\"    OBJECTIVE  Precautions: Bed/chair alarm    WEIGHT BEARING RESTRICTION  L Lower Extremity: Weight Bearing as Tolerated    PAIN ASSESSMENT  Ratin  Location: LLE        ACTIVITIES OF DAILY LIVING ASSESSMENT  AM-PAC ‘6-Clicks’ Inpatient Daily Activity Short Form  How much help from another person does the patient currently need…  -   Putting on and taking off regular lower body clothing?: A Little  -   Bathing (including washing, rinsing, drying)?: A Little  -   Toileting, which includes using toilet, bedpan or urinal? : A Little  -   Putting on and taking off regular upper body clothing?: None  -   Taking care of personal grooming such as brushing teeth?: None  -   Eating meals?: None    AM-PAC Score:  Score: 21  Approx Degree of Impairment: 32.79%  Standardized Score (AM-PAC Scale): 44.27  CMS  Modifier (G-Code): CJ    FUNCTIONAL TRANSFER ASSESSMENT  Sit to Stand: Edge of Bed  Edge of Bed: Contact Guard Assist  Toilet Transfer: Contact Guard Assist    BED MOBILITY     Sit to Supine (OT): Min Assist         FUNCTIONAL ADL ASSESSMENT     LB Dressing Seated: Moderate Assist (assist to don L shoe) able to externally rotate R hip to don R shoe          Skilled Therapy Provided: Pt received in chair asking for pain meds. RN notified. VSS. Pt reports he has reachers and sock aids at home from s last year. Asked about boot or immobilizer for pain. Explained that ortho would have to order it. RN states pt is pending arterial doppler of LLE and is OK to mobilize. CGA for functional transfer at  level; pt with frequent breaks and slow pace intermittently due to increased pain from 6-7 to 8/10. Pt left in bed for doppler. Discussed pain service with pt.    The patient's Approx Degree of Impairment: 32.79% has been calculated based on documentation in the [facility] '6 clicks' Inpatient Daily Activity Short Form.  Research supports that patients with this level of impairment may benefit from home health.  Final disposition will be made by interdisciplinary medical team.            Patients self stated goal is: did not state     Patient will complete functional transfer with Mod I  Comment: ongoing - CGA    Patient will complete toileting with Mod I  Comment: ongoing     Patient will tolerate standing for 4 minutes in prep for adls with Mod I   Comment: ongoing     Patient will complete item retrieval with Mod I  Comment: ongoing           Goals  on: 12/3/24  Frequency:  3-5x/wk    OT Session Time: 30 minutes  Self-Care Home Management: 30 minutes       Kelly Murrell OT  [facility]  Inpatient Rehabilitation  Occupational Therapy  [phone]

## 2024-11-29 NOTE — PROGRESS NOTES
[facility]  part of [facility] Infectious Disease  Progress Note    Jovan GUTHRIE Wilber Castorena Patient Status:  Inpatient    1970 MRN [id number]   Location [facility] 5SW/SE Attending Jan Fabian MD   Hosp Day # 11 PCP Eric Sethi,      Subjective:  Patient seen/examined.  Up in bed in NAD.  No F/C.  Now with LLE DVT.  Discharge planning in progress - home vs. SNF.    Objective:  Blood pressure 149/67, pulse 91, temperature 98.4 °F (36.9 °C), temperature source Oral, resp. rate 18, height 6' 1\" (1.854 m), weight (!) 400 lb 3.2 oz (181.5 kg), SpO2 94%.    Intake/Output:    Intake/Output Summary (Last 24 hours) at 2024 1311  Last data filed at 2024 1112  Gross per 24 hour   Intake 542 ml   Output 3050 ml   Net -2508 ml       Physical Exam:  General: Awake, alert, non-tox, NAD.  HEENT:  Oropharynx clear, trachea ML.  Heart: RRR S1S2 no murmurs.  Lungs: Essentially CTA b/l, no rhonchi, rales, wheezes.  Abdomen: Soft, NT/ND.  BS present.  No organomegaly.  Extremity: LLE erythema less intense.  Neurological: No focal deficits.  Derm:  Warm, dry, free from rashes.    Lab Data Review:  Lab Results   Component Value Date    WBC 11.6 2024    HGB 11.4 2024    HCT 35.0 2024    .0 2024    CREATSERUM 0.90 2024    BUN 23 2024     2024    K 4.3 2024     2024    CO2 30.0 2024    GLU 93 2024    CA 10.3 2024        Cultures:   Blood cultures x 2 negative  MRSA screen negative    Radiology:  CONCLUSION:   1. Left lower extremity cellulitis.  No abscess.   2. Mild myositis of posterior superficial compartment with reactive enhancement.    3. No osteomyelitis.   4. Less pronounced superficial soft tissue edema in the right lower extremity seen at edge of field of view.       Assessment and Plan:     Sepsis presenting with fevers, leukocytosis, and LLE cellulitis as etiology  of events  - Tm 102F during this admission  - Blood cultures are negative  - Dopplers negative for DVT  - CT with R knee effusion and torn quadriceps tendon  - MRI with mild myositis  - IV ancef x 4 days given without significant improvement->cubicin + cefepime on 11/21/24     2.  Leukocytosis due to the above  - At 11K today and ~same  - Will trend     3.  Underlying psoriasis as a potential nidus for entry of bacteria  - At risk for MDROs due to humira use  - MRSA negative     4.  Some abnormalities in autoimmune labs  - Atypical PANCA equivocal  - Elevated compliment levels    5.  Acute LLE DVT   - Latest US shows new clot  - Anticoagulation ongoing     6.  Disposition - very slow progress but slowly improving.  Continue IV daptomycin and cefepime as Rx while here.  Ortho does not feel septic arthritis is a concern.  Humira remains held due to infection.  PICC in place and anticipate longer course IV Rx given slow response to therapy.  Plan to use cefepime alone at discharge 2gm IV q8h x 2 weeks with definitive EOT to be determined.  Will discontinue daptomycin at discharge.  OK for discharge at any time from ID with this plan when ok with others.    Kailee Calloway DO, [facility] Infectious Disease  [phone]    11/29/2024  1:11 PM

## 2024-11-29 NOTE — PHYSICAL THERAPY NOTE
PHYSICAL THERAPY TREATMENT NOTE - INPATIENT     Room Number: 529/529-A       Presenting Problem: left leg pain and cellulitis  Co-Morbidities : appendicitis, arrhythmia, atrial fibrillation, congestive heart disease, high blood pressure, NEREIDA, osteoarthritis, essential hypertension    Problem List  Principal Problem:    Cellulitis of left lower leg  Active Problems:    Psoriasis    Immunosuppression (HCC)      PHYSICAL THERAPY ASSESSMENT   Patient demonstrates good  progress this session, goals  progressing with bed mobility, transfers and ambulation this session.       Patient is requiring stand-by assist and contact guard assist as a result of the following impairments: decreased functional strength, pain, medical status, and limited LLE ROM.     Patient continues to function below baseline with bed mobility, transfers, gait, stair negotiation, and performing household tasks.  Next session anticipate patient to progress bed mobility, transfers, and gait.  Physical Therapy will continue to follow patient for duration of hospitalization.    Patient continues to benefit from continued skilled PT services: at discharge to promote prior level of function and safety with additional support and return home with home health PT vs gradual rehab pending pain control and progress with IP PT.     PLAN DURING HOSPITALIZATION  Nursing Mobility Recommendation : 1 Assist  PT Device Recommendation: Rolling walker  PT Treatment Plan: Bed mobility;Body mechanics;Patient education;Transfer training;Balance training;Stair training;Endurance;Energy conservation;Family education;Gait training  Frequency (Obs): 5x/week     SUBJECTIVE  I'm concerned to go home and the pain gets worse    OBJECTIVE  Precautions: Bed/chair alarm    WEIGHT BEARING RESTRICTION  L Lower Extremity: Weight Bearing as Tolerated      PAIN ASSESSMENT   Ratin (8 with activity)  Location: LLE  Management Techniques: Activity promotion;Body  mechanics    BALANCE  Static Sitting: Normal  Dynamic Sitting: Good  Static Standing: Fair  Dynamic Standing: Fair -    ACTIVITY TOLERANCE  Pulse: 91  Heart Rate Source: Monitor     BP: 149/67  BP Location: Left arm  BP Method: Automatic  Patient Position: Sitting     O2 WALK  Oxygen Therapy  SPO2% on Room Air at Rest: 94    AM-PAC '6-Clicks' INPATIENT SHORT FORM - BASIC MOBILITY  How much difficulty does the patient currently have...  Patient Difficulty: Turning over in bed (including adjusting bedclothes, sheets and blankets)?: A Little   Patient Difficulty: Sitting down on and standing up from a chair with arms (e.g., wheelchair, bedside commode, etc.): A Little   Patient Difficulty: Moving from lying on back to sitting on the side of the bed?: A Little   How much help from another person does the patient currently need...   Help from Another: Moving to and from a bed to a chair (including a wheelchair)?: A Little   Help from Another: Need to walk in hospital room?: A Little   Help from Another: Climbing 3-5 steps with a railing?: A Lot     AM-PAC Score:  Raw Score: 17   Approx Degree of Impairment: 50.57%   Standardized Score (AM-PAC Scale): 42.13   CMS Modifier (G-Code): CK    FUNCTIONAL ABILITY STATUS  Functional Mobility/Gait Assessment  Gait Assistance: Contact guard assist  Distance (ft): 60 ft x2  Assistive Device: Rolling walker  Pattern: Shuffle;L Decreased stance time (distance limited by pain)  Rolling: stand-by assist  Sit to Supine: stand-by assist  Sit to Stand: stand-by assist to RW    Skilled Therapy Provided: Pt rec'd in chair agreeable to therapy, identified by name and , gait belt donned for mobility. Coordinated session with OT to maximize pt outcomes. Pt with gait progression this session, however continues to be most limited by pain in LLE despite being recently medicated. Pt demos slow but steady gait with RW and able to perform bed mobility with SBA. Pt back in bed at end of session  with needs in reach, RN aware. Encouraged pt to ambulate with staff as able to further improve mobility in preparation for discharge.       The patient's Approx Degree of Impairment: 50.57% has been calculated based on documentation in the [facility] '6 clicks' Inpatient Daily Activity Short Form.  Research supports that patients with this level of impairment may benefit from rehab.  Final disposition will be made by interdisciplinary medical team.        Patient End of Session: In bed;Needs met;Call light within reach;RN aware of session/findings    CURRENT GOALS   Goals to be met by: 12/3/24 (updated 11/26)  Patient Goal Patient's self-stated goal is: go home   Goal #1 Patient is able to demonstrate supine - sit EOB @ level: minimum assistance   new goal - independent bed mobility   Goal #1   Current Status  Goal met 11/26   Goal #2 Patient is able to demonstrate transfers Sit to/from Stand at assistance level: minimum assistance with walker - rolling  New goal- STS with supervision and RW      Goal #2  Current Status  goal met 11/26   Goal #3 Patient is able to ambulate 50 feet with assist device: walker - rolling at assistance level: minimum assistance   Goal #3   Current Status GOAL MET 11/29  New goal- 180 ft with Supervision and RW   Goal #4 Stair goal TBD pending progress   Goal #4   Current Status  NT   Goal #5 Patient to demonstrate independence with home activity/exercise instructions provided to patient in preparation for discharge.   Goal #5   Current Status  not met/progressing      Gait Training: 10 minutes  Therapeutic Activity: 20 minutes

## 2024-11-29 NOTE — PLAN OF CARE
Problem: Patient Centered Care  Goal: Patient preferences are identified and integrated in the patient's plan of care  Description: Interventions:  - What would you like us to know as we care for you? From home alone  - Provide timely, complete, and accurate information to patient/family  - Incorporate patient and family knowledge, values, beliefs, and cultural backgrounds into the planning and delivery of care  - Encourage patient/family to participate in care and decision-making at the level they choose  - Honor patient and family perspectives and choices  Outcome: Progressing     Problem: CARDIOVASCULAR - ADULT  Goal: Maintains optimal cardiac output and hemodynamic stability  Description: INTERVENTIONS:  - Monitor vital signs, rhythm, and trends  - Monitor for bleeding, hypotension and signs of decreased cardiac output  - Evaluate effectiveness of vasoactive medications to optimize hemodynamic stability  - Monitor arterial and/or venous puncture sites for bleeding and/or hematoma  - Assess quality of pulses, skin color and temperature  - Assess for signs of decreased coronary artery perfusion - ex. Angina  - Evaluate fluid balance, assess for edema, trend weights  Outcome: Progressing     Problem: RESPIRATORY - ADULT  Goal: Achieves optimal ventilation and oxygenation  Description: INTERVENTIONS:  - Assess for changes in respiratory status  - Assess for changes in mentation and behavior  - Position to facilitate oxygenation and minimize respiratory effort  - Oxygen supplementation based on oxygen saturation or ABGs  - Provide Smoking Cessation handout, if applicable  - Encourage broncho-pulmonary hygiene including cough, deep breathe, Incentive Spirometry  - Assess the need for suctioning and perform as needed  - Assess and instruct to report SOB or any respiratory difficulty  - Respiratory Therapy support as indicated  - Manage/alleviate anxiety  - Monitor for signs/symptoms of CO2 retention  Outcome:  Progressing     Problem: GASTROINTESTINAL - ADULT  Goal: Minimal or absence of nausea and vomiting  Description: INTERVENTIONS:  - Maintain adequate hydration with IV or PO as ordered and tolerated  - Nasogastric tube to low intermittent suction as ordered  - Evaluate effectiveness of ordered antiemetic medications  - Provide nonpharmacologic comfort measures as appropriate  - Advance diet as tolerated, if ordered  - Obtain nutritional consult as needed  - Evaluate fluid balance  Outcome: Progressing     Problem: GENITOURINARY - ADULT  Goal: Absence of urinary retention  Description: INTERVENTIONS:  - Assess patient’s ability to void and empty bladder  - Monitor intake/output and perform bladder scan as needed  - Follow urinary retention protocol/standard of care  - Consider collaborating with pharmacy to review patient's medication profile  - Implement strategies to promote bladder emptying  Outcome: Progressing     Problem: METABOLIC/FLUID AND ELECTROLYTES - ADULT  Goal: Glucose maintained within prescribed range  Description: INTERVENTIONS:  - Monitor Blood Glucose as ordered  - Assess for signs and symptoms of hyperglycemia and hypoglycemia  - Administer ordered medications to maintain glucose within target range  - Assess barriers to adequate nutritional intake and initiate nutrition consult as needed  - Instruct patient on self management of diabetes  Outcome: Progressing  Goal: Electrolytes maintained within normal limits  Description: INTERVENTIONS:  - Monitor labs and rhythm and assess patient for signs and symptoms of electrolyte imbalances  - Administer electrolyte replacement as ordered  - Monitor response to electrolyte replacements, including rhythm and repeat lab results as appropriate  - Fluid restriction as ordered  - Instruct patient on fluid and nutrition restrictions as appropriate  Outcome: Progressing     Problem: SKIN/TISSUE INTEGRITY - ADULT  Goal: Skin integrity remains intact  Description:  INTERVENTIONS  - Assess and document risk factors for pressure ulcer development  - Assess and document skin integrity  - Monitor for areas of redness and/or skin breakdown  - Initiate interventions, skin care algorithm/standards of care as needed  Outcome: Progressing  Goal: Incision(s), wounds(s) or drain site(s) healing without S/S of infection  Description: INTERVENTIONS:  - Assess and document risk factors for pressure ulcer development  - Assess and document skin integrity  - Assess and document dressing/incision, wound bed, drain sites and surrounding tissue  - Implement wound care per orders  - Initiate isolation precautions as appropriate  - Initiate Pressure Ulcer prevention bundle as indicated  Outcome: Progressing     Problem: MUSCULOSKELETAL - ADULT  Goal: Return mobility to safest level of function  Description: INTERVENTIONS:  - Assess patient stability and activity tolerance for standing, transferring and ambulating w/ or w/o assistive devices  - Assist with transfers and ambulation using safe patient handling equipment as needed  - Ensure adequate protection for wounds/incisions during mobilization  - Obtain PT/OT consults as needed  - Advance activity as appropriate  - Communicate ordered activity level and limitations with patient/family  Outcome: Progressing     Problem: PAIN - ADULT  Goal: Verbalizes/displays adequate comfort level or patient's stated pain goal  Description: INTERVENTIONS:  - Encourage pt to monitor pain and request assistance  - Assess pain using appropriate pain scale  - Administer analgesics based on type and severity of pain and evaluate response  - Implement non-pharmacological measures as appropriate and evaluate response  - Consider cultural and social influences on pain and pain management  - Manage/alleviate anxiety  - Utilize distraction and/or relaxation techniques  - Monitor for opioid side effects  - Notify MD/LIP if interventions unsuccessful or patient reports new  pain  - Anticipate increased pain with activity and pre-medicate as appropriate  Outcome: Progressing     Problem: SAFETY ADULT - FALL  Goal: Free from fall injury  Description: INTERVENTIONS:  - Assess pt frequently for physical needs  - Identify cognitive and physical deficits and behaviors that affect risk of falls.  - [place] fall precautions as indicated by assessment.  - Educate pt/family on patient safety including physical limitations  - Instruct pt to call for assistance with activity based on assessment  - Modify environment to reduce risk of injury  - Provide assistive devices as appropriate  - Consider OT/PT consult to assist with strengthening/mobility  - Encourage toileting schedule  Outcome: Progressing     Problem: DISCHARGE PLANNING  Goal: Discharge to home or other facility with appropriate resources  Description: INTERVENTIONS:  - Identify barriers to discharge w/pt and caregiver  - Include patient/family/discharge partner in discharge planning  - Arrange for needed discharge resources and transportation as appropriate  - Identify discharge learning needs (meds, wound care, etc)  - Arrange for interpreters to assist at discharge as needed  - Consider post-discharge preferences of patient/family/discharge partner  - Complete POLST form as appropriate  - Assess patient's ability to be responsible for managing their own health  - Refer to Case Management Department for coordinating discharge planning if the patient needs post-hospital services based on physician/LIP order or complex needs related to functional status, cognitive ability or social support system  Outcome: Progressing     Problem: Impaired Functional Mobility  Goal: Achieve highest/safest level of mobility/gait  Description: Interventions:  - Assess patient's functional ability and stability  - Promote increasing activity/tolerance for mobility and gait  - Educate and engage patient/family in tolerated activity level and  precautions  - Recommend use of  RW for transfers and ambulation  - Recommend patient transfer to bedside chair toward strongest side  - When transferring patient, block weaker knee for safety  - Recommend use of chair position in bed 3 times per day  Outcome: Progressing     Problem: Impaired Activities of Daily Living  Goal: Achieve highest/safest level of independence in self care  Description: Interventions:  - Assess ability and encourage patient to participate in ADLs to maximize function  - Promote sitting position while performing ADLs such as feeding, grooming, and bathing  - Educate and encourage patient/family in tolerated functional activity level and precautions during self-care  Outcome: Progressing     Problem: Patient/Family Goals  Goal: Patient/Family Long Term Goal  Description: Patient's Long Term Goal: Discharge from the hospital    Interventions:  - Monitor vital signs  - Monitor appropriate labs  - Monitor blood glucose levels  - Pain management  - Administer medications per order  - Follow MD orders  - Diagnostics per order  - Update / inform patient and family on plan of care  - Discharge planning  - See additional Care Plan goals for specific interventions  Outcome: Progressing  Goal: Patient/Family Short Term Goal  Description: Patient's Short Term Goal: Improve redness, swelling, and pain to left lower extremity     Interventions:   - Monitor vital signs  - Monitor appropriate labs  - Monitor blood glucose levels  - Pain management  - Administer medications per order  - Follow MD orders  - Diagnostics per order  - Update / inform patient and family on plan of care  - See additional Care Plan goals for specific interventions  Outcome: Progressing     Problem: Diabetes/Glucose Control  Goal: Glucose maintained within prescribed range  Description: INTERVENTIONS:  - Monitor Blood Glucose as ordered  - Assess for signs and symptoms of hyperglycemia and hypoglycemia  - Administer ordered  medications to maintain glucose within target range  - Assess barriers to adequate nutritional intake and initiate nutrition consult as needed  - Instruct patient on self management of diabetes  Outcome: Progressing

## 2024-11-30 ENCOUNTER — APPOINTMENT (OUTPATIENT)
Dept: ULTRASOUND IMAGING | Facility: HOSPITAL | Age: 54
End: 2024-11-30
Attending: INTERNAL MEDICINE
Payer: COMMERCIAL

## 2024-11-30 LAB
ANION GAP SERPL CALC-SCNC: 6 MMOL/L (ref 0–18)
BASOPHILS # BLD AUTO: 0.09 X10(3) UL (ref 0–0.2)
BASOPHILS NFR BLD AUTO: 0.9 %
BUN BLD-MCNC: 25 MG/DL (ref 9–23)
BUN/CREAT SERPL: 25.8 (ref 10–20)
CALCIUM BLD-MCNC: 10.3 MG/DL (ref 8.7–10.4)
CHLORIDE SERPL-SCNC: 102 MMOL/L (ref 98–112)
CO2 SERPL-SCNC: 28 MMOL/L (ref 21–32)
CREAT BLD-MCNC: 0.97 MG/DL
DEPRECATED RDW RBC AUTO: 48.5 FL (ref 35.1–46.3)
EGFRCR SERPLBLD CKD-EPI 2021: 94 ML/MIN/1.73M2 (ref 60–?)
EOSINOPHIL # BLD AUTO: 0.19 X10(3) UL (ref 0–0.7)
EOSINOPHIL NFR BLD AUTO: 2 %
ERYTHROCYTE [DISTWIDTH] IN BLOOD BY AUTOMATED COUNT: 15.1 % (ref 11–15)
GLUCOSE BLD-MCNC: 94 MG/DL (ref 70–99)
GLUCOSE BLDC GLUCOMTR-MCNC: 104 MG/DL (ref 70–99)
GLUCOSE BLDC GLUCOMTR-MCNC: 109 MG/DL (ref 70–99)
GLUCOSE BLDC GLUCOMTR-MCNC: 122 MG/DL (ref 70–99)
HCT VFR BLD AUTO: 37.6 %
HGB BLD-MCNC: 11.8 G/DL
IMM GRANULOCYTES # BLD AUTO: 0.13 X10(3) UL (ref 0–1)
IMM GRANULOCYTES NFR BLD: 1.4 %
LYMPHOCYTES # BLD AUTO: 3.18 X10(3) UL (ref 1–4)
LYMPHOCYTES NFR BLD AUTO: 33.1 %
MCH RBC QN AUTO: 27.6 PG (ref 26–34)
MCHC RBC AUTO-ENTMCNC: 31.4 G/DL (ref 31–37)
MCV RBC AUTO: 87.9 FL
MONOCYTES # BLD AUTO: 0.92 X10(3) UL (ref 0.1–1)
MONOCYTES NFR BLD AUTO: 9.6 %
NEUTROPHILS # BLD AUTO: 5.09 X10 (3) UL (ref 1.5–7.7)
NEUTROPHILS # BLD AUTO: 5.09 X10(3) UL (ref 1.5–7.7)
NEUTROPHILS NFR BLD AUTO: 53 %
OSMOLALITY SERPL CALC.SUM OF ELEC: 286 MOSM/KG (ref 275–295)
PLATELET # BLD AUTO: 409 10(3)UL (ref 150–450)
POTASSIUM SERPL-SCNC: 4 MMOL/L (ref 3.5–5.1)
RBC # BLD AUTO: 4.28 X10(6)UL
SODIUM SERPL-SCNC: 136 MMOL/L (ref 136–145)
WBC # BLD AUTO: 9.6 X10(3) UL (ref 4–11)

## 2024-11-30 PROCEDURE — 93971 EXTREMITY STUDY: CPT | Performed by: INTERNAL MEDICINE

## 2024-11-30 NOTE — PLAN OF CARE
Patient has safety precautions in place bed in the lowest position, bed alarm on, and call light within reach. Plan of care ongoing. No further concerns as of present.    Problem: Patient Centered Care  Goal: Patient preferences are identified and integrated in the patient's plan of care  Description: Interventions:  - What would you like us to know as we care for you? From home alone  - Provide timely, complete, and accurate information to patient/family  - Incorporate patient and family knowledge, values, beliefs, and cultural backgrounds into the planning and delivery of care  - Encourage patient/family to participate in care and decision-making at the level they choose  - Honor patient and family perspectives and choices  Outcome: Progressing     Problem: CARDIOVASCULAR - ADULT  Goal: Maintains optimal cardiac output and hemodynamic stability  Description: INTERVENTIONS:  - Monitor vital signs, rhythm, and trends  - Monitor for bleeding, hypotension and signs of decreased cardiac output  - Evaluate effectiveness of vasoactive medications to optimize hemodynamic stability  - Monitor arterial and/or venous puncture sites for bleeding and/or hematoma  - Assess quality of pulses, skin color and temperature  - Assess for signs of decreased coronary artery perfusion - ex. Angina  - Evaluate fluid balance, assess for edema, trend weights  Outcome: Progressing     Problem: RESPIRATORY - ADULT  Goal: Achieves optimal ventilation and oxygenation  Description: INTERVENTIONS:  - Assess for changes in respiratory status  - Assess for changes in mentation and behavior  - Position to facilitate oxygenation and minimize respiratory effort  - Oxygen supplementation based on oxygen saturation or ABGs  - Provide Smoking Cessation handout, if applicable  - Encourage broncho-pulmonary hygiene including cough, deep breathe, Incentive Spirometry  - Assess the need for suctioning and perform as needed  - Assess and instruct to report  SOB or any respiratory difficulty  - Respiratory Therapy support as indicated  - Manage/alleviate anxiety  - Monitor for signs/symptoms of CO2 retention  Outcome: Progressing     Problem: GASTROINTESTINAL - ADULT  Goal: Minimal or absence of nausea and vomiting  Description: INTERVENTIONS:  - Maintain adequate hydration with IV or PO as ordered and tolerated  - Nasogastric tube to low intermittent suction as ordered  - Evaluate effectiveness of ordered antiemetic medications  - Provide nonpharmacologic comfort measures as appropriate  - Advance diet as tolerated, if ordered  - Obtain nutritional consult as needed  - Evaluate fluid balance  Outcome: Progressing     Problem: GENITOURINARY - ADULT  Goal: Absence of urinary retention  Description: INTERVENTIONS:  - Assess patient’s ability to void and empty bladder  - Monitor intake/output and perform bladder scan as needed  - Follow urinary retention protocol/standard of care  - Consider collaborating with pharmacy to review patient's medication profile  - Implement strategies to promote bladder emptying  Outcome: Progressing     Problem: METABOLIC/FLUID AND ELECTROLYTES - ADULT  Goal: Glucose maintained within prescribed range  Description: INTERVENTIONS:  - Monitor Blood Glucose as ordered  - Assess for signs and symptoms of hyperglycemia and hypoglycemia  - Administer ordered medications to maintain glucose within target range  - Assess barriers to adequate nutritional intake and initiate nutrition consult as needed  - Instruct patient on self management of diabetes  Outcome: Progressing  Goal: Electrolytes maintained within normal limits  Description: INTERVENTIONS:  - Monitor labs and rhythm and assess patient for signs and symptoms of electrolyte imbalances  - Administer electrolyte replacement as ordered  - Monitor response to electrolyte replacements, including rhythm and repeat lab results as appropriate  - Fluid restriction as ordered  - Instruct patient on  fluid and nutrition restrictions as appropriate  Outcome: Progressing     Problem: SKIN/TISSUE INTEGRITY - ADULT  Goal: Skin integrity remains intact  Description: INTERVENTIONS  - Assess and document risk factors for pressure ulcer development  - Assess and document skin integrity  - Monitor for areas of redness and/or skin breakdown  - Initiate interventions, skin care algorithm/standards of care as needed  Outcome: Progressing  Goal: Incision(s), wounds(s) or drain site(s) healing without S/S of infection  Description: INTERVENTIONS:  - Assess and document risk factors for pressure ulcer development  - Assess and document skin integrity  - Assess and document dressing/incision, wound bed, drain sites and surrounding tissue  - Implement wound care per orders  - Initiate isolation precautions as appropriate  - Initiate Pressure Ulcer prevention bundle as indicated  Outcome: Progressing     Problem: MUSCULOSKELETAL - ADULT  Goal: Return mobility to safest level of function  Description: INTERVENTIONS:  - Assess patient stability and activity tolerance for standing, transferring and ambulating w/ or w/o assistive devices  - Assist with transfers and ambulation using safe patient handling equipment as needed  - Ensure adequate protection for wounds/incisions during mobilization  - Obtain PT/OT consults as needed  - Advance activity as appropriate  - Communicate ordered activity level and limitations with patient/family  Outcome: Progressing     Problem: PAIN - ADULT  Goal: Verbalizes/displays adequate comfort level or patient's stated pain goal  Description: INTERVENTIONS:  - Encourage pt to monitor pain and request assistance  - Assess pain using appropriate pain scale  - Administer analgesics based on type and severity of pain and evaluate response  - Implement non-pharmacological measures as appropriate and evaluate response  - Consider cultural and social influences on pain and pain management  - Manage/alleviate  anxiety  - Utilize distraction and/or relaxation techniques  - Monitor for opioid side effects  - Notify MD/LIP if interventions unsuccessful or patient reports new pain  - Anticipate increased pain with activity and pre-medicate as appropriate  Outcome: Progressing     Problem: SAFETY ADULT - FALL  Goal: Free from fall injury  Description: INTERVENTIONS:  - Assess pt frequently for physical needs  - Identify cognitive and physical deficits and behaviors that affect risk of falls.  - [place] fall precautions as indicated by assessment.  - Educate pt/family on patient safety including physical limitations  - Instruct pt to call for assistance with activity based on assessment  - Modify environment to reduce risk of injury  - Provide assistive devices as appropriate  - Consider OT/PT consult to assist with strengthening/mobility  - Encourage toileting schedule  Outcome: Progressing     Problem: DISCHARGE PLANNING  Goal: Discharge to home or other facility with appropriate resources  Description: INTERVENTIONS:  - Identify barriers to discharge w/pt and caregiver  - Include patient/family/discharge partner in discharge planning  - Arrange for needed discharge resources and transportation as appropriate  - Identify discharge learning needs (meds, wound care, etc)  - Arrange for interpreters to assist at discharge as needed  - Consider post-discharge preferences of patient/family/discharge partner  - Complete POLST form as appropriate  - Assess patient's ability to be responsible for managing their own health  - Refer to Case Management Department for coordinating discharge planning if the patient needs post-hospital services based on physician/LIP order or complex needs related to functional status, cognitive ability or social support system  Outcome: Progressing     Problem: Impaired Functional Mobility  Goal: Achieve highest/safest level of mobility/gait  Description: Interventions:  - Assess patient's functional  ability and stability  - Promote increasing activity/tolerance for mobility and gait  - Educate and engage patient/family in tolerated activity level and precautions  - Recommend use of  RW for transfers and ambulation  - Recommend patient transfer to bedside chair toward strongest side  - When transferring patient, block weaker knee for safety  - Recommend use of chair position in bed 3 times per day  Outcome: Progressing     Problem: Impaired Activities of Daily Living  Goal: Achieve highest/safest level of independence in self care  Description: Interventions:  - Assess ability and encourage patient to participate in ADLs to maximize function  - Promote sitting position while performing ADLs such as feeding, grooming, and bathing  - Educate and encourage patient/family in tolerated functional activity level and precautions during self-care  Outcome: Progressing     Problem: Patient/Family Goals  Goal: Patient/Family Long Term Goal  Description: Patient's Long Term Goal: Discharge from the hospital    Interventions:  - Monitor vital signs  - Monitor appropriate labs  - Monitor blood glucose levels  - Pain management  - Administer medications per order  - Follow MD orders  - Diagnostics per order  - Update / inform patient and family on plan of care  - Discharge planning  - See additional Care Plan goals for specific interventions  Outcome: Progressing  Goal: Patient/Family Short Term Goal  Description: Patient's Short Term Goal: Improve redness, swelling, and pain to left lower extremity     Interventions:   - Monitor vital signs  - Monitor appropriate labs  - Monitor blood glucose levels  - Pain management  - Administer medications per order  - Follow MD orders  - Diagnostics per order  - Update / inform patient and family on plan of care  - See additional Care Plan goals for specific interventions  Outcome: Progressing     Problem: Diabetes/Glucose Control  Goal: Glucose maintained within prescribed  range  Description: INTERVENTIONS:  - Monitor Blood Glucose as ordered  - Assess for signs and symptoms of hyperglycemia and hypoglycemia  - Administer ordered medications to maintain glucose within target range  - Assess barriers to adequate nutritional intake and initiate nutrition consult as needed  - Instruct patient on self management of diabetes  Outcome: Progressing

## 2024-11-30 NOTE — PLAN OF CARE
Problem: Patient Centered Care  Goal: Patient preferences are identified and integrated in the patient's plan of care  Description: Interventions:  - What would you like us to know as we care for you? From home alone  - Provide timely, complete, and accurate information to patient/family  - Incorporate patient and family knowledge, values, beliefs, and cultural backgrounds into the planning and delivery of care  - Encourage patient/family to participate in care and decision-making at the level they choose  - Honor patient and family perspectives and choices  Outcome: Progressing     Problem: CARDIOVASCULAR - ADULT  Goal: Maintains optimal cardiac output and hemodynamic stability  Description: INTERVENTIONS:  - Monitor vital signs, rhythm, and trends  - Monitor for bleeding, hypotension and signs of decreased cardiac output  - Evaluate effectiveness of vasoactive medications to optimize hemodynamic stability  - Monitor arterial and/or venous puncture sites for bleeding and/or hematoma  - Assess quality of pulses, skin color and temperature  - Assess for signs of decreased coronary artery perfusion - ex. Angina  - Evaluate fluid balance, assess for edema, trend weights  Outcome: Progressing     Problem: RESPIRATORY - ADULT  Goal: Achieves optimal ventilation and oxygenation  Description: INTERVENTIONS:  - Assess for changes in respiratory status  - Assess for changes in mentation and behavior  - Position to facilitate oxygenation and minimize respiratory effort  - Oxygen supplementation based on oxygen saturation or ABGs  - Provide Smoking Cessation handout, if applicable  - Encourage broncho-pulmonary hygiene including cough, deep breathe, Incentive Spirometry  - Assess the need for suctioning and perform as needed  - Assess and instruct to report SOB or any respiratory difficulty  - Respiratory Therapy support as indicated  - Manage/alleviate anxiety  - Monitor for signs/symptoms of CO2 retention  Outcome:  Progressing     Problem: GASTROINTESTINAL - ADULT  Goal: Minimal or absence of nausea and vomiting  Description: INTERVENTIONS:  - Maintain adequate hydration with IV or PO as ordered and tolerated  - Nasogastric tube to low intermittent suction as ordered  - Evaluate effectiveness of ordered antiemetic medications  - Provide nonpharmacologic comfort measures as appropriate  - Advance diet as tolerated, if ordered  - Obtain nutritional consult as needed  - Evaluate fluid balance  Outcome: Progressing     Problem: GENITOURINARY - ADULT  Goal: Absence of urinary retention  Description: INTERVENTIONS:  - Assess patient’s ability to void and empty bladder  - Monitor intake/output and perform bladder scan as needed  - Follow urinary retention protocol/standard of care  - Consider collaborating with pharmacy to review patient's medication profile  - Implement strategies to promote bladder emptying  Outcome: Progressing     Problem: METABOLIC/FLUID AND ELECTROLYTES - ADULT  Goal: Glucose maintained within prescribed range  Description: INTERVENTIONS:  - Monitor Blood Glucose as ordered  - Assess for signs and symptoms of hyperglycemia and hypoglycemia  - Administer ordered medications to maintain glucose within target range  - Assess barriers to adequate nutritional intake and initiate nutrition consult as needed  - Instruct patient on self management of diabetes  Outcome: Progressing  Goal: Electrolytes maintained within normal limits  Description: INTERVENTIONS:  - Monitor labs and rhythm and assess patient for signs and symptoms of electrolyte imbalances  - Administer electrolyte replacement as ordered  - Monitor response to electrolyte replacements, including rhythm and repeat lab results as appropriate  - Fluid restriction as ordered  - Instruct patient on fluid and nutrition restrictions as appropriate  Outcome: Progressing     Problem: SKIN/TISSUE INTEGRITY - ADULT  Goal: Skin integrity remains intact  Description:  INTERVENTIONS  - Assess and document risk factors for pressure ulcer development  - Assess and document skin integrity  - Monitor for areas of redness and/or skin breakdown  - Initiate interventions, skin care algorithm/standards of care as needed  Outcome: Progressing  Goal: Incision(s), wounds(s) or drain site(s) healing without S/S of infection  Description: INTERVENTIONS:  - Assess and document risk factors for pressure ulcer development  - Assess and document skin integrity  - Assess and document dressing/incision, wound bed, drain sites and surrounding tissue  - Implement wound care per orders  - Initiate isolation precautions as appropriate  - Initiate Pressure Ulcer prevention bundle as indicated  Outcome: Progressing     Problem: MUSCULOSKELETAL - ADULT  Goal: Return mobility to safest level of function  Description: INTERVENTIONS:  - Assess patient stability and activity tolerance for standing, transferring and ambulating w/ or w/o assistive devices  - Assist with transfers and ambulation using safe patient handling equipment as needed  - Ensure adequate protection for wounds/incisions during mobilization  - Obtain PT/OT consults as needed  - Advance activity as appropriate  - Communicate ordered activity level and limitations with patient/family  Outcome: Progressing     Problem: PAIN - ADULT  Goal: Verbalizes/displays adequate comfort level or patient's stated pain goal  Description: INTERVENTIONS:  - Encourage pt to monitor pain and request assistance  - Assess pain using appropriate pain scale  - Administer analgesics based on type and severity of pain and evaluate response  - Implement non-pharmacological measures as appropriate and evaluate response  - Consider cultural and social influences on pain and pain management  - Manage/alleviate anxiety  - Utilize distraction and/or relaxation techniques  - Monitor for opioid side effects  - Notify MD/LIP if interventions unsuccessful or patient reports new  pain  - Anticipate increased pain with activity and pre-medicate as appropriate  Outcome: Progressing     Problem: SAFETY ADULT - FALL  Goal: Free from fall injury  Description: INTERVENTIONS:  - Assess pt frequently for physical needs  - Identify cognitive and physical deficits and behaviors that affect risk of falls.  - [place] fall precautions as indicated by assessment.  - Educate pt/family on patient safety including physical limitations  - Instruct pt to call for assistance with activity based on assessment  - Modify environment to reduce risk of injury  - Provide assistive devices as appropriate  - Consider OT/PT consult to assist with strengthening/mobility  - Encourage toileting schedule  Outcome: Progressing     Problem: DISCHARGE PLANNING  Goal: Discharge to home or other facility with appropriate resources  Description: INTERVENTIONS:  - Identify barriers to discharge w/pt and caregiver  - Include patient/family/discharge partner in discharge planning  - Arrange for needed discharge resources and transportation as appropriate  - Identify discharge learning needs (meds, wound care, etc)  - Arrange for interpreters to assist at discharge as needed  - Consider post-discharge preferences of patient/family/discharge partner  - Complete POLST form as appropriate  - Assess patient's ability to be responsible for managing their own health  - Refer to Case Management Department for coordinating discharge planning if the patient needs post-hospital services based on physician/LIP order or complex needs related to functional status, cognitive ability or social support system  Outcome: Progressing     Problem: Impaired Functional Mobility  Goal: Achieve highest/safest level of mobility/gait  Description: Interventions:  - Assess patient's functional ability and stability  - Promote increasing activity/tolerance for mobility and gait  - Educate and engage patient/family in tolerated activity level and  precautions  - Recommend use of  RW for transfers and ambulation  - Recommend patient transfer to bedside chair toward strongest side  - When transferring patient, block weaker knee for safety  - Recommend use of chair position in bed 3 times per day  Outcome: Progressing     Problem: Impaired Activities of Daily Living  Goal: Achieve highest/safest level of independence in self care  Description: Interventions:  - Assess ability and encourage patient to participate in ADLs to maximize function  - Promote sitting position while performing ADLs such as feeding, grooming, and bathing  - Educate and encourage patient/family in tolerated functional activity level and precautions during self-care  Outcome: Progressing     Problem: Patient/Family Goals  Goal: Patient/Family Long Term Goal  Description: Patient's Long Term Goal: Discharge from the hospital    Interventions:  - Monitor vital signs  - Monitor appropriate labs  - Monitor blood glucose levels  - Pain management  - Administer medications per order  - Follow MD orders  - Diagnostics per order  - Update / inform patient and family on plan of care  - Discharge planning  - See additional Care Plan goals for specific interventions  Outcome: Progressing  Goal: Patient/Family Short Term Goal  Description: Patient's Short Term Goal: Improve redness, swelling, and pain to left lower extremity     Interventions:   - Monitor vital signs  - Monitor appropriate labs  - Monitor blood glucose levels  - Pain management  - Administer medications per order  - Follow MD orders  - Diagnostics per order  - Update / inform patient and family on plan of care  - See additional Care Plan goals for specific interventions  Outcome: Progressing     Problem: Diabetes/Glucose Control  Goal: Glucose maintained within prescribed range  Description: INTERVENTIONS:  - Monitor Blood Glucose as ordered  - Assess for signs and symptoms of hyperglycemia and hypoglycemia  - Administer ordered  medications to maintain glucose within target range  - Assess barriers to adequate nutritional intake and initiate nutrition consult as needed  - Instruct patient on self management of diabetes  Outcome: Progressing

## 2024-11-30 NOTE — PROGRESS NOTES
[facility] Hospitalist Progress Note     CC: Hospital Follow up    PCP: Eric Sethi DO       Assessment/Plan:     Principal Problem:    Cellulitis of left lower leg  Active Problems:    Psoriasis    Immunosuppression (HCC)    Jovan Merino - 54 year old male w MO, HTN, Afib, HFpEF, psoriasis admitted 11/18/2024 for LLE cellulitis, started on IV ancef. Initially improving however 11/21 worse - abx broadened to vanc/zosyn, now cefepime and daptomycin. CT with cellulitis . ID/Rheum/Orhto consulted. MRI with LLE cellulitis, mild myositis in posterior aspect, no abscess, no OM.  PICC in place for extended IV abx coverage. Will likely need MEGAN but really wants to try to go home.      Sepsis 2/2 LLE cellulitis  Immunosuppression 2/2 psoriasis/Humira  - Febrile to 102 here. HR 80s, WBC 14  - blood cx NG x 5 days  - . Known HFpEF. Feels like legs aren't swollen & has lost weight  - Pain control: IVP dilaudid, PO oxy, APAP PRN  - IV ancef (11/18 - 21 )  - 11/19 febrile, check Bcx. Lot of pain - add tramadol (itching w oxy/norc), add IV toradol scheduled.   - Headaches, feels dehydrated - will give 1L NS bolus  - 11/21: slightly worse, LE very swollen, WBC higher. Will switch ancef to vanco/zosyn for now. Consult ID - now cefepime and daptomycin  - CT negative 11/21 for underlying abscess  - Rheum/Ortho on consult   - No concern for compartment syndrome or joint involvement at this time  - MRI with LLE cellulitis, mild myositis in posterior aspect, no abscess, no OM   - inflammatory markers down trending   - PICC in place for extended IV abx coverage  - Will need MEGAN     Severe LLE pain and swelling  - possibly 2/2 cellulitis however not improving with IV abx  - no signs/symptoms of compartment syndrome  - continue pain control with PO and IV pain meds  - eval by ortho  - inflammatory markers significantly elevated now down trended   - CPK normal  - s/p IV lasix, x2  - will consult rheumatology  - LLE venous doppler  11/18/24 and 11/24/24 negative for dvt  - LLE venous doppler 11/29/24 Left popliteal v. Positive DVT    - LLE arterial doppler 11/29/24 pending   - MRI with LLE cellulitis, mild myositis in posterior aspect, no abscess, no OM      DVT  - LLE, Left popliteal v  - full dose lovenox started   - will consider warfarin vs Xarelto based on BMI limitations     Partial quad tear  L knee effusion  - ortho consulted, appreciate recs     Headaches  - fioricet PRN     HFpEF - compensated  HTN  Parox Afib - low CV, not on AC  - Continue PTA lasix, flecainide, coreg, hydral, enalapril     Psoriasis  - On humira OP. Would hold until leg better     Morbid obesity w [facility]  - Body mass index is 52.8 kg/m².   - Healthy diet & wt loss encouraged  - Has lost quite a bit of weight with Mounjaro already     DM2 - currently controlled w mounjaro  - Okay for reg diet, follow BG on AM labs. Can hold on SSI/accuchecks for now, can add if issues controlling     Depression   - seen by psych liaison  - Will start Zoloft    ACP: Full Code  Ppx: DVT: Enox  Dispo  EDoD:  ~12/1-2     F/u:   - PCP:  Eric Sethi,      Available via epic secure chat or perfect serve 7A - 7P.     Jan Fabian MD   Internal Medicine - [place] Hospitalist  [facility]     Subjective:     No CP, SOB, or N/V.  Left leg about the same.   More pain with standing or moving.   Walked to nurse station.   Wants to try stairs before dc.     OBJECTIVE:    Blood pressure 138/84, pulse 78, temperature 98 °F (36.7 °C), temperature source Oral, resp. rate 18, height 6' 1\" (1.854 m), weight (!) 400 lb 3.2 oz (181.5 kg), SpO2 93%.    Temp:  [98 °F (36.7 °C)-98.7 °F (37.1 °C)] 98 °F (36.7 °C)  Pulse:  [78-80] 78  Resp:  [18] 18  BP: (109-138)/(62-84) 138/84  SpO2:  [92 %-93 %] 93 %      Intake/Output:    Intake/Output Summary (Last 24 hours) at 11/30/2024 1122  Last data filed at 11/29/2024 2308  Gross per 24 hour   Intake 222 ml   Output 1200 ml   Net -978 ml       Last 3 Weights    11/20/24 0528 (!) 400 lb 3.2 oz (181.5 kg)   11/19/24 0615 (!) 395 lb 3.2 oz (179.3 kg)   11/18/24 1705 (!) 394 lb 3.2 oz (178.8 kg)   11/18/24 1112 (!) 395 lb (179.2 kg)   11/17/21 1338 (!) 379 lb 3.2 oz (172 kg)   06/10/21 1222 (!) 364 lb (165.1 kg)       Exam   GEN: obese male in NAD, tearful  HEENT: EOMI  Pulm: CTAB, no crackles or wheezes  CV: RRR, no murmurs, DP pulses present  ABD: Soft, non-tender, non-distended, +BS  MSK: LLE swelling, very TTP, LLE compartments not tense  Neuro: Grossly normal, CN intact, sensory intact  SKIN: warm, dry, psoriatic plaques, LLE with edema, erythema, warm to touch  EXT: left leg edema    Data Review:       Labs:     Recent Labs   Lab 11/28/24 0435 11/29/24 0627 11/30/24 0618   RBC 4.01* 4.05* 4.28*   HGB 11.1* 11.4* 11.8*   HCT 34.8* 35.0* 37.6*   MCV 86.8 86.4 87.9   MCH 27.7 28.1 27.6   MCHC 31.9 32.6 31.4   RDW 15.0 15.0 15.1*   NEPRELIM  --   --  5.09   WBC 10.4 11.6* 9.6   .0 353.0 409.0         Recent Labs   Lab 11/28/24 0435 11/29/24 0627 11/30/24  0618   * 93 94   BUN 20 23 25*   CREATSERUM 0.85 0.90 0.97   EGFRCR 105 103 94   CA 10.0 10.3 10.3   * 135* 136   K 4.5 4.3 4.0    101 102   CO2 29.0 30.0 28.0       No results for input(s): \"ALT\", \"AST\", \"ALB\", \"AMYLASE\", \"LIPASE\", \"LDH\" in the last 168 hours.    Invalid input(s): \"ALPHOS\", \"TBIL\", \"DBIL\", \"TPROT\"      Imaging:  US VENOUS DOPPLER LEG LEFT - DIAG IMG (CPT=93971)    Result Date: 11/29/2024  CONCLUSION:   1. Nonocclusive thrombus within the proximal left popliteal vein, which was not clearly identified on prior ultrasounds and is concerning for acute deep venous thrombus. 2. Prominent left groin lymph node, which is likely reactive. 3. Limited evaluation of the calf veins secondary to subcutaneous edema and body habitus.   Impression point 1 was communicated via secure [email or web address] chat to Casey BARBOSA at 12:43 p.m. on 11/29/2024 by Gilberto Pan MD   Dictated by (CST): Gilberto Pan MD  on 11/29/2024 at 12:36 PM     Finalized by (CST): Gilberto Pan MD on 11/29/2024 at 12:44 PM             Meds:      enoxaparin  180 mg Subcutaneous 2 times per day    sertraline  25 mg Oral Daily    furosemide  40 mg Oral Daily    cefepime  2 g Intravenous Q8H    DAPTOmycin  1,000 mg Intravenous Q24H    clotrimazole-betamethasone   Topical BID    docosanol   Topical BID    cetirizine  10 mg Oral Daily    enalapril  20 mg Oral BID    flecainide  150 mg Oral BID    hydrALAZINE  50 mg Oral BID    carvedilol  25 mg Oral BID with meals    magnesium oxide  200 mg Oral Nightly    dilTIAZem HCl ER Coated Beads  180 mg Oral Nightly         ibuprofen    butalbital-acetaminophen-caffeine    magnesium hydroxide    traMADol    calcium carbonate    famotidine    acetaminophen    melatonin    polyethylene glycol (PEG 3350)    sennosides    guaiFENesin    ondansetron    prochlorperazine    HYDROmorphone    HYDROmorphone    simethicone

## 2024-11-30 NOTE — PROGRESS NOTES
[facility] Infectious Disease Progress Note    Jovan CLEVLEAND Merino Sr. Patient Status:  Inpatient    1970 MRN [id number]   Location [facility] 5SW/SE Attending Jan Fabian MD   Hosp Day # 12 PCP Eric Sethi DO     Subjective:  Chart reviewed, pt seen bedside.  Reports he was doing better earlier but now having significant pain in his LLE again.  Very frustrated about ongoing pain and no clear dx of why pain is so severe.  No fevers.  Started on lovenox for DVT.     Objective:    Allergies:  Allergies[1]    Medications:    Current Facility-Administered Medications:     enoxaparin (Lovenox) 180 mg injection, 180 mg, Subcutaneous, 2 times per day    sertraline (Zoloft) tab 25 mg, 25 mg, Oral, Daily    furosemide (Lasix) tab 40 mg, 40 mg, Oral, Daily    ibuprofen (Motrin) tab 400 mg, 400 mg, Oral, Q6H PRN    butalbital-acetaminophen-caffeine (Fioricet) -40 MG per tab 1 tablet, 1 tablet, Oral, Q4H PRN    magnesium hydroxide (Milk of Magnesia) 400 MG/5ML oral suspension 30 mL, 30 mL, Oral, Q6H PRN    ceFEPIme (Maxpime) 2 g in sodium chloride 0.9% 100 mL IVPB-MBP, 2 g, Intravenous, Q8H    DAPTOmycin (Cubicin) 1,000 mg in sodium chloride 0.9% PF IV syringe, 1,000 mg, Intravenous, Q24H    clotrimazole-betamethasone (Lotrisone) 1-0.05 % cream, , Topical, BID    docosanol (Abreva) 10 % cream, , Topical, BID    cetirizine (ZyrTEC) tab 10 mg, 10 mg, Oral, Daily    traMADol (Ultram) tab 50 mg, 50 mg, Oral, Q6H PRN    calcium carbonate (Tums) chewable tab 1,000 mg, 1,000 mg, Oral, TID PRN    famotidine (Pepcid) tab 20 mg, 20 mg, Oral, BID PRN    enalapril (Vasotec) tab 20 mg, 20 mg, Oral, BID    flecainide (Tambocor) tab 150 mg, 150 mg, Oral, BID    hydrALAZINE (Apresoline) tab 50 mg, 50 mg, Oral, BID    carvedilol (Coreg) tab 25 mg, 25 mg, Oral, BID with meals    acetaminophen (Tylenol Extra Strength) tab 500 mg, 500 mg, Oral, Q4H PRN    melatonin tab 3 mg,  3 mg, Oral, Nightly PRN    polyethylene glycol (PEG 3350) (Miralax) 17 g oral packet 17 g, 17 g, Oral, Daily PRN    sennosides (Senokot) tab 17.2 mg, 17.2 mg, Oral, Nightly PRN    guaiFENesin (Robitussin) 100 MG/5 ML oral liquid 200 mg, 200 mg, Oral, Q4H PRN    ondansetron (Zofran) 4 MG/2ML injection 4 mg, 4 mg, Intravenous, Q6H PRN    prochlorperazine (Compazine) 10 MG/2ML injection 5 mg, 5 mg, Intravenous, Q8H PRN    HYDROmorphone (Dilaudid) 1 MG/ML injection 1 mg, 1 mg, Intravenous, Q2H PRN    HYDROmorphone (Dilaudid) 1 MG/ML injection 0.5 mg, 0.5 mg, Intravenous, Q2H PRN    magnesium oxide (Mag-Ox) tab 200 mg, 200 mg, Oral, Nightly    simethicone (Mylicon) chewable tab 80 mg, 80 mg, Oral, TID PRN    dilTIAZem ER (CardIZEM CD) 24 hr cap 180 mg, 180 mg, Oral, Nightly    Physical Exam:  General: Alert, orientated x3.  Cooperative.  No apparent distress.  Vital Signs:  Blood pressure 138/84, pulse 78, temperature 98 °F (36.7 °C), temperature source Oral, resp. rate 18, height 6' 1\" (1.854 m), weight (!) 400 lb 3.2 oz (181.5 kg), SpO2 93%.   Temp (24hrs), Av.3 °F (36.8 °C), Min:98 °F (36.7 °C), Max:98.7 °F (37.1 °C)      HEENT: Exam is unremarkable.  Without scleral icterus.  Mucous membranes are moist. PERRLA.  Oropharynx is clear.  Lungs: Clear to auscultation bilaterally.  Cardiac: Regular rate   Abdomen:  Soft, non-distended, non-tender, with no rebound or guarding.    Extremities:  LLE dressing intact and just changed   Skin: Normal texture and turgor.  Neurologic: Cranial nerves are grossly intact.      Labs:  Lab Results   Component Value Date    WBC 9.6 2024    HGB 11.8 2024    HCT 37.6 2024    .0 2024    CREATSERUM 0.97 2024    BUN 25 2024     2024    K 4.0 2024     2024    CO2 28.0 2024    GLU 94 2024    CA 10.3 2024       Radiology:  Venous dopplers yesterday:  CONCLUSION:      1. Nonocclusive thrombus within the  proximal left popliteal vein, which was not clearly identified on prior ultrasounds and is concerning for acute deep venous thrombus.   2. Prominent left groin lymph node, which is likely reactive.   3. Limited evaluation of the calf veins secondary to subcutaneous edema and body habitus.     Assessment/Plan:    1.  LLE cellulitis  -admitted with fevers and leukocytosis  -temps as high as 102  -blood cultures NG  -dopplers neg for DVT initially, now repeat venous dopplers yesterday with acute DVT. Now on lovenox   -CT with R knee effusions and torn quad tendon  -CRP  6.3< 11.6 < 18.9   -arterial dopplers done, results pending   -MRI with mild myositis   -on IV Daptomycin and Cefepime, s/p IV Ancef x 4 days     2. Leukocytosis and fevers  -due to above  -afebrile   -WBC down today at 9.6K     3. Immunocompromise  -due to hx of psoriasis, on Humira  -MRSA nares negative   -rheum following     DISPO: PICC in place.  Follow up arterial dopplers. Continue IV Daptomycin and Cefepime while here, and rx in chart for IV Cefepime 2gm Q 8hrs x 2 weeks but ultimately EOT to be determined pending progress.  DC planning for [facility] vs home.  Unclear why pain is so severe, CRP improving and WBC normalized.  possible arterial compromise?  Continue to trend WBC and temps.  Will continue to follow     If you have any questions or concerns please call [facility] at [phone].     GAGAN Allison  11/30/2024  11:01 AM         [1]   Allergies  Allergen Reactions    Oxycodone ITCHING    Hydrocodone ITCHING and RASH

## 2024-11-30 NOTE — PROGRESS NOTES
[facility]  Orthopedic Surgery  Progress Note    Jovan Merino Sr. Patient Status:  Inpatient    1970 MRN [id number]   Location [facility] 5SW/SE Attending Jan Fabian MD   Hosp Day # 12 PCP Eric Sethi DO     PLAN:  - May DC from an Ortho standpoint  - Please re consult us as needed  - Follow up with Ortho outpatient as needed    Emily Chau, YOLANDA  2024  11:05 AM

## 2024-12-01 LAB
ANION GAP SERPL CALC-SCNC: 4 MMOL/L (ref 0–18)
BASOPHILS # BLD AUTO: 0.08 X10(3) UL (ref 0–0.2)
BASOPHILS NFR BLD AUTO: 0.7 %
BUN BLD-MCNC: 25 MG/DL (ref 9–23)
BUN/CREAT SERPL: 27.2 (ref 10–20)
CALCIUM BLD-MCNC: 10.3 MG/DL (ref 8.7–10.4)
CHLORIDE SERPL-SCNC: 102 MMOL/L (ref 98–112)
CK SERPL-CCNC: 577 U/L
CO2 SERPL-SCNC: 28 MMOL/L (ref 21–32)
CREAT BLD-MCNC: 0.92 MG/DL
CRP SERPL-MCNC: 3.2 MG/DL (ref ?–1)
DEPRECATED RDW RBC AUTO: 47.6 FL (ref 35.1–46.3)
EGFRCR SERPLBLD CKD-EPI 2021: 100 ML/MIN/1.73M2 (ref 60–?)
EOSINOPHIL # BLD AUTO: 0.21 X10(3) UL (ref 0–0.7)
EOSINOPHIL NFR BLD AUTO: 1.8 %
ERYTHROCYTE [DISTWIDTH] IN BLOOD BY AUTOMATED COUNT: 14.9 % (ref 11–15)
GLUCOSE BLD-MCNC: 96 MG/DL (ref 70–99)
GLUCOSE BLDC GLUCOMTR-MCNC: 101 MG/DL (ref 70–99)
GLUCOSE BLDC GLUCOMTR-MCNC: 104 MG/DL (ref 70–99)
GLUCOSE BLDC GLUCOMTR-MCNC: 109 MG/DL (ref 70–99)
GLUCOSE BLDC GLUCOMTR-MCNC: 117 MG/DL (ref 70–99)
HCT VFR BLD AUTO: 35.3 %
HGB BLD-MCNC: 11.3 G/DL
IMM GRANULOCYTES # BLD AUTO: 0.14 X10(3) UL (ref 0–1)
IMM GRANULOCYTES NFR BLD: 1.2 %
LYMPHOCYTES # BLD AUTO: 3.18 X10(3) UL (ref 1–4)
LYMPHOCYTES NFR BLD AUTO: 27.3 %
MCH RBC QN AUTO: 27.8 PG (ref 26–34)
MCHC RBC AUTO-ENTMCNC: 32 G/DL (ref 31–37)
MCV RBC AUTO: 86.9 FL
MONOCYTES # BLD AUTO: 1.06 X10(3) UL (ref 0.1–1)
MONOCYTES NFR BLD AUTO: 9.1 %
NEUTROPHILS # BLD AUTO: 6.99 X10 (3) UL (ref 1.5–7.7)
NEUTROPHILS # BLD AUTO: 6.99 X10(3) UL (ref 1.5–7.7)
NEUTROPHILS NFR BLD AUTO: 59.9 %
OSMOLALITY SERPL CALC.SUM OF ELEC: 282 MOSM/KG (ref 275–295)
PLATELET # BLD AUTO: 406 10(3)UL (ref 150–450)
POTASSIUM SERPL-SCNC: 4.4 MMOL/L (ref 3.5–5.1)
PROCALCITONIN SERPL-MCNC: 0.12 NG/ML (ref ?–0.05)
RBC # BLD AUTO: 4.06 X10(6)UL
SODIUM SERPL-SCNC: 134 MMOL/L (ref 136–145)
WBC # BLD AUTO: 11.7 X10(3) UL (ref 4–11)

## 2024-12-01 NOTE — PROGRESS NOTES
[facility] Hospitalist Progress Note     CC: Hospital Follow up    PCP: Eric Sethi DO       Assessment/Plan:     Principal Problem:    Cellulitis of left lower leg  Active Problems:    Psoriasis    Immunosuppression (HCC)    Jovan Merino - 54 year old male w MO, HTN, Afib, HFpEF, psoriasis admitted 11/18/2024 for LLE cellulitis, started on IV ancef. Initially improving however 11/21 worse - abx broadened to vanc/zosyn, now cefepime and daptomycin. CT with cellulitis . ID/Rheum/Orhto consulted. MRI with LLE cellulitis, mild myositis in posterior aspect, no abscess, no OM.  PICC in place for extended IV abx coverage. Will likely need MEGAN but really wants to try to go home. 3rd LLE doppler 11/29/24 Left popliteal v. Positive DVT. Full dose lovenox started with plans for oral AC after arterial studies are resulted.      Sepsis 2/2 LLE cellulitis  Immunosuppression 2/2 psoriasis/Humira  - Febrile to 102 here. HR 80s, WBC 14  - blood cx NG x 5 days  - . Known HFpEF. Feels like legs aren't swollen & has lost weight  - Pain control: IVP dilaudid, PO oxy, APAP PRN  - IV ancef (11/18 - 21 )  - 11/19 febrile, check Bcx. Lot of pain - add tramadol (itching w oxy/norc), add IV toradol scheduled.   - Headaches, feels dehydrated - will give 1L NS bolus  - 11/21: slightly worse, LE very swollen, WBC higher. Will switch ancef to vanco/zosyn for now. Consult ID - now cefepime and daptomycin  - CT negative 11/21 for underlying abscess  - Rheum/Ortho on consult   - No concern for compartment syndrome or joint involvement at this time  - MRI with LLE cellulitis, mild myositis in posterior aspect, no abscess, no OM   - inflammatory markers down trending   - PICC in place for extended IV abx coverage  - MEGAN may benefit but wants to go home      Severe LLE pain and swelling  - possibly 2/2 cellulitis however not improving with IV abx  - no signs/symptoms of compartment syndrome  - continue pain control with PO and IV pain meds  -  eval by ortho  - inflammatory markers significantly elevated now down trended   - CPK normal  - s/p IV lasix, x2, now on oral daily   - rheumatology following   - MRI with LLE cellulitis, mild myositis in posterior aspect, no abscess, no OM   - LLE venous doppler 11/18/24 and 11/24/24 negative for dvt  - LLE venous doppler 11/29/24 Left popliteal v. Positive DVT    - LLE arterial doppler 11/29/24 pending     Acute DVT  - LLE, Left popliteal v  - full dose lovenox started   - will consider warfarin vs Xarelto based on BMI limitations   - Discussed with patient's that although no good studies have indicated the contraindication of Xarelto with his current BMI, he may have better coverage with warfarin 2/2 increased risk factors  - Will start oral AC after arterial blood flow studies have been resulted  - Patient will take the day to decide between Xarelto and warfarin  - At this time he is leaning more towards warfarin  - will need Lovenox injection training and dietitian for warfarin diet    Partial quad tear  L knee effusion  - ortho consulted, appreciate recs, have signed off      Headaches  - fioricet PRN     HFpEF - compensated  HTN  Parox Afib - low CV, not on AC  - Continue PTA lasix, flecainide, coreg, hydral, enalapril     Psoriasis  - On humira OP. Would hold until leg better     Morbid obesity w [facility]  - Body mass index is 52.8 kg/m².   - Healthy diet & wt loss encouraged  - Has lost quite a bit of weight with Mounjaro already     DM2 - currently controlled w mounjaro  - Okay for reg diet, follow BG on AM labs. Can hold on SSI/accuchecks for now, can add if issues controlling     Depression   - seen by psych liaison  - Will start Zoloft    ACP: Full Code  Ppx: DVT: Enox  Dispo  EDoD:  ~12/3-4.  Still needing iv pain meds      F/u:   - PCP:  Eric Sethi, DO     Available via epic secure chat or perfect serve 7A - 7P.     Jan Fabian MD   Internal Medicine - Hospitalist  [facility]      Subjective:     No CP, SOB, or N/V.  Left leg about the same.   More pain with standing or moving.   Walked to nurse station.   Wants to try stairs before dc.     OBJECTIVE:    Blood pressure 127/75, pulse 81, temperature 98.7 °F (37.1 °C), temperature source Oral, resp. rate 18, height 6' 1\" (1.854 m), weight (!) 400 lb 3.2 oz (181.5 kg), SpO2 93%.    Temp:  [98.2 °F (36.8 °C)-98.8 °F (37.1 °C)] 98.7 °F (37.1 °C)  Pulse:  [70-81] 81  Resp:  [18-20] 18  BP: (101-127)/(63-75) 127/75  SpO2:  [92 %-95 %] 93 %      Intake/Output:    Intake/Output Summary (Last 24 hours) at 12/1/2024 1132  Last data filed at 12/1/2024 1037  Gross per 24 hour   Intake 1220 ml   Output 650 ml   Net 570 ml       Last 3 Weights   11/20/24 0528 (!) 400 lb 3.2 oz (181.5 kg)   11/19/24 0615 (!) 395 lb 3.2 oz (179.3 kg)   11/18/24 1705 (!) 394 lb 3.2 oz (178.8 kg)   11/18/24 1112 (!) 395 lb (179.2 kg)   11/17/21 1338 (!) 379 lb 3.2 oz (172 kg)   06/10/21 1222 (!) 364 lb (165.1 kg)       Exam   GEN: obese male in NAD, tearful  HEENT: EOMI  Pulm: CTAB, no crackles or wheezes  CV: RRR, no murmurs, DP pulses present  ABD: Soft, non-tender, non-distended, +BS  MSK: LLE swelling, very TTP, LLE compartments not tense  Neuro: Grossly normal, CN intact, sensory intact  SKIN: warm, dry, psoriatic plaques, LLE with improved edema, erythema, strong DPP  EXT: left leg edema    Data Review:       Labs:     Recent Labs   Lab 11/29/24  0627 11/30/24  0618 12/01/24  0557   RBC 4.05* 4.28* 4.06*   HGB 11.4* 11.8* 11.3*   HCT 35.0* 37.6* 35.3*   MCV 86.4 87.9 86.9   MCH 28.1 27.6 27.8   MCHC 32.6 31.4 32.0   RDW 15.0 15.1* 14.9   NEPRELIM  --  5.09 6.99   WBC 11.6* 9.6 11.7*   .0 409.0 406.0         Recent Labs   Lab 11/29/24  0627 11/30/24  0618 12/01/24  0557   GLU 93 94 96   BUN 23 25* 25*   CREATSERUM 0.90 0.97 0.92   EGFRCR 103 94 100   CA 10.3 10.3 10.3   * 136 134*   K 4.3 4.0 4.4    102 102   CO2 30.0 28.0 28.0       No results for  input(s): \"ALT\", \"AST\", \"ALB\", \"AMYLASE\", \"LIPASE\", \"LDH\" in the last 168 hours.    Invalid input(s): \"ALPHOS\", \"TBIL\", \"DBIL\", \"TPROT\"      Imaging:  US VENOUS DOPPLER LEG LEFT - DIAG IMG (CPT=93971)    Result Date: 11/29/2024  CONCLUSION:   1. Nonocclusive thrombus within the proximal left popliteal vein, which was not clearly identified on prior ultrasounds and is concerning for acute deep venous thrombus. 2. Prominent left groin lymph node, which is likely reactive. 3. Limited evaluation of the calf veins secondary to subcutaneous edema and body habitus.   Impression point 1 was communicated via secure [employer] chat to Casey BARBOSA at 12:43 p.m. on 11/29/2024 by Gilberto Pan MD   Dictated by (CST): Gilberto Pan MD on 11/29/2024 at 12:36 PM     Finalized by (CST): Gilberto Pan MD on 11/29/2024 at 12:44 PM             Meds:      enoxaparin  180 mg Subcutaneous 2 times per day    sertraline  25 mg Oral Daily    furosemide  40 mg Oral Daily    cefepime  2 g Intravenous Q8H    DAPTOmycin  1,000 mg Intravenous Q24H    clotrimazole-betamethasone   Topical BID    docosanol   Topical BID    cetirizine  10 mg Oral Daily    enalapril  20 mg Oral BID    flecainide  150 mg Oral BID    hydrALAZINE  50 mg Oral BID    carvedilol  25 mg Oral BID with meals    magnesium oxide  200 mg Oral Nightly    dilTIAZem HCl ER Coated Beads  180 mg Oral Nightly         ibuprofen    butalbital-acetaminophen-caffeine    magnesium hydroxide    traMADol    calcium carbonate    famotidine    acetaminophen    melatonin    polyethylene glycol (PEG 3350)    sennosides    guaiFENesin    ondansetron    prochlorperazine    HYDROmorphone    HYDROmorphone    simethicone

## 2024-12-01 NOTE — PROGRESS NOTES
[facility]  part of [facility] Infectious Disease Progress Note    Jovan Merino Sr. Patient Status:  Inpatient    1970 MRN [id number]   Location [facility] 5SW/SE Attending Jan Fabian MD   Hosp Day # 13 PCP Eric Sethi DO     Subjective:  Chart reviewed, pt seen bedside.  Reports doing ok right now but was having significant pain in his lower leg again earlier.  Pain gets significantly worse when bearing weight.   Pain is located around area of redness then also goes into ankle.  No fevers.     Objective:    Allergies:  Allergies[1]    Medications:    Current Facility-Administered Medications:     enoxaparin (Lovenox) 180 mg injection, 180 mg, Subcutaneous, 2 times per day    sertraline (Zoloft) tab 25 mg, 25 mg, Oral, Daily    furosemide (Lasix) tab 40 mg, 40 mg, Oral, Daily    ibuprofen (Motrin) tab 400 mg, 400 mg, Oral, Q6H PRN    butalbital-acetaminophen-caffeine (Fioricet) -40 MG per tab 1 tablet, 1 tablet, Oral, Q4H PRN    magnesium hydroxide (Milk of Magnesia) 400 MG/5ML oral suspension 30 mL, 30 mL, Oral, Q6H PRN    ceFEPIme (Maxpime) 2 g in sodium chloride 0.9% 100 mL IVPB-MBP, 2 g, Intravenous, Q8H    DAPTOmycin (Cubicin) 1,000 mg in sodium chloride 0.9% PF IV syringe, 1,000 mg, Intravenous, Q24H    clotrimazole-betamethasone (Lotrisone) 1-0.05 % cream, , Topical, BID    docosanol (Abreva) 10 % cream, , Topical, BID    cetirizine (ZyrTEC) tab 10 mg, 10 mg, Oral, Daily    traMADol (Ultram) tab 50 mg, 50 mg, Oral, Q6H PRN    calcium carbonate (Tums) chewable tab 1,000 mg, 1,000 mg, Oral, TID PRN    famotidine (Pepcid) tab 20 mg, 20 mg, Oral, BID PRN    enalapril (Vasotec) tab 20 mg, 20 mg, Oral, BID    flecainide (Tambocor) tab 150 mg, 150 mg, Oral, BID    hydrALAZINE (Apresoline) tab 50 mg, 50 mg, Oral, BID    carvedilol (Coreg) tab 25 mg, 25 mg, Oral, BID with meals    acetaminophen (Tylenol Extra Strength) tab 500 mg, 500 mg, Oral, Q4H PRN     melatonin tab 3 mg, 3 mg, Oral, Nightly PRN    polyethylene glycol (PEG 3350) (Miralax) 17 g oral packet 17 g, 17 g, Oral, Daily PRN    sennosides (Senokot) tab 17.2 mg, 17.2 mg, Oral, Nightly PRN    guaiFENesin (Robitussin) 100 MG/5 ML oral liquid 200 mg, 200 mg, Oral, Q4H PRN    ondansetron (Zofran) 4 MG/2ML injection 4 mg, 4 mg, Intravenous, Q6H PRN    prochlorperazine (Compazine) 10 MG/2ML injection 5 mg, 5 mg, Intravenous, Q8H PRN    HYDROmorphone (Dilaudid) 1 MG/ML injection 1 mg, 1 mg, Intravenous, Q2H PRN    HYDROmorphone (Dilaudid) 1 MG/ML injection 0.5 mg, 0.5 mg, Intravenous, Q2H PRN    magnesium oxide (Mag-Ox) tab 200 mg, 200 mg, Oral, Nightly    simethicone (Mylicon) chewable tab 80 mg, 80 mg, Oral, TID PRN    dilTIAZem ER (CardIZEM CD) 24 hr cap 180 mg, 180 mg, Oral, Nightly    Physical Exam:  General: Alert, orientated x3.  Cooperative.  No apparent distress.  Vital Signs:  Blood pressure 127/75, pulse 81, temperature 98.7 °F (37.1 °C), temperature source Oral, resp. rate 18, height 6' 1\" (1.854 m), weight (!) 400 lb 3.2 oz (181.5 kg), SpO2 93%.   Temp (24hrs), Av.6 °F (37 °C), Min:98.2 °F (36.8 °C), Max:98.8 °F (37.1 °C)      HEENT: Exam is unremarkable.  Without scleral icterus.  Mucous membranes are moist. PERRLA.  Oropharynx is clear.  Lungs: Clear to auscultation bilaterally.  Cardiac: Regular rate   Abdomen:  Soft, non-distended, non-tender, with no rebound or guarding.    Extremities:  LLE with ongoing area of bright erythema, +warmth and +TTP.  No overt fluctuance.   Mild edema   Skin: Normal texture and turgor.  Neurologic: Cranial nerves are grossly intact.      Labs:  Lab Results   Component Value Date    WBC 11.7 2024    HGB 11.3 2024    HCT 35.3 2024    .0 2024    CREATSERUM 0.92 2024    BUN 25 2024     2024    K 4.4 2024     2024    CO2 28.0 2024    GLU 96 2024    CA 10.3 2024      12/01/2024       Radiology:  Venous dopplers yesterday:  CONCLUSION:      1. Nonocclusive thrombus within the proximal left popliteal vein, which was not clearly identified on prior ultrasounds and is concerning for acute deep venous thrombus.   2. Prominent left groin lymph node, which is likely reactive.   3. Limited evaluation of the calf veins secondary to subcutaneous edema and body habitus.     Assessment/Plan:    1.  LLE cellulitis  -admitted with fevers and leukocytosis  -temps as high as 102  -blood cultures NG  -dopplers neg for DVT initially, now repeat venous dopplers yesterday with acute DVT. Now on lovenox   -CT with R knee effusions and torn quad tendon  -CRP  6.3< 11.6 < 18.9   -arterial dopplers done, results pending   -MRI with mild myositis   -on IV Daptomycin and Cefepime, s/p IV Ancef x 4 days     2. Leukocytosis and fevers  -due to above  -afebrile   -WBC back up today at 11.7K  -CK elevated at 577     3. Immunocompromise  -due to hx of psoriasis, on Humira  -MRSA nares negative   -rheum following     DISPO: PICC in place.  Repeat CRP and PCT. Follow up arterial dopplers. Continue IV Cefepime and will stop IV Daptomycin given rising CK.  Will repeat CK tomorrow.   Rx in chart for IV Cefepime 2gm Q 8hrs x 2 weeks but ultimately EOT to be determined pending progress.  DC planning for [facility] vs home.    If you have any questions or concerns please call [facility] at [phone].     GAGAN Allison  11/30/2024  11:01 AM         [1]   Allergies  Allergen Reactions    Oxycodone ITCHING    Hydrocodone ITCHING and RASH

## 2024-12-02 LAB
ANION GAP SERPL CALC-SCNC: 4 MMOL/L (ref 0–18)
BASOPHILS # BLD AUTO: 0.08 X10(3) UL (ref 0–0.2)
BASOPHILS NFR BLD AUTO: 0.8 %
BUN BLD-MCNC: 26 MG/DL (ref 9–23)
BUN/CREAT SERPL: 28 (ref 10–20)
CALCIUM BLD-MCNC: 10.2 MG/DL (ref 8.7–10.4)
CHLORIDE SERPL-SCNC: 102 MMOL/L (ref 98–112)
CK SERPL-CCNC: 1265 U/L
CO2 SERPL-SCNC: 30 MMOL/L (ref 21–32)
CREAT BLD-MCNC: 0.93 MG/DL
CRP SERPL-MCNC: 3.3 MG/DL (ref ?–1)
DEPRECATED RDW RBC AUTO: 47.3 FL (ref 35.1–46.3)
EGFRCR SERPLBLD CKD-EPI 2021: 99 ML/MIN/1.73M2 (ref 60–?)
EOSINOPHIL # BLD AUTO: 0.21 X10(3) UL (ref 0–0.7)
EOSINOPHIL NFR BLD AUTO: 2.1 %
ERYTHROCYTE [DISTWIDTH] IN BLOOD BY AUTOMATED COUNT: 15 % (ref 11–15)
GLUCOSE BLD-MCNC: 91 MG/DL (ref 70–99)
GLUCOSE BLDC GLUCOMTR-MCNC: 101 MG/DL (ref 70–99)
GLUCOSE BLDC GLUCOMTR-MCNC: 103 MG/DL (ref 70–99)
GLUCOSE BLDC GLUCOMTR-MCNC: 118 MG/DL (ref 70–99)
GLUCOSE BLDC GLUCOMTR-MCNC: 190 MG/DL (ref 70–99)
HCT VFR BLD AUTO: 32.9 %
HGB BLD-MCNC: 10.4 G/DL
IMM GRANULOCYTES # BLD AUTO: 0.1 X10(3) UL (ref 0–1)
IMM GRANULOCYTES NFR BLD: 1 %
INR BLD: 1.14 (ref 0.8–1.2)
LYMPHOCYTES # BLD AUTO: 3.1 X10(3) UL (ref 1–4)
LYMPHOCYTES NFR BLD AUTO: 31.2 %
MCH RBC QN AUTO: 27.2 PG (ref 26–34)
MCHC RBC AUTO-ENTMCNC: 31.6 G/DL (ref 31–37)
MCV RBC AUTO: 85.9 FL
MONOCYTES # BLD AUTO: 1.09 X10(3) UL (ref 0.1–1)
MONOCYTES NFR BLD AUTO: 11 %
NEUTROPHILS # BLD AUTO: 5.35 X10 (3) UL (ref 1.5–7.7)
NEUTROPHILS # BLD AUTO: 5.35 X10(3) UL (ref 1.5–7.7)
NEUTROPHILS NFR BLD AUTO: 53.9 %
OSMOLALITY SERPL CALC.SUM OF ELEC: 286 MOSM/KG (ref 275–295)
PLATELET # BLD AUTO: 375 10(3)UL (ref 150–450)
POTASSIUM SERPL-SCNC: 4.5 MMOL/L (ref 3.5–5.1)
PROTHROMBIN TIME: 15.3 SECONDS (ref 11.6–14.8)
RBC # BLD AUTO: 3.83 X10(6)UL
SODIUM SERPL-SCNC: 136 MMOL/L (ref 136–145)
UFH PPP CHRO-ACNC: 0.57 IU/ML
UFH PPP CHRO-ACNC: 1.08 IU/ML
UFH PPP CHRO-ACNC: 1.34 IU/ML
WBC # BLD AUTO: 9.9 X10(3) UL (ref 4–11)

## 2024-12-02 RX ORDER — ENOXAPARIN SODIUM 100 MG/ML
140 INJECTION SUBCUTANEOUS EVERY 12 HOURS
Status: DISCONTINUED | OUTPATIENT
Start: 2024-12-03 | End: 2024-12-02 | Stop reason: ALTCHOICE

## 2024-12-02 RX ORDER — IBUPROFEN 400 MG/1
400 TABLET, FILM COATED ORAL EVERY 6 HOURS PRN
Status: DISPENSED | OUTPATIENT
Start: 2024-12-02 | End: 2024-12-04

## 2024-12-02 RX ORDER — ENOXAPARIN SODIUM 150 MG/ML
140 INJECTION SUBCUTANEOUS EVERY 12 HOURS SCHEDULED
Status: DISCONTINUED | OUTPATIENT
Start: 2024-12-02 | End: 2024-12-02

## 2024-12-02 RX ORDER — HYDROMORPHONE HYDROCHLORIDE 1 MG/ML
1 INJECTION, SOLUTION INTRAMUSCULAR; INTRAVENOUS; SUBCUTANEOUS
Status: DISCONTINUED | OUTPATIENT
Start: 2024-12-02 | End: 2024-12-09

## 2024-12-02 RX ORDER — KETOROLAC TROMETHAMINE 30 MG/ML
30 INJECTION, SOLUTION INTRAMUSCULAR; INTRAVENOUS EVERY 6 HOURS PRN
Status: DISPENSED | OUTPATIENT
Start: 2024-12-02 | End: 2024-12-04

## 2024-12-02 RX ORDER — TRAMADOL HYDROCHLORIDE 50 MG/1
100 TABLET ORAL EVERY 8 HOURS
Status: DISCONTINUED | OUTPATIENT
Start: 2024-12-02 | End: 2024-12-03

## 2024-12-02 RX ORDER — WARFARIN SODIUM 5 MG/1
10 TABLET ORAL NIGHTLY
Status: DISCONTINUED | OUTPATIENT
Start: 2024-12-02 | End: 2024-12-07

## 2024-12-02 RX ORDER — ENOXAPARIN SODIUM 100 MG/ML
140 INJECTION SUBCUTANEOUS EVERY 12 HOURS SCHEDULED
Status: DISCONTINUED | OUTPATIENT
Start: 2024-12-02 | End: 2024-12-02

## 2024-12-02 RX ORDER — ENOXAPARIN SODIUM 150 MG/ML
140 INJECTION SUBCUTANEOUS EVERY 12 HOURS
Status: DISCONTINUED | OUTPATIENT
Start: 2024-12-03 | End: 2024-12-06

## 2024-12-02 NOTE — PAYOR COMM NOTE
CONTINUED STAY REVIEW    Payor: [employer] P[employer]  Subscriber #:  [id number]  Authorization Number: [id number]    Admit date: 11/18/24  Admit time:  4:58 PM    REVIEW DOCUMENTATION:11/28 11/28 Hospitalist Progress Note    [facility] Hospitalist Progress Note      CC: Hospital Follow up     PCP: Eric Sethi DO         Assessment/Plan:      Principal Problem:    Cellulitis of left lower leg  Active Problems:    Psoriasis    Immunosuppression (HCC)     Jovan Mreino - 54 year old male w MO, HTN, Afib, HFpEF, psoriasis admitted 11/18/2024 for LLE cellulitis, started on IV ancef. Initially improving however 11/21 worse - abx broadened to vanc/zosyn, now cefepime and daptomycin. CT with cellulitis . ID/Rheum/Orhto consulted. MRI with LLE cellulitis, mild myositis in posterior aspect, no abscess, no OM.  PICC in place for extended IV abx coverage. Will need MEGAN.      Sepsis 2/2 LLE cellulitis  Immunosuppression 2/2 psoriasis/Humira  - Febrile to 102 here. HR 80s, WBC 14  - blood cx NG x 5 days  - Arterial and venous doppler neg. No DVT  - . Known HFpEF. Feels like legs aren't swollen & has lost weight  - Pain control: IVP dilaudid, PO oxy, APAP PRN  - IV ancef (11/18 - 21 )  - 11/19 febrile, check Bcx. Lot of pain - add tramadol (itching w oxy/norc), add IV toradol scheduled.   - Headaches, feels dehydrated - will give 1L NS bolus  - 11/21: slightly worse, LE very swollen, WBC higher. Will switch ancef to vanco/zosyn for now. Consult ID - now cefepime and daptomycin  - CT negative 11/21 for underlying abscess  - Rheum/Ortho on consult   - No concern for compartment syndrome or joint involvement at this time  - MRI with LLE cellulitis, mild myositis in posterior aspect, no abscess, no OM   - inflammatory markers down trending   - PICC in place for extended IV abx coverage  - Will need MEGAN     Severe LLE pain and swelling  - possibly 2/2 cellulitis however not improving with IV abx  - no signs/symptoms of  compartment syndrome  - continue pain control with PO and IV susana meds  - eval by ortho  - inflammatory markers significantly elevated  - CPK normal  - s/p IV lasix, x2  - will consult rheumatology  - MRI with LLE cellulitis, mild myositis in posterior aspect, no abscess, no OM   - inflammatory markers down trending     Partial quad tear  L knee effusion  - ortho consulted, appreciate recs     Headaches  - fioricet PRN     HFpEF - compensated  HTN  Parox Afib - low CV, not on AC  - Continue PTA lasix, flecainide, coreg, hydral, enalapril     Psoriasis  - On humira OP. Would hold until leg better     [place] [facility] [place] mass index is 52.8 kg/m².   - Healthy diet & wt loss encouraged  - Has lost quite a bit of weight with Mounjaro already     DM2 - currently controlled w mounjaro  - Okay for reg diet, follow BG on AM labs. Can hold on SSI/accuchecks for now, can add if issues controlling     Depression   - seen by psych liaison  - Will start Zoloft     ACP: Full Code  Ppx: DVT: Enox  Dispo  EDoD:  ~11/29-11/30     F/u:   - PCP:  Eric Sethi DO     Available via epic secure chat or perfect serve 7A - 7P.     Jan Fabian MD   Internal Medicine - Hospitalist  [facility]      Subjective:      No CP, SOB, or N/V.  Left leg about the same.   More pain with standing or moving.   More itching last night.      OBJECTIVE:     Blood pressure 147/81, pulse 80, temperature 98.7 °F (37.1 °C), temperature source Oral, resp. rate 18, height 6' 1\" (1.854 m), weight (!) 400 lb 3.2 oz (181.5 kg), SpO2 92%.     Temp:  [97.7 °F (36.5 °C)-99.2 °F (37.3 °C)] 98.7 °F (37.1 °C)  Pulse:  [77-96] 80  Resp:  [18] 18  BP: (117-155)/(70-81) 147/81  SpO2:  [91 %-93 %] 92 %        Intake/Output:     Intake/Output Summary (Last 24 hours) at 11/28/2024 1233  Last data filed at 11/28/2024 0606      Gross per 24 hour   Intake 532 ml   Output 1300 ml   Net -768 ml              Last 3 Weights   11/20/24 0528 (!) 400 lb 3.2 oz  (181.5 kg)   11/19/24 0615 (!) 395 lb 3.2 oz (179.3 kg)   11/18/24 1705 (!) 394 lb 3.2 oz (178.8 kg)   11/18/24 1112 (!) 395 lb (179.2 kg)   11/17/21 1338 (!) 379 lb 3.2 oz (172 kg)   06/10/21 1222 (!) 364 lb (165.1 kg)         Exam   GEN: obese male in NAD, tearful  HEENT: EOMI  Pulm: CTAB, no crackles or wheezes  CV: RRR, no murmurs, DP pulses present  ABD: Soft, non-tender, non-distended, +BS  MSK: LLE swelling, very TTP, LLE compartments not tense  Neuro: Grossly normal, CN intact, sensory intact  SKIN: warm, dry, psoriatic plaques, LLE with edema, erythema, warm to touch  EXT: left leg edema     Data Review:       Labs:            Recent Labs   Lab 11/26/24 0458 11/27/24 0458 11/28/24 0435   RBC 4.17* 4.15* 4.01*   HGB 11.7* 11.5* 11.1*   HCT 36.2* 35.9* 34.8*   MCV 86.8 86.5 86.8   MCH 28.1 27.7 27.7   MCHC 32.3 32.0 31.9   RDW 15.0 14.9 15.0   WBC 12.7* 12.8* 10.4   .0 330.0 343.0                  Recent Labs   Lab 11/26/24 0458 11/27/24 0458 11/28/24 0435   GLU 98 103* 110*   BUN 17 18 20   CREATSERUM 0.81 0.88 0.85   EGFRCR 106 103 105   CA 10.4 10.2 10.0    133* 135*   K 4.4 4.5 4.5   CL 99 99 102   CO2 30.0 30.0 29.0         No results for input(s): \"ALT\", \"AST\", \"ALB\", \"AMYLASE\", \"LIPASE\", \"LDH\" in the last 168 hours.     Invalid input(s): \"ALPHOS\", \"TBIL\", \"DBIL\", \"TPROT\"        Imaging:  MRI LOWER LEG (W+WO), LEFT (CPT=73720)     Result Date: 11/26/2024  CONCLUSION:         1. Left lower extremity cellulitis.  No abscess. 2. Mild myositis of posterior superficial compartment with reactive enhancement.  3. No osteomyelitis. 4. Less pronounced superficial soft tissue edema in the right lower extremity seen at edge of field of view.    Dictated by (CST): Dexter Mendieta MD on 11/26/2024 at 1:54 PM     Finalized by (CST): Dexter Mendieta MD on 11/26/2024 at 2:03 PM               Meds:      Scheduled Medications    sertraline  25 mg Oral Daily    furosemide  40 mg Oral Daily    cefepime  2  g Intravenous Q8H    DAPTOmycin  1,000 mg Intravenous Q24H    clotrimazole-betamethasone   Topical BID    docosanol   Topical BID    cetirizine  10 mg Oral Daily    enalapril  20 mg Oral BID    flecainide  150 mg Oral BID    hydrALAZINE  50 mg Oral BID    carvedilol  25 mg Oral BID with meals    enoxaparin  90 mg Subcutaneous 2 times per day    magnesium oxide  200 mg Oral Nightly    dilTIAZem HCl ER Coated Beads  180 mg Oral Nightly         Medication Infusions           PRN Medications     ibuprofen    butalbital-acetaminophen-caffeine    magnesium hydroxide    traMADol    calcium carbonate    famotidine    acetaminophen    melatonin    polyethylene glycol (PEG 3350)    sennosides    guaiFENesin    ondansetron    prochlorperazine    HYDROmorphone    HYDROmorphone    simethicone         11/29/2024 Hospitalist Progress Note   [facility] Hospitalist Progress Note      CC: Hospital Follow up     PCP: Eric Sethi DO         Assessment/Plan:      Principal Problem:    Cellulitis of left lower leg  Active Problems:    Psoriasis    Immunosuppression (HCC)     Jovan Merino - 54 year old male w MO, HTN, Afib, HFpEF, psoriasis admitted 11/18/2024 for LLE cellulitis, started on IV ancef. Initially improving however 11/21 worse - abx broadened to vanc/zosyn, now cefepime and daptomycin. CT with cellulitis . ID/Rheum/Orhto consulted. MRI with LLE cellulitis, mild myositis in posterior aspect, no abscess, no OM.  PICC in place for extended IV abx coverage. Will need MEGAN.      Sepsis 2/2 LLE cellulitis  Immunosuppression 2/2 psoriasis/Humira  - Febrile to 102 here. HR 80s, WBC 14  - blood cx NG x 5 days  - . Known HFpEF. Feels like legs aren't swollen & has lost weight  - Pain control: IVP dilaudid, PO oxy, APAP PRN  - IV ancef (11/18 - 21 )  - 11/19 febrile, check Bcx. Lot of pain - add tramadol (itching w oxy/norc), add IV toradol scheduled.   - Headaches, feels dehydrated - will give 1L NS bolus  - 11/21: slightly worse,  LE very swollen, WBC higher. Will switch ancef to vanco/zosyn for now. Consult ID - now cefepime and daptomycin  - CT negative 11/21 for underlying abscess  - Rheum/Ortho on consult   - No concern for compartment syndrome or joint involvement at this time  - MRI with LLE cellulitis, mild myositis in posterior aspect, no abscess, no OM   - inflammatory markers down trending   - PICC in place for extended IV abx coverage  - Will need MEGAN     Severe LLE pain and swelling  - possibly 2/2 cellulitis however not improving with IV abx  - no signs/symptoms of compartment syndrome  - continue pain control with PO and IV pain meds  - eval by ortho  - inflammatory markers significantly elevated now down trended   - CPK normal  - s/p IV lasix, x2  - will consult rheumatology  - LLE venous doppler 11/18/24 and 11/24/24 negative for dvt  - LLE venous doppler 11/29/24 Left popliteal v. Positive DVT    - LLE arterial doppler 11/29/24 pending   - MRI with LLE cellulitis, mild myositis in posterior aspect, no abscess, no OM      DVT  - LLE, Left popliteal v  - full dose lovenox started      Partial quad tear  L knee effusion  - ortho consulted, appreciate recs     Headaches  - fioricet PRN     HFpEF - compensated  HTN  Parox Afib - low CV, not on AC  - Continue PTA lasix, flecainide, coreg, hydral, enalapril     Psoriasis  - On humira OP. Would hold until leg better     Morbid obesity w [facility] [place]  - Body mass index is 52.8 kg/m².   - Healthy diet & wt loss encouraged  - Has lost quite a bit of weight with Mounjaro already     DM2 - currently controlled w mounjaro  - Okay for reg diet, follow BG on AM labs. Can hold on SSI/accuchecks for now, can add if issues controlling     Depression   - seen by psych liaison  - Will start Zoloft     ACP: Full Code  Ppx: DVT: Enox  Dispo  EDoD:  ~12/1-2     F/u:   - PCP:  Eric Sethi,      Available via epic secure chat or perfect serve 7A - 7P.     Jan Fabian MD   Internal Medicine -  Hospitalist  [facility]      Subjective:      No CP, SOB, or N/V.  Left leg about the same.   More pain with standing or moving.   Walked to nurse station.      OBJECTIVE:     Blood pressure 149/67, pulse 91, temperature 98.4 °F (36.9 °C), temperature source Oral, resp. rate 18, height 6' 1\" (1.854 m), weight (!) 400 lb 3.2 oz (181.5 kg), SpO2 94%.     Temp:  [97.6 °F (36.4 °C)-98.6 °F (37 °C)] 98.4 °F (36.9 °C)  Pulse:  [75-91] 91  Resp:  [18] 18  BP: (115-149)/(67-78) 149/67  SpO2:  [91 %-98 %] 94 %        Intake/Output:     Intake/Output Summary (Last 24 hours) at 11/29/2024 1208  Last data filed at 11/29/2024 1112      Gross per 24 hour   Intake 542 ml   Output 3050 ml   Net -2508 ml              Last 3 Weights   11/20/24 0528 (!) 400 lb 3.2 oz (181.5 kg)   11/19/24 0615 (!) 395 lb 3.2 oz (179.3 kg)   11/18/24 1705 (!) 394 lb 3.2 oz (178.8 kg)   11/18/24 1112 (!) 395 lb (179.2 kg)   11/17/21 1338 (!) 379 lb 3.2 oz (172 kg)   06/10/21 1222 (!) 364 lb (165.1 kg)         Exam   GEN: obese male in NAD, tearful  HEENT: EOMI  Pulm: CTAB, no crackles or wheezes  CV: RRR, no murmurs, DP pulses present  ABD: Soft, non-tender, non-distended, +BS  MSK: LLE swelling, very TTP, LLE compartments not tense  Neuro: Grossly normal, CN intact, sensory intact  SKIN: warm, dry, psoriatic plaques, LLE with edema, erythema, warm to touch  EXT: left leg edema     Data Review:       Labs:            Recent Labs   Lab 11/27/24  0458 11/28/24  0435 11/29/24  0627   RBC 4.15* 4.01* 4.05*   HGB 11.5* 11.1* 11.4*   HCT 35.9* 34.8* 35.0*   MCV 86.5 86.8 86.4   MCH 27.7 27.7 28.1   MCHC 32.0 31.9 32.6   RDW 14.9 15.0 15.0   WBC 12.8* 10.4 11.6*   .0 343.0 353.0                  Recent Labs   Lab 11/27/24  0458 11/28/24  0435 11/29/24  0627   * 110* 93   BUN 18 20 23   CREATSERUM 0.88 0.85 0.90   EGFRCR 103 105 103   CA 10.2 10.0 10.3   * 135* 135*   K 4.5 4.5 4.3   CL 99 102 101   CO2 30.0 29.0 30.0         No  results for input(s): \"ALT\", \"AST\", \"ALB\", \"AMYLASE\", \"LIPASE\", \"LDH\" in the last 168 hours.     Invalid input(s): \"ALPHOS\", \"TBIL\", \"DBIL\", \"TPROT\"        Imaging:  MRI LOWER LEG (W+WO), LEFT (CPT=73720)     Result Date: 11/26/2024  CONCLUSION:         1. Left lower extremity cellulitis.  No abscess. 2. Mild myositis of posterior superficial compartment with reactive enhancement.  3. No osteomyelitis. 4. Less pronounced superficial soft tissue edema in the right lower extremity seen at edge of field of view.    Dictated by (CST): Dexter Mendieta MD on 11/26/2024 at 1:54 PM     Finalized by (CST): Dexter Mendieta MD on 11/26/2024 at 2:03 PM               Meds:      Scheduled Medications    enoxaparin  1 mg/kg Subcutaneous 2 times per day    sertraline  25 mg Oral Daily    furosemide  40 mg Oral Daily    cefepime  2 g Intravenous Q8H    DAPTOmycin  1,000 mg Intravenous Q24H    clotrimazole-betamethasone   Topical BID    docosanol   Topical BID    cetirizine  10 mg Oral Daily    enalapril  20 mg Oral BID    flecainide  150 mg Oral BID    hydrALAZINE  50 mg Oral BID    carvedilol  25 mg Oral BID with meals    magnesium oxide  200 mg Oral Nightly    dilTIAZem HCl ER Coated Beads  180 mg Oral Nightly         Medication Infusions           PRN Medications     ibuprofen    butalbital-acetaminophen-caffeine    magnesium hydroxide    traMADol    calcium carbonate    famotidine    acetaminophen    melatonin    polyethylene glycol (PEG 3350)    sennosides    guaiFENesin    ondansetron    prochlorperazine    HYDROmorphone    HYDROmorphone    simethicone      11/30/2024 Hospitalist Progress          [facility] Hospitalist Progress Note      CC: Hospital Follow up     PCP: Eric Sethi DO         Assessment/Plan:      Principal Problem:    Cellulitis of left lower leg  Active Problems:    Psoriasis    Immunosuppression (HCC)     Jovan Merino - 54 year old male w MO, HTN, Afib, HFpEF, psoriasis admitted 11/18/2024 for LLE cellulitis,  started on IV ancef. Initially improving however 11/21 worse - abx broadened to vanc/zosyn, now cefepime and daptomycin. CT with cellulitis . ID/Rheum/Orhto consulted. MRI with LLE cellulitis, mild myositis in posterior aspect, no abscess, no OM.  PICC in place for extended IV abx coverage. Will likely need MEGAN but really wants to try to go home.      Sepsis 2/2 LLE cellulitis  Immunosuppression 2/2 psoriasis/Humira  - Febrile to 102 here. HR 80s, WBC 14  - blood cx NG x 5 days  - . Known HFpEF. Feels like legs aren't swollen & has lost weight  - Pain control: IVP dilaudid, PO oxy, APAP PRN  - IV ancef (11/18 - 21 )  - 11/19 febrile, check Bcx. Lot of pain - add tramadol (itching w oxy/norc), add IV toradol scheduled.   - Headaches, feels dehydrated - will give 1L NS bolus  - 11/21: slightly worse, LE very swollen, WBC higher. Will switch ancef to vanco/zosyn for now. Consult ID - now cefepime and daptomycin  - CT negative 11/21 for underlying abscess  - Rheum/Ortho on consult   - No concern for compartment syndrome or joint involvement at this time  - MRI with LLE cellulitis, mild myositis in posterior aspect, no abscess, no OM   - inflammatory markers down trending   - PICC in place for extended IV abx coverage  - Will need MEGAN     Severe LLE pain and swelling  - possibly 2/2 cellulitis however not improving with IV abx  - no signs/symptoms of compartment syndrome  - continue pain control with PO and IV pain meds  - eval by ortho  - inflammatory markers significantly elevated now down trended   - CPK normal  - s/p IV lasix, x2  - will consult rheumatology  - LLE venous doppler 11/18/24 and 11/24/24 negative for dvt  - LLE venous doppler 11/29/24 Left popliteal v. Positive DVT    - LLE arterial doppler 11/29/24 pending   - MRI with LLE cellulitis, mild myositis in posterior aspect, no abscess, no OM      DVT  - LLE, Left popliteal v  - full dose lovenox started   - will consider warfarin vs Xarelto based on  BMI limitations      Partial quad tear  L knee effusion  - ortho consulted, appreciate recs     Headaches  - fioricet PRN     HFpEF - compensated  HTN  Parox Afib - low CV, not on AC  - Continue PTA lasix, flecainide, coreg, hydral, enalapril     Psoriasis  - On humira OP. Would hold until leg better     Morbid obesity w [facility]  - Body mass index is 52.8 kg/m².   - Healthy diet & wt loss encouraged  - Has lost quite a bit of weight with Mounjaro already     DM2 - currently controlled w mounjaro  - Okay for reg diet, follow BG on AM labs. Can hold on SSI/accuchecks for now, can add if issues controlling     Depression   - seen by psych liaison  - Will start Zoloft     ACP: Full Code  Ppx: DVT: Enox  Dispo  EDoD:  ~12/1-2     F/u:   - PCP:  Eric Sethi,      Available via epic secure chat or perfect serve 7A - 7P.     Jan Fabian MD   Internal Medicine - Hospitalist  [facility]      Subjective:      No CP, SOB, or N/V.  Left leg about the same.   More pain with standing or moving.   Walked to nurse station.   Wants to try stairs before dc.      OBJECTIVE:     Blood pressure 138/84, pulse 78, temperature 98 °F (36.7 °C), temperature source Oral, resp. rate 18, height 6' 1\" (1.854 m), weight (!) 400 lb 3.2 oz (181.5 kg), SpO2 93%.     Temp:  [98 °F (36.7 °C)-98.7 °F (37.1 °C)] 98 °F (36.7 °C)  Pulse:  [78-80] 78  Resp:  [18] 18  BP: (109-138)/(62-84) 138/84  SpO2:  [92 %-93 %] 93 %        Intake/Output:     Intake/Output Summary (Last 24 hours) at 11/30/2024 1122  Last data filed at 11/29/2024 2308      Gross per 24 hour   Intake 222 ml   Output 1200 ml   Net -978 ml              Last 3 Weights   11/20/24 0528 (!) 400 lb 3.2 oz (181.5 kg)   11/19/24 0615 (!) 395 lb 3.2 oz (179.3 kg)   11/18/24 1705 (!) 394 lb 3.2 oz (178.8 kg)   11/18/24 1112 (!) 395 lb (179.2 kg)   11/17/21 1338 (!) 379 lb 3.2 oz (172 kg)   06/10/21 1222 (!) 364 lb (165.1 kg)         Exam   GEN: obese male in NAD, tearful  HEENT:  EOMI  Pulm: CTAB, no crackles or wheezes  CV: RRR, no murmurs, DP pulses present  ABD: Soft, non-tender, non-distended, +BS  MSK: LLE swelling, very TTP, LLE compartments not tense  Neuro: Grossly normal, CN intact, sensory intact  SKIN: warm, dry, psoriatic plaques, LLE with edema, erythema, warm to touch  EXT: left leg edema     Data Review:       Labs:            Recent Labs   Lab 11/28/24 0435 11/29/24 0627 11/30/24 0618   RBC 4.01* 4.05* 4.28*   HGB 11.1* 11.4* 11.8*   HCT 34.8* 35.0* 37.6*   MCV 86.8 86.4 87.9   MCH 27.7 28.1 27.6   MCHC 31.9 32.6 31.4   RDW 15.0 15.0 15.1*   NEPRELIM  --   --  5.09   WBC 10.4 11.6* 9.6   .0 353.0 409.0                  Recent Labs   Lab 11/28/24 0435 11/29/24 0627 11/30/24 0618   * 93 94   BUN 20 23 25*   CREATSERUM 0.85 0.90 0.97   EGFRCR 105 103 94   CA 10.0 10.3 10.3   * 135* 136   K 4.5 4.3 4.0    101 102   CO2 29.0 30.0 28.0         No results for input(s): \"ALT\", \"AST\", \"ALB\", \"AMYLASE\", \"LIPASE\", \"LDH\" in the last 168 hours.     Invalid input(s): \"ALPHOS\", \"TBIL\", \"DBIL\", \"TPROT\"        Imaging:  US VENOUS DOPPLER LEG LEFT - DIAG IMG (CPT=93971)     Result Date: 11/29/2024  CONCLUSION:          1. Nonocclusive thrombus within the proximal left popliteal vein, which was not clearly identified on prior ultrasounds and is concerning for acute deep venous thrombus. 2. Prominent left groin lymph node, which is likely reactive. 3. Limited evaluation of the calf veins secondary to subcutaneous edema and body habitus.   Impression point 1 was communicated via secure [employer] chat to Casey BARBOSA at 12:43 p.m. on 11/29/2024 by Gilberto Pan MD   Dictated by (CST): Gilberto Pan MD on 11/29/2024 at 12:36 PM     Finalized by (CST): Gilberto Pan MD on 11/29/2024 at 12:44 PM               Meds:      Scheduled Medications    enoxaparin  180 mg Subcutaneous 2 times per day    sertraline  25 mg Oral Daily    furosemide  40 mg Oral Daily    cefepime  2 g  Intravenous Q8H    DAPTOmycin  1,000 mg Intravenous Q24H    clotrimazole-betamethasone   Topical BID    docosanol   Topical BID    cetirizine  10 mg Oral Daily    enalapril  20 mg Oral BID    flecainide  150 mg Oral BID    hydrALAZINE  50 mg Oral BID    carvedilol  25 mg Oral BID with meals    magnesium oxide  200 mg Oral Nightly    dilTIAZem HCl ER Coated Beads  180 mg Oral Nightly         Medication Infusions           PRN Medications     ibuprofen    butalbital-acetaminophen-caffeine    magnesium hydroxide    traMADol    calcium carbonate    famotidine    acetaminophen    melatonin    polyethylene glycol (PEG 3350)    sennosides    guaiFENesin    ondansetron    prochlorperazine    HYDROmorphone    HYDROmorphone    simethicone       12/1/2024 Hospitalist Progress Note        [facility] Hospitalist Progress Note      CC: Hospital Follow up     PCP: Eric Sethi DO         Assessment/Plan:      Principal Problem:    Cellulitis of left lower leg  Active Problems:    Psoriasis    Immunosuppression (HCC)     Jovan Merino - 54 year old male w MO, HTN, Afib, HFpEF, psoriasis admitted 11/18/2024 for LLE cellulitis, started on IV ancef. Initially improving however 11/21 worse - abx broadened to vanc/zosyn, now cefepime and daptomycin. CT with cellulitis . ID/Rheum/Orhto consulted. MRI with LLE cellulitis, mild myositis in posterior aspect, no abscess, no OM.  PICC in place for extended IV abx coverage. Will likely need MEGAN but really wants to try to go home. 3rd LLE doppler 11/29/24 Left popliteal v. Positive DVT. Full dose lovenox started with plans for oral AC after arterial studies are resulted.      Sepsis 2/2 LLE cellulitis  Immunosuppression 2/2 psoriasis/Humira  - Febrile to 102 here. HR 80s, WBC 14  - blood cx NG x 5 days  - . Known HFpEF. Feels like legs aren't swollen & has lost weight  - Pain control: IVP dilaudid, PO oxy, APAP PRN  - IV ancef (11/18 - 21 )  - 11/19 febrile, check Bcx. Lot of pain - add  tramadol (itching w oxy/norc), add IV toradol scheduled.   - Headaches, feels dehydrated - will give 1L NS bolus  - 11/21: slightly worse, LE very swollen, WBC higher. Will switch ancef to vanco/zosyn for now. Consult ID - now cefepime and daptomycin  - CT negative 11/21 for underlying abscess  - Rheum/Ortho on consult   - No concern for compartment syndrome or joint involvement at this time  - MRI with LLE cellulitis, mild myositis in posterior aspect, no abscess, no OM   - inflammatory markers down trending   - PICC in place for extended IV abx coverage  - MEGAN may benefit but wants to go home      Severe LLE pain and swelling  - possibly 2/2 cellulitis however not improving with IV abx  - no signs/symptoms of compartment syndrome  - continue pain control with PO and IV pain meds  - eval by ortho  - inflammatory markers significantly elevated now down trended   - CPK normal  - s/p IV lasix, x2, now on oral daily   - rheumatology following   - MRI with LLE cellulitis, mild myositis in posterior aspect, no abscess, no OM   - LLE venous doppler 11/18/24 and 11/24/24 negative for dvt  - LLE venous doppler 11/29/24 Left popliteal v. Positive DVT    - LLE arterial doppler 11/29/24 pending      Acute DVT  - LLE, Left popliteal v  - full dose lovenox started   - will consider warfarin vs Xarelto based on BMI limitations   - Discussed with patient's that although no good studies have indicated the contraindication of Xarelto with his current BMI, he may have better coverage with warfarin 2/2 increased risk factors  - Will start oral AC after arterial blood flow studies have been resulted  - Patient will take the day to decide between Xarelto and warfarin  - At this time he is leaning more towards warfarin  - will need Lovenox injection training and dietitian for warfarin diet     Partial quad tear  L knee effusion  - ortho consulted, appreciate recs, have signed off      Headaches  - fioricet PRN     HFpEF -  compensated  HTN  Parox Afib - low CV, not on AC  - Continue PTA lasix, flecainide, coreg, hydral, enalapril     Psoriasis  - On humira OP. Would hold until leg better     Morbid obesity w longterm  - Body mass index is 52.8 kg/m².   - Healthy diet & wt loss encouraged  - Has lost quite a bit of weight with Mounjaro already     DM2 - currently controlled w mounjaro  - Okay for reg diet, follow BG on AM labs. Can hold on SSI/accuchecks for now, can add if issues controlling     Depression   - seen by psych liaison  - Will start Zoloft     ACP: Full Code  Ppx: DVT: Enox  Dispo  EDoD:  ~12/3-4.  Still needing iv pain meds      F/u:   - PCP:  Eric Sethi,      Available via epic secure chat or perfect serve 7A - 7P.     Jan Fabian MD   [facility]      Subjective:      No CP, SOB, or N/V.  Left leg about the same.   More pain with standing or moving.   Walked to nurse station.   Wants to try stairs before dc.      OBJECTIVE:     Blood pressure 127/75, pulse 81, temperature 98.7 °F (37.1 °C), temperature source Oral, resp. rate 18, height 6' 1\" (1.854 m), weight (!) 400 lb 3.2 oz (181.5 kg), SpO2 93%.     Temp:  [98.2 °F (36.8 °C)-98.8 °F (37.1 °C)] 98.7 °F (37.1 °C)  Pulse:  [70-81] 81  Resp:  [18-20] 18  BP: (101-127)/(63-75) 127/75  SpO2:  [92 %-95 %] 93 %        Intake/Output:     Intake/Output Summary (Last 24 hours) at 12/1/2024 1132  Last data filed at 12/1/2024 1037      Gross per 24 hour   Intake 1220 ml   Output 650 ml   Net 570 ml              Last 3 Weights   11/20/24 0528 (!) 400 lb 3.2 oz (181.5 kg)   11/19/24 0615 (!) 395 lb 3.2 oz (179.3 kg)   11/18/24 1705 (!) 394 lb 3.2 oz (178.8 kg)   11/18/24 1112 (!) 395 lb (179.2 kg)   11/17/21 1338 (!) 379 lb 3.2 oz (172 kg)   06/10/21 1222 (!) 364 lb (165.1 kg)         Exam   GEN: obese male in NAD, tearful  HEENT: EOMI  Pulm: CTAB, no crackles or wheezes  CV: RRR, no murmurs, DP pulses present  ABD: Soft,  non-tender, non-distended, +BS  MSK: LLE swelling, very TTP, LLE compartments not tense  Neuro: Grossly normal, CN intact, sensory intact  SKIN: warm, dry, psoriatic plaques, LLE with improved edema, erythema, strong DPP  EXT: left leg edema     Data Review:       Labs:            Recent Labs   Lab 11/29/24 0627 11/30/24 0618 12/01/24  0557   RBC 4.05* 4.28* 4.06*   HGB 11.4* 11.8* 11.3*   HCT 35.0* 37.6* 35.3*   MCV 86.4 87.9 86.9   MCH 28.1 27.6 27.8   MCHC 32.6 31.4 32.0   RDW 15.0 15.1* 14.9   NEPRELIM  --  5.09 6.99   WBC 11.6* 9.6 11.7*   .0 409.0 406.0                  Recent Labs   Lab 11/29/24 0627 11/30/24  0618 12/01/24  0557   GLU 93 94 96   BUN 23 25* 25*   CREATSERUM 0.90 0.97 0.92   EGFRCR 103 94 100   CA 10.3 10.3 10.3   * 136 134*   K 4.3 4.0 4.4    102 102   CO2 30.0 28.0 28.0         No results for input(s): \"ALT\", \"AST\", \"ALB\", \"AMYLASE\", \"LIPASE\", \"LDH\" in the last 168 hours.     Invalid input(s): \"ALPHOS\", \"TBIL\", \"DBIL\", \"TPROT\"        Imaging:  US VENOUS DOPPLER LEG LEFT - DIAG IMG (CPT=93971)     Result Date: 11/29/2024  CONCLUSION:          1. Nonocclusive thrombus within the proximal left popliteal vein, which was not clearly identified on prior ultrasounds and is concerning for acute deep venous thrombus. 2. Prominent left groin lymph node, which is likely reactive. 3. Limited evaluation of the calf veins secondary to subcutaneous edema and body habitus.   Impression point 1 was communicated via secure epic chat to Caesy BARBOSA at 12:43 p.m. on 11/29/2024 by Gilberto Pan MD   Dictated by (CST): Gilberto Pan MD on 11/29/2024 at 12:36 PM     Finalized by (CST): Gilberto Pan MD on 11/29/2024 at 12:44 PM               Meds:      Scheduled Medications    enoxaparin  180 mg Subcutaneous 2 times per day    sertraline  25 mg Oral Daily    furosemide  40 mg Oral Daily    cefepime  2 g Intravenous Q8H    DAPTOmycin  1,000 mg Intravenous Q24H    clotrimazole-betamethasone    Topical BID    docosanol   Topical BID    cetirizine  10 mg Oral Daily    enalapril  20 mg Oral BID    flecainide  150 mg Oral BID    hydrALAZINE  50 mg Oral BID    carvedilol  25 mg Oral BID with meals    magnesium oxide  200 mg Oral Nightly    dilTIAZem HCl ER Coated Beads  180 mg Oral Nightly         Medication Infusions           PRN Medications     ibuprofen    butalbital-acetaminophen-caffeine    magnesium hydroxide    traMADol    calcium carbonate    famotidine    acetaminophen    melatonin    polyethylene glycol (PEG 3350)    sennosides    guaiFENesin    ondansetron    prochlorperazine    HYDROmorphone    HYDROmorphone    simethicone          12/2/2024 Hospitalist Progress Note   [facility] Hospitalist Progress Note      CC: Hospital Follow up     PCP: Eric Sethi,          Assessment/Plan:      Principal Problem:    Cellulitis of left lower leg  Active Problems:    Psoriasis    Immunosuppression (HCC)     Jovan Merino - 54 year old male w MO, HTN, Afib, HFpEF, psoriasis admitted 11/18/2024 for LLE cellulitis, started on IV ancef. Initially improving however 11/21 worse - abx broadened to vanc/zosyn, now cefepime and daptomycin. CT with cellulitis . ID/Rheum/Orhto consulted. MRI with LLE cellulitis, mild myositis in posterior aspect, no abscess, no OM.  PICC in place for extended IV abx coverage. Will likely need MEGAN but really wants to try to go home. 3rd LLE doppler 11/29/24 Left popliteal v. Positive DVT. Full dose lovenox started with plans for oral AC after arterial studies are resulted.      Sepsis 2/2 LLE cellulitis  Immunosuppression 2/2 psoriasis/Humira  - Febrile to 102 here. HR 80s, WBC 14  - blood cx NG x 5 days  - . Known HFpEF. Feels like legs aren't swollen & has lost weight  - Pain control: IVP dilaudid, PO oxy, APAP PRN  - IV ancef (11/18 - 21 )  - 11/19 febrile, check Bcx. Lot of pain - add tramadol (itching w oxy/norc), add IV toradol scheduled.   - Headaches, feels dehydrated - will  give 1L NS bolus  - 11/21: slightly worse, LE very swollen, WBC higher. Will switch ancef to vanco/zosyn for now. Consult ID - now cefepime and daptomycin  - CT negative 11/21 for underlying abscess  - Rheum/Ortho on consult   - No concern for compartment syndrome or joint involvement at this time  - MRI with LLE cellulitis, mild myositis in posterior aspect, no abscess, no OM   - inflammatory markers down trending   - PICC in place for extended IV abx coverage  - MEGAN may benefit but wants to go home      Severe LLE pain and swelling  - possibly 2/2 cellulitis however not improving with IV abx  - no signs/symptoms of compartment syndrome  - continue pain control with PO and IV pain meds  - eval by ortho  - inflammatory markers significantly elevated now down trended   - CPK normal  - s/p IV lasix, x2, now on oral daily   - rheumatology following   - MRI with LLE cellulitis, mild myositis in posterior aspect, no abscess, no OM   - LLE venous doppler 11/18/24 and 11/24/24 negative for dvt  - LLE venous doppler 11/29/24 Left popliteal v. Positive DVT    - LLE arterial doppler 11/29/24 with patent arteries    - will schedule higher dose tramadol  - can consider trial of gabapentin or Lyrica     Acute DVT  - LLE, Left popliteal v  - full dose lovenox started   - will consider warfarin vs Xarelto based on BMI limitations   - Discussed with patient's that although no good studies have indicated the contraindication of Xarelto with his current BMI, he may have better coverage with warfarin 2/2 increased risk factors  - will need Lovenox injection training and dietitian for warfarin diet  - d/w pharmacy, will start coumadin 10 mg daily, higher dose due to his weight and titrate based on INR     Partial quad tear  L knee effusion  - ortho consulted, appreciate recs, have signed off      Headaches  - fioricet PRN     HFpEF - compensated  HTN  Parox Afib - low CV, not on AC  - Continue PTA lasix, flecainide, coreg, hydral,  enalapril     Psoriasis  - On humira OP. Would hold until leg better     Morbid obesity w [facility]  - Body mass index is 52.8 kg/m².   - Healthy diet & wt loss encouraged  - Has lost quite a bit of weight with Mounjaro already     DM2 - currently controlled w mounjaro  - Paty for reg diet, follow BG on AM labs. Can hold on SSI/accuchecks for now, can add if issues controlling     Depression   - seen by psych liaison  - Will start Zoloft     ACP: Full Code  Ppx: DVT: Enox  Dispo  EDoD:  ~12/3-4.  Still needing iv pain meds      F/u:   - PCP:  Eric Sethi,      Available via epic secure chat or perfect serve 7A - 7P.     Linda Savage MD  Internal Medicine - Hospitalist  [facility]      Subjective:      Still having a lot of pain but able to get up and go to the bathroom. Still needs IV pain meds for breakthrough, has not been using PO tramadol as much. Friend at bedside     OBJECTIVE:     Blood pressure 106/69, pulse 78, temperature 98.2 °F (36.8 °C), temperature source Oral, resp. rate 18, height 6' 1\" (1.854 m), weight (!) 400 lb 3.2 oz (181.5 kg), SpO2 93%.     Temp:  [98.2 °F (36.8 °C)-98.7 °F (37.1 °C)] 98.2 °F (36.8 °C)  Pulse:  [67-79] 78  Resp:  [18-20] 18  BP: (106-138)/(60-75) 106/69  SpO2:  [91 %-95 %] 93 %        Intake/Output:     Intake/Output Summary (Last 24 hours) at 12/2/2024 1340  Last data filed at 12/2/2024 1228      Gross per 24 hour   Intake 600 ml   Output 700 ml   Net -100 ml              Last 3 Weights   11/20/24 0528 (!) 400 lb 3.2 oz (181.5 kg)   11/19/24 0615 (!) 395 lb 3.2 oz (179.3 kg)   11/18/24 1705 (!) 394 lb 3.2 oz (178.8 kg)   11/18/24 1112 (!) 395 lb (179.2 kg)   11/17/21 1338 (!) 379 lb 3.2 oz (172 kg)   06/10/21 1222 (!) 364 lb (165.1 kg)         Exam   GEN: obese male in NAD, tearful  HEENT: EOMI  Pulm: CTAB, no crackles or wheezes  CV: RRR, no murmurs, DP pulses present  ABD: Soft, non-tender, non-distended, +BS  MSK: LLE swelling, very TTP, LLE compartments  not tense  Neuro: Grossly normal, CN intact, sensory intact  SKIN: warm, dry, psoriatic plaques, LLE with improved edema, erythema, strong DPP  EXT: left leg edema     Data Review:       Labs:            Recent Labs   Lab 11/30/24 0618 12/01/24  0557 12/02/24  0443   RBC 4.28* 4.06* 3.83*   HGB 11.8* 11.3* 10.4*   HCT 37.6* 35.3* 32.9*   MCV 87.9 86.9 85.9   MCH 27.6 27.8 27.2   MCHC 31.4 32.0 31.6   RDW 15.1* 14.9 15.0   NEPRELIM 5.09 6.99 5.35   WBC 9.6 11.7* 9.9   .0 406.0 375.0                  Recent Labs   Lab 11/30/24 0618 12/01/24  0557 12/02/24  0443   GLU 94 96 91   BUN 25* 25* 26*   CREATSERUM 0.97 0.92 0.93   EGFRCR 94 100 99   CA 10.3 10.3 10.2    134* 136   K 4.0 4.4 4.5    102 102   CO2 28.0 28.0 30.0         No results for input(s): \"ALT\", \"AST\", \"ALB\", \"AMYLASE\", \"LIPASE\", \"LDH\" in the last 168 hours.     Invalid input(s): \"ALPHOS\", \"TBIL\", \"DBIL\", \"TPROT\"        Imaging:  No results found.        Meds:      Scheduled Medications    traMADol  100 mg Oral Q8H    enoxaparin  180 mg Subcutaneous 2 times per day    sertraline  25 mg Oral Daily    furosemide  40 mg Oral Daily    cefepime  2 g Intravenous Q8H    clotrimazole-betamethasone   Topical BID    docosanol   Topical BID    cetirizine  10 mg Oral Daily    enalapril  20 mg Oral BID    flecainide  150 mg Oral BID    hydrALAZINE  50 mg Oral BID    carvedilol  25 mg Oral BID with meals    magnesium oxide  200 mg Oral Nightly    dilTIAZem HCl ER Coated Beads  180 mg Oral Nightly         Medication Infusions           PRN Medications     ibuprofen **OR** ketorolac    butalbital-acetaminophen-caffeine    magnesium hydroxide    calcium carbonate    famotidine    acetaminophen    melatonin    polyethylene glycol (PEG 3350)    sennosides    guaiFENesin    ondansetron    prochlorperazine    HYDROmorphone    HYDROmorphone    simethicone            MEDICATIONS ADMINISTERED IN LAST 1 DAY:  carvedilol (Coreg) tab 25 mg       Date Action Dose  Route User    12/2/2024 0825 Given 25 mg Oral Erickson Valencia RN    12/1/2024 1850 Given 25 mg Oral Marlene Gee RN          ceFEPIme (Maxpime) 2 g in sodium chloride 0.9% 100 mL IVPB-MBP       Date Action Dose Route User    12/2/2024 1434 New Bag 2 g Intravenous Erickson Valencia RN    12/2/2024 0550 New Bag 2 g Intravenous Sandy Watson RN    12/1/2024 2142 New Bag 2 g Intravenous Sandy Watson RN          cetirizine (ZyrTEC) tab 10 mg       Date Action Dose Route User    12/2/2024 0824 Given 10 mg Oral Erickson Valencia RN          clotrimazole-betamethasone (Lotrisone) 1-0.05 % cream       Date Action Dose Route User    12/2/2024 1442 Given (none) Topical Erickson Valencia RN          dilTIAZem ER (CardIZEM CD) 24 hr cap 180 mg       Date Action Dose Route User    12/1/2024 2140 Given 180 mg Oral Sandy Watson RN          enalapril (Vasotec) tab 20 mg       Date Action Dose Route User    12/2/2024 0823 Given 20 mg Oral Erickson Valencia RN    12/1/2024 2140 Given 20 mg Oral Sandy Watson RN          enoxaparin (Lovenox) 180 mg injection       Date Action Dose Route User    12/2/2024 0825 Given 180 mg Subcutaneous (Left Lower Abdomen) Erickson Valencia RN    12/1/2024 2141 Given 180 mg Subcutaneous (Right Lower Abdomen) Sandy Watson RN          flecainide (Tambocor) tab 150 mg       Date Action Dose Route User    12/2/2024 0823 Given 150 mg Oral Erickson Valencia RN    12/1/2024 2139 Given 150 mg Oral Sandy Watson RN          furosemide (Lasix) tab 40 mg       Date Action Dose Route User    12/2/2024 0824 Given 40 mg Oral Erickson Valencia RN          hydrALAZINE (Apresoline) tab 50 mg       Date Action Dose Route User    12/2/2024 0824 Given 50 mg Oral Erickson Valencia RN    12/1/2024 2140 Given 50 mg Oral Sandy Watson CHELSEY          HYDROmorphone (Dilaudid) 1 MG/ML injection 1 mg       Date Action Dose Route User    12/2/2024 0156 Given 1 mg Intravenous Sandy Watson,  RN    12/1/2024 2157 Given 1 mg Intravenous Sandy Watson RN          HYDROmorphone (Dilaudid) 1 MG/ML injection 0.5 mg       Date Action Dose Route User    12/2/2024 1238 Given 0.5 mg Intravenous Maira Caballero RN    12/1/2024 1747 Given 0.5 mg Intravenous Marlene Gee RN          magnesium oxide (Mag-Ox) tab 200 mg       Date Action Dose Route User    12/1/2024 2140 Given 200 mg Oral Sandy Watson RN          sertraline (Zoloft) tab 25 mg       Date Action Dose Route User    12/2/2024 0824 Given 25 mg Oral Erickson Valencia RN          traMADol (Ultram) tab 50 mg       Date Action Dose Route User    12/2/2024 0822 Given 50 mg Oral Erickson Valencia RN          traMADol (Ultram) tab 100 mg       Date Action Dose Route User    12/2/2024 1430 Given 100 mg Oral Erickson Valencia RN            Vitals (last day)       Date/Time Temp Pulse Resp BP SpO2 Weight O2 Device O2 Flow Rate (L/min) [facility]    12/02/24 1236 -- 78 18 106/69 93 % -- -- -- RM    12/02/24 0819 98.2 °F (36.8 °C) 79 20 138/70 92 % -- None (Room air) -- TT    12/02/24 0400 98.7 °F (37.1 °C) 67 18 129/60 91 % -- None (Room air) --     12/01/24 2100 98.7 °F (37.1 °C) -- 18 119/72 92 % -- None (Room air) --     12/01/24 1834 -- -- -- 122/75 -- -- -- -- WW    12/01/24 1741 98.2 °F (36.8 °C) 75 18 109/66 95 % -- None (Room air) -- YD    12/01/24 0909 98.7 °F (37.1 °C) 81 18 127/75 93 % -- None (Room air) -- YD    12/01/24 0553 98.8 °F (37.1 °C) 81 20 119/75 92 % -- None (Room air) --

## 2024-12-02 NOTE — PROGRESS NOTES
Pharmacy Progress Note:  Anticoagulation Dose Adjustment     Jovan Merino Sr. is a 54 year old male on enoxaparin for Treatment of DVT .  Anti-factor Xa levels are being monitored due to the patient's high risk status of obesity.    Relevant labs:    Body mass index is 52.8 kg/m².    Wt Readings from Last 1 Encounters:   11/20/24 (!) 181.5 kg (400 lb 3.2 oz)       Lab Results   Component Value Date    CREATSERUM 0.93 12/02/2024       Heparin Anti Xa Assay   Date Value Ref Range Status   12/02/2024 1.34 IU/mL Final       Anti Xa level being assessed:  180 units/mL (Peak drawn 4 hours after dose).  Goal Peak Anti-Xa level:  (Enoxaparin treatment: 0.5-1 unit/mL).  Goal Trough Anti-Xa level: </=0.5 unit/mL  (when applicable)    Based on the above, enoxaparin dose will be adjusted to 140mg, to begin 12/2 @2100 . Next Anti-factor Xa Trough drawn 30 minutes before dose will be ordered on 12/2 @2000.      Thank you,  Lupe Helton, PharmD  12/2/2024 3:12 PM

## 2024-12-02 NOTE — PLAN OF CARE
Problem: Patient Centered Care  Goal: Patient preferences are identified and integrated in the patient's plan of care  Description: Interventions:  - What would you like us to know as we care for you? From home alone  - Provide timely, complete, and accurate information to patient/family  - Incorporate patient and family knowledge, values, beliefs, and cultural backgrounds into the planning and delivery of care  - Encourage patient/family to participate in care and decision-making at the level they choose  - Honor patient and family perspectives and choices  Outcome: Progressing     Problem: CARDIOVASCULAR - ADULT  Goal: Maintains optimal cardiac output and hemodynamic stability  Description: INTERVENTIONS:  - Monitor vital signs, rhythm, and trends  - Monitor for bleeding, hypotension and signs of decreased cardiac output  - Evaluate effectiveness of vasoactive medications to optimize hemodynamic stability  - Monitor arterial and/or venous puncture sites for bleeding and/or hematoma  - Assess quality of pulses, skin color and temperature  - Assess for signs of decreased coronary artery perfusion - ex. Angina  - Evaluate fluid balance, assess for edema, trend weights  Outcome: Progressing     Problem: RESPIRATORY - ADULT  Goal: Achieves optimal ventilation and oxygenation  Description: INTERVENTIONS:  - Assess for changes in respiratory status  - Assess for changes in mentation and behavior  - Position to facilitate oxygenation and minimize respiratory effort  - Oxygen supplementation based on oxygen saturation or ABGs  - Provide Smoking Cessation handout, if applicable  - Encourage broncho-pulmonary hygiene including cough, deep breathe, Incentive Spirometry  - Assess the need for suctioning and perform as needed  - Assess and instruct to report SOB or any respiratory difficulty  - Respiratory Therapy support as indicated  - Manage/alleviate anxiety  - Monitor for signs/symptoms of CO2 retention  Outcome:  Progressing     Problem: GASTROINTESTINAL - ADULT  Goal: Minimal or absence of nausea and vomiting  Description: INTERVENTIONS:  - Maintain adequate hydration with IV or PO as ordered and tolerated  - Nasogastric tube to low intermittent suction as ordered  - Evaluate effectiveness of ordered antiemetic medications  - Provide nonpharmacologic comfort measures as appropriate  - Advance diet as tolerated, if ordered  - Obtain nutritional consult as needed  - Evaluate fluid balance  Outcome: Progressing     Problem: GENITOURINARY - ADULT  Goal: Absence of urinary retention  Description: INTERVENTIONS:  - Assess patient’s ability to void and empty bladder  - Monitor intake/output and perform bladder scan as needed  - Follow urinary retention protocol/standard of care  - Consider collaborating with pharmacy to review patient's medication profile  - Implement strategies to promote bladder emptying  Outcome: Progressing     Problem: METABOLIC/FLUID AND ELECTROLYTES - ADULT  Goal: Glucose maintained within prescribed range  Description: INTERVENTIONS:  - Monitor Blood Glucose as ordered  - Assess for signs and symptoms of hyperglycemia and hypoglycemia  - Administer ordered medications to maintain glucose within target range  - Assess barriers to adequate nutritional intake and initiate nutrition consult as needed  - Instruct patient on self management of diabetes  Outcome: Progressing  Goal: Electrolytes maintained within normal limits  Description: INTERVENTIONS:  - Monitor labs and rhythm and assess patient for signs and symptoms of electrolyte imbalances  - Administer electrolyte replacement as ordered  - Monitor response to electrolyte replacements, including rhythm and repeat lab results as appropriate  - Fluid restriction as ordered  - Instruct patient on fluid and nutrition restrictions as appropriate  Outcome: Progressing     Problem: SKIN/TISSUE INTEGRITY - ADULT  Goal: Skin integrity remains intact  Description:  INTERVENTIONS  - Assess and document risk factors for pressure ulcer development  - Assess and document skin integrity  - Monitor for areas of redness and/or skin breakdown  - Initiate interventions, skin care algorithm/standards of care as needed  Outcome: Progressing  Goal: Incision(s), wounds(s) or drain site(s) healing without S/S of infection  Description: INTERVENTIONS:  - Assess and document risk factors for pressure ulcer development  - Assess and document skin integrity  - Assess and document dressing/incision, wound bed, drain sites and surrounding tissue  - Implement wound care per orders  - Initiate isolation precautions as appropriate  - Initiate Pressure Ulcer prevention bundle as indicated  Outcome: Progressing     Problem: MUSCULOSKELETAL - ADULT  Goal: Return mobility to safest level of function  Description: INTERVENTIONS:  - Assess patient stability and activity tolerance for standing, transferring and ambulating w/ or w/o assistive devices  - Assist with transfers and ambulation using safe patient handling equipment as needed  - Ensure adequate protection for wounds/incisions during mobilization  - Obtain PT/OT consults as needed  - Advance activity as appropriate  - Communicate ordered activity level and limitations with patient/family  Outcome: Progressing     Problem: Impaired Functional Mobility  Goal: Achieve highest/safest level of mobility/gait  Description: Interventions:  - Assess patient's functional ability and stability  - Promote increasing activity/tolerance for mobility and gait  - Educate and engage patient/family in tolerated activity level and precautions  - Recommend use of  RW for transfers and ambulation  - Recommend patient transfer to bedside chair toward strongest side  - When transferring patient, block weaker knee for safety  - Recommend use of chair position in bed 3 times per day  Outcome: Progressing     Problem: Impaired Activities of Daily Living  Goal: Achieve  highest/safest level of independence in self care  Description: Interventions:  - Assess ability and encourage patient to participate in ADLs to maximize function  - Promote sitting position while performing ADLs such as feeding, grooming, and bathing  - Educate and encourage patient/family in tolerated functional activity level and precautions during self-care  Outcome: Progressing     Problem: PAIN - ADULT  Goal: Verbalizes/displays adequate comfort level or patient's stated pain goal  Description: INTERVENTIONS:  - Encourage pt to monitor pain and request assistance  - Assess pain using appropriate pain scale  - Administer analgesics based on type and severity of pain and evaluate response  - Implement non-pharmacological measures as appropriate and evaluate response  - Consider cultural and social influences on pain and pain management  - Manage/alleviate anxiety  - Utilize distraction and/or relaxation techniques  - Monitor for opioid side effects  - Notify MD/LIP if interventions unsuccessful or patient reports new pain  - Anticipate increased pain with activity and pre-medicate as appropriate  Outcome: Progressing     Problem: SAFETY ADULT - FALL  Goal: Free from fall injury  Description: INTERVENTIONS:  - Assess pt frequently for physical needs  - Identify cognitive and physical deficits and behaviors that affect risk of falls.  - [place] fall precautions as indicated by assessment.  - Educate pt/family on patient safety including physical limitations  - Instruct pt to call for assistance with activity based on assessment  - Modify environment to reduce risk of injury  - Provide assistive devices as appropriate  - Consider OT/PT consult to assist with strengthening/mobility  - Encourage toileting schedule  Outcome: Progressing     Problem: DISCHARGE PLANNING  Goal: Discharge to home or other facility with appropriate resources  Description: INTERVENTIONS:  - Identify barriers to discharge w/pt and  caregiver  - Include patient/family/discharge partner in discharge planning  - Arrange for needed discharge resources and transportation as appropriate  - Identify discharge learning needs (meds, wound care, etc)  - Arrange for interpreters to assist at discharge as needed  - Consider post-discharge preferences of patient/family/discharge partner  - Complete POLST form as appropriate  - Assess patient's ability to be responsible for managing their own health  - Refer to Case Management Department for coordinating discharge planning if the patient needs post-hospital services based on physician/LIP order or complex needs related to functional status, cognitive ability or social support system  Outcome: Progressing     Problem: Patient/Family Goals  Goal: Patient/Family Long Term Goal  Description: Patient's Long Term Goal: Discharge from the hospital    Interventions:  - Monitor vital signs  - Monitor appropriate labs  - Monitor blood glucose levels  - Pain management  - Administer medications per order  - Follow MD orders  - Diagnostics per order  - Update / inform patient and family on plan of care  - Discharge planning  - See additional Care Plan goals for specific interventions  Outcome: Progressing  Goal: Patient/Family Short Term Goal  Description: Patient's Short Term Goal: Improve redness, swelling, and pain to left lower extremity     Interventions:   - Monitor vital signs  - Monitor appropriate labs  - Monitor blood glucose levels  - Pain management  - Administer medications per order  - Follow MD orders  - Diagnostics per order  - Update / inform patient and family on plan of care  - See additional Care Plan goals for specific interventions  Outcome: Progressing     Problem: Diabetes/Glucose Control  Goal: Glucose maintained within prescribed range  Description: INTERVENTIONS:  - Monitor Blood Glucose as ordered  - Assess for signs and symptoms of hyperglycemia and hypoglycemia  - Administer ordered  medications to maintain glucose within target range  - Assess barriers to adequate nutritional intake and initiate nutrition consult as needed  - Instruct patient on self management of diabetes  Outcome: Progressing     Problem: HEMATOLOGIC - ADULT  Goal: Free from bleeding injury  Description: (Example usage: patient with low platelets)  INTERVENTIONS:  - Avoid intramuscular injections, enemas and rectal medication administration  - Ensure safe mobilization of patient  - Hold pressure on venipuncture sites to achieve adequate hemostasis  - Assess for signs and symptoms of internal bleeding  - Monitor lab trends  - Patient is to report abnormal signs of bleeding to staff  - Avoid use of toothpicks and dental floss  - Use electric shaver for shaving  - Use soft bristle tooth brush  - Limit straining and forceful nose blowing  Outcome: Progressing

## 2024-12-02 NOTE — PROGRESS NOTES
[facility] Hospitalist Progress Note     CC: Hospital Follow up    PCP: Eric Sethi DO       Assessment/Plan:     Principal Problem:    Cellulitis of left lower leg  Active Problems:    Psoriasis    Immunosuppression (HCC)    Jovan Merino - 54 year old male w MO, HTN, Afib, HFpEF, psoriasis admitted 11/18/2024 for LLE cellulitis, started on IV ancef. Initially improving however 11/21 worse - abx broadened to vanc/zosyn, now cefepime and daptomycin. CT with cellulitis . ID/Rheum/Orhto consulted. MRI with LLE cellulitis, mild myositis in posterior aspect, no abscess, no OM.  PICC in place for extended IV abx coverage. Will likely need EMGAN but really wants to try to go home. 3rd LLE doppler 11/29/24 Left popliteal v. Positive DVT. Full dose lovenox started with plans for oral AC after arterial studies are resulted.      Sepsis 2/2 LLE cellulitis  Immunosuppression 2/2 psoriasis/Humira  - Febrile to 102 here. HR 80s, WBC 14  - blood cx NG x 5 days  - . Known HFpEF. Feels like legs aren't swollen & has lost weight  - Pain control: IVP dilaudid, PO oxy, APAP PRN  - IV ancef (11/18 - 21 )  - 11/19 febrile, check Bcx. Lot of pain - add tramadol (itching w oxy/norc), add IV toradol scheduled.   - Headaches, feels dehydrated - will give 1L NS bolus  - 11/21: slightly worse, LE very swollen, WBC higher. Will switch ancef to vanco/zosyn for now. Consult ID - now cefepime and daptomycin  - CT negative 11/21 for underlying abscess  - Rheum/Ortho on consult   - No concern for compartment syndrome or joint involvement at this time  - MRI with LLE cellulitis, mild myositis in posterior aspect, no abscess, no OM   - inflammatory markers down trending   - PICC in place for extended IV abx coverage  - MEGAN may benefit but wants to go home      Severe LLE pain and swelling  - possibly 2/2 cellulitis however not improving with IV abx  - no signs/symptoms of compartment syndrome  - continue pain control with PO and IV pain meds  -  eval by ortho  - inflammatory markers significantly elevated now down trended   - CPK normal  - s/p IV lasix, x2, now on oral daily   - rheumatology following   - MRI with LLE cellulitis, mild myositis in posterior aspect, no abscess, no OM   - LLE venous doppler 11/18/24 and 11/24/24 negative for dvt  - LLE venous doppler 11/29/24 Left popliteal v. Positive DVT    - LLE arterial doppler 11/29/24 with patent arteries    - will schedule higher dose tramadol  - can consider trial of gabapentin or Lyrica    Acute DVT  - LLE, Left popliteal v  - full dose lovenox started   - will consider warfarin vs Xarelto based on BMI limitations   - Discussed with patient's that although no good studies have indicated the contraindication of Xarelto with his current BMI, he may have better coverage with warfarin 2/2 increased risk factors  - will need Lovenox injection training and dietitian for warfarin diet  - d/w pharmacy, will start coumadin 10 mg daily, higher dose due to his weight and titrate based on INR    Partial quad tear  L knee effusion  - ortho consulted, appreciate recs, have signed off      Headaches  - fioricet PRN     HFpEF - compensated  HTN  Parox Afib - low CV, not on AC  - Continue PTA lasix, flecainide, coreg, hydral, enalapril     Psoriasis  - On humira OP. Would hold until leg better     Morbid obesity w [facility]  - Body mass index is 52.8 kg/m².   - Healthy diet & wt loss encouraged  - Has lost quite a bit of weight with Mounjaro already     DM2 - currently controlled w mounjaro  - Okay for reg diet, follow BG on AM labs. Can hold on SSI/accuchecks for now, can add if issues controlling     Depression   - seen by psych liaison  - Will start Zoloft    ACP: Full Code  Ppx: DVT: Enox  Dispo  EDoD:  ~12/3-4.  Still needing iv pain meds      F/u:   - PCP:  Eric Sethi,      Available via epic secure chat or perfect serve 7A - 7P.     Linda Savage MD  Internal Medicine - Hospitalist  [facility]     Subjective:     Still having a lot of pain but able to get up and go to the bathroom. Still needs IV pain meds for breakthrough, has not been using PO tramadol as much. Friend at bedside    OBJECTIVE:    Blood pressure 106/69, pulse 78, temperature 98.2 °F (36.8 °C), temperature source Oral, resp. rate 18, height 6' 1\" (1.854 m), weight (!) 400 lb 3.2 oz (181.5 kg), SpO2 93%.    Temp:  [98.2 °F (36.8 °C)-98.7 °F (37.1 °C)] 98.2 °F (36.8 °C)  Pulse:  [67-79] 78  Resp:  [18-20] 18  BP: (106-138)/(60-75) 106/69  SpO2:  [91 %-95 %] 93 %      Intake/Output:    Intake/Output Summary (Last 24 hours) at 12/2/2024 1340  Last data filed at 12/2/2024 1228  Gross per 24 hour   Intake 600 ml   Output 700 ml   Net -100 ml       Last 3 Weights   11/20/24 0528 (!) 400 lb 3.2 oz (181.5 kg)   11/19/24 0615 (!) 395 lb 3.2 oz (179.3 kg)   11/18/24 1705 (!) 394 lb 3.2 oz (178.8 kg)   11/18/24 1112 (!) 395 lb (179.2 kg)   11/17/21 1338 (!) 379 lb 3.2 oz (172 kg)   06/10/21 1222 (!) 364 lb (165.1 kg)       Exam   GEN: obese male in NAD, tearful  HEENT: EOMI  Pulm: CTAB, no crackles or wheezes  CV: RRR, no murmurs, DP pulses present  ABD: Soft, non-tender, non-distended, +BS  MSK: LLE swelling, very TTP, LLE compartments not tense  Neuro: Grossly normal, CN intact, sensory intact  SKIN: warm, dry, psoriatic plaques, LLE with improved edema, erythema, strong DPP  EXT: left leg edema    Data Review:       Labs:     Recent Labs   Lab 11/30/24  0618 12/01/24  0557 12/02/24  0443   RBC 4.28* 4.06* 3.83*   HGB 11.8* 11.3* 10.4*   HCT 37.6* 35.3* 32.9*   MCV 87.9 86.9 85.9   MCH 27.6 27.8 27.2   MCHC 31.4 32.0 31.6   RDW 15.1* 14.9 15.0   NEPRELIM 5.09 6.99 5.35   WBC 9.6 11.7* 9.9   .0 406.0 375.0         Recent Labs   Lab 11/30/24  0618 12/01/24  0557 12/02/24  0443   GLU 94 96 91   BUN 25* 25* 26*   CREATSERUM 0.97 0.92 0.93   EGFRCR 94 100 99   CA 10.3 10.3 10.2    134* 136   K 4.0 4.4 4.5    102 102   CO2 28.0  28.0 30.0       No results for input(s): \"ALT\", \"AST\", \"ALB\", \"AMYLASE\", \"LIPASE\", \"LDH\" in the last 168 hours.    Invalid input(s): \"ALPHOS\", \"TBIL\", \"DBIL\", \"TPROT\"      Imaging:  No results found.      Meds:      traMADol  100 mg Oral Q8H    enoxaparin  180 mg Subcutaneous 2 times per day    sertraline  25 mg Oral Daily    furosemide  40 mg Oral Daily    cefepime  2 g Intravenous Q8H    clotrimazole-betamethasone   Topical BID    docosanol   Topical BID    cetirizine  10 mg Oral Daily    enalapril  20 mg Oral BID    flecainide  150 mg Oral BID    hydrALAZINE  50 mg Oral BID    carvedilol  25 mg Oral BID with meals    magnesium oxide  200 mg Oral Nightly    dilTIAZem HCl ER Coated Beads  180 mg Oral Nightly         ibuprofen **OR** ketorolac    butalbital-acetaminophen-caffeine    magnesium hydroxide    calcium carbonate    famotidine    acetaminophen    melatonin    polyethylene glycol (PEG 3350)    sennosides    guaiFENesin    ondansetron    prochlorperazine    HYDROmorphone    HYDROmorphone    simethicone

## 2024-12-02 NOTE — PROGRESS NOTES
[facility]  part of [facility] Infectious Disease  Progress Note    Jovan GUTHRIE Wilber Barrett. Patient Status:  Inpatient    1970 MRN [id number]   Location [facility] 5SW/SE Attending Linda Savage MD   Hosp Day # 14 PCP Eric Sethi,      Subjective:  Patient seen and examined sitting up in bedside chair. Nursing present at the bedside. No acute overnight events. Tolerating IV antibiotics. Still with significant pain in the LLE. States that redness and swelling of the LLE have improved. Remains afebrile. Denies n/v/d.       Objective:  Blood pressure 138/70, pulse 79, temperature 98.2 °F (36.8 °C), temperature source Oral, resp. rate 20, height 6' 1\" (1.854 m), weight (!) 400 lb 3.2 oz (181.5 kg), SpO2 92%.    Intake/Output:    Intake/Output Summary (Last 24 hours) at 2024 0925  Last data filed at 2024 1448  Gross per 24 hour   Intake 120 ml   Output 650 ml   Net -530 ml       Physical Exam:  General: Awake, alert, non-tox, NAD.  HEENT:  Oropharynx clear, trachea ML.  Heart: RRR S1S2 no murmurs.  Lungs: Essentially CTA b/l, no rhonchi, rales, wheezes.  Abdomen: Soft, NT/ND.  BS present.  No guarding or rebound.  Extremity: +LLE erythema, warmth and TTP. +LLE edema.  Neurological: No focal deficits.  Derm:  Warm and dry.    Lab Data Review:  Lab Results   Component Value Date    WBC 9.9 2024    HGB 10.4 2024    HCT 32.9 2024    .0 2024    CREATSERUM 0.93 2024    BUN 26 2024     2024    K 4.5 2024     2024    CO2 30.0 2024    GLU 91 2024    CA 10.2 2024    CRP 3.30 2024    CK 1,265 2024        Cultures:  Hospital Encounter on 24   1. Blood Culture     Status: None    Collection Time: 24  8:48 AM    Specimen: Blood,peripheral   Result Value Ref Range    Blood Culture Result No Growth 5 Days N/A       Radiology:  US VENOUS DOPPLER LEG  LEFT - DIAG IMG (CPT=93971)    Result Date: 11/29/2024  CONCLUSION:   1. Nonocclusive thrombus within the proximal left popliteal vein, which was not clearly identified on prior ultrasounds and is concerning for acute deep venous thrombus. 2. Prominent left groin lymph node, which is likely reactive. 3. Limited evaluation of the calf veins secondary to subcutaneous edema and body habitus.   Impression point 1 was communicated via secure epic chat to Casey BARBOSA at 12:43 p.m. on 11/29/2024 by Gilberto Pan MD   Dictated by (CST): Gilberto Pan MD on 11/29/2024 at 12:36 PM     Finalized by (CST): Gilberto Pan MD on 11/29/2024 at 12:44 PM             Assessment and Plan:  1.  Sepsis in this patient presenting with fevers, leukocytosis and LLE cellulitis  - Blood cultures negative.  - CT with R knee effusion and torn quadriceps tendon.  - MRI with mild myositis.  - Repeat LLE Venous Doppler, 11/29, with new acute DVT.   - Arterial Dopplers pending.   - IV Ancef x4 days without significant improvement -> IV Cubicin + cefepime on 11/21/24.  - S/p IV Cubicin, 11/21 - 12/1, d/t elevated CK levels.   - IV cefepime, ongoing.     2.  Leukocytosis and fevers secondary to the above  - WBC at 9.9K <- 11.7K  - Afebrile.   - Will trend.    3.  Underlying psoriasis is a potential nidus for entry of bacteria  - At risk for MDRO's due to Humira use.  - MRSA PCR negative.  - Rheum on consult.    4.  Recs  - Continue IV cefepime at this time.  Plan to continue same Rx on discharge through 12/11/24.  Final EOT pending clinical course.  Order in med rec.    - PICC line in place.  - F/u WBC and fever curve.  - F/u arterial studies.  - Supportive care as per the primary team.  - Discussed plan of care with nursing.  - Discussed plan of care with patient.  All questions addressed and understanding verbalized.  - Further recommendations pending clinical course.    Discussed case with ID attending/collaborating physician, Dr. Estrada  Khanh, who is in agreement with the above plan of care    Please note that this report has been produced using speech recognition software and may contain errors related to that system including, but not limited to, errors in grammar, punctuation, and spelling, as well as words and phrases that possibly may have been recognized inappropriately.  If there are any questions or concerns, contact the dictating provider for clarification.     The 21st Century Cures Act makes medical notes like these available to patients in the interest of transparency. Please be advised this is a medical document. Medical documents are intended to carry relevant information, facts as evident, and the clinical opinion of the practitioner. The medical note is intended as peer to peer communication and may appear blunt or direct. It is written in medical language and may contain abbreviations or verbiage that are unfamiliar.     If you have any questions or concerns please call [facility] Infectious Disease at [phone].     Otoniel Dempsey, YOLANDA    12/2/2024  9:25 AM

## 2024-12-02 NOTE — PLAN OF CARE
Problem: Patient Centered Care  Goal: Patient preferences are identified and integrated in the patient's plan of care  Description: Interventions:  - What would you like us to know as we care for you? From home alone  - Provide timely, complete, and accurate information to patient/family  - Incorporate patient and family knowledge, values, beliefs, and cultural backgrounds into the planning and delivery of care  - Encourage patient/family to participate in care and decision-making at the level they choose  - Honor patient and family perspectives and choices  Outcome: Progressing     Problem: CARDIOVASCULAR - ADULT  Goal: Maintains optimal cardiac output and hemodynamic stability  Description: INTERVENTIONS:  - Monitor vital signs, rhythm, and trends  - Monitor for bleeding, hypotension and signs of decreased cardiac output  - Evaluate effectiveness of vasoactive medications to optimize hemodynamic stability  - Monitor arterial and/or venous puncture sites for bleeding and/or hematoma  - Assess quality of pulses, skin color and temperature  - Assess for signs of decreased coronary artery perfusion - ex. Angina  - Evaluate fluid balance, assess for edema, trend weights  Outcome: Progressing     Problem: RESPIRATORY - ADULT  Goal: Achieves optimal ventilation and oxygenation  Description: INTERVENTIONS:  - Assess for changes in respiratory status  - Assess for changes in mentation and behavior  - Position to facilitate oxygenation and minimize respiratory effort  - Oxygen supplementation based on oxygen saturation or ABGs  - Provide Smoking Cessation handout, if applicable  - Encourage broncho-pulmonary hygiene including cough, deep breathe, Incentive Spirometry  - Assess the need for suctioning and perform as needed  - Assess and instruct to report SOB or any respiratory difficulty  - Respiratory Therapy support as indicated  - Manage/alleviate anxiety  - Monitor for signs/symptoms of CO2 retention  Outcome:  Progressing     Problem: GASTROINTESTINAL - ADULT  Goal: Minimal or absence of nausea and vomiting  Description: INTERVENTIONS:  - Maintain adequate hydration with IV or PO as ordered and tolerated  - Nasogastric tube to low intermittent suction as ordered  - Evaluate effectiveness of ordered antiemetic medications  - Provide nonpharmacologic comfort measures as appropriate  - Advance diet as tolerated, if ordered  - Obtain nutritional consult as needed  - Evaluate fluid balance  Outcome: Progressing     Problem: GENITOURINARY - ADULT  Goal: Absence of urinary retention  Description: INTERVENTIONS:  - Assess patient’s ability to void and empty bladder  - Monitor intake/output and perform bladder scan as needed  - Follow urinary retention protocol/standard of care  - Consider collaborating with pharmacy to review patient's medication profile  - Implement strategies to promote bladder emptying  Outcome: Progressing     Problem: METABOLIC/FLUID AND ELECTROLYTES - ADULT  Goal: Glucose maintained within prescribed range  Description: INTERVENTIONS:  - Monitor Blood Glucose as ordered  - Assess for signs and symptoms of hyperglycemia and hypoglycemia  - Administer ordered medications to maintain glucose within target range  - Assess barriers to adequate nutritional intake and initiate nutrition consult as needed  - Instruct patient on self management of diabetes  Outcome: Progressing  Goal: Electrolytes maintained within normal limits  Description: INTERVENTIONS:  - Monitor labs and rhythm and assess patient for signs and symptoms of electrolyte imbalances  - Administer electrolyte replacement as ordered  - Monitor response to electrolyte replacements, including rhythm and repeat lab results as appropriate  - Fluid restriction as ordered  - Instruct patient on fluid and nutrition restrictions as appropriate  Outcome: Progressing     Problem: SKIN/TISSUE INTEGRITY - ADULT  Goal: Skin integrity remains intact  Description:  INTERVENTIONS  - Assess and document risk factors for pressure ulcer development  - Assess and document skin integrity  - Monitor for areas of redness and/or skin breakdown  - Initiate interventions, skin care algorithm/standards of care as needed  Outcome: Progressing  Goal: Incision(s), wounds(s) or drain site(s) healing without S/S of infection  Description: INTERVENTIONS:  - Assess and document risk factors for pressure ulcer development  - Assess and document skin integrity  - Assess and document dressing/incision, wound bed, drain sites and surrounding tissue  - Implement wound care per orders  - Initiate isolation precautions as appropriate  - Initiate Pressure Ulcer prevention bundle as indicated  Outcome: Progressing     Problem: MUSCULOSKELETAL - ADULT  Goal: Return mobility to safest level of function  Description: INTERVENTIONS:  - Assess patient stability and activity tolerance for standing, transferring and ambulating w/ or w/o assistive devices  - Assist with transfers and ambulation using safe patient handling equipment as needed  - Ensure adequate protection for wounds/incisions during mobilization  - Obtain PT/OT consults as needed  - Advance activity as appropriate  - Communicate ordered activity level and limitations with patient/family  Outcome: Progressing     Problem: PAIN - ADULT  Goal: Verbalizes/displays adequate comfort level or patient's stated pain goal  Description: INTERVENTIONS:  - Encourage pt to monitor pain and request assistance  - Assess pain using appropriate pain scale  - Administer analgesics based on type and severity of pain and evaluate response  - Implement non-pharmacological measures as appropriate and evaluate response  - Consider cultural and social influences on pain and pain management  - Manage/alleviate anxiety  - Utilize distraction and/or relaxation techniques  - Monitor for opioid side effects  - Notify MD/LIP if interventions unsuccessful or patient reports new  pain  - Anticipate increased pain with activity and pre-medicate as appropriate  Outcome: Progressing     Problem: SAFETY ADULT - FALL  Goal: Free from fall injury  Description: INTERVENTIONS:  - Assess pt frequently for physical needs  - Identify cognitive and physical deficits and behaviors that affect risk of falls.  - Plato fall precautions as indicated by assessment.  - Educate pt/family on patient safety including physical limitations  - Instruct pt to call for assistance with activity based on assessment  - Modify environment to reduce risk of injury  - Provide assistive devices as appropriate  - Consider OT/PT consult to assist with strengthening/mobility  - Encourage toileting schedule  Outcome: Progressing     Problem: DISCHARGE PLANNING  Goal: Discharge to home or other facility with appropriate resources  Description: INTERVENTIONS:  - Identify barriers to discharge w/pt and caregiver  - Include patient/family/discharge partner in discharge planning  - Arrange for needed discharge resources and transportation as appropriate  - Identify discharge learning needs (meds, wound care, etc)  - Arrange for interpreters to assist at discharge as needed  - Consider post-discharge preferences of patient/family/discharge partner  - Complete POLST form as appropriate  - Assess patient's ability to be responsible for managing their own health  - Refer to Case Management Department for coordinating discharge planning if the patient needs post-hospital services based on physician/LIP order or complex needs related to functional status, cognitive ability or social support system  Outcome: Progressing     Problem: Impaired Functional Mobility  Goal: Achieve highest/safest level of mobility/gait  Description: Interventions:  - Assess patient's functional ability and stability  - Promote increasing activity/tolerance for mobility and gait  - Educate and engage patient/family in tolerated activity level and  precautions  - Recommend use of  RW for transfers and ambulation  - Recommend patient transfer to bedside chair toward strongest side  - When transferring patient, block weaker knee for safety  - Recommend use of chair position in bed 3 times per day  Outcome: Progressing     Problem: Impaired Activities of Daily Living  Goal: Achieve highest/safest level of independence in self care  Description: Interventions:  - Assess ability and encourage patient to participate in ADLs to maximize function  - Promote sitting position while performing ADLs such as feeding, grooming, and bathing  - Educate and encourage patient/family in tolerated functional activity level and precautions during self-care  Outcome: Progressing     Problem: Patient/Family Goals  Goal: Patient/Family Long Term Goal  Description: Patient's Long Term Goal: Discharge from the hospital    Interventions:  - Monitor vital signs  - Monitor appropriate labs  - Monitor blood glucose levels  - Pain management  - Administer medications per order  - Follow MD orders  - Diagnostics per order  - Update / inform patient and family on plan of care  - Discharge planning  - See additional Care Plan goals for specific interventions  Outcome: Progressing  Goal: Patient/Family Short Term Goal  Description: Patient's Short Term Goal: Improve redness, swelling, and pain to left lower extremity     Interventions:   - Monitor vital signs  - Monitor appropriate labs  - Monitor blood glucose levels  - Pain management  - Administer medications per order  - Follow MD orders  - Diagnostics per order  - Update / inform patient and family on plan of care  - See additional Care Plan goals for specific interventions  Outcome: Progressing     Problem: Diabetes/Glucose Control  Goal: Glucose maintained within prescribed range  Description: INTERVENTIONS:  - Monitor Blood Glucose as ordered  - Assess for signs and symptoms of hyperglycemia and hypoglycemia  - Administer ordered  medications to maintain glucose within target range  - Assess barriers to adequate nutritional intake and initiate nutrition consult as needed  - Instruct patient on self management of diabetes  Outcome: Progressing     Problem: HEMATOLOGIC - ADULT  Goal: Free from bleeding injury  Description: (Example usage: patient with low platelets)  INTERVENTIONS:  - Avoid intramuscular injections, enemas and rectal medication administration  - Ensure safe mobilization of patient  - Hold pressure on venipuncture sites to achieve adequate hemostasis  - Assess for signs and symptoms of internal bleeding  - Monitor lab trends  - Patient is to report abnormal signs of bleeding to staff  - Avoid use of toothpicks and dental floss  - Use electric shaver for shaving  - Use soft bristle tooth brush  - Limit straining and forceful nose blowing  Outcome: Progressing

## 2024-12-03 LAB
ANION GAP SERPL CALC-SCNC: 2 MMOL/L (ref 0–18)
BASOPHILS # BLD AUTO: 0.08 X10(3) UL (ref 0–0.2)
BASOPHILS NFR BLD AUTO: 0.8 %
BUN BLD-MCNC: 21 MG/DL (ref 9–23)
BUN/CREAT SERPL: 23.9 (ref 10–20)
CALCIUM BLD-MCNC: 10.2 MG/DL (ref 8.7–10.4)
CHLORIDE SERPL-SCNC: 104 MMOL/L (ref 98–112)
CO2 SERPL-SCNC: 29 MMOL/L (ref 21–32)
CREAT BLD-MCNC: 0.88 MG/DL
DEPRECATED RDW RBC AUTO: 46.6 FL (ref 35.1–46.3)
EGFRCR SERPLBLD CKD-EPI 2021: 103 ML/MIN/1.73M2 (ref 60–?)
EOSINOPHIL # BLD AUTO: 0.26 X10(3) UL (ref 0–0.7)
EOSINOPHIL NFR BLD AUTO: 2.5 %
ERYTHROCYTE [DISTWIDTH] IN BLOOD BY AUTOMATED COUNT: 14.7 % (ref 11–15)
GLUCOSE BLD-MCNC: 100 MG/DL (ref 70–99)
GLUCOSE BLDC GLUCOMTR-MCNC: 107 MG/DL (ref 70–99)
GLUCOSE BLDC GLUCOMTR-MCNC: 112 MG/DL (ref 70–99)
GLUCOSE BLDC GLUCOMTR-MCNC: 189 MG/DL (ref 70–99)
HCT VFR BLD AUTO: 35.5 %
HGB BLD-MCNC: 11.3 G/DL
IMM GRANULOCYTES # BLD AUTO: 0.1 X10(3) UL (ref 0–1)
IMM GRANULOCYTES NFR BLD: 0.9 %
INR BLD: 1.07 (ref 0.8–1.2)
LYMPHOCYTES # BLD AUTO: 3.2 X10(3) UL (ref 1–4)
LYMPHOCYTES NFR BLD AUTO: 30.2 %
MCH RBC QN AUTO: 27.3 PG (ref 26–34)
MCHC RBC AUTO-ENTMCNC: 31.8 G/DL (ref 31–37)
MCV RBC AUTO: 85.7 FL
MONOCYTES # BLD AUTO: 1.07 X10(3) UL (ref 0.1–1)
MONOCYTES NFR BLD AUTO: 10.1 %
NEUTROPHILS # BLD AUTO: 5.9 X10 (3) UL (ref 1.5–7.7)
NEUTROPHILS # BLD AUTO: 5.9 X10(3) UL (ref 1.5–7.7)
NEUTROPHILS NFR BLD AUTO: 55.5 %
OSMOLALITY SERPL CALC.SUM OF ELEC: 283 MOSM/KG (ref 275–295)
PLATELET # BLD AUTO: 403 10(3)UL (ref 150–450)
POTASSIUM SERPL-SCNC: 4.5 MMOL/L (ref 3.5–5.1)
PROTHROMBIN TIME: 14.5 SECONDS (ref 11.6–14.8)
RBC # BLD AUTO: 4.14 X10(6)UL
SODIUM SERPL-SCNC: 135 MMOL/L (ref 136–145)
WBC # BLD AUTO: 10.6 X10(3) UL (ref 4–11)

## 2024-12-03 RX ORDER — ENOXAPARIN SODIUM 150 MG/ML
140 INJECTION SUBCUTANEOUS EVERY 12 HOURS
Qty: 14 EACH | Refills: 0 | Status: SHIPPED | OUTPATIENT
Start: 2024-12-03 | End: 2024-12-09

## 2024-12-03 RX ORDER — TRAMADOL HYDROCHLORIDE 50 MG/1
100 TABLET ORAL EVERY 6 HOURS
Status: DISCONTINUED | OUTPATIENT
Start: 2024-12-03 | End: 2024-12-09

## 2024-12-03 RX ORDER — CEFEPIME 1 G/50ML
2 INJECTION, SOLUTION INTRAVENOUS EVERY 8 HOURS
Qty: 2700 ML | Refills: 0 | Status: SHIPPED | OUTPATIENT
Start: 2024-12-03 | End: 2024-12-04

## 2024-12-03 NOTE — CM/SW NOTE
JASVIR received MD order for Lovenox coverage/INR check 2 days after discharge.    Plan is full-dose Lovenox w/bridge to coumadin.    JASVIR spoke to pt's pharmacy [facility] in [place] Ph: [phone] who stated co-pay was $100 for amount prescribed.    Pt will also DC on Coumadin- SW attached INR order to [facility] referral and liaison Kirstin is aware pt will need this drawn.     PLAN: DC home w/[facility] pending med clear    / to remain available for support and/or discharge planning.     Georgie Landis MSW, LSW [phone]

## 2024-12-03 NOTE — OCCUPATIONAL THERAPY NOTE
OCCUPATIONAL THERAPY TREATMENT NOTE - INPATIENT        Room Number: 529/529-A     Presenting Problem: cellulitis LLE    Problem List  Principal Problem:    Cellulitis of left lower leg  Active Problems:    Psoriasis    Immunosuppression (HCC)      OCCUPATIONAL THERAPY ASSESSMENT   Pt approached in bathroom toileting independently. Patient met all goals at independent level at RW level. Pt continues with L knee pain but able to ambulate in the hallway. Pt toileted, groomed, and dressed LB with use of RW PRN.    DC OT    SUBJECTIVE  \"I feel a lot better. I am nervous about going home with the meds.\"    OBJECTIVE       WEIGHT BEARING RESTRICTION  L Lower Extremity: Weight Bearing as Tolerated    PAIN ASSESSMENT  Rating: unable to rate    ACTIVITY TOLERANCE  HR 87, O2 94% on RA       ACTIVITIES OF DAILY LIVING ASSESSMENT  AM-PAC ‘6-Clicks’ Inpatient Daily Activity Short Form  How much help from another person does the patient currently need…  -   Putting on and taking off regular lower body clothing?: None  -   Bathing (including washing, rinsing, drying)?: None  -   Toileting, which includes using toilet, bedpan or urinal? : None  -   Putting on and taking off regular upper body clothing?: None  -   Taking care of personal grooming such as brushing teeth?: None  -   Eating meals?: None    AM-PAC Score:  Score: 24  Approx Degree of Impairment: 0%  Standardized Score (AM-PAC Scale): 57.54  CMS Modifier (G-Code): CH    FUNCTIONAL TRANSFER ASSESSMENT  ind    BALANCE ASSESSMENT  Static Sitting: Independent  Static Standing: Contact Guard Assist    FUNCTIONAL ADL ASSESSMENT  ind            EDUCATION PROVIDED  Patient Education : Role of Occupational Therapy; Plan of Care; Functional Transfer Techniques; Fall Prevention; Posture/Positioning; Proper Body Mechanics  Patient's Response to Education: Verbalized Understanding; Returned Demonstration    The patient's Approx Degree of Impairment: 0% has been calculated based on  documentation in the [facility] '6 clicks' Inpatient Daily Activity Short Form.  Research supports that patients with this level of impairment may benefit from home.  Final disposition will be made by interdisciplinary medical team.    Patient End of Session: Up in chair;Needs met;Call light within reach;RN aware of session/findings;All patient questions and concerns addressed    OT Goals: MET    OT Session Time: 15 minutes  Self-Care Home Management: 15 minutes      Kelly Murrell OT  [facility]  Inpatient Rehabilitation  Occupational Therapy  [phone]

## 2024-12-03 NOTE — PLAN OF CARE
Problem: Patient Centered Care  Goal: Patient preferences are identified and integrated in the patient's plan of care  Description: Interventions:  - What would you like us to know as we care for you? From home alone  - Provide timely, complete, and accurate information to patient/family  - Incorporate patient and family knowledge, values, beliefs, and cultural backgrounds into the planning and delivery of care  - Encourage patient/family to participate in care and decision-making at the level they choose  - Honor patient and family perspectives and choices  Outcome: Progressing     Problem: CARDIOVASCULAR - ADULT  Goal: Maintains optimal cardiac output and hemodynamic stability  Description: INTERVENTIONS:  - Monitor vital signs, rhythm, and trends  - Monitor for bleeding, hypotension and signs of decreased cardiac output  - Evaluate effectiveness of vasoactive medications to optimize hemodynamic stability  - Monitor arterial and/or venous puncture sites for bleeding and/or hematoma  - Assess quality of pulses, skin color and temperature  - Assess for signs of decreased coronary artery perfusion - ex. Angina  - Evaluate fluid balance, assess for edema, trend weights  Outcome: Progressing     Problem: RESPIRATORY - ADULT  Goal: Achieves optimal ventilation and oxygenation  Description: INTERVENTIONS:  - Assess for changes in respiratory status  - Assess for changes in mentation and behavior  - Position to facilitate oxygenation and minimize respiratory effort  - Oxygen supplementation based on oxygen saturation or ABGs  - Provide Smoking Cessation handout, if applicable  - Encourage broncho-pulmonary hygiene including cough, deep breathe, Incentive Spirometry  - Assess the need for suctioning and perform as needed  - Assess and instruct to report SOB or any respiratory difficulty  - Respiratory Therapy support as indicated  - Manage/alleviate anxiety  - Monitor for signs/symptoms of CO2 retention  Outcome:  Progressing     Problem: GASTROINTESTINAL - ADULT  Goal: Minimal or absence of nausea and vomiting  Description: INTERVENTIONS:  - Maintain adequate hydration with IV or PO as ordered and tolerated  - Nasogastric tube to low intermittent suction as ordered  - Evaluate effectiveness of ordered antiemetic medications  - Provide nonpharmacologic comfort measures as appropriate  - Advance diet as tolerated, if ordered  - Obtain nutritional consult as needed  - Evaluate fluid balance  Outcome: Progressing     Problem: GENITOURINARY - ADULT  Goal: Absence of urinary retention  Description: INTERVENTIONS:  - Assess patient’s ability to void and empty bladder  - Monitor intake/output and perform bladder scan as needed  - Follow urinary retention protocol/standard of care  - Consider collaborating with pharmacy to review patient's medication profile  - Implement strategies to promote bladder emptying  Outcome: Progressing     Problem: METABOLIC/FLUID AND ELECTROLYTES - ADULT  Goal: Glucose maintained within prescribed range  Description: INTERVENTIONS:  - Monitor Blood Glucose as ordered  - Assess for signs and symptoms of hyperglycemia and hypoglycemia  - Administer ordered medications to maintain glucose within target range  - Assess barriers to adequate nutritional intake and initiate nutrition consult as needed  - Instruct patient on self management of diabetes  Outcome: Progressing  Goal: Electrolytes maintained within normal limits  Description: INTERVENTIONS:  - Monitor labs and rhythm and assess patient for signs and symptoms of electrolyte imbalances  - Administer electrolyte replacement as ordered  - Monitor response to electrolyte replacements, including rhythm and repeat lab results as appropriate  - Fluid restriction as ordered  - Instruct patient on fluid and nutrition restrictions as appropriate  Outcome: Progressing     Problem: SKIN/TISSUE INTEGRITY - ADULT  Goal: Skin integrity remains intact  Description:  INTERVENTIONS  - Assess and document risk factors for pressure ulcer development  - Assess and document skin integrity  - Monitor for areas of redness and/or skin breakdown  - Initiate interventions, skin care algorithm/standards of care as needed  Outcome: Progressing  Goal: Incision(s), wounds(s) or drain site(s) healing without S/S of infection  Description: INTERVENTIONS:  - Assess and document risk factors for pressure ulcer development  - Assess and document skin integrity  - Assess and document dressing/incision, wound bed, drain sites and surrounding tissue  - Implement wound care per orders  - Initiate isolation precautions as appropriate  - Initiate Pressure Ulcer prevention bundle as indicated  Outcome: Progressing     Problem: MUSCULOSKELETAL - ADULT  Goal: Return mobility to safest level of function  Description: INTERVENTIONS:  - Assess patient stability and activity tolerance for standing, transferring and ambulating w/ or w/o assistive devices  - Assist with transfers and ambulation using safe patient handling equipment as needed  - Ensure adequate protection for wounds/incisions during mobilization  - Obtain PT/OT consults as needed  - Advance activity as appropriate  - Communicate ordered activity level and limitations with patient/family  Outcome: Progressing     Problem: PAIN - ADULT  Goal: Verbalizes/displays adequate comfort level or patient's stated pain goal  Description: INTERVENTIONS:  - Encourage pt to monitor pain and request assistance  - Assess pain using appropriate pain scale  - Administer analgesics based on type and severity of pain and evaluate response  - Implement non-pharmacological measures as appropriate and evaluate response  - Consider cultural and social influences on pain and pain management  - Manage/alleviate anxiety  - Utilize distraction and/or relaxation techniques  - Monitor for opioid side effects  - Notify MD/LIP if interventions unsuccessful or patient reports new  pain  - Anticipate increased pain with activity and pre-medicate as appropriate  Outcome: Progressing     Problem: SAFETY ADULT - FALL  Goal: Free from fall injury  Description: INTERVENTIONS:  - Assess pt frequently for physical needs  - Identify cognitive and physical deficits and behaviors that affect risk of falls.  - [place] fall precautions as indicated by assessment.  - Educate pt/family on patient safety including physical limitations  - Instruct pt to call for assistance with activity based on assessment  - Modify environment to reduce risk of injury  - Provide assistive devices as appropriate  - Consider OT/PT consult to assist with strengthening/mobility  - Encourage toileting schedule  Outcome: Progressing     Problem: DISCHARGE PLANNING  Goal: Discharge to home or other facility with appropriate resources  Description: INTERVENTIONS:  - Identify barriers to discharge w/pt and caregiver  - Include patient/family/discharge partner in discharge planning  - Arrange for needed discharge resources and transportation as appropriate  - Identify discharge learning needs (meds, wound care, etc)  - Arrange for interpreters to assist at discharge as needed  - Consider post-discharge preferences of patient/family/discharge partner  - Complete POLST form as appropriate  - Assess patient's ability to be responsible for managing their own health  - Refer to Case Management Department for coordinating discharge planning if the patient needs post-hospital services based on physician/LIP order or complex needs related to functional status, cognitive ability or social support system  Outcome: Progressing     Problem: Impaired Functional Mobility  Goal: Achieve highest/safest level of mobility/gait  Description: Interventions:  - Assess patient's functional ability and stability  - Promote increasing activity/tolerance for mobility and gait  - Educate and engage patient/family in tolerated activity level and  precautions  - Recommend use of  RW for transfers and ambulation  - Recommend patient transfer to bedside chair toward strongest side  - When transferring patient, block weaker knee for safety  - Recommend use of chair position in bed 3 times per day  Outcome: Progressing     Problem: Impaired Activities of Daily Living  Goal: Achieve highest/safest level of independence in self care  Description: Interventions:  - Assess ability and encourage patient to participate in ADLs to maximize function  - Promote sitting position while performing ADLs such as feeding, grooming, and bathing  - Educate and encourage patient/family in tolerated functional activity level and precautions during self-care  Outcome: Progressing     Problem: Patient/Family Goals  Goal: Patient/Family Long Term Goal  Description: Patient's Long Term Goal: Discharge from the hospital    Interventions:  - Monitor vital signs  - Monitor appropriate labs  - Monitor blood glucose levels  - Pain management  - Administer medications per order  - Follow MD orders  - Diagnostics per order  - Update / inform patient and family on plan of care  - Discharge planning  - See additional Care Plan goals for specific interventions  Outcome: Progressing  Goal: Patient/Family Short Term Goal  Description: Patient's Short Term Goal: Improve redness, swelling, and pain to left lower extremity     Interventions:   - Monitor vital signs  - Monitor appropriate labs  - Monitor blood glucose levels  - Pain management  - Administer medications per order  - Follow MD orders  - Diagnostics per order  - Update / inform patient and family on plan of care  - See additional Care Plan goals for specific interventions  Outcome: Progressing     Problem: Diabetes/Glucose Control  Goal: Glucose maintained within prescribed range  Description: INTERVENTIONS:  - Monitor Blood Glucose as ordered  - Assess for signs and symptoms of hyperglycemia and hypoglycemia  - Administer ordered  medications to maintain glucose within target range  - Assess barriers to adequate nutritional intake and initiate nutrition consult as needed  - Instruct patient on self management of diabetes  Outcome: Progressing     Problem: HEMATOLOGIC - ADULT  Goal: Free from bleeding injury  Description: (Example usage: patient with low platelets)  INTERVENTIONS:  - Avoid intramuscular injections, enemas and rectal medication administration  - Ensure safe mobilization of patient  - Hold pressure on venipuncture sites to achieve adequate hemostasis  - Assess for signs and symptoms of internal bleeding  - Monitor lab trends  - Patient is to report abnormal signs of bleeding to staff  - Avoid use of toothpicks and dental floss  - Use electric shaver for shaving  - Use soft bristle tooth brush  - Limit straining and forceful nose blowing  Outcome: Progressing

## 2024-12-03 NOTE — PLAN OF CARE
Problem: Patient Centered Care  Goal: Patient preferences are identified and integrated in the patient's plan of care  Description: Interventions:  - What would you like us to know as we care for you? From home alone  - Provide timely, complete, and accurate information to patient/family  - Incorporate patient and family knowledge, values, beliefs, and cultural backgrounds into the planning and delivery of care  - Encourage patient/family to participate in care and decision-making at the level they choose  - Honor patient and family perspectives and choices  Outcome: Progressing     Problem: CARDIOVASCULAR - ADULT  Goal: Maintains optimal cardiac output and hemodynamic stability  Description: INTERVENTIONS:  - Monitor vital signs, rhythm, and trends  - Monitor for bleeding, hypotension and signs of decreased cardiac output  - Evaluate effectiveness of vasoactive medications to optimize hemodynamic stability  - Monitor arterial and/or venous puncture sites for bleeding and/or hematoma  - Assess quality of pulses, skin color and temperature  - Assess for signs of decreased coronary artery perfusion - ex. Angina  - Evaluate fluid balance, assess for edema, trend weights  Outcome: Progressing     Problem: RESPIRATORY - ADULT  Goal: Achieves optimal ventilation and oxygenation  Description: INTERVENTIONS:  - Assess for changes in respiratory status  - Assess for changes in mentation and behavior  - Position to facilitate oxygenation and minimize respiratory effort  - Oxygen supplementation based on oxygen saturation or ABGs  - Provide Smoking Cessation handout, if applicable  - Encourage broncho-pulmonary hygiene including cough, deep breathe, Incentive Spirometry  - Assess the need for suctioning and perform as needed  - Assess and instruct to report SOB or any respiratory difficulty  - Respiratory Therapy support as indicated  - Manage/alleviate anxiety  - Monitor for signs/symptoms of CO2 retention  Outcome:  Progressing     Problem: GASTROINTESTINAL - ADULT  Goal: Minimal or absence of nausea and vomiting  Description: INTERVENTIONS:  - Maintain adequate hydration with IV or PO as ordered and tolerated  - Nasogastric tube to low intermittent suction as ordered  - Evaluate effectiveness of ordered antiemetic medications  - Provide nonpharmacologic comfort measures as appropriate  - Advance diet as tolerated, if ordered  - Obtain nutritional consult as needed  - Evaluate fluid balance  Outcome: Progressing     Problem: GENITOURINARY - ADULT  Goal: Absence of urinary retention  Description: INTERVENTIONS:  - Assess patient’s ability to void and empty bladder  - Monitor intake/output and perform bladder scan as needed  - Follow urinary retention protocol/standard of care  - Consider collaborating with pharmacy to review patient's medication profile  - Implement strategies to promote bladder emptying  Outcome: Progressing     Problem: METABOLIC/FLUID AND ELECTROLYTES - ADULT  Goal: Glucose maintained within prescribed range  Description: INTERVENTIONS:  - Monitor Blood Glucose as ordered  - Assess for signs and symptoms of hyperglycemia and hypoglycemia  - Administer ordered medications to maintain glucose within target range  - Assess barriers to adequate nutritional intake and initiate nutrition consult as needed  - Instruct patient on self management of diabetes  Outcome: Progressing  Goal: Electrolytes maintained within normal limits  Description: INTERVENTIONS:  - Monitor labs and rhythm and assess patient for signs and symptoms of electrolyte imbalances  - Administer electrolyte replacement as ordered  - Monitor response to electrolyte replacements, including rhythm and repeat lab results as appropriate  - Fluid restriction as ordered  - Instruct patient on fluid and nutrition restrictions as appropriate  Outcome: Progressing     Problem: SKIN/TISSUE INTEGRITY - ADULT  Goal: Skin integrity remains intact  Description:  INTERVENTIONS  - Assess and document risk factors for pressure ulcer development  - Assess and document skin integrity  - Monitor for areas of redness and/or skin breakdown  - Initiate interventions, skin care algorithm/standards of care as needed  Outcome: Progressing  Goal: Incision(s), wounds(s) or drain site(s) healing without S/S of infection  Description: INTERVENTIONS:  - Assess and document risk factors for pressure ulcer development  - Assess and document skin integrity  - Assess and document dressing/incision, wound bed, drain sites and surrounding tissue  - Implement wound care per orders  - Initiate isolation precautions as appropriate  - Initiate Pressure Ulcer prevention bundle as indicated  Outcome: Progressing     Problem: MUSCULOSKELETAL - ADULT  Goal: Return mobility to safest level of function  Description: INTERVENTIONS:  - Assess patient stability and activity tolerance for standing, transferring and ambulating w/ or w/o assistive devices  - Assist with transfers and ambulation using safe patient handling equipment as needed  - Ensure adequate protection for wounds/incisions during mobilization  - Obtain PT/OT consults as needed  - Advance activity as appropriate  - Communicate ordered activity level and limitations with patient/family  Outcome: Progressing     Problem: PAIN - ADULT  Goal: Verbalizes/displays adequate comfort level or patient's stated pain goal  Description: INTERVENTIONS:  - Encourage pt to monitor pain and request assistance  - Assess pain using appropriate pain scale  - Administer analgesics based on type and severity of pain and evaluate response  - Implement non-pharmacological measures as appropriate and evaluate response  - Consider cultural and social influences on pain and pain management  - Manage/alleviate anxiety  - Utilize distraction and/or relaxation techniques  - Monitor for opioid side effects  - Notify MD/LIP if interventions unsuccessful or patient reports new  pain  - Anticipate increased pain with activity and pre-medicate as appropriate  Outcome: Progressing     Problem: SAFETY ADULT - FALL  Goal: Free from fall injury  Description: INTERVENTIONS:  - Assess pt frequently for physical needs  - Identify cognitive and physical deficits and behaviors that affect risk of falls.  - [place] fall precautions as indicated by assessment.  - Educate pt/family on patient safety including physical limitations  - Instruct pt to call for assistance with activity based on assessment  - Modify environment to reduce risk of injury  - Provide assistive devices as appropriate  - Consider OT/PT consult to assist with strengthening/mobility  - Encourage toileting schedule  Outcome: Progressing     Problem: DISCHARGE PLANNING  Goal: Discharge to home or other facility with appropriate resources  Description: INTERVENTIONS:  - Identify barriers to discharge w/pt and caregiver  - Include patient/family/discharge partner in discharge planning  - Arrange for needed discharge resources and transportation as appropriate  - Identify discharge learning needs (meds, wound care, etc)  - Arrange for interpreters to assist at discharge as needed  - Consider post-discharge preferences of patient/family/discharge partner  - Complete POLST form as appropriate  - Assess patient's ability to be responsible for managing their own health  - Refer to Case Management Department for coordinating discharge planning if the patient needs post-hospital services based on physician/LIP order or complex needs related to functional status, cognitive ability or social support system  Outcome: Progressing     Problem: Impaired Functional Mobility  Goal: Achieve highest/safest level of mobility/gait  Description: Interventions:  - Assess patient's functional ability and stability  - Promote increasing activity/tolerance for mobility and gait  - Educate and engage patient/family in tolerated activity level and  precautions  - Recommend use of  RW for transfers and ambulation  - Recommend patient transfer to bedside chair toward strongest side  - When transferring patient, block weaker knee for safety  - Recommend use of chair position in bed 3 times per day  Outcome: Progressing     Problem: Impaired Activities of Daily Living  Goal: Achieve highest/safest level of independence in self care  Description: Interventions:  - Assess ability and encourage patient to participate in ADLs to maximize function  - Promote sitting position while performing ADLs such as feeding, grooming, and bathing  - Educate and encourage patient/family in tolerated functional activity level and precautions during self-care  Outcome: Progressing     Problem: Patient/Family Goals  Goal: Patient/Family Long Term Goal  Description: Patient's Long Term Goal: Discharge from the hospital    Interventions:  - Monitor vital signs  - Monitor appropriate labs  - Monitor blood glucose levels  - Pain management  - Administer medications per order  - Follow MD orders  - Diagnostics per order  - Update / inform patient and family on plan of care  - Discharge planning  - See additional Care Plan goals for specific interventions  Outcome: Progressing  Goal: Patient/Family Short Term Goal  Description: Patient's Short Term Goal: Improve redness, swelling, and pain to left lower extremity     Interventions:   - Monitor vital signs  - Monitor appropriate labs  - Monitor blood glucose levels  - Pain management  - Administer medications per order  - Follow MD orders  - Diagnostics per order  - Update / inform patient and family on plan of care  - See additional Care Plan goals for specific interventions  Outcome: Progressing     Problem: Diabetes/Glucose Control  Goal: Glucose maintained within prescribed range  Description: INTERVENTIONS:  - Monitor Blood Glucose as ordered  - Assess for signs and symptoms of hyperglycemia and hypoglycemia  - Administer ordered  medications to maintain glucose within target range  - Assess barriers to adequate nutritional intake and initiate nutrition consult as needed  - Instruct patient on self management of diabetes  Outcome: Progressing     Problem: HEMATOLOGIC - ADULT  Goal: Free from bleeding injury  Description: (Example usage: patient with low platelets)  INTERVENTIONS:  - Avoid intramuscular injections, enemas and rectal medication administration  - Ensure safe mobilization of patient  - Hold pressure on venipuncture sites to achieve adequate hemostasis  - Assess for signs and symptoms of internal bleeding  - Monitor lab trends  - Patient is to report abnormal signs of bleeding to staff  - Avoid use of toothpicks and dental floss  - Use electric shaver for shaving  - Use soft bristle tooth brush  - Limit straining and forceful nose blowing  Outcome: Progressing

## 2024-12-03 NOTE — PROGRESS NOTES
[facility] Hospitalist Progress Note     CC: Hospital Follow up    PCP: rEic Sethi DO       Assessment/Plan:     Principal Problem:    Cellulitis of left lower leg  Active Problems:    Psoriasis    Immunosuppression (HCC)    Jovan Merino - 54 year old male w MO, HTN, Afib, HFpEF, psoriasis admitted 11/18/2024 for LLE cellulitis, started on IV ancef. Initially improving however 11/21 worse - abx broadened to vanc/zosyn, now cefepime and daptomycin. CT with cellulitis . ID/Rheum/Orhto consulted. MRI with LLE cellulitis, mild myositis in posterior aspect, no abscess, no OM.  PICC in place for extended IV abx coverage. Will likely need MEGAN but really wants to try to go home. 3rd LLE doppler 11/29/24 Left popliteal v. Positive DVT. Full dose lovenox started with plans to bridge to coumadin     Sepsis 2/2 LLE cellulitis  Immunosuppression 2/2 psoriasis/Humira  - Febrile to 102 here. HR 80s, WBC 14  - blood cx NG x 5 days  - . Known HFpEF. Feels like legs aren't swollen & has lost weight  - Pain control: IVP dilaudid, PO oxy, APAP PRN  - IV ancef (11/18 - 21 )  - 11/19 febrile, check Bcx. Lot of pain - add tramadol (itching w oxy/norc), add IV toradol scheduled.   - Headaches, feels dehydrated - will give 1L NS bolus  - 11/21: slightly worse, LE very swollen, WBC higher. Will switch ancef to vanco/zosyn for now. Consult ID - now cefepime and daptomycin  - CT negative 11/21 for underlying abscess  - Rheum/Ortho on consult   - No concern for compartment syndrome or joint involvement at this time  - MRI with LLE cellulitis, mild myositis in posterior aspect, no abscess, no OM   - inflammatory markers down trending   - PICC in place for extended IV abx coverage     Severe LLE pain and swelling  - possibly 2/2 cellulitis however not improving with IV abx  - no signs/symptoms of compartment syndrome  - continue pain control with PO and IV pain meds  - eval by ortho  - inflammatory markers significantly elevated now down  trended   - CPK normal  - s/p IV lasix, x2, now on oral daily   - rheumatology following   - MRI with LLE cellulitis, mild myositis in posterior aspect, no abscess, no OM   - LLE venous doppler 11/18/24 and 11/24/24 negative for dvt  - LLE venous doppler 11/29/24 Left popliteal v. Positive DVT    - LLE arterial doppler 11/29/24 with patent arteries    - will schedule higher dose tramadol  - can consider trial of gabapentin or Lyrica    Acute DVT  - LLE, Left popliteal v  - full dose lovenox started   - will consider warfarin vs Xarelto based on BMI limitations   - Discussed with patient's that although no good studies have indicated the contraindication of Xarelto with his current BMI, he may have better coverage with warfarin 2/2 increased risk factors  - will need Lovenox injection training and dietitian for warfarin diet  - d/w pharmacy, will start coumadin 10 mg daily, higher dose due to his weight and titrate based on INR  - will need PCP set up to follow-up coumadin dosing  - needs Xa level checked tomorrow evening    Partial quad tear  L knee effusion  - ortho consulted, appreciate recs, have signed off      Headaches  - fioricet PRN     HFpEF - compensated  HTN  Parox Afib - low CV, not on AC  - Continue PTA lasix, flecainide, coreg, hydral, enalapril     Psoriasis  - On humira OP. Would hold until leg better     Morbid obesity w [facility]  - Body mass index is 52.8 kg/m².   - Healthy diet & wt loss encouraged  - Has lost quite a bit of weight with Mounjaro already     DM2 - currently controlled w mounjaro  - Okay for reg diet, follow BG on AM labs. Can hold on SSI/accuchecks for now, can add if issues controlling     Depression   - seen by psych liaison  - Will start Zoloft, increase dose to 50 mg daily    ACP: Full Code  Ppx: DVT: Enox  Dispo  EDoD:  ~12/4-5. Pending final lovenox dosing     F/u:   - PCP:  Eric Sethi, DO     Available via epic secure chat or perfect serve 7A - 7P.     Linda Savage,  MD  Internal Medicine - Hospitalist  [facility]     Subjective:     Using a lot less IV pain meds, did need some for breakthrough. Overwhelmed with everything.     OBJECTIVE:    Blood pressure 132/84, pulse 78, temperature 99.2 °F (37.3 °C), temperature source Oral, resp. rate 16, height 6' 1\" (1.854 m), weight (!) 400 lb 3.2 oz (181.5 kg), SpO2 93%.    Temp:  [97.9 °F (36.6 °C)-99.2 °F (37.3 °C)] 99.2 °F (37.3 °C)  Pulse:  [70-83] 78  Resp:  [16-18] 16  BP: (119-148)/(66-96) 132/84  SpO2:  [93 %-96 %] 93 %      Intake/Output:    Intake/Output Summary (Last 24 hours) at 12/3/2024 1451  Last data filed at 12/3/2024 0821  Gross per 24 hour   Intake 1690 ml   Output 750 ml   Net 940 ml       Last 3 Weights   11/20/24 0528 (!) 400 lb 3.2 oz (181.5 kg)   11/19/24 0615 (!) 395 lb 3.2 oz (179.3 kg)   11/18/24 1705 (!) 394 lb 3.2 oz (178.8 kg)   11/18/24 1112 (!) 395 lb (179.2 kg)   11/17/21 1338 (!) 379 lb 3.2 oz (172 kg)   06/10/21 1222 (!) 364 lb (165.1 kg)       Exam   GEN: obese male in NAD, tearful  HEENT: EOMI  Pulm: CTAB, no crackles or wheezes  CV: RRR, no murmurs, DP pulses present  ABD: Soft, non-tender, non-distended, +BS  MSK: LLE swelling, very TTP, LLE compartments not tense  Neuro: Grossly normal, CN intact, sensory intact  SKIN: warm, dry, psoriatic plaques, LLE with improved edema, erythema, strong DPP  EXT: left leg edema    Data Review:       Labs:     Recent Labs   Lab 12/01/24  0557 12/02/24  0443 12/03/24  0543   RBC 4.06* 3.83* 4.14*   HGB 11.3* 10.4* 11.3*   HCT 35.3* 32.9* 35.5*   MCV 86.9 85.9 85.7   MCH 27.8 27.2 27.3   MCHC 32.0 31.6 31.8   RDW 14.9 15.0 14.7   NEPRELIM 6.99 5.35 5.90   WBC 11.7* 9.9 10.6   .0 375.0 403.0         Recent Labs   Lab 12/01/24  0557 12/02/24  0443 12/03/24  0543   GLU 96 91 100*   BUN 25* 26* 21   CREATSERUM 0.92 0.93 0.88   EGFRCR 100 99 103   CA 10.3 10.2 10.2   * 136 135*   K 4.4 4.5 4.5    102 104   CO2 28.0 30.0 29.0       No  results for input(s): \"ALT\", \"AST\", \"ALB\", \"AMYLASE\", \"LIPASE\", \"LDH\" in the last 168 hours.    Invalid input(s): \"ALPHOS\", \"TBIL\", \"DBIL\", \"TPROT\"      Imaging:  No results found.      Meds:      traMADol  100 mg Oral q6h    [START ON 12/4/2024] sertraline  50 mg Oral Daily    warfarin  10 mg Oral Nightly    enoxaparin  140 mg Subcutaneous q12h    furosemide  40 mg Oral Daily    cefepime  2 g Intravenous Q8H    clotrimazole-betamethasone   Topical BID    docosanol   Topical BID    cetirizine  10 mg Oral Daily    enalapril  20 mg Oral BID    flecainide  150 mg Oral BID    hydrALAZINE  50 mg Oral BID    carvedilol  25 mg Oral BID with meals    magnesium oxide  200 mg Oral Nightly    dilTIAZem HCl ER Coated Beads  180 mg Oral Nightly         ibuprofen **OR** ketorolac    HYDROmorphone    magnesium hydroxide    calcium carbonate    famotidine    acetaminophen    melatonin    polyethylene glycol (PEG 3350)    sennosides    guaiFENesin    ondansetron    prochlorperazine    HYDROmorphone    simethicone

## 2024-12-03 NOTE — PROGRESS NOTES
Infectious Disease Progress Note      Date of admission: 11/18/2024  1:05 PM     Reason for consult: Left lower extremity cellulitis    Referring physician: Linda Savage MD    Subjective: Pain, redness and swelling are all continuing to improve.  No nausea or vomiting.  No diarrhea.  No shortness of breath.  No cough or sputum production.    The rest of the systems were reviewed and found to be negative except was mentioned above    Medications:    ibuprofen **OR** ketorolac    traMADol    warfarin    HYDROmorphone    enoxaparin    sertraline    furosemide    magnesium hydroxide    cefepime    clotrimazole-betamethasone    docosanol    cetirizine    calcium carbonate    famotidine    enalapril    flecainide    hydrALAZINE    carvedilol    acetaminophen    melatonin    polyethylene glycol (PEG 3350)    sennosides    guaiFENesin    ondansetron    prochlorperazine    HYDROmorphone    magnesium oxide    simethicone    dilTIAZem HCl ER Coated Beads     Allergies:  Allergies[1]    Physical Exam:  Vitals:    12/03/24 0817   BP: 132/84   Pulse: 78   Resp:    Temp: 99.2 °F (37.3 °C)     Vitals signs and nursing note reviewed.   Constitutional:       Appearance: Normal appearance.   HENT:      Head: Normocephalic and atraumatic.      Mouth: Mucous membranes are moist.   Neck:      Musculoskeletal: Neck supple.   Cardiovascular:      Rate and Rhythm: Normal rate.   Pulmonary:      Effort: Pulmonary effort is normal. No respiratory distress.   Musculoskeletal:      Right lower leg: No edema.      Left lower leg: +2 edema.  Resolving cellulitis noted  Skin:     General: Skin is warm and dry.   Neurological:      General: No focal deficit present.      Mental Status: Alert and oriented to person, place, and time.       Laboratory data:  I have reviewed all the lab results independently.  Lab Results   Component Value Date    WBC 10.6 12/03/2024    HGB 11.3 12/03/2024    HCT 35.5 12/03/2024    .0 12/03/2024     CREATSERUM 0.88 12/03/2024    BUN 21 12/03/2024     12/03/2024    K 4.5 12/03/2024     12/03/2024    CO2 29.0 12/03/2024     12/03/2024    CA 10.2 12/03/2024    INR 1.07 12/03/2024      Recent Labs   Lab 12/03/24  0543   RBC 4.14*   HGB 11.3*   HCT 35.5*   MCV 85.7   MCH 27.3   MCHC 31.8   RDW 14.7   NEPRELIM 5.90   WBC 10.6   .0      Microbiology data:  Hospital Encounter on 11/18/24   1. Blood Culture     Status: None    Collection Time: 11/19/24  8:48 AM    Specimen: Blood,peripheral   Result Value Ref Range    Blood Culture Result No Growth 5 Days N/A      Impression:  Jovan Merino Sr. is a 54 year old male with    Extremity cellulitis, presenting here with sepsis with threat to life  Blood cultures with no growth to date  MRI showing mild myositis  Initially on IV cefazolin without improvement, escalated to IV daptomycin and cefepime on 11/21  Daptomycin stopped on 12/1 due to rhabdomyolysis  Currently on IV cefepime with clinical improvement  Plan to discharge on cefepime through 12/11/2024  DVT in the same leg  On anticoagulation as per primary team  History of psoriasis  Patient is reporting improvement in his psoriasis since going on antibiotics  Would benefit from penicillin V 500 mg twice daily for suppression to be started once off the cefepime  Will discuss when he follows up with us in the office    Recommendations:    Continue IV cefepime for now as previously planned through 12/11/2024  Weekly CBC with differential and CMP, sed rate and CRP.  Fax results to [facility]. Fax: [phone]. Tel: [phone].  PICC line care as per protocol.  Follow-up with infectious disease in 1 week after discharge  Anticoagulation as per primary team  Continue to monitor daily labs for antibiotic toxicities  Further recommendations will depend on the above work-up and clinical progress     The plan of care was discussed with the primary hospital team, Linda Savage MD      Recommendations were also discussed with the patient; all questions were answered.     Thank you for this consultation. Please don't hesitate to call the ID team for questions or any acute changes in patient's clinical condition.    Please note that this report has been produced using speech recognition software and may contain errors related to that system including, but not limited to, errors in grammar, punctuation, and spelling, as well as words and phrases that possibly may have been recognized inappropriately.  If there are any questions or concerns, contact the dictating provider for clarification.    The  [employer] Cures Act makes medical notes like these available to patients in the interest of transparency. Please be advised this is a medical document. Medical documents are intended to carry relevant information, facts as evident, and the clinical opinion of the practitioner. The medical note is intended as peer to peer communication and may appear blunt or direct. It is written in medical language and may contain abbreviations or verbiage that are unfamiliar.     Natalie Cassidy MD  [facility]. Tel: [phone]. Fax: [phone].     Jovan Merino Sr. : 1970 MRN: [id number] CSN: [id number]          [1]   Allergies  Allergen Reactions    Oxycodone ITCHING    Hydrocodone ITCHING and RASH

## 2024-12-03 NOTE — PAYOR COMM NOTE
CONTINUED STAY REVIEW    Payor: [employer]  Subscriber #:  [id number]  Authorization Number: [id number]    Admit date: 11/18/24  Admit time:  4:58 PM    REVIEW DOCUMENTATION: 12/3    Infectious Disease Progress Note        Date of admission: 11/18/2024  1:05 PM      Reason for consult: Left lower extremity cellulitis     Referring physician: Linda Savage MD     Subjective: Pain, redness and swelling are all continuing to improve.  No nausea or vomiting.  No diarrhea.  No shortness of breath.  No cough or sputum production.     The rest of the systems were reviewed and found to be negative except was mentioned above     Medications:    Current Hospital Medications     ibuprofen **OR** ketorolac    traMADol    warfarin    HYDROmorphone    enoxaparin    sertraline    furosemide    magnesium hydroxide    cefepime    clotrimazole-betamethasone    docosanol    cetirizine    calcium carbonate    famotidine    enalapril    flecainide    hydrALAZINE    carvedilol    acetaminophen    melatonin    polyethylene glycol (PEG 3350)    sennosides    guaiFENesin    ondansetron    prochlorperazine    HYDROmorphone    magnesium oxide    simethicone    dilTIAZem HCl ER Coated Beads         Allergies:  [Allergies]    [Allergies]       Allergen Reactions    Oxycodone ITCHING    Hydrocodone ITCHING and RASH        Physical Exam:      Vitals:     12/03/24 0817   BP: 132/84   Pulse: 78   Resp:     Temp: 99.2 °F (37.3 °C)      Vitals signs and nursing note reviewed.   Constitutional:       Appearance: Normal appearance.   HENT:      Head: Normocephalic and atraumatic.      Mouth: Mucous membranes are moist.   Neck:      Musculoskeletal: Neck supple.   Cardiovascular:      Rate and Rhythm: Normal rate.   Pulmonary:      Effort: Pulmonary effort is normal. No respiratory distress.   Musculoskeletal:      Right lower leg: No edema.      Left lower leg: +2 edema.  Resolving cellulitis noted  Skin:     General: Skin is warm  and dry.   Neurological:      General: No focal deficit present.      Mental Status: Alert and oriented to person, place, and time.         Laboratory data:  I have reviewed all the lab results independently.        Lab Results   Component Value Date     WBC 10.6 12/03/2024     HGB 11.3 12/03/2024     HCT 35.5 12/03/2024     .0 12/03/2024     CREATSERUM 0.88 12/03/2024     BUN 21 12/03/2024      12/03/2024     K 4.5 12/03/2024      12/03/2024     CO2 29.0 12/03/2024      12/03/2024     CA 10.2 12/03/2024     INR 1.07 12/03/2024          Recent Labs   Lab 12/03/24  0543   RBC 4.14*   HGB 11.3*   HCT 35.5*   MCV 85.7   MCH 27.3   MCHC 31.8   RDW 14.7   NEPRELIM 5.90   WBC 10.6   .0      Microbiology data:        Hospital Encounter on 11/18/24   1. Blood Culture     Status: None     Collection Time: 11/19/24  8:48 AM     Specimen: Blood,peripheral   Result Value Ref Range     Blood Culture Result No Growth 5 Days N/A      Impression:  Jovan Merino Sr. is a 54 year old male with     Extremity cellulitis, presenting here with sepsis with threat to life  Blood cultures with no growth to date  MRI showing mild myositis  Initially on IV cefazolin without improvement, escalated to IV daptomycin and cefepime on 11/21  Daptomycin stopped on 12/1 due to rhabdomyolysis  Currently on IV cefepime with clinical improvement  Plan to discharge on cefepime through 12/11/2024  DVT in the same leg  On anticoagulation as per primary team  History of psoriasis  Patient is reporting improvement in his psoriasis since going on antibiotics  Would benefit from penicillin V 500 mg twice daily for suppression to be started once off the cefepime  Will discuss when he follows up with us in the office     Recommendations:    Continue IV cefepime for now as previously planned through 12/11/2024  Weekly CBC with differential and CMP, sed rate and CRP.  Fax results to [facility]. Fax: [phone]. Tel:  [phone].  PICC line care as per protocol.  Follow-up with infectious disease in 1 week after discharge  Anticoagulation as per primary team  Continue to monitor daily labs for antibiotic toxicities  Further recommendations will depend on the above work-up and clinical progress      The plan of care was discussed with the primary hospital team, Linda Savage MD Mohmmad F. Hajjiri, MD [facility].         MEDICATIONS ADMINISTERED IN LAST 1 DAY:  carvedilol (Coreg) tab 25 mg       Date Action Dose Route User    12/3/2024 0820 Given 25 mg Oral Erickson Valencia RN    12/2/2024 1822 Given 25 mg Oral Erickson Valencia RN          ceFEPIme (Maxpime) 2 g in sodium chloride 0.9% 100 mL IVPB-MBP       Date Action Dose Route User    12/3/2024 0821 New Bag 2 g Intravenous Erickson Valencia RN    12/3/2024 0141 Restarted (none) Intravenous Sindhu Alonzo RN    12/2/2024 2147 New Bag 2 g Intravenous Sindhu Alonzo RN    12/2/2024 1434 New Bag 2 g Intravenous Erickson Valencia RN          cetirizine (ZyrTEC) tab 10 mg       Date Action Dose Route User    12/3/2024 0820 Given 10 mg Oral Erickson Valencia RN          clotrimazole-betamethasone (Lotrisone) 1-0.05 % cream       Date Action Dose Route User    12/3/2024 0835 Given (none) Topical Erickson Valencia RN    12/2/2024 2148 Given (none) Topical Sindhu Alonzo RN    12/2/2024 1442 Given (none) Topical Erickson Valencia RN          dilTIAZem ER (CardIZEM CD) 24 hr cap 180 mg       Date Action Dose Route User    12/2/2024 2147 Given 180 mg Oral Sindhu Alonzo RN          enalapril (Vasotec) tab 20 mg       Date Action Dose Route User    12/3/2024 0820 Given 20 mg Oral Erickson Valencia RN    12/2/2024 2147 Given 20 mg Oral Sindhu Alonzo RN          enoxaparin (Lovenox) 150 MG/ML SUBQ injection 140 mg       Date Action Dose Route User    12/2/2024 7187 Given 140 mg Subcutaneous (Right Lower Abdomen) Sindhu Alonzo,  CHELSEY          flecainide (Tambocor) tab 150 mg       Date Action Dose Route User    12/3/2024 1116 Given 150 mg Oral Erickson Valencia RN    12/2/2024 2148 Given 150 mg Oral Sindhu Alonzo RN          furosemide (Lasix) tab 40 mg       Date Action Dose Route User    12/3/2024 0820 Given 40 mg Oral Erickson Valencia RN          hydrALAZINE (Apresoline) tab 50 mg       Date Action Dose Route User    12/3/2024 0820 Given 50 mg Oral Erickson Valencia RN    12/2/2024 2147 Given 50 mg Oral Sindhu Alonzo RN          HYDROmorphone (Dilaudid) 1 MG/ML injection 0.5 mg       Date Action Dose Route User    12/3/2024 0146 Given 0.5 mg Intravenous Sindhu Alonzo RN    12/2/2024 1816 Given 0.5 mg Intravenous Erickson Valencia RN          magnesium oxide (Mag-Ox) tab 200 mg       Date Action Dose Route User    12/2/2024 2148 Given 200 mg Oral Sindhu Alonzo RN          sertraline (Zoloft) tab 25 mg       Date Action Dose Route User    12/3/2024 0820 Given 25 mg Oral Erickson Valencia RN          traMADol (Ultram) tab 100 mg       Date Action Dose Route User    12/3/2024 0532 Given 100 mg Oral Sindhu Alonzo RN    12/2/2024 2147 Given 100 mg Oral Sindhu Alonzo RN    12/2/2024 1430 Given 100 mg Oral Erickson Valencia RN          warfarin (Coumadin) tab 10 mg       Date Action Dose Route User    12/2/2024 2147 Given 10 mg Oral Sindhu Alonzo RN            Vitals (last day)       Date/Time Temp Pulse Resp BP SpO2 Weight O2 Device O2 Flow Rate (L/min) Who    12/03/24 0817 99.2 °F (37.3 °C) 78 -- 132/84 93 % -- None (Room air) -- TT    12/03/24 0530 97.9 °F (36.6 °C) 70 16 119/79 95 % -- None (Room air) -- RB    12/02/24 2133 98.6 °F (37 °C) 73 16 125/66 93 % -- None (Room air) -- RB    12/02/24 1739 -- -- -- 137/82 -- -- -- -- TT    12/02/24 1722 98.8 °F (37.1 °C) 83 18 148/96 96 % -- None (Room air) -- TT    12/02/24 1236 -- 78 18 106/69 93 % -- -- -- RM    12/02/24 0819 98.2 °F (36.8 °C) 79 20  138/70 92 % -- None (Room air) -- TT    12/02/24 0400 98.7 °F (37.1 °C) 67 18 129/60 91 % -- None (Room air) -- SW

## 2024-12-03 NOTE — PROGRESS NOTES
Pharmacy Progress Note:  Anticoagulation Dose Adjustment     Jovan Merino Sr. is a 54 year old male on enoxaparin for Treatment of DVT .  Anti-factor Xa levels are being monitored due to the patient's high risk status of obesity.    Relevant labs:    Body mass index is 52.8 kg/m².    Wt Readings from Last 1 Encounters:   11/20/24 (!) 181.5 kg (400 lb 3.2 oz)       Lab Results   Component Value Date    CREATSERUM 0.93 12/02/2024       Heparin Anti Xa Assay   Date Value Ref Range Status   12/02/2024 0.57 IU/mL Final       Anti Xa level being assessed:  0.57 units/mL (Trough drawn zero minutes before dose).  Goal Peak Anti-Xa level:  (Enoxaparin treatment: 0.5-1 unit/mL).  Goal Trough Anti-Xa level: </=0.5 unit/mL  (when applicable)    Based on the above, enoxaparin dose will be adjusted to 140 mg subcutaneous every 12 hours, to begin 12/3 at 1600 . Next Anti-factor Xa Peak drawn 4 hours after dose will be ordered on 12/4 PM..    Thank you,  Velia Welch, PharmD  12/2/2024 10:10 PM

## 2024-12-03 NOTE — PLAN OF CARE
Problem: Patient Centered Care  Goal: Patient preferences are identified and integrated in the patient's plan of care  Description: Interventions:  - What would you like us to know as we care for you? From home alone  - Provide timely, complete, and accurate information to patient/family  - Incorporate patient and family knowledge, values, beliefs, and cultural backgrounds into the planning and delivery of care  - Encourage patient/family to participate in care and decision-making at the level they choose  - Honor patient and family perspectives and choices  Outcome: Progressing     Problem: CARDIOVASCULAR - ADULT  Goal: Maintains optimal cardiac output and hemodynamic stability  Description: INTERVENTIONS:  - Monitor vital signs, rhythm, and trends  - Monitor for bleeding, hypotension and signs of decreased cardiac output  - Evaluate effectiveness of vasoactive medications to optimize hemodynamic stability  - Monitor arterial and/or venous puncture sites for bleeding and/or hematoma  - Assess quality of pulses, skin color and temperature  - Assess for signs of decreased coronary artery perfusion - ex. Angina  - Evaluate fluid balance, assess for edema, trend weights  Outcome: Progressing     Problem: RESPIRATORY - ADULT  Goal: Achieves optimal ventilation and oxygenation  Description: INTERVENTIONS:  - Assess for changes in respiratory status  - Assess for changes in mentation and behavior  - Position to facilitate oxygenation and minimize respiratory effort  - Oxygen supplementation based on oxygen saturation or ABGs  - Provide Smoking Cessation handout, if applicable  - Encourage broncho-pulmonary hygiene including cough, deep breathe, Incentive Spirometry  - Assess the need for suctioning and perform as needed  - Assess and instruct to report SOB or any respiratory difficulty  - Respiratory Therapy support as indicated  - Manage/alleviate anxiety  - Monitor for signs/symptoms of CO2 retention  Outcome:  Progressing     Problem: GASTROINTESTINAL - ADULT  Goal: Minimal or absence of nausea and vomiting  Description: INTERVENTIONS:  - Maintain adequate hydration with IV or PO as ordered and tolerated  - Nasogastric tube to low intermittent suction as ordered  - Evaluate effectiveness of ordered antiemetic medications  - Provide nonpharmacologic comfort measures as appropriate  - Advance diet as tolerated, if ordered  - Obtain nutritional consult as needed  - Evaluate fluid balance  Outcome: Progressing     Problem: GENITOURINARY - ADULT  Goal: Absence of urinary retention  Description: INTERVENTIONS:  - Assess patient’s ability to void and empty bladder  - Monitor intake/output and perform bladder scan as needed  - Follow urinary retention protocol/standard of care  - Consider collaborating with pharmacy to review patient's medication profile  - Implement strategies to promote bladder emptying  Outcome: Progressing     Problem: METABOLIC/FLUID AND ELECTROLYTES - ADULT  Goal: Glucose maintained within prescribed range  Description: INTERVENTIONS:  - Monitor Blood Glucose as ordered  - Assess for signs and symptoms of hyperglycemia and hypoglycemia  - Administer ordered medications to maintain glucose within target range  - Assess barriers to adequate nutritional intake and initiate nutrition consult as needed  - Instruct patient on self management of diabetes  Outcome: Progressing  Goal: Electrolytes maintained within normal limits  Description: INTERVENTIONS:  - Monitor labs and rhythm and assess patient for signs and symptoms of electrolyte imbalances  - Administer electrolyte replacement as ordered  - Monitor response to electrolyte replacements, including rhythm and repeat lab results as appropriate  - Fluid restriction as ordered  - Instruct patient on fluid and nutrition restrictions as appropriate  Outcome: Progressing     Problem: SKIN/TISSUE INTEGRITY - ADULT  Goal: Skin integrity remains intact  Description:  INTERVENTIONS  - Assess and document risk factors for pressure ulcer development  - Assess and document skin integrity  - Monitor for areas of redness and/or skin breakdown  - Initiate interventions, skin care algorithm/standards of care as needed  Outcome: Progressing  Goal: Incision(s), wounds(s) or drain site(s) healing without S/S of infection  Description: INTERVENTIONS:  - Assess and document risk factors for pressure ulcer development  - Assess and document skin integrity  - Assess and document dressing/incision, wound bed, drain sites and surrounding tissue  - Implement wound care per orders  - Initiate isolation precautions as appropriate  - Initiate Pressure Ulcer prevention bundle as indicated  Outcome: Progressing     Problem: MUSCULOSKELETAL - ADULT  Goal: Return mobility to safest level of function  Description: INTERVENTIONS:  - Assess patient stability and activity tolerance for standing, transferring and ambulating w/ or w/o assistive devices  - Assist with transfers and ambulation using safe patient handling equipment as needed  - Ensure adequate protection for wounds/incisions during mobilization  - Obtain PT/OT consults as needed  - Advance activity as appropriate  - Communicate ordered activity level and limitations with patient/family  Outcome: Progressing     Problem: PAIN - ADULT  Goal: Verbalizes/displays adequate comfort level or patient's stated pain goal  Description: INTERVENTIONS:  - Encourage pt to monitor pain and request assistance  - Assess pain using appropriate pain scale  - Administer analgesics based on type and severity of pain and evaluate response  - Implement non-pharmacological measures as appropriate and evaluate response  - Consider cultural and social influences on pain and pain management  - Manage/alleviate anxiety  - Utilize distraction and/or relaxation techniques  - Monitor for opioid side effects  - Notify MD/LIP if interventions unsuccessful or patient reports new  pain  - Anticipate increased pain with activity and pre-medicate as appropriate  Outcome: Progressing     Problem: SAFETY ADULT - FALL  Goal: Free from fall injury  Description: INTERVENTIONS:  - Assess pt frequently for physical needs  - Identify cognitive and physical deficits and behaviors that affect risk of falls.  - [place] fall precautions as indicated by assessment.  - Educate pt/family on patient safety including physical limitations  - Instruct pt to call for assistance with activity based on assessment  - Modify environment to reduce risk of injury  - Provide assistive devices as appropriate  - Consider OT/PT consult to assist with strengthening/mobility  - Encourage toileting schedule  Outcome: Progressing     Problem: DISCHARGE PLANNING  Goal: Discharge to home or other facility with appropriate resources  Description: INTERVENTIONS:  - Identify barriers to discharge w/pt and caregiver  - Include patient/family/discharge partner in discharge planning  - Arrange for needed discharge resources and transportation as appropriate  - Identify discharge learning needs (meds, wound care, etc)  - Arrange for interpreters to assist at discharge as needed  - Consider post-discharge preferences of patient/family/discharge partner  - Complete POLST form as appropriate  - Assess patient's ability to be responsible for managing their own health  - Refer to Case Management Department for coordinating discharge planning if the patient needs post-hospital services based on physician/LIP order or complex needs related to functional status, cognitive ability or social support system  Outcome: Progressing     Problem: Impaired Functional Mobility  Goal: Achieve highest/safest level of mobility/gait  Description: Interventions:  - Assess patient's functional ability and stability  - Promote increasing activity/tolerance for mobility and gait  - Educate and engage patient/family in tolerated activity level and  precautions  - Recommend use of  RW for transfers and ambulation  - Recommend patient transfer to bedside chair toward strongest side  - When transferring patient, block weaker knee for safety  - Recommend use of chair position in bed 3 times per day  Outcome: Progressing     Problem: Impaired Activities of Daily Living  Goal: Achieve highest/safest level of independence in self care  Description: Interventions:  - Assess ability and encourage patient to participate in ADLs to maximize function  - Promote sitting position while performing ADLs such as feeding, grooming, and bathing  - Educate and encourage patient/family in tolerated functional activity level and precautions during self-care  Outcome: Progressing     Problem: Patient/Family Goals  Goal: Patient/Family Long Term Goal  Description: Patient's Long Term Goal: Discharge from the hospital    Interventions:  - Monitor vital signs  - Monitor appropriate labs  - Monitor blood glucose levels  - Pain management  - Administer medications per order  - Follow MD orders  - Diagnostics per order  - Update / inform patient and family on plan of care  - Discharge planning  - See additional Care Plan goals for specific interventions  Outcome: Progressing  Goal: Patient/Family Short Term Goal  Description: Patient's Short Term Goal: Improve redness, swelling, and pain to left lower extremity     Interventions:   - Monitor vital signs  - Monitor appropriate labs  - Monitor blood glucose levels  - Pain management  - Administer medications per order  - Follow MD orders  - Diagnostics per order  - Update / inform patient and family on plan of care  - See additional Care Plan goals for specific interventions  Outcome: Progressing     Problem: Diabetes/Glucose Control  Goal: Glucose maintained within prescribed range  Description: INTERVENTIONS:  - Monitor Blood Glucose as ordered  - Assess for signs and symptoms of hyperglycemia and hypoglycemia  - Administer ordered  medications to maintain glucose within target range  - Assess barriers to adequate nutritional intake and initiate nutrition consult as needed  - Instruct patient on self management of diabetes  Outcome: Progressing     Problem: HEMATOLOGIC - ADULT  Goal: Free from bleeding injury  Description: (Example usage: patient with low platelets)  INTERVENTIONS:  - Avoid intramuscular injections, enemas and rectal medication administration  - Ensure safe mobilization of patient  - Hold pressure on venipuncture sites to achieve adequate hemostasis  - Assess for signs and symptoms of internal bleeding  - Monitor lab trends  - Patient is to report abnormal signs of bleeding to staff  - Avoid use of toothpicks and dental floss  - Use electric shaver for shaving  - Use soft bristle tooth brush  - Limit straining and forceful nose blowing  Outcome: Progressing

## 2024-12-03 NOTE — PHYSICAL THERAPY NOTE
PHYSICAL THERAPY TREATMENT NOTE - INPATIENT     Room Number: 529/529-A       Presenting Problem: left leg pain and cellulitis  Co-Morbidities : appendicitis, arrhythmia, atrial fibrillation, congestive heart disease, high blood pressure, NEREIDA, osteoarthritis, essential hypertension    Problem List  Principal Problem:    Cellulitis of left lower leg  Active Problems:    Psoriasis    Immunosuppression (HCC)      PHYSICAL THERAPY ASSESSMENT   Patient demonstrates good  progress this session, goals  updated to reflect patient performance.      Patient is requiring supervision and stand-by assist as a result of the following impairments: decreased functional strength, decreased endurance/aerobic capacity, pain, impaired standing balance, decreased muscular endurance, and medical status.     Patient continues to function below baseline with bed mobility, transfers, gait, stair negotiation, standing prolonged periods, and performing household tasks.  Next session anticipate patient to progress bed mobility, transfers, gait, stair negotiation, and standing prolonged periods.  Physical Therapy will continue to follow patient for duration of hospitalization.    Patient continues to benefit from continued skilled PT services: at discharge to promote prior level of function and safety with additional support and return home with home health PT.    PLAN DURING HOSPITALIZATION  Nursing Mobility Recommendation : 1 Assist  PT Device Recommendation: Rolling walker (bariatric)  PT Treatment Plan: Bed mobility;Body mechanics;Patient education;Transfer training;Balance training;Stair training;Endurance;Energy conservation;Family education;Gait training  Frequency (Obs): 5x/week     SUBJECTIVE  \"I am nervous about going home.\"    OBJECTIVE  Precautions: None    WEIGHT BEARING RESTRICTION  L Lower Extremity: Weight Bearing as Tolerated    PAIN ASSESSMENT   Ratin  Location: LLE  Management Techniques: Activity promotion;Body  mechanics;Repositioning    BALANCE  Static Sitting: Good  Dynamic Sitting: Fair +  Static Standing: Fair  Dynamic Standing: Fair -    ACTIVITY TOLERANCE  Pulse: 87  Heart Rate Source: Monitor    O2 WALK  Oxygen Therapy  SPO2% Ambulation on Room Air: 94    AM-PAC '6-Clicks' INPATIENT SHORT FORM - BASIC MOBILITY  How much difficulty does the patient currently have...  Patient Difficulty: Turning over in bed (including adjusting bedclothes, sheets and blankets)?: A Little   Patient Difficulty: Sitting down on and standing up from a chair with arms (e.g., wheelchair, bedside commode, etc.): A Little   Patient Difficulty: Moving from lying on back to sitting on the side of the bed?: A Little   How much help from another person does the patient currently need...   Help from Another: Moving to and from a bed to a chair (including a wheelchair)?: A Little   Help from Another: Need to walk in hospital room?: A Little   Help from Another: Climbing 3-5 steps with a railing?: A Little     AM-PAC Score:  Raw Score: 18   Approx Degree of Impairment: 46.58%   Standardized Score (AM-PAC Scale): 43.63   CMS Modifier (G-Code): CK    FUNCTIONAL ABILITY STATUS  Functional Mobility/Gait Assessment  Gait Assistance:  (SBA)  Distance (ft): 175 ft  Assistive Device: Rolling walker (bariatric)  Pattern: L Decreased stance time;Shuffle (decreased jeffrey speed, flexed posture)  Sit to Stand: supervision from toilet and bedside chair    Skilled Therapy Provided: Patient in bathroom upon arrival. RN approved activity. Educated patient on POC and benefits of mobilization. Agreeable to participate. Patient reporting LLE pain, rated 5 out of 10 per the pain scale. Patient tolerating increased activity and ambulation distance this session compared to last session. Patient benefits from increased time in order to complete functional mobility tasks.    The patient's Approx Degree of Impairment: 46.58% has been calculated based on documentation in  the [facility] '6 clicks' Inpatient Daily Activity Short Form.  Research supports that patients with this level of impairment may benefit from HHPT.  Final disposition will be made by interdisciplinary medical team.    Patient End of Session: Up in chair;Needs met;Call light within reach;RN aware of session/findings;All patient questions and concerns addressed (patient with shoes on)    CURRENT GOALS   Goals to be met by: 12/3/24 (updated 11/26)  Patient Goal Patient's self-stated goal is: go home   Goal #1 Patient is able to demonstrate supine - sit EOB @ level: independent   Goal #1   Current Status  NT   Goal #2 Patient is able to demonstrate transfers Sit to/from Stand at assistance level: modified independent with walker - rolling   Goal #2  Current Status  Goal met and updated 12/3/24: Supervision with bariatric RW   Goal #3 Patient is able to ambulate 180 feet with assist device: walker - rolling at assistance level: supervision   Goal #3   Current Status   ft with bariatric RW   Goal #4 Goal added 12/3/24: Patient will negotiate 2 stairs/one curb w/ assistive device and supervision   Goal #4   Current Status  NT   Goal #5 Patient to demonstrate independence with home activity/exercise instructions provided to patient in preparation for discharge.   Goal #5   Current Status  Ongoing     Gait Training: 15 minutes   Yes

## 2024-12-04 ENCOUNTER — APPOINTMENT (OUTPATIENT)
Dept: CT IMAGING | Facility: HOSPITAL | Age: 54
End: 2024-12-04
Attending: INTERNAL MEDICINE
Payer: COMMERCIAL

## 2024-12-04 LAB
ANION GAP SERPL CALC-SCNC: 1 MMOL/L (ref 0–18)
BUN BLD-MCNC: 19 MG/DL (ref 9–23)
BUN/CREAT SERPL: 20.7 (ref 10–20)
CALCIUM BLD-MCNC: 9.8 MG/DL (ref 8.7–10.4)
CHLORIDE SERPL-SCNC: 102 MMOL/L (ref 98–112)
CO2 SERPL-SCNC: 31 MMOL/L (ref 21–32)
CREAT BLD-MCNC: 0.92 MG/DL
EGFRCR SERPLBLD CKD-EPI 2021: 100 ML/MIN/1.73M2 (ref 60–?)
GLUCOSE BLD-MCNC: 86 MG/DL (ref 70–99)
GLUCOSE BLDC GLUCOMTR-MCNC: 104 MG/DL (ref 70–99)
GLUCOSE BLDC GLUCOMTR-MCNC: 110 MG/DL (ref 70–99)
GLUCOSE BLDC GLUCOMTR-MCNC: 96 MG/DL (ref 70–99)
INR BLD: 1.09 (ref 0.8–1.2)
OSMOLALITY SERPL CALC.SUM OF ELEC: 280 MOSM/KG (ref 275–295)
POTASSIUM SERPL-SCNC: 4.5 MMOL/L (ref 3.5–5.1)
PROTHROMBIN TIME: 14.7 SECONDS (ref 11.6–14.8)
SODIUM SERPL-SCNC: 134 MMOL/L (ref 136–145)

## 2024-12-04 PROCEDURE — 73701 CT LOWER EXTREMITY W/DYE: CPT | Performed by: INTERNAL MEDICINE

## 2024-12-04 RX ORDER — LIDOCAINE HYDROCHLORIDE 10 MG/ML
1 INJECTION, SOLUTION EPIDURAL; INFILTRATION; INTRACAUDAL; PERINEURAL ONCE
Status: DISCONTINUED | OUTPATIENT
Start: 2024-12-04 | End: 2024-12-05

## 2024-12-04 RX ORDER — CEFEPIME 1 G/50ML
2 INJECTION, SOLUTION INTRAVENOUS EVERY 8 HOURS
Qty: 2100 ML | Refills: 0 | Status: SHIPPED | OUTPATIENT
Start: 2024-12-04 | End: 2024-12-09

## 2024-12-04 NOTE — PROGRESS NOTES
[facility] Hospitalist Progress Note     CC: Hospital Follow up    PCP: Eric Sethi DO       Assessment/Plan:   Jovan Merino - 54 year old male w MO, HTN, Afib, HFpEF, psoriasis admitted 11/18/2024 for LLE cellulitis, started on IV ancef. Initially improving however 11/21 worse - abx broadened to vanc/zosyn, then cefepime and daptomycin. CT with cellulitis . ID/Rheum/Orhto consulted. MRI with LLE cellulitis, mild myositis in posterior aspect, no abscess, no OM.  PICC in place for extended IV abx coverage. 3rd LLE doppler 11/29/24 Left popliteal vein DVT. Full dose lovenox started with plans to bridge to coumadin     Sepsis due to LLE cellulitis  Immunosuppression due to psoriasis/Humira  - blood cx no growth  - IV ancef (11/18 - 21 )  - 11/21: worse, Consulted ID - transitioned to cefepime and daptomycin  - CT negative 11/21 for underlying abscess  - Rheum/Ortho on consult   - No concern for compartment syndrome or joint involvement at this time  - MRI with LLE cellulitis, mild myositis in posterior aspect, no abscess, no OM   - PICC in place for extended IV abx coverage  - noted this AM area of fluctuance above the medial aspect of left ankle. Will need ortho to re-eval to consider I&D    Acute DVT  - LLE, Left popliteal vein  - full dose lovenox to coumadin   - will need Lovenox injection training and dietitian for warfarin diet  - was started on coumadin 10 mg HS, higher dose due to his weight and titrate based on INR  - will need PCP set up to follow-up coumadin dosing  - needs Xa level checked tomorrow     Partial quad tear  L knee effusion  - ortho consulted, appreciate recs     Headaches  - fioricet PRN     HFpEF - compensated  HTN  Parox Afib - low CV, not on AC  - Continue PTA lasix, flecainide, coreg, hydral, enalapril     Psoriasis  - On humira OP. Would hold until leg better     Morbid obesity w [facility]  - Body mass index is 52.8 kg/m².   - Healthy diet & wt loss encouraged  - Has lost quite a bit of weight  with Mounjaro already     DM2 - currently controlled w mounjaro  - Okay for reg diet, follow BG on AM labs. Can hold on SSI/accuchecks for now, can add if issues controlling     Depression   - seen by psych liaison  - started Zoloft 50mg daily     ACP: Full Code  Ppx: DVT: Enox  Dispo  EDoD:  pending     F/u:   - PCP:  Eric Sethi, DO Candido Suarez DO  [facility] Hospitalist      Subjective:     Pain is controlled but still present. Noticed area of fluctuance on medial aspect above left ankle. No fevers.     OBJECTIVE:    Blood pressure 129/79, pulse 82, temperature 98.2 °F (36.8 °C), temperature source Oral, resp. rate 17, height 6' 1\" (1.854 m), weight (!) 400 lb 3.2 oz (181.5 kg), SpO2 93%.    Temp:  [98 °F (36.7 °C)-98.5 °F (36.9 °C)] 98.2 °F (36.8 °C)  Pulse:  [72-87] 82  Resp:  [16-18] 17  BP: (110-142)/(65-88) 129/79  SpO2:  [93 %-98 %] 93 %      Intake/Output:    Intake/Output Summary (Last 24 hours) at 12/4/2024 1100  Last data filed at 12/4/2024 0600  Gross per 24 hour   Intake 980 ml   Output 700 ml   Net 280 ml       Last 3 Weights   11/20/24 0528 (!) 400 lb 3.2 oz (181.5 kg)   11/19/24 0615 (!) 395 lb 3.2 oz (179.3 kg)   11/18/24 1705 (!) 394 lb 3.2 oz (178.8 kg)   11/18/24 1112 (!) 395 lb (179.2 kg)   11/17/21 1338 (!) 379 lb 3.2 oz (172 kg)   06/10/21 1222 (!) 364 lb (165.1 kg)       Exam   GEN: no distress  Pulm: CTABL  CV: RRR, no murmurs  ABD: Soft  MSK: swelling improved LLE compartments not tense, fluctuant area above medial aspect of left ankle   Neuro: Grossly normal, CN intact, sensory intact  SKIN: warm, dry, psoriatic plaques, LLE with improved edema    Data Review:       Labs:     Recent Labs   Lab 12/01/24  0557 12/02/24  0443 12/03/24  0543   RBC 4.06* 3.83* 4.14*   HGB 11.3* 10.4* 11.3*   HCT 35.3* 32.9* 35.5*   MCV 86.9 85.9 85.7   MCH 27.8 27.2 27.3   MCHC 32.0 31.6 31.8   RDW 14.9 15.0 14.7   NEPRELIM 6.99 5.35 5.90   WBC 11.7* 9.9 10.6   .0 375.0 403.0          Recent Labs   Lab 12/01/24  0557 12/02/24  0443 12/03/24  0543   GLU 96 91 100*   BUN 25* 26* 21   CREATSERUM 0.92 0.93 0.88   EGFRCR 100 99 103   CA 10.3 10.2 10.2   * 136 135*   K 4.4 4.5 4.5    102 104   CO2 28.0 30.0 29.0       No results for input(s): \"ALT\", \"AST\", \"ALB\", \"AMYLASE\", \"LIPASE\", \"LDH\" in the last 168 hours.    Invalid input(s): \"ALPHOS\", \"TBIL\", \"DBIL\", \"TPROT\"      Imaging:  No results found.      Meds:      traMADol  100 mg Oral q6h    sertraline  50 mg Oral Daily    warfarin  10 mg Oral Nightly    enoxaparin  140 mg Subcutaneous q12h    furosemide  40 mg Oral Daily    cefepime  2 g Intravenous Q8H    clotrimazole-betamethasone   Topical BID    docosanol   Topical BID    cetirizine  10 mg Oral Daily    enalapril  20 mg Oral BID    flecainide  150 mg Oral BID    hydrALAZINE  50 mg Oral BID    carvedilol  25 mg Oral BID with meals    magnesium oxide  200 mg Oral Nightly    dilTIAZem HCl ER Coated Beads  180 mg Oral Nightly         ibuprofen **OR** ketorolac    HYDROmorphone    magnesium hydroxide    calcium carbonate    famotidine    acetaminophen    melatonin    polyethylene glycol (PEG 3350)    sennosides    guaiFENesin    ondansetron    prochlorperazine    HYDROmorphone    simethicone

## 2024-12-04 NOTE — PROGRESS NOTES
[facility]  part of [facility] Infectious Disease  Progress Note    Jovan Merino . Patient Status:  Inpatient    1970 MRN [id number]   Location [facility] 5SW/SE Attending Candido Suarez DO   Hosp Day # 16 PCP Eric Sethi DO     Subjective:  Patient seen and examined sitting up in bedside chair. Reports feeling better today. Leg pain and swelling with ongoing improvement; however, still with more focal pain around the ankle. Tolerating IV antibiotics. Remains afebrile. Denies n/v/d.     Objective:  Blood pressure 110/65, pulse 72, temperature 98.5 °F (36.9 °C), temperature source Oral, resp. rate 18, height 6' 1\" (1.854 m), weight (!) 400 lb 3.2 oz (181.5 kg), SpO2 93%.    Intake/Output:    Intake/Output Summary (Last 24 hours) at 2024 0851  Last data filed at 2024 0600  Gross per 24 hour   Intake 980 ml   Output 700 ml   Net 280 ml       Physical Exam:  General: Awake, alert, non-tox, NAD.  HEENT:  Oropharynx clear, trachea ML.  Heart: RRR S1S2 no murmurs.  Lungs: Essentially CTA b/l, no rhonchi, rales, wheezes.  Abdomen: Soft, NT/ND.  BS present.  No guarding or rebound.  Extremity: +1-2 LLE edema. Cellulitis improving. Area of fluctuance noted above L medial ankle with firm induration noted above lateral aspect of the L ankle. +TTP.  Neurological: No focal deficits.  Derm:  Warm and dry.    Lab Data Review:  Lab Results   Component Value Date    INR 1.09 2024        Cultures:  Hospital Encounter on 24   1. Blood Culture     Status: None    Collection Time: 24  8:48 AM    Specimen: Blood,peripheral   Result Value Ref Range    Blood Culture Result No Growth 5 Days N/A       Radiology:  No results found.      Assessment and Plan:  1.  LLE cellulitis in this patient presenting here with sepsis with threat to life  - Blood cultures NGTD.  - MRI with mild myositis.  - Initially on IV cefazolin without improvement, escalated  to IV daptomycin and cefepime on 11/21.  - IV daptomycin stopped on 12/1 d/t rhabdomyolysis.  - Currently on IV cefepime with clinical improvement.    2.  LLE DVT  - On anticoagulation as per the primary team.    3.  History of psoriasis  - Patient reporting improvement of psoriasis since starting antibiotics.  - Would benefit from penicillin V 500 mg twice daily for suppression to be started once off cefepime.  - Will discuss on follow-up as outpatient.    4.  Recs  - Continue IV cefepime through 12/11/24.  Orders in med rec.  - PICC line in place.  - F/u WBC and fever curve.  - Recommend formal podiatry input for possible I&D.   - Supportive care as per the primary team.  - Discussed plan of care with nursing.   - Discussed plan of care with patient. All questions addressed and understanding verbalized.  - Further recommendations pending clinical course.    Discussed case with ID attending/collaborating physician, Dr. Natalie Cassidy, who is in agreement with the above plan of care    Please note that this report has been produced using speech recognition software and may contain errors related to that system including, but not limited to, errors in grammar, punctuation, and spelling, as well as words and phrases that possibly may have been recognized inappropriately.  If there are any questions or concerns, contact the dictating provider for clarification.     The 21st Century Cures Act makes medical notes like these available to patients in the interest of transparency. Please be advised this is a medical document. Medical documents are intended to carry relevant information, facts as evident, and the clinical opinion of the practitioner. The medical note is intended as peer to peer communication and may appear blunt or direct. It is written in medical language and may contain abbreviations or verbiage that are unfamiliar.     If you have any questions or concerns please call [facility] Infectious  Disease at [phone].     Otoniel Dempsey, APRN    12/4/2024  8:51 AM

## 2024-12-04 NOTE — PLAN OF CARE
Problem: Patient Centered Care  Goal: Patient preferences are identified and integrated in the patient's plan of care  Description: Interventions:  - What would you like us to know as we care for you? From home alone  - Provide timely, complete, and accurate information to patient/family  - Incorporate patient and family knowledge, values, beliefs, and cultural backgrounds into the planning and delivery of care  - Encourage patient/family to participate in care and decision-making at the level they choose  - Honor patient and family perspectives and choices  Outcome: Progressing     Problem: CARDIOVASCULAR - ADULT  Goal: Maintains optimal cardiac output and hemodynamic stability  Description: INTERVENTIONS:  - Monitor vital signs, rhythm, and trends  - Monitor for bleeding, hypotension and signs of decreased cardiac output  - Evaluate effectiveness of vasoactive medications to optimize hemodynamic stability  - Monitor arterial and/or venous puncture sites for bleeding and/or hematoma  - Assess quality of pulses, skin color and temperature  - Assess for signs of decreased coronary artery perfusion - ex. Angina  - Evaluate fluid balance, assess for edema, trend weights  Outcome: Progressing     Problem: RESPIRATORY - ADULT  Goal: Achieves optimal ventilation and oxygenation  Description: INTERVENTIONS:  - Assess for changes in respiratory status  - Assess for changes in mentation and behavior  - Position to facilitate oxygenation and minimize respiratory effort  - Oxygen supplementation based on oxygen saturation or ABGs  - Provide Smoking Cessation handout, if applicable  - Encourage broncho-pulmonary hygiene including cough, deep breathe, Incentive Spirometry  - Assess the need for suctioning and perform as needed  - Assess and instruct to report SOB or any respiratory difficulty  - Respiratory Therapy support as indicated  - Manage/alleviate anxiety  - Monitor for signs/symptoms of CO2 retention  Outcome:  Progressing     Problem: GASTROINTESTINAL - ADULT  Goal: Minimal or absence of nausea and vomiting  Description: INTERVENTIONS:  - Maintain adequate hydration with IV or PO as ordered and tolerated  - Nasogastric tube to low intermittent suction as ordered  - Evaluate effectiveness of ordered antiemetic medications  - Provide nonpharmacologic comfort measures as appropriate  - Advance diet as tolerated, if ordered  - Obtain nutritional consult as needed  - Evaluate fluid balance  Outcome: Progressing     Problem: GENITOURINARY - ADULT  Goal: Absence of urinary retention  Description: INTERVENTIONS:  - Assess patient’s ability to void and empty bladder  - Monitor intake/output and perform bladder scan as needed  - Follow urinary retention protocol/standard of care  - Consider collaborating with pharmacy to review patient's medication profile  - Implement strategies to promote bladder emptying  Outcome: Progressing     Problem: METABOLIC/FLUID AND ELECTROLYTES - ADULT  Goal: Glucose maintained within prescribed range  Description: INTERVENTIONS:  - Monitor Blood Glucose as ordered  - Assess for signs and symptoms of hyperglycemia and hypoglycemia  - Administer ordered medications to maintain glucose within target range  - Assess barriers to adequate nutritional intake and initiate nutrition consult as needed  - Instruct patient on self management of diabetes  Outcome: Progressing  Goal: Electrolytes maintained within normal limits  Description: INTERVENTIONS:  - Monitor labs and rhythm and assess patient for signs and symptoms of electrolyte imbalances  - Administer electrolyte replacement as ordered  - Monitor response to electrolyte replacements, including rhythm and repeat lab results as appropriate  - Fluid restriction as ordered  - Instruct patient on fluid and nutrition restrictions as appropriate  Outcome: Progressing     Problem: SKIN/TISSUE INTEGRITY - ADULT  Goal: Skin integrity remains intact  Description:  INTERVENTIONS  - Assess and document risk factors for pressure ulcer development  - Assess and document skin integrity  - Monitor for areas of redness and/or skin breakdown  - Initiate interventions, skin care algorithm/standards of care as needed  Outcome: Progressing  Goal: Incision(s), wounds(s) or drain site(s) healing without S/S of infection  Description: INTERVENTIONS:  - Assess and document risk factors for pressure ulcer development  - Assess and document skin integrity  - Assess and document dressing/incision, wound bed, drain sites and surrounding tissue  - Implement wound care per orders  - Initiate isolation precautions as appropriate  - Initiate Pressure Ulcer prevention bundle as indicated  Outcome: Progressing     Problem: MUSCULOSKELETAL - ADULT  Goal: Return mobility to safest level of function  Description: INTERVENTIONS:  - Assess patient stability and activity tolerance for standing, transferring and ambulating w/ or w/o assistive devices  - Assist with transfers and ambulation using safe patient handling equipment as needed  - Ensure adequate protection for wounds/incisions during mobilization  - Obtain PT/OT consults as needed  - Advance activity as appropriate  - Communicate ordered activity level and limitations with patient/family  Outcome: Progressing     Problem: PAIN - ADULT  Goal: Verbalizes/displays adequate comfort level or patient's stated pain goal  Description: INTERVENTIONS:  - Encourage pt to monitor pain and request assistance  - Assess pain using appropriate pain scale  - Administer analgesics based on type and severity of pain and evaluate response  - Implement non-pharmacological measures as appropriate and evaluate response  - Consider cultural and social influences on pain and pain management  - Manage/alleviate anxiety  - Utilize distraction and/or relaxation techniques  - Monitor for opioid side effects  - Notify MD/LIP if interventions unsuccessful or patient reports new  pain  - Anticipate increased pain with activity and pre-medicate as appropriate  Outcome: Progressing     Problem: SAFETY ADULT - FALL  Goal: Free from fall injury  Description: INTERVENTIONS:  - Assess pt frequently for physical needs  - Identify cognitive and physical deficits and behaviors that affect risk of falls.  - Sharon fall precautions as indicated by assessment.  - Educate pt/family on patient safety including physical limitations  - Instruct pt to call for assistance with activity based on assessment  - Modify environment to reduce risk of injury  - Provide assistive devices as appropriate  - Consider OT/PT consult to assist with strengthening/mobility  - Encourage toileting schedule  Outcome: Progressing     Problem: DISCHARGE PLANNING  Goal: Discharge to home or other facility with appropriate resources  Description: INTERVENTIONS:  - Identify barriers to discharge w/pt and caregiver  - Include patient/family/discharge partner in discharge planning  - Arrange for needed discharge resources and transportation as appropriate  - Identify discharge learning needs (meds, wound care, etc)  - Arrange for interpreters to assist at discharge as needed  - Consider post-discharge preferences of patient/family/discharge partner  - Complete POLST form as appropriate  - Assess patient's ability to be responsible for managing their own health  - Refer to Case Management Department for coordinating discharge planning if the patient needs post-hospital services based on physician/LIP order or complex needs related to functional status, cognitive ability or social support system  Outcome: Progressing     Problem: Impaired Functional Mobility  Goal: Achieve highest/safest level of mobility/gait  Description: Interventions:  - Assess patient's functional ability and stability  - Promote increasing activity/tolerance for mobility and gait  - Educate and engage patient/family in tolerated activity level and  precautions  - Recommend use of  RW for transfers and ambulation  - Recommend patient transfer to bedside chair toward strongest side  - When transferring patient, block weaker knee for safety  - Recommend use of chair position in bed 3 times per day  Outcome: Progressing     Problem: Impaired Activities of Daily Living  Goal: Achieve highest/safest level of independence in self care  Description: Interventions:  - Assess ability and encourage patient to participate in ADLs to maximize function  - Promote sitting position while performing ADLs such as feeding, grooming, and bathing  - Educate and encourage patient/family in tolerated functional activity level and precautions during self-care  Outcome: Progressing     Problem: Patient/Family Goals  Goal: Patient/Family Long Term Goal  Description: Patient's Long Term Goal: Discharge from the hospital    Interventions:  - Monitor vital signs  - Monitor appropriate labs  - Monitor blood glucose levels  - Pain management  - Administer medications per order  - Follow MD orders  - Diagnostics per order  - Update / inform patient and family on plan of care  - Discharge planning  - See additional Care Plan goals for specific interventions  Outcome: Progressing  Goal: Patient/Family Short Term Goal  Description: Patient's Short Term Goal: Improve redness, swelling, and pain to left lower extremity     Interventions:   - Monitor vital signs  - Monitor appropriate labs  - Monitor blood glucose levels  - Pain management  - Administer medications per order  - Follow MD orders  - Diagnostics per order  - Update / inform patient and family on plan of care  - See additional Care Plan goals for specific interventions  Outcome: Progressing     Problem: Diabetes/Glucose Control  Goal: Glucose maintained within prescribed range  Description: INTERVENTIONS:  - Monitor Blood Glucose as ordered  - Assess for signs and symptoms of hyperglycemia and hypoglycemia  - Administer ordered  medications to maintain glucose within target range  - Assess barriers to adequate nutritional intake and initiate nutrition consult as needed  - Instruct patient on self management of diabetes  Outcome: Progressing     Problem: HEMATOLOGIC - ADULT  Goal: Free from bleeding injury  Description: (Example usage: patient with low platelets)  INTERVENTIONS:  - Avoid intramuscular injections, enemas and rectal medication administration  - Ensure safe mobilization of patient  - Hold pressure on venipuncture sites to achieve adequate hemostasis  - Assess for signs and symptoms of internal bleeding  - Monitor lab trends  - Patient is to report abnormal signs of bleeding to staff  - Avoid use of toothpicks and dental floss  - Use electric shaver for shaving  - Use soft bristle tooth brush  - Limit straining and forceful nose blowing  Outcome: Progressing

## 2024-12-04 NOTE — PAYOR COMM NOTE
CONTINUED STAY REVIEW    Payor: [employer] PPO  Subscriber #:  [id number]  Authorization Number: [id number]    Admit date: 11/18/24  Admit time:  4:58 PM    REVIEW DOCUMENTATION: 12/4    [facility] Hospitalist Progress Note      CC: Hospital Follow up     PCP: Eric Sethi DO         Assessment/Plan:   Jovan Merino - 54 year old male w MO, HTN, Afib, HFpEF, psoriasis admitted 11/18/2024 for LLE cellulitis, started on IV ancef. Initially improving however 11/21 worse - abx broadened to vanc/zosyn, then cefepime and daptomycin. CT with cellulitis . ID/Rheum/Orhto consulted. MRI with LLE cellulitis, mild myositis in posterior aspect, no abscess, no OM.  PICC in place for extended IV abx coverage. 3rd LLE doppler 11/29/24 Left popliteal vein DVT. Full dose lovenox started with plans to bridge to coumadin     Sepsis due to LLE cellulitis  Immunosuppression due to psoriasis/Humira  - blood cx no growth  - IV ancef (11/18 - 21 )  - 11/21: worse, Consulted ID - transitioned to cefepime and daptomycin  - CT negative 11/21 for underlying abscess  - Rheum/Ortho on consult   - No concern for compartment syndrome or joint involvement at this time  - MRI with LLE cellulitis, mild myositis in posterior aspect, no abscess, no OM   - PICC in place for extended IV abx coverage  - noted this AM area of fluctuance above the medial aspect of left ankle. Will need ortho to re-eval to consider I&D     Acute DVT  - LLE, Left popliteal vein  - full dose lovenox to coumadin   - will need Lovenox injection training and dietitian for warfarin diet  - was started on coumadin 10 mg HS, higher dose due to his weight and titrate based on INR  - will need PCP set up to follow-up coumadin dosing  - needs Xa level checked tomorrow      Partial quad tear  L knee effusion  - ortho consulted, appreciate recs     Headaches  - fioricet PRN     HFpEF - compensated  HTN  Parox Afib - low CV, not on AC  - Continue PTA lasix, flecainide, coreg,  hydral, enalapril     Psoriasis  - On humira OP. Would hold until leg better     Morbid obesity w [facility]  - Body mass index is 52.8 kg/m².   - Healthy diet & wt loss encouraged  - Has lost quite a bit of weight with Mounjaro already     DM2 - currently controlled w mounjaro  - Okay for reg diet, follow BG on AM labs. Can hold on SSI/accuchecks for now, can add if issues controlling     Depression   - seen by psych liaison  - started Zoloft 50mg daily      ACP: Full Code  Ppx: DVT: Enox  Dispo  EDoD:  pending     F/u:   - PCP:  Eric Sethi, DO Candido Suarez DO  [facility] Hospitalist       Subjective:      Pain is controlled but still present. Noticed area of fluctuance on medial aspect above left ankle. No fevers.      OBJECTIVE:     Blood pressure 129/79, pulse 82, temperature 98.2 °F (36.8 °C), temperature source Oral, resp. rate 17, height 6' 1\" (1.854 m), weight (!) 400 lb 3.2 oz (181.5 kg), SpO2 93%.     Temp:  [98 °F (36.7 °C)-98.5 °F (36.9 °C)] 98.2 °F (36.8 °C)  Pulse:  [72-87] 82  Resp:  [16-18] 17  BP: (110-142)/(65-88) 129/79  SpO2:  [93 %-98 %] 93 %        Intake/Output:     Intake/Output Summary (Last 24 hours) at 12/4/2024 1100  Last data filed at 12/4/2024 0600      Gross per 24 hour   Intake 980 ml   Output 700 ml   Net 280 ml              Last 3 Weights   11/20/24 0528 (!) 400 lb 3.2 oz (181.5 kg)   11/19/24 0615 (!) 395 lb 3.2 oz (179.3 kg)   11/18/24 1705 (!) 394 lb 3.2 oz (178.8 kg)   11/18/24 1112 (!) 395 lb (179.2 kg)   11/17/21 1338 (!) 379 lb 3.2 oz (172 kg)   06/10/21 1222 (!) 364 lb (165.1 kg)         Exam   GEN: no distress  Pulm: CTABL  CV: RRR, no murmurs  ABD: Soft  MSK: swelling improved LLE compartments not tense, fluctuant area above medial aspect of left ankle   Neuro: Grossly normal, CN intact, sensory intact  SKIN: warm, dry, psoriatic plaques, LLE with improved edema     Data Review:       Labs:            Recent Labs   Lab 12/01/24  0557 12/02/24  0443  12/03/24  0543   RBC 4.06* 3.83* 4.14*   HGB 11.3* 10.4* 11.3*   HCT 35.3* 32.9* 35.5*   MCV 86.9 85.9 85.7   MCH 27.8 27.2 27.3   MCHC 32.0 31.6 31.8   RDW 14.9 15.0 14.7   NEPRELIM 6.99 5.35 5.90   WBC 11.7* 9.9 10.6   .0 375.0 403.0                  Recent Labs   Lab 12/01/24  0557 12/02/24  0443 12/03/24  0543   GLU 96 91 100*   BUN 25* 26* 21   CREATSERUM 0.92 0.93 0.88   EGFRCR 100 99 103   CA 10.3 10.2 10.2   * 136 135*   K 4.4 4.5 4.5    102 104   CO2 28.0 30.0 29.0         No results for input(s): \"ALT\", \"AST\", \"ALB\", \"AMYLASE\", \"LIPASE\", \"LDH\" in the last 168 hours.     Invalid input(s): \"ALPHOS\", \"TBIL\", \"DBIL\", \"TPROT\"        Imaging:  No results found.        Meds:      Scheduled Medications    traMADol  100 mg Oral q6h    sertraline  50 mg Oral Daily    warfarin  10 mg Oral Nightly    enoxaparin  140 mg Subcutaneous q12h    furosemide  40 mg Oral Daily    cefepime  2 g Intravenous Q8H    clotrimazole-betamethasone   Topical BID    docosanol   Topical BID    cetirizine  10 mg Oral Daily    enalapril  20 mg Oral BID    flecainide  150 mg Oral BID    hydrALAZINE  50 mg Oral BID    carvedilol  25 mg Oral BID with meals    magnesium oxide  200 mg Oral Nightly    dilTIAZem HCl ER Coated Beads  180 mg Oral Nightly         Medication Infusions           PRN Medications     ibuprofen **OR** ketorolac    HYDROmorphone    magnesium hydroxide    calcium carbonate    famotidine    acetaminophen    melatonin    polyethylene glycol (PEG 3350)    sennosides    guaiFENesin    ondansetron    prochlorperazine    HYDROmorphone    simethicone            MEDICATIONS ADMINISTERED IN LAST 1 DAY:  acetaminophen (Tylenol Extra Strength) tab 500 mg       Date Action Dose Route User    12/4/2024 0510 Given 500 mg Oral Sindhu Alonzo RN          calcium carbonate (Tums) chewable tab 1,000 mg       Date Action Dose Route User    12/4/2024 0100 Given 1,000 mg Oral Sindhu Alonzo, RN          carvedilol  (Coreg) tab 25 mg       Date Action Dose Route User    12/4/2024 1713 Given 25 mg Oral Albina Pathak RN    12/4/2024 1005 Given 25 mg Oral Albina Pathak RN          ceFEPIme (Maxpime) 2 g in sodium chloride 0.9% 100 mL IVPB-MBP       Date Action Dose Route User    12/4/2024 1713 New Bag 2 g Intravenous Albina Pathak RN    12/4/2024 1006 New Bag 2 g Intravenous Albina Pathak RN    12/4/2024 0222 New Bag 2 g Intravenous Sindhu Alonzo RN    12/3/2024 1759 New Bag 2 g Intravenous Erickson Valencia RN          cetirizine (ZyrTEC) tab 10 mg       Date Action Dose Route User    12/4/2024 1005 Given 10 mg Oral Albina Pathak RN          clotrimazole-betamethasone (Lotrisone) 1-0.05 % cream       Date Action Dose Route User    12/4/2024 1006 Given (none) Topical Albina Pathak RN    12/3/2024 2100 Given (none) Topical Sindhu Alonzo RN          dilTIAZem ER (CardIZEM CD) 24 hr cap 180 mg       Date Action Dose Route User    12/3/2024 2039 Given 180 mg Oral Sindhu Alonzo RN          enalapril (Vasotec) tab 20 mg       Date Action Dose Route User    12/4/2024 1005 Given 20 mg Oral Albina Pathak RN    12/3/2024 2039 Given 20 mg Oral Sindhu Alonzo RN          enoxaparin (Lovenox) 150 MG/ML SUBQ injection 140 mg       Date Action Dose Route User    12/4/2024 1712 Given 140 mg Subcutaneous (Left Lower Abdomen) Albina Patahk RN    12/4/2024 0435 Given 140 mg Subcutaneous (Right Lower Abdomen) Sindhu Alonzo RN          flecainide (Tambocor) tab 150 mg       Date Action Dose Route User    12/4/2024 1006 Given 150 mg Oral Albina Pathak RN    12/3/2024 2039 Given 150 mg Oral Sindhu Alonzo RN          furosemide (Lasix) tab 40 mg       Date Action Dose Route User    12/4/2024 1005 Given 40 mg Oral Albina Pathak RN          hydrALAZINE (Apresoline) tab 50 mg       Date Action Dose Route User    12/4/2024 1005 Given 50 mg Oral Albina Pathak RN    12/3/2024  2039 Given 50 mg Oral Sindhu Alonzo RN          magnesium oxide (Mag-Ox) tab 200 mg       Date Action Dose Route User    12/3/2024 2039 Given 200 mg Oral Sindhu Alonzo RN          sertraline (Zoloft) tab 50 mg       Date Action Dose Route User    12/4/2024 1005 Given 50 mg Oral Albina Pathak RN          traMADol (Ultram) tab 100 mg       Date Action Dose Route User    12/4/2024 1327 Given 100 mg Oral Albina Pathak RN    12/4/2024 1005 Given 100 mg Oral Albina Pathak RN    12/4/2024 0222 Given 100 mg Oral Sindhu Aolnzo RN    12/3/2024 2039 Given 100 mg Oral Sindhu Alonzo RN          warfarin (Coumadin) tab 10 mg       Date Action Dose Route User    12/3/2024 2039 Given 10 mg Oral Sindhu Alonzo RN            Vitals (last day)       Date/Time Temp Pulse Resp BP SpO2 Weight O2 Device O2 Flow Rate (L/min) [facility]    12/04/24 1711 -- 78 19 120/82 93 % -- None (Room air) -- AB    12/04/24 1004 98.2 °F (36.8 °C) 82 17 129/79 93 % -- None (Room air) -- AB    12/04/24 0433 98.5 °F (36.9 °C) 72 18 110/65 93 % -- None (Room air) -- RB    12/03/24 2029 98.4 °F (36.9 °C) 73 16 142/72 93 % -- None (Room air) -- RB    12/03/24 1707 -- 73 -- 134/88 -- -- -- -- TT    12/03/24 1524 -- 87 -- -- -- -- -- -- JH    12/03/24 1520 -- 87 -- -- -- -- -- -- GF    12/03/24 1516 98 °F (36.7 °C) 75 -- 136/75 98 % -- None (Room air) -- TT    12/03/24 0817 99.2 °F (37.3 °C) 78 -- 132/84 93 % -- None (Room air) -- TT    12/03/24 0530 97.9 °F (36.6 °C) 70 16 119/79 95 % -- None (Room air) -- RB

## 2024-12-04 NOTE — PLAN OF CARE
Problem: Patient Centered Care  Goal: Patient preferences are identified and integrated in the patient's plan of care  Description: Interventions:  - What would you like us to know as we care for you? From home alone  - Provide timely, complete, and accurate information to patient/family  - Incorporate patient and family knowledge, values, beliefs, and cultural backgrounds into the planning and delivery of care  - Encourage patient/family to participate in care and decision-making at the level they choose  - Honor patient and family perspectives and choices  Outcome: Progressing     Problem: CARDIOVASCULAR - ADULT  Goal: Maintains optimal cardiac output and hemodynamic stability  Description: INTERVENTIONS:  - Monitor vital signs, rhythm, and trends  - Monitor for bleeding, hypotension and signs of decreased cardiac output  - Evaluate effectiveness of vasoactive medications to optimize hemodynamic stability  - Monitor arterial and/or venous puncture sites for bleeding and/or hematoma  - Assess quality of pulses, skin color and temperature  - Assess for signs of decreased coronary artery perfusion - ex. Angina  - Evaluate fluid balance, assess for edema, trend weights  Outcome: Progressing     Problem: RESPIRATORY - ADULT  Goal: Achieves optimal ventilation and oxygenation  Description: INTERVENTIONS:  - Assess for changes in respiratory status  - Assess for changes in mentation and behavior  - Position to facilitate oxygenation and minimize respiratory effort  - Oxygen supplementation based on oxygen saturation or ABGs  - Provide Smoking Cessation handout, if applicable  - Encourage broncho-pulmonary hygiene including cough, deep breathe, Incentive Spirometry  - Assess the need for suctioning and perform as needed  - Assess and instruct to report SOB or any respiratory difficulty  - Respiratory Therapy support as indicated  - Manage/alleviate anxiety  - Monitor for signs/symptoms of CO2 retention  Outcome:  Progressing     Problem: GASTROINTESTINAL - ADULT  Goal: Minimal or absence of nausea and vomiting  Description: INTERVENTIONS:  - Maintain adequate hydration with IV or PO as ordered and tolerated  - Nasogastric tube to low intermittent suction as ordered  - Evaluate effectiveness of ordered antiemetic medications  - Provide nonpharmacologic comfort measures as appropriate  - Advance diet as tolerated, if ordered  - Obtain nutritional consult as needed  - Evaluate fluid balance  Outcome: Progressing     Problem: GENITOURINARY - ADULT  Goal: Absence of urinary retention  Description: INTERVENTIONS:  - Assess patient’s ability to void and empty bladder  - Monitor intake/output and perform bladder scan as needed  - Follow urinary retention protocol/standard of care  - Consider collaborating with pharmacy to review patient's medication profile  - Implement strategies to promote bladder emptying  Outcome: Progressing     Problem: METABOLIC/FLUID AND ELECTROLYTES - ADULT  Goal: Glucose maintained within prescribed range  Description: INTERVENTIONS:  - Monitor Blood Glucose as ordered  - Assess for signs and symptoms of hyperglycemia and hypoglycemia  - Administer ordered medications to maintain glucose within target range  - Assess barriers to adequate nutritional intake and initiate nutrition consult as needed  - Instruct patient on self management of diabetes  Outcome: Progressing  Goal: Electrolytes maintained within normal limits  Description: INTERVENTIONS:  - Monitor labs and rhythm and assess patient for signs and symptoms of electrolyte imbalances  - Administer electrolyte replacement as ordered  - Monitor response to electrolyte replacements, including rhythm and repeat lab results as appropriate  - Fluid restriction as ordered  - Instruct patient on fluid and nutrition restrictions as appropriate  Outcome: Progressing     Problem: SKIN/TISSUE INTEGRITY - ADULT  Goal: Skin integrity remains intact  Description:  INTERVENTIONS  - Assess and document risk factors for pressure ulcer development  - Assess and document skin integrity  - Monitor for areas of redness and/or skin breakdown  - Initiate interventions, skin care algorithm/standards of care as needed  Outcome: Progressing  Goal: Incision(s), wounds(s) or drain site(s) healing without S/S of infection  Description: INTERVENTIONS:  - Assess and document risk factors for pressure ulcer development  - Assess and document skin integrity  - Assess and document dressing/incision, wound bed, drain sites and surrounding tissue  - Implement wound care per orders  - Initiate isolation precautions as appropriate  - Initiate Pressure Ulcer prevention bundle as indicated  Outcome: Progressing     Problem: MUSCULOSKELETAL - ADULT  Goal: Return mobility to safest level of function  Description: INTERVENTIONS:  - Assess patient stability and activity tolerance for standing, transferring and ambulating w/ or w/o assistive devices  - Assist with transfers and ambulation using safe patient handling equipment as needed  - Ensure adequate protection for wounds/incisions during mobilization  - Obtain PT/OT consults as needed  - Advance activity as appropriate  - Communicate ordered activity level and limitations with patient/family  Outcome: Progressing     Problem: PAIN - ADULT  Goal: Verbalizes/displays adequate comfort level or patient's stated pain goal  Description: INTERVENTIONS:  - Encourage pt to monitor pain and request assistance  - Assess pain using appropriate pain scale  - Administer analgesics based on type and severity of pain and evaluate response  - Implement non-pharmacological measures as appropriate and evaluate response  - Consider cultural and social influences on pain and pain management  - Manage/alleviate anxiety  - Utilize distraction and/or relaxation techniques  - Monitor for opioid side effects  - Notify MD/LIP if interventions unsuccessful or patient reports new  pain  - Anticipate increased pain with activity and pre-medicate as appropriate  Outcome: Progressing     Problem: SAFETY ADULT - FALL  Goal: Free from fall injury  Description: INTERVENTIONS:  - Assess pt frequently for physical needs  - Identify cognitive and physical deficits and behaviors that affect risk of falls.  - [place] fall precautions as indicated by assessment.  - Educate pt/family on patient safety including physical limitations  - Instruct pt to call for assistance with activity based on assessment  - Modify environment to reduce risk of injury  - Provide assistive devices as appropriate  - Consider OT/PT consult to assist with strengthening/mobility  - Encourage toileting schedule  Outcome: Progressing     Problem: DISCHARGE PLANNING  Goal: Discharge to home or other facility with appropriate resources  Description: INTERVENTIONS:  - Identify barriers to discharge w/pt and caregiver  - Include patient/family/discharge partner in discharge planning  - Arrange for needed discharge resources and transportation as appropriate  - Identify discharge learning needs (meds, wound care, etc)  - Arrange for interpreters to assist at discharge as needed  - Consider post-discharge preferences of patient/family/discharge partner  - Complete POLST form as appropriate  - Assess patient's ability to be responsible for managing their own health  - Refer to Case Management Department for coordinating discharge planning if the patient needs post-hospital services based on physician/LIP order or complex needs related to functional status, cognitive ability or social support system  Outcome: Progressing     Problem: Impaired Functional Mobility  Goal: Achieve highest/safest level of mobility/gait  Description: Interventions:  - Assess patient's functional ability and stability  - Promote increasing activity/tolerance for mobility and gait  - Educate and engage patient/family in tolerated activity level and  precautions  - Recommend use of  RW for transfers and ambulation  - Recommend patient transfer to bedside chair toward strongest side  - When transferring patient, block weaker knee for safety  - Recommend use of chair position in bed 3 times per day  Outcome: Progressing     Problem: Impaired Activities of Daily Living  Goal: Achieve highest/safest level of independence in self care  Description: Interventions:  - Assess ability and encourage patient to participate in ADLs to maximize function  - Promote sitting position while performing ADLs such as feeding, grooming, and bathing  - Educate and encourage patient/family in tolerated functional activity level and precautions during self-care  Outcome: Progressing     Problem: Patient/Family Goals  Goal: Patient/Family Long Term Goal  Description: Patient's Long Term Goal: Discharge from the hospital    Interventions:  - Monitor vital signs  - Monitor appropriate labs  - Monitor blood glucose levels  - Pain management  - Administer medications per order  - Follow MD orders  - Diagnostics per order  - Update / inform patient and family on plan of care  - Discharge planning  - See additional Care Plan goals for specific interventions  Outcome: Progressing  Goal: Patient/Family Short Term Goal  Description: Patient's Short Term Goal: Improve redness, swelling, and pain to left lower extremity     Interventions:   - Monitor vital signs  - Monitor appropriate labs  - Monitor blood glucose levels  - Pain management  - Administer medications per order  - Follow MD orders  - Diagnostics per order  - Update / inform patient and family on plan of care  - See additional Care Plan goals for specific interventions  Outcome: Progressing     Problem: Diabetes/Glucose Control  Goal: Glucose maintained within prescribed range  Description: INTERVENTIONS:  - Monitor Blood Glucose as ordered  - Assess for signs and symptoms of hyperglycemia and hypoglycemia  - Administer ordered  medications to maintain glucose within target range  - Assess barriers to adequate nutritional intake and initiate nutrition consult as needed  - Instruct patient on self management of diabetes  Outcome: Progressing     Problem: HEMATOLOGIC - ADULT  Goal: Free from bleeding injury  Description: (Example usage: patient with low platelets)  INTERVENTIONS:  - Avoid intramuscular injections, enemas and rectal medication administration  - Ensure safe mobilization of patient  - Hold pressure on venipuncture sites to achieve adequate hemostasis  - Assess for signs and symptoms of internal bleeding  - Monitor lab trends  - Patient is to report abnormal signs of bleeding to staff  - Avoid use of toothpicks and dental floss  - Use electric shaver for shaving  - Use soft bristle tooth brush  - Limit straining and forceful nose blowing  Outcome: Progressing

## 2024-12-05 LAB
ANION GAP SERPL CALC-SCNC: 4 MMOL/L (ref 0–18)
BASOPHILS # BLD AUTO: 0.09 X10(3) UL (ref 0–0.2)
BASOPHILS NFR BLD AUTO: 1.1 %
BUN BLD-MCNC: 22 MG/DL (ref 9–23)
BUN/CREAT SERPL: 21.8 (ref 10–20)
CALCIUM BLD-MCNC: 10.1 MG/DL (ref 8.7–10.4)
CHLORIDE SERPL-SCNC: 102 MMOL/L (ref 98–112)
CO2 SERPL-SCNC: 30 MMOL/L (ref 21–32)
CREAT BLD-MCNC: 1.01 MG/DL
DEPRECATED RDW RBC AUTO: 46.1 FL (ref 35.1–46.3)
EGFRCR SERPLBLD CKD-EPI 2021: 89 ML/MIN/1.73M2 (ref 60–?)
EOSINOPHIL # BLD AUTO: 0.23 X10(3) UL (ref 0–0.7)
EOSINOPHIL NFR BLD AUTO: 2.7 %
ERYTHROCYTE [DISTWIDTH] IN BLOOD BY AUTOMATED COUNT: 14.6 % (ref 11–15)
GLUCOSE BLD-MCNC: 100 MG/DL (ref 70–99)
GLUCOSE BLDC GLUCOMTR-MCNC: 103 MG/DL (ref 70–99)
GLUCOSE BLDC GLUCOMTR-MCNC: 92 MG/DL (ref 70–99)
HCT VFR BLD AUTO: 35.3 %
HGB BLD-MCNC: 11.3 G/DL
IMM GRANULOCYTES # BLD AUTO: 0.09 X10(3) UL (ref 0–1)
IMM GRANULOCYTES NFR BLD: 1.1 %
INR BLD: 1.18 (ref 0.8–1.2)
LYMPHOCYTES # BLD AUTO: 2.36 X10(3) UL (ref 1–4)
LYMPHOCYTES NFR BLD AUTO: 27.6 %
MCH RBC QN AUTO: 27.4 PG (ref 26–34)
MCHC RBC AUTO-ENTMCNC: 32 G/DL (ref 31–37)
MCV RBC AUTO: 85.7 FL
MONOCYTES # BLD AUTO: 0.92 X10(3) UL (ref 0.1–1)
MONOCYTES NFR BLD AUTO: 10.8 %
NEUTROPHILS # BLD AUTO: 4.86 X10 (3) UL (ref 1.5–7.7)
NEUTROPHILS # BLD AUTO: 4.86 X10(3) UL (ref 1.5–7.7)
NEUTROPHILS NFR BLD AUTO: 56.7 %
OSMOLALITY SERPL CALC.SUM OF ELEC: 285 MOSM/KG (ref 275–295)
PLATELET # BLD AUTO: 373 10(3)UL (ref 150–450)
POTASSIUM SERPL-SCNC: 4.2 MMOL/L (ref 3.5–5.1)
PROTHROMBIN TIME: 15.7 SECONDS (ref 11.6–14.8)
RBC # BLD AUTO: 4.12 X10(6)UL
SODIUM SERPL-SCNC: 136 MMOL/L (ref 136–145)
UFH PPP CHRO-ACNC: 0.93 IU/ML
WBC # BLD AUTO: 8.6 X10(3) UL (ref 4–11)

## 2024-12-05 RX ORDER — ZOLPIDEM TARTRATE 5 MG/1
5 TABLET ORAL NIGHTLY PRN
Status: DISCONTINUED | OUTPATIENT
Start: 2024-12-05 | End: 2024-12-09

## 2024-12-05 NOTE — PROGRESS NOTES
Infectious Disease Progress Note      Date of admission: 11/18/2024  1:05 PM     Reason for consult: Left lower extremity cellulitis    Referring physician: Candido Suarez DO    Subjective: Patient continues to complain of left lower extremity pain around the ankle.  CT is showing a fluid collection, concerning for underlying abscess.  No nausea or vomiting.  No diarrhea.    The rest of the systems were reviewed and found to be negative except was mentioned above    Medications:    lidocaine PF    traMADol    sertraline    warfarin    HYDROmorphone    enoxaparin    furosemide    magnesium hydroxide    cefepime    clotrimazole-betamethasone    docosanol    cetirizine    calcium carbonate    famotidine    enalapril    flecainide    hydrALAZINE    carvedilol    acetaminophen    melatonin    polyethylene glycol (PEG 3350)    sennosides    guaiFENesin    ondansetron    prochlorperazine    HYDROmorphone    magnesium oxide    simethicone    dilTIAZem HCl ER Coated Beads     Allergies:  Allergies[1]    Physical Exam:  Vitals:    12/05/24 0902   BP: 119/67   Pulse: 77   Resp: 18   Temp: 98.1 °F (36.7 °C)     Vitals signs and nursing note reviewed.   Constitutional:       Appearance: Normal appearance.   HENT:      Head: Normocephalic and atraumatic.      Mouth: Mucous membranes are moist.   Neck:      Musculoskeletal: Neck supple.   Cardiovascular:      Rate and Rhythm: Normal rate.   Pulmonary:      Effort: Pulmonary effort is normal. No respiratory distress.   Musculoskeletal:      Right lower leg: No edema.      Left lower leg: +2 edema.  Stable redness with worsening swelling in the ankle with mild fluctuation concerning for underlying abscess  Skin:     General: Skin is warm and dry.   Neurological:      General: No focal deficit present.      Mental Status: Alert and oriented to person, place, and time.       Laboratory data:  I have reviewed all the lab results independently.  Lab Results   Component Value Date     WBC 8.6 12/05/2024    HGB 11.3 12/05/2024    HCT 35.3 12/05/2024    .0 12/05/2024    CREATSERUM 0.92 12/04/2024    BUN 19 12/04/2024     12/04/2024    K 4.5 12/04/2024     12/04/2024    CO2 31.0 12/04/2024    GLU 86 12/04/2024    CA 9.8 12/04/2024      Recent Labs   Lab 12/05/24  1018   RBC 4.12*   HGB 11.3*   HCT 35.3*   MCV 85.7   MCH 27.4   MCHC 32.0   RDW 14.6   NEPRELIM 4.86   WBC 8.6   .0      Microbiology data:  Hospital Encounter on 11/18/24   1. Blood Culture     Status: None    Collection Time: 11/19/24  8:48 AM    Specimen: Blood,peripheral   Result Value Ref Range    Blood Culture Result No Growth 5 Days N/A      Radiology:  CT of the ankle on the left side:  Focal thin-walled rim-enhancing fluid collection along the medial aspect of the mid to distal tibia measuring 2.7 x 0.9 x 8 cm.  Diffuse cutaneous edema noted.  No osteomyelitis.  No acute fracture.    Impression:  Jovan Merino . is a 54 year old male with    Left lower extremity cellulitis, presenting here with sepsis with threat to life  Blood cultures with no growth to date  MRI showing mild myositis  Initially on IV cefazolin without improvement, escalated to IV daptomycin and cefepime on 11/21  Daptomycin stopped on 12/1 due to rhabdomyolysis  Currently on IV cefepime with clinical improvement  Plan to discharge on cefepime through 12/11/2024  DVT in the same leg  On anticoagulation as per primary team  History of psoriasis  Patient is reporting improvement in his psoriasis since going on antibiotics  Would benefit from penicillin V 500 mg twice daily for suppression to be started once off the cefepime  Will discuss when he follows up with us in the office    Recommendations:    Continue IV cefepime   Consult podiatry/orthopedics to assess the collection for possible drainage  Anticoagulation as per primary team  Continue to monitor daily labs for antibiotic toxicities  Further recommendations will depend on the  above work-up and clinical progress     The plan of care was discussed with the primary hospital team, Candido Suarez DO     Recommendations were also discussed with the patient; all questions were answered.     Thank you for this consultation. Please don't hesitate to call the ID team for questions or any acute changes in patient's clinical condition.    Please note that this report has been produced using speech recognition software and may contain errors related to that system including, but not limited to, errors in grammar, punctuation, and spelling, as well as words and phrases that possibly may have been recognized inappropriately.  If there are any questions or concerns, contact the dictating provider for clarification.    The  [employer] Act makes medical notes like these available to patients in the interest of transparency. Please be advised this is a medical document. Medical documents are intended to carry relevant information, facts as evident, and the clinical opinion of the practitioner. The medical note is intended as peer to peer communication and may appear blunt or direct. It is written in medical language and may contain abbreviations or verbiage that are unfamiliar.     Natalie Cassidy MD  [facility]. Tel: [phone]. Fax: [phone].     Jovan Merino Sr. : 1970 MRN: [id number] [facility]: [id number]          [1]   Allergies  Allergen Reactions    Oxycodone ITCHING    Hydrocodone ITCHING and RASH

## 2024-12-05 NOTE — PLAN OF CARE
Problem: Patient Centered Care  Goal: Patient preferences are identified and integrated in the patient's plan of care  Description: Interventions:  - What would you like us to know as we care for you? From home alone  - Provide timely, complete, and accurate information to patient/family  - Incorporate patient and family knowledge, values, beliefs, and cultural backgrounds into the planning and delivery of care  - Encourage patient/family to participate in care and decision-making at the level they choose  - Honor patient and family perspectives and choices  Outcome: Progressing     Problem: CARDIOVASCULAR - ADULT  Goal: Maintains optimal cardiac output and hemodynamic stability  Description: INTERVENTIONS:  - Monitor vital signs, rhythm, and trends  - Monitor for bleeding, hypotension and signs of decreased cardiac output  - Evaluate effectiveness of vasoactive medications to optimize hemodynamic stability  - Monitor arterial and/or venous puncture sites for bleeding and/or hematoma  - Assess quality of pulses, skin color and temperature  - Assess for signs of decreased coronary artery perfusion - ex. Angina  - Evaluate fluid balance, assess for edema, trend weights  Outcome: Progressing     Problem: RESPIRATORY - ADULT  Goal: Achieves optimal ventilation and oxygenation  Description: INTERVENTIONS:  - Assess for changes in respiratory status  - Assess for changes in mentation and behavior  - Position to facilitate oxygenation and minimize respiratory effort  - Oxygen supplementation based on oxygen saturation or ABGs  - Provide Smoking Cessation handout, if applicable  - Encourage broncho-pulmonary hygiene including cough, deep breathe, Incentive Spirometry  - Assess the need for suctioning and perform as needed  - Assess and instruct to report SOB or any respiratory difficulty  - Respiratory Therapy support as indicated  - Manage/alleviate anxiety  - Monitor for signs/symptoms of CO2 retention  Outcome:  Progressing     Problem: GASTROINTESTINAL - ADULT  Goal: Minimal or absence of nausea and vomiting  Description: INTERVENTIONS:  - Maintain adequate hydration with IV or PO as ordered and tolerated  - Nasogastric tube to low intermittent suction as ordered  - Evaluate effectiveness of ordered antiemetic medications  - Provide nonpharmacologic comfort measures as appropriate  - Advance diet as tolerated, if ordered  - Obtain nutritional consult as needed  - Evaluate fluid balance  Outcome: Progressing     Problem: GENITOURINARY - ADULT  Goal: Absence of urinary retention  Description: INTERVENTIONS:  - Assess patient’s ability to void and empty bladder  - Monitor intake/output and perform bladder scan as needed  - Follow urinary retention protocol/standard of care  - Consider collaborating with pharmacy to review patient's medication profile  - Implement strategies to promote bladder emptying  Outcome: Progressing     Problem: METABOLIC/FLUID AND ELECTROLYTES - ADULT  Goal: Glucose maintained within prescribed range  Description: INTERVENTIONS:  - Monitor Blood Glucose as ordered  - Assess for signs and symptoms of hyperglycemia and hypoglycemia  - Administer ordered medications to maintain glucose within target range  - Assess barriers to adequate nutritional intake and initiate nutrition consult as needed  - Instruct patient on self management of diabetes  Outcome: Progressing  Goal: Electrolytes maintained within normal limits  Description: INTERVENTIONS:  - Monitor labs and rhythm and assess patient for signs and symptoms of electrolyte imbalances  - Administer electrolyte replacement as ordered  - Monitor response to electrolyte replacements, including rhythm and repeat lab results as appropriate  - Fluid restriction as ordered  - Instruct patient on fluid and nutrition restrictions as appropriate  Outcome: Progressing     Problem: SKIN/TISSUE INTEGRITY - ADULT  Goal: Skin integrity remains intact  Description:  INTERVENTIONS  - Assess and document risk factors for pressure ulcer development  - Assess and document skin integrity  - Monitor for areas of redness and/or skin breakdown  - Initiate interventions, skin care algorithm/standards of care as needed  Outcome: Progressing  Goal: Incision(s), wounds(s) or drain site(s) healing without S/S of infection  Description: INTERVENTIONS:  - Assess and document risk factors for pressure ulcer development  - Assess and document skin integrity  - Assess and document dressing/incision, wound bed, drain sites and surrounding tissue  - Implement wound care per orders  - Initiate isolation precautions as appropriate  - Initiate Pressure Ulcer prevention bundle as indicated  Outcome: Progressing     Problem: MUSCULOSKELETAL - ADULT  Goal: Return mobility to safest level of function  Description: INTERVENTIONS:  - Assess patient stability and activity tolerance for standing, transferring and ambulating w/ or w/o assistive devices  - Assist with transfers and ambulation using safe patient handling equipment as needed  - Ensure adequate protection for wounds/incisions during mobilization  - Obtain PT/OT consults as needed  - Advance activity as appropriate  - Communicate ordered activity level and limitations with patient/family  Outcome: Progressing     Problem: PAIN - ADULT  Goal: Verbalizes/displays adequate comfort level or patient's stated pain goal  Description: INTERVENTIONS:  - Encourage pt to monitor pain and request assistance  - Assess pain using appropriate pain scale  - Administer analgesics based on type and severity of pain and evaluate response  - Implement non-pharmacological measures as appropriate and evaluate response  - Consider cultural and social influences on pain and pain management  - Manage/alleviate anxiety  - Utilize distraction and/or relaxation techniques  - Monitor for opioid side effects  - Notify MD/LIP if interventions unsuccessful or patient reports new  pain  - Anticipate increased pain with activity and pre-medicate as appropriate  Outcome: Progressing     Problem: SAFETY ADULT - FALL  Goal: Free from fall injury  Description: INTERVENTIONS:  - Assess pt frequently for physical needs  - Identify cognitive and physical deficits and behaviors that affect risk of falls.  - [place] fall precautions as indicated by assessment.  - Educate pt/family on patient safety including physical limitations  - Instruct pt to call for assistance with activity based on assessment  - Modify environment to reduce risk of injury  - Provide assistive devices as appropriate  - Consider OT/PT consult to assist with strengthening/mobility  - Encourage toileting schedule  Outcome: Progressing     Problem: DISCHARGE PLANNING  Goal: Discharge to home or other facility with appropriate resources  Description: INTERVENTIONS:  - Identify barriers to discharge w/pt and caregiver  - Include patient/family/discharge partner in discharge planning  - Arrange for needed discharge resources and transportation as appropriate  - Identify discharge learning needs (meds, wound care, etc)  - Arrange for interpreters to assist at discharge as needed  - Consider post-discharge preferences of patient/family/discharge partner  - Complete POLST form as appropriate  - Assess patient's ability to be responsible for managing their own health  - Refer to Case Management Department for coordinating discharge planning if the patient needs post-hospital services based on physician/LIP order or complex needs related to functional status, cognitive ability or social support system  Outcome: Progressing     Problem: Impaired Functional Mobility  Goal: Achieve highest/safest level of mobility/gait  Description: Interventions:  - Assess patient's functional ability and stability  - Promote increasing activity/tolerance for mobility and gait  - Educate and engage patient/family in tolerated activity level and  precautions  - Recommend use of  RW for transfers and ambulation  - Recommend patient transfer to bedside chair toward strongest side  - When transferring patient, block weaker knee for safety  - Recommend use of chair position in bed 3 times per day  Outcome: Progressing     Problem: Impaired Activities of Daily Living  Goal: Achieve highest/safest level of independence in self care  Description: Interventions:  - Assess ability and encourage patient to participate in ADLs to maximize function  - Promote sitting position while performing ADLs such as feeding, grooming, and bathing  - Educate and encourage patient/family in tolerated functional activity level and precautions during self-care  Outcome: Progressing     Problem: Patient/Family Goals  Goal: Patient/Family Long Term Goal  Description: Patient's Long Term Goal: Discharge from the hospital    Interventions:  - Monitor vital signs  - Monitor appropriate labs  - Monitor blood glucose levels  - Pain management  - Administer medications per order  - Follow MD orders  - Diagnostics per order  - Update / inform patient and family on plan of care  - Discharge planning  - See additional Care Plan goals for specific interventions  Outcome: Progressing  Goal: Patient/Family Short Term Goal  Description: Patient's Short Term Goal: Improve redness, swelling, and pain to left lower extremity     Interventions:   - Monitor vital signs  - Monitor appropriate labs  - Monitor blood glucose levels  - Pain management  - Administer medications per order  - Follow MD orders  - Diagnostics per order  - Update / inform patient and family on plan of care  - See additional Care Plan goals for specific interventions  Outcome: Progressing     Problem: Diabetes/Glucose Control  Goal: Glucose maintained within prescribed range  Description: INTERVENTIONS:  - Monitor Blood Glucose as ordered  - Assess for signs and symptoms of hyperglycemia and hypoglycemia  - Administer ordered  medications to maintain glucose within target range  - Assess barriers to adequate nutritional intake and initiate nutrition consult as needed  - Instruct patient on self management of diabetes  Outcome: Progressing     Problem: HEMATOLOGIC - ADULT  Goal: Free from bleeding injury  Description: (Example usage: patient with low platelets)  INTERVENTIONS:  - Avoid intramuscular injections, enemas and rectal medication administration  - Ensure safe mobilization of patient  - Hold pressure on venipuncture sites to achieve adequate hemostasis  - Assess for signs and symptoms of internal bleeding  - Monitor lab trends  - Patient is to report abnormal signs of bleeding to staff  - Avoid use of toothpicks and dental floss  - Use electric shaver for shaving  - Use soft bristle tooth brush  - Limit straining and forceful nose blowing  Outcome: Progressing

## 2024-12-05 NOTE — PLAN OF CARE
Problem: Patient Centered Care  Goal: Patient preferences are identified and integrated in the patient's plan of care  Description: Interventions:  - What would you like us to know as we care for you? From home alone  - Provide timely, complete, and accurate information to patient/family  - Incorporate patient and family knowledge, values, beliefs, and cultural backgrounds into the planning and delivery of care  - Encourage patient/family to participate in care and decision-making at the level they choose  - Honor patient and family perspectives and choices  Outcome: Progressing     Problem: CARDIOVASCULAR - ADULT  Goal: Maintains optimal cardiac output and hemodynamic stability  Description: INTERVENTIONS:  - Monitor vital signs, rhythm, and trends  - Monitor for bleeding, hypotension and signs of decreased cardiac output  - Evaluate effectiveness of vasoactive medications to optimize hemodynamic stability  - Monitor arterial and/or venous puncture sites for bleeding and/or hematoma  - Assess quality of pulses, skin color and temperature  - Assess for signs of decreased coronary artery perfusion - ex. Angina  - Evaluate fluid balance, assess for edema, trend weights  Outcome: Progressing     Problem: RESPIRATORY - ADULT  Goal: Achieves optimal ventilation and oxygenation  Description: INTERVENTIONS:  - Assess for changes in respiratory status  - Assess for changes in mentation and behavior  - Position to facilitate oxygenation and minimize respiratory effort  - Oxygen supplementation based on oxygen saturation or ABGs  - Provide Smoking Cessation handout, if applicable  - Encourage broncho-pulmonary hygiene including cough, deep breathe, Incentive Spirometry  - Assess the need for suctioning and perform as needed  - Assess and instruct to report SOB or any respiratory difficulty  - Respiratory Therapy support as indicated  - Manage/alleviate anxiety  - Monitor for signs/symptoms of CO2 retention  Outcome:  Progressing     Problem: GASTROINTESTINAL - ADULT  Goal: Minimal or absence of nausea and vomiting  Description: INTERVENTIONS:  - Maintain adequate hydration with IV or PO as ordered and tolerated  - Nasogastric tube to low intermittent suction as ordered  - Evaluate effectiveness of ordered antiemetic medications  - Provide nonpharmacologic comfort measures as appropriate  - Advance diet as tolerated, if ordered  - Obtain nutritional consult as needed  - Evaluate fluid balance  Outcome: Progressing     Problem: GENITOURINARY - ADULT  Goal: Absence of urinary retention  Description: INTERVENTIONS:  - Assess patient’s ability to void and empty bladder  - Monitor intake/output and perform bladder scan as needed  - Follow urinary retention protocol/standard of care  - Consider collaborating with pharmacy to review patient's medication profile  - Implement strategies to promote bladder emptying  Outcome: Progressing     Problem: METABOLIC/FLUID AND ELECTROLYTES - ADULT  Goal: Glucose maintained within prescribed range  Description: INTERVENTIONS:  - Monitor Blood Glucose as ordered  - Assess for signs and symptoms of hyperglycemia and hypoglycemia  - Administer ordered medications to maintain glucose within target range  - Assess barriers to adequate nutritional intake and initiate nutrition consult as needed  - Instruct patient on self management of diabetes  Outcome: Progressing  Goal: Electrolytes maintained within normal limits  Description: INTERVENTIONS:  - Monitor labs and rhythm and assess patient for signs and symptoms of electrolyte imbalances  - Administer electrolyte replacement as ordered  - Monitor response to electrolyte replacements, including rhythm and repeat lab results as appropriate  - Fluid restriction as ordered  - Instruct patient on fluid and nutrition restrictions as appropriate  Outcome: Progressing     Problem: SKIN/TISSUE INTEGRITY - ADULT  Goal: Skin integrity remains intact  Description:  INTERVENTIONS  - Assess and document risk factors for pressure ulcer development  - Assess and document skin integrity  - Monitor for areas of redness and/or skin breakdown  - Initiate interventions, skin care algorithm/standards of care as needed  Outcome: Progressing  Goal: Incision(s), wounds(s) or drain site(s) healing without S/S of infection  Description: INTERVENTIONS:  - Assess and document risk factors for pressure ulcer development  - Assess and document skin integrity  - Assess and document dressing/incision, wound bed, drain sites and surrounding tissue  - Implement wound care per orders  - Initiate isolation precautions as appropriate  - Initiate Pressure Ulcer prevention bundle as indicated  Outcome: Progressing     Problem: MUSCULOSKELETAL - ADULT  Goal: Return mobility to safest level of function  Description: INTERVENTIONS:  - Assess patient stability and activity tolerance for standing, transferring and ambulating w/ or w/o assistive devices  - Assist with transfers and ambulation using safe patient handling equipment as needed  - Ensure adequate protection for wounds/incisions during mobilization  - Obtain PT/OT consults as needed  - Advance activity as appropriate  - Communicate ordered activity level and limitations with patient/family  Outcome: Progressing     Problem: PAIN - ADULT  Goal: Verbalizes/displays adequate comfort level or patient's stated pain goal  Description: INTERVENTIONS:  - Encourage pt to monitor pain and request assistance  - Assess pain using appropriate pain scale  - Administer analgesics based on type and severity of pain and evaluate response  - Implement non-pharmacological measures as appropriate and evaluate response  - Consider cultural and social influences on pain and pain management  - Manage/alleviate anxiety  - Utilize distraction and/or relaxation techniques  - Monitor for opioid side effects  - Notify MD/LIP if interventions unsuccessful or patient reports new  pain  - Anticipate increased pain with activity and pre-medicate as appropriate  Outcome: Progressing     Problem: SAFETY ADULT - FALL  Goal: Free from fall injury  Description: INTERVENTIONS:  - Assess pt frequently for physical needs  - Identify cognitive and physical deficits and behaviors that affect risk of falls.  - [place] precautions as indicated by assessment.  - Educate pt/family on patient safety including physical limitations  - Instruct pt to call for assistance with activity based on assessment  - Modify environment to reduce risk of injury  - Provide assistive devices as appropriate  - Consider OT/PT consult to assist with strengthening/mobility  - Encourage toileting schedule  Outcome: Progressing     Problem: DISCHARGE PLANNING  Goal: Discharge to home or other facility with appropriate resources  Description: INTERVENTIONS:  - Identify barriers to discharge w/pt and caregiver  - Include patient/family/discharge partner in discharge planning  - Arrange for needed discharge resources and transportation as appropriate  - Identify discharge learning needs (meds, wound care, etc)  - Arrange for interpreters to assist at discharge as needed  - Consider post-discharge preferences of patient/family/discharge partner  - Complete POLST form as appropriate  - Assess patient's ability to be responsible for managing their own health  - Refer to Case Management Department for coordinating discharge planning if the patient needs post-hospital services based on physician/LIP order or complex needs related to functional status, cognitive ability or social support system  Outcome: Progressing     Problem: Impaired Functional Mobility  Goal: Achieve highest/safest level of mobility/gait  Description: Interventions:  - Assess patient's functional ability and stability  - Promote increasing activity/tolerance for mobility and gait  - Educate and engage patient/family in tolerated activity level and  precautions  - Recommend use of  RW for transfers and ambulation  - Recommend patient transfer to bedside chair toward strongest side  - When transferring patient, block weaker knee for safety  - Recommend use of chair position in bed 3 times per day  Outcome: Progressing     Problem: Impaired Activities of Daily Living  Goal: Achieve highest/safest level of independence in self care  Description: Interventions:  - Assess ability and encourage patient to participate in ADLs to maximize function  - Promote sitting position while performing ADLs such as feeding, grooming, and bathing  - Educate and encourage patient/family in tolerated functional activity level and precautions during self-care  Outcome: Progressing     Problem: Patient/Family Goals  Goal: Patient/Family Long Term Goal  Description: Patient's Long Term Goal: Discharge from the hospital    Interventions:  - Monitor vital signs  - Monitor appropriate labs  - Monitor blood glucose levels  - Pain management  - Administer medications per order  - Follow MD orders  - Diagnostics per order  - Update / inform patient and family on plan of care  - Discharge planning  - See additional Care Plan goals for specific interventions  Outcome: Progressing  Goal: Patient/Family Short Term Goal  Description: Patient's Short Term Goal: Improve redness, swelling, and pain to left lower extremity     Interventions:   - Monitor vital signs  - Monitor appropriate labs  - Monitor blood glucose levels  - Pain management  - Administer medications per order  - Follow MD orders  - Diagnostics per order  - Update / inform patient and family on plan of care  - See additional Care Plan goals for specific interventions  Outcome: Progressing     Problem: Diabetes/Glucose Control  Goal: Glucose maintained within prescribed range  Description: INTERVENTIONS:  - Monitor Blood Glucose as ordered  - Assess for signs and symptoms of hyperglycemia and hypoglycemia  - Administer ordered  medications to maintain glucose within target range  - Assess barriers to adequate nutritional intake and initiate nutrition consult as needed  - Instruct patient on self management of diabetes  Outcome: Progressing     Problem: HEMATOLOGIC - ADULT  Goal: Free from bleeding injury  Description: (Example usage: patient with low platelets)  INTERVENTIONS:  - Avoid intramuscular injections, enemas and rectal medication administration  - Ensure safe mobilization of patient  - Hold pressure on venipuncture sites to achieve adequate hemostasis  - Assess for signs and symptoms of internal bleeding  - Monitor lab trends  - Patient is to report abnormal signs of bleeding to staff  - Avoid use of toothpicks and dental floss  - Use electric shaver for shaving  - Use soft bristle tooth brush  - Limit straining and forceful nose blowing  Outcome: Progressing

## 2024-12-05 NOTE — PAYOR COMM NOTE
--------------  CONTINUED STAY REVIEW    Payor: [employer] OUT OF STATE PPO  Subscriber #:  [id number]  Authorization Number: [id number]    Admit date: 11/18/24  Admit time:  4:58 PM    REVIEW DOCUMENTATION: 12/5  [facility] Hospitalist Progress Note      CC: Hospital Follow up     PCP: Eric Sethi DO         Assessment/Plan:   Jovan Merino - 54 year old male w MO, HTN, Afib, HFpEF, psoriasis admitted 11/18/2024 for LLE cellulitis, started on IV Ancef. Initially improving however 11/21 worse - abx broadened to vanc/zosyn, then cefepime and daptomycin. CT with cellulitis . ID/Rheum/Orhto consulted. MRI with LLE cellulitis, mild myositis in posterior aspect, no abscess, no OM.  PICC in place for extended IV abx coverage. 3rd LLE doppler 11/29/24 Left popliteal vein DVT. Full dose lovenox started with plans to bridge to coumadin     Sepsis due to LLE cellulitis  Immunosuppression due to psoriasis/Humira  - blood cx no growth  - IV ancef (11/18 - 21 )  - 11/21: worse, Consulted ID - transitioned to cefepime and daptomycin  - CT negative 11/21 for underlying abscess  - Rheum/Ortho on consult   - No concern for compartment syndrome or joint involvement at this time  - MRI with LLE cellulitis, mild myositis in posterior aspect, no abscess, no OM   - PICC in place for extended IV abx coverage  - noted 12/4 area of fluctuance above the medial aspect of left ankle. Discussed with ortho Dr. Jorge Urrutia who will evaluate, I have ordered a CT as well and showing thin walled area of fluid enhancing subcutaneous collection      Acute DVT  - LLE, Left popliteal vein  - full dose lovenox to coumadin   - will need Lovenox injection training and dietitian for warfarin diet  - was started on coumadin 10 mg HS, higher dose due to his weight and titrate based on INR  - will need PCP set up to follow-up coumadin dosing  - Xa level checked---in therapeutic range      Partial quad tear  L knee effusion  - ortho consulted, appreciate recs      Headaches  - fioricet PRN     HFpEF - compensated  HTN  Parox Afib - low CV, not on AC  - Continue PTA lasix, flecainide, coreg, hydral, enalapril     Psoriasis  - On humira OP. Would hold until leg better     Morbid obesity w [facility] [place]  - Body mass index is 52.8 kg/m².   - Healthy diet & wt loss encouraged  - Has lost quite a bit of weight with Mounjaro already     DM2 - currently controlled w mounjaro  - Okay for reg diet, fCan hold on SSI/accuchecks for now, can add if issues controlling     Depression   - seen by psych liaison  - started Zoloft 50mg daily      ACP: Full Code  Ppx: DVT: Enox  Dispo  EDoD:  pending, IV antibiotics planned at discharge. Awaiting ortho re-eval      F/u:   - PCP:  DO Candido [facility]rawal [facility]       Subjective:      Pain is controlled but still present. Noticed area of fluctuance on medial aspect above left ankle. No fevers.      OBJECTIVE:     Blood pressure 119/67, pulse 77, temperature 98.1 °F (36.7 °C), temperature source Oral, resp. rate 18, height 6' 1\" (1.854 m), weight (!) 400 lb 3.2 oz (181.5 kg), SpO2 92%.     Temp:  [98.1 °F (36.7 °C)-98.7 °F (37.1 °C)] 98.1 °F (36.7 °C)  Pulse:  [71-78] 77  Resp:  [18-19] 18  BP: (110-124)/(67-82) 119/67  SpO2:  [92 %-93 %] 92 %        Intake/Output:     Intake/Output Summary (Last 24 hours) at 12/5/2024 1253  Last data filed at 12/5/2024 1020      Gross per 24 hour   Intake 900 ml   Output 500 ml   Net 400 ml              Last 3 Weights   11/20/24 0528 (!) 400 lb 3.2 oz (181.5 kg)   11/19/24 0615 (!) 395 lb 3.2 oz (179.3 kg)   11/18/24 1705 (!) 394 lb 3.2 oz (178.8 kg)   11/18/24 1112 (!) 395 lb (179.2 kg)   11/17/21 1338 (!) 379 lb 3.2 oz (172 kg)   06/10/21 1222 (!) 364 lb (165.1 kg)         Exam   GEN: no distress  Pulm: CTABL  CV: RRR, no murmurs  ABD: Soft  MSK: swelling improved LLE compartments not tense, fluctuant area above medial aspect of left ankle   Neuro: Grossly normal, CN intact,  sensory intact  SKIN: warm, dry, psoriatic plaques, LLE with improved edema     Data Review:       Labs:            Recent Labs   Lab 12/02/24  0443 12/03/24  0543 12/05/24  1018   RBC 3.83* 4.14* 4.12*   HGB 10.4* 11.3* 11.3*   HCT 32.9* 35.5* 35.3*   MCV 85.9 85.7 85.7   MCH 27.2 27.3 27.4   MCHC 31.6 31.8 32.0   RDW 15.0 14.7 14.6   NEPRELIM 5.35 5.90 4.86   WBC 9.9 10.6 8.6   .0 403.0 373.0                  Recent Labs   Lab 12/03/24  0543 12/04/24  1711 12/05/24  1018   * 86 100*   BUN 21 19 22   CREATSERUM 0.88 0.92 1.01   EGFRCR 103 100 89   CA 10.2 9.8 10.1   * 134* 136   K 4.5 4.5 4.2    102 102   CO2 29.0 31.0 30.0         No results for input(s): \"ALT\", \"AST\", \"ALB\", \"AMYLASE\", \"LIPASE\", \"LDH\" in the last 168 hours.     Invalid input(s): \"ALPHOS\", \"TBIL\", \"DBIL\", \"TPROT\"        Imaging:  No results found.        Meds:      Scheduled Medications    lidocaine-EPINEPHrine  1 mL Intradermal Once    traMADol  100 mg Oral q6h    sertraline  50 mg Oral Daily    warfarin  10 mg Oral Nightly    enoxaparin  140 mg Subcutaneous q12h    furosemide  40 mg Oral Daily    cefepime  2 g Intravenous Q8H    clotrimazole-betamethasone   Topical BID    docosanol   Topical BID    cetirizine  10 mg Oral Daily    enalapril  20 mg Oral BID    flecainide  150 mg Oral BID    hydrALAZINE  50 mg Oral BID    carvedilol  25 mg Oral BID with meals    magnesium oxide  200 mg Oral Nightly    dilTIAZem HCl ER Coated Beads  180 mg Oral Nightly         Medication Infusions           PRN Medications     HYDROmorphone    magnesium hydroxide    calcium carbonate    famotidine    acetaminophen    melatonin    polyethylene glycol (PEG 3350)    sennosides    guaiFENesin    ondansetron    prochlorperazine    HYDROmorphone    simethicone            MEDICATIONS ADMINISTERED IN LAST 1 DAY:  acetaminophen (Tylenol Extra Strength) tab 500 mg       Date Action Dose Route User    12/5/2024 1445 Given 500 mg Oral Albina Pathak,  RN    12/5/2024 0531 Given 500 mg Oral Jeremie Simmons RN          calcium carbonate (Tums) chewable tab 1,000 mg       Date Action Dose Route User    12/5/2024 0904 Given 1,000 mg Oral Albina Pathak RN          carvedilol (Coreg) tab 25 mg       Date Action Dose Route User    12/5/2024 0903 Given 25 mg Oral Albina Pathak RN    12/4/2024 1713 Given 25 mg Oral Albina Pathak RN          ceFEPIme (Maxpime) 2 g in sodium chloride 0.9% 100 mL IVPB-MBP       Date Action Dose Route User    12/5/2024 0904 New Bag 2 g Intravenous Albina Pathak RN    12/5/2024 0211 New Bag 2 g Intravenous Jeremie Simmons RN    12/4/2024 1713 New Bag 2 g Intravenous Albina Pathak RN          cetirizine (ZyrTEC) tab 10 mg       Date Action Dose Route User    12/5/2024 0903 Given 10 mg Oral Albina Pathak RN          clotrimazole-betamethasone (Lotrisone) 1-0.05 % cream       Date Action Dose Route User    12/5/2024 0904 Given (none) Topical Albina Pathak RN    12/4/2024 2218 Given (none) Topical Jeremie Simmons RN          dilTIAZem ER (CardIZEM CD) 24 hr cap 180 mg       Date Action Dose Route User    12/4/2024 2036 Given 180 mg Oral Jeremie Simmons RN          enalapril (Vasotec) tab 20 mg       Date Action Dose Route User    12/5/2024 0903 Given 20 mg Oral Albina Pathak RN    12/4/2024 2036 Given 20 mg Oral Jeremie Simmons RN          enoxaparin (Lovenox) 150 MG/ML SUBQ injection 140 mg       Date Action Dose Route User    12/5/2024 0502 Given 140 mg Subcutaneous (Right Lower Abdomen) Jeremie Simmons RN    12/4/2024 1712 Given 140 mg Subcutaneous (Left Lower Abdomen) Albina Pathak RN          flecainide (Tambocor) tab 150 mg       Date Action Dose Route User    12/5/2024 0903 Given 150 mg Oral Albina Pathak RN    12/4/2024 2037 Given 150 mg Oral Jeremie Simmons RN          furosemide (Lasix) tab 40 mg       Date Action Dose Route User    12/5/2024 0903 Given 40 mg Oral Albina Pathak, RN           hydrALAZINE (Apresoline) tab 50 mg       Date Action Dose Route User    12/5/2024 0904 Given 50 mg Oral Albina Pathak RN    12/4/2024 2036 Given 50 mg Oral Jeremie Simmons RN          iopamidol 76% (ISOVUE-370) injection for power injector       Date Action Dose Route User    12/4/2024 2023 Given 80 mL Intravenous (Left Antecubital) Modesat Garcia          lidocaine 1%-EPINEPHrine 1:200,000 (Xylocaine-Epinephrine) injection       Date Action Dose Route User    12/5/2024 1441 Given 1 mL Intradermal Albina Pathak RN          magnesium oxide (Mag-Ox) tab 200 mg       Date Action Dose Route User    12/4/2024 2038 Given 200 mg Oral Jeremie Simmons RN          sertraline (Zoloft) tab 50 mg       Date Action Dose Route User    12/5/2024 0903 Given 50 mg Oral Albina Pathak RN          traMADol (Ultram) tab 100 mg       Date Action Dose Route User    12/5/2024 1340 Given 100 mg Oral Albina Pathak RN    12/5/2024 0903 Given 100 mg Oral Albina Pathak RN    12/5/2024 0211 Given 100 mg Oral Jeremie Simmons RN    12/4/2024 2037 Given 100 mg Oral Jeremie Simmons RN          warfarin (Coumadin) tab 10 mg       Date Action Dose Route User    12/4/2024 2036 Given 10 mg Oral Jeremie Simmons RN            Vitals (last day)       Date/Time Temp Pulse Resp BP SpO2 Weight O2 Device O2 Flow Rate (L/min) [facility]    12/05/24 0902 98.1 °F (36.7 °C) 77 18 119/67 92 % -- None (Room air) -- AB    12/05/24 0507 98.7 °F (37.1 °C) 71 18 124/73 93 % -- None (Room air) -- RP    12/04/24 2028 98.4 °F (36.9 °C) 76 18 110/72 92 % -- None (Room air) -- RP    12/04/24 1711 -- 78 19 120/82 93 % -- None (Room air) -- AB    12/04/24 1004 98.2 °F (36.8 °C) 82 17 129/79 93 % -- None (Room air) -- AB    12/04/24 0433 98.5 °F (36.9 °C) 72 18 110/65 93 % -- None (Room air) -- RB

## 2024-12-05 NOTE — WOUND PROGRESS NOTE
MD order to see the pt. for dressing recs, pt., s/p bedside I and D of left leg wound with Dr Urrutia. Will see the pt. in the am as the wound was just packed. Spoke with the nurse.

## 2024-12-05 NOTE — PROGRESS NOTES
[facility] Hospitalist Progress Note     CC: Hospital Follow up    PCP: Eric Sethi DO       Assessment/Plan:   Jovan Merino - 54 year old male w MO, HTN, Afib, HFpEF, psoriasis admitted 11/18/2024 for LLE cellulitis, started on IV Ancef. Initially improving however 11/21 worse - abx broadened to vanc/zosyn, then cefepime and daptomycin. CT with cellulitis . ID/Rheum/Orhto consulted. MRI with LLE cellulitis, mild myositis in posterior aspect, no abscess, no OM.  PICC in place for extended IV abx coverage. 3rd LLE doppler 11/29/24 Left popliteal vein DVT. Full dose lovenox started with plans to bridge to coumadin     Sepsis due to LLE cellulitis  Immunosuppression due to psoriasis/Humira  - blood cx no growth  - IV ancef (11/18 - 21 )  - 11/21: worse, Consulted ID - transitioned to cefepime and daptomycin  - CT negative 11/21 for underlying abscess  - Rheum/Ortho on consult   - No concern for compartment syndrome or joint involvement at this time  - MRI with LLE cellulitis, mild myositis in posterior aspect, no abscess, no OM   - PICC in place for extended IV abx coverage  - noted 12/4 area of fluctuance above the medial aspect of left ankle. Discussed with ortho Dr. Jorge Urrutia who will evaluate, I have ordered a CT as well and showing thin walled area of fluid enhancing subcutaneous collection     Acute DVT  - LLE, Left popliteal vein  - full dose lovenox to coumadin   - will need Lovenox injection training and dietitian for warfarin diet  - was started on coumadin 10 mg HS, higher dose due to his weight and titrate based on INR  - will need PCP set up to follow-up coumadin dosing  - Xa level checked---in therapeutic range     Partial quad tear  L knee effusion  - ortho consulted, appreciate recs     Headaches  - fioricet PRN     HFpEF - compensated  HTN  Parox Afib - low CV, not on AC  - Continue PTA lasix, flecainide, coreg, hydral, enalapril     Psoriasis  - On humira OP. Would hold until leg better     Morbid  obesity w [facility] [place]  - Body mass index is 52.8 kg/m².   - Healthy diet & wt loss encouraged  - Has lost quite a bit of weight with Mounjaro already     DM2 - currently controlled w mounjaro  - Okay for reg diet, fCan hold on SSI/accuchecks for now, can add if issues controlling     Depression   - seen by psych liaison  - started Zoloft 50mg daily     ACP: Full Code  Ppx: DVT: Enox  Dispo  EDoD:  pending, IV antibiotics planned at discharge. Awaiting ortho re-eval      F/u:   - PCP:  Eric Sethi, DO Candido Suarez DO  [facility]      Subjective:     Pain is controlled but still present. Noticed area of fluctuance on medial aspect above left ankle. No fevers.     OBJECTIVE:    Blood pressure 119/67, pulse 77, temperature 98.1 °F (36.7 °C), temperature source Oral, resp. rate 18, height 6' 1\" (1.854 m), weight (!) 400 lb 3.2 oz (181.5 kg), SpO2 92%.    Temp:  [98.1 °F (36.7 °C)-98.7 °F (37.1 °C)] 98.1 °F (36.7 °C)  Pulse:  [71-78] 77  Resp:  [18-19] 18  BP: (110-124)/(67-82) 119/67  SpO2:  [92 %-93 %] 92 %      Intake/Output:    Intake/Output Summary (Last 24 hours) at 12/5/2024 1253  Last data filed at 12/5/2024 1020  Gross per 24 hour   Intake 900 ml   Output 500 ml   Net 400 ml       Last 3 Weights   11/20/24 0528 (!) 400 lb 3.2 oz (181.5 kg)   11/19/24 0615 (!) 395 lb 3.2 oz (179.3 kg)   11/18/24 1705 (!) 394 lb 3.2 oz (178.8 kg)   11/18/24 1112 (!) 395 lb (179.2 kg)   11/17/21 1338 (!) 379 lb 3.2 oz (172 kg)   06/10/21 1222 (!) 364 lb (165.1 kg)       Exam   GEN: no distress  Pulm: CTABL  CV: RRR, no murmurs  ABD: Soft  MSK: swelling improved LLE compartments not tense, fluctuant area above medial aspect of left ankle   Neuro: Grossly normal, CN intact, sensory intact  SKIN: warm, dry, psoriatic plaques, LLE with improved edema    Data Review:       Labs:     Recent Labs   Lab 12/02/24  0443 12/03/24  0543 12/05/24  1018   RBC 3.83* 4.14* 4.12*   HGB 10.4* 11.3* 11.3*   HCT 32.9* 35.5* 35.3*    MCV 85.9 85.7 85.7   MCH 27.2 27.3 27.4   MCHC 31.6 31.8 32.0   RDW 15.0 14.7 14.6   NEPRELIM 5.35 5.90 4.86   WBC 9.9 10.6 8.6   .0 403.0 373.0         Recent Labs   Lab 12/03/24  0543 12/04/24  1711 12/05/24  1018   * 86 100*   BUN 21 19 22   CREATSERUM 0.88 0.92 1.01   EGFRCR 103 100 89   CA 10.2 9.8 10.1   * 134* 136   K 4.5 4.5 4.2    102 102   CO2 29.0 31.0 30.0       No results for input(s): \"ALT\", \"AST\", \"ALB\", \"AMYLASE\", \"LIPASE\", \"LDH\" in the last 168 hours.    Invalid input(s): \"ALPHOS\", \"TBIL\", \"DBIL\", \"TPROT\"      Imaging:  No results found.      Meds:      lidocaine-EPINEPHrine  1 mL Intradermal Once    traMADol  100 mg Oral q6h    sertraline  50 mg Oral Daily    warfarin  10 mg Oral Nightly    enoxaparin  140 mg Subcutaneous q12h    furosemide  40 mg Oral Daily    cefepime  2 g Intravenous Q8H    clotrimazole-betamethasone   Topical BID    docosanol   Topical BID    cetirizine  10 mg Oral Daily    enalapril  20 mg Oral BID    flecainide  150 mg Oral BID    hydrALAZINE  50 mg Oral BID    carvedilol  25 mg Oral BID with meals    magnesium oxide  200 mg Oral Nightly    dilTIAZem HCl ER Coated Beads  180 mg Oral Nightly         HYDROmorphone    magnesium hydroxide    calcium carbonate    famotidine    acetaminophen    melatonin    polyethylene glycol (PEG 3350)    sennosides    guaiFENesin    ondansetron    prochlorperazine    HYDROmorphone    simethicone

## 2024-12-05 NOTE — PROGRESS NOTES
Pharmacy Progress Note:  Anticoagulation Dose Adjustment     Jovan Merino Sr. is a 54 year old male on enoxaparin for Treatment of DVT .  Anti-factor Xa levels are being monitored due to the patient's high risk status of obesity.    Relevant labs:    Body mass index is 52.8 kg/m².    Wt Readings from Last 1 Encounters:   11/20/24 (!) 181.5 kg (400 lb 3.2 oz)       Lab Results   Component Value Date    CREATSERUM 1.01 12/05/2024       Heparin Anti Xa Assay   Date Value Ref Range Status   12/05/2024 0.93 IU/mL Final       Anti Xa level being assessed:  0.93 units/mL (Peak drawn 4 hours after dose).  Goal Peak Anti-Xa level:  (Enoxaparin treatment: 0.5-1 unit/mL).  Goal Trough Anti-Xa level: </=0.5 unit/mL  (when applicable)    Based on the above, enoxaparin dose will continue at 140mg . Next Anti-factor Xa Peak drawn 4 hours after dosewill be ordered on 12/6 @0900.    Thank you,  Lupe Helton, PharmD  12/5/2024 11:06 AM

## 2024-12-05 NOTE — CM/SW NOTE
Department  asked to send updates to [facility] referral for [facility].    Assigned SW/CM to follow up with patient/family on discharge plan.     Kristel Hector, DSC

## 2024-12-05 NOTE — PROGRESS NOTES
[facility]  part of [facility]    Progress Note    Jovan Merino Sr. Patient Status:  Inpatient    1970 MRN [id number]   Location [facility] 5SW/SE Attending Candido Suarez DO   Hosp Day # 17 PCP Eric Sethi DO       Subjective:   Jovan Merino Sr. is a(n) 54 year old male with a left lower extremity cellulitis.  Symptoms improving on antibiotics.  Has been here for approximately 10 days.  Denies fevers, chills, night sweats, or other constitutional symptoms in the last 24 hours.  Denies chest pain or shortness of breath.    Objective:   Blood pressure 119/67, pulse 77, temperature 98.1 °F (36.7 °C), temperature source Oral, resp. rate 18, height 6' 1\" (1.854 m), weight (!) 400 lb 3.2 oz (181.5 kg), SpO2 92%.    NAD, Aox3  LLE:  -Skin swelling and redness of the lower leg diffusely, improved from previously  -Approximately 3 x 3 cm area of fluctuance of the medial ankle approximately 7 cm proximal to the tip of the medial malleolus with surrounding redness  -Sensations intact to light touch in the superficial peroneal, deep peroneal, tibial, saphenous, and sural nerve distributions.  -Firing gastroc, tibialis anterior, EHL, and FHL musculature    CT left ankle with contrast:  -2.7 x 0.9 x 8 cm thin-walled enhancing subcutaneous fluid collection along the medial aspect of the mid to distal tibia    Assessment and Plan:      54M with a left lower extremity infection and area of fluctuance concerning for an abscess.    -I discussed treatment options and following discussion he would like to proceed with I&D of the abscess.  Betadine was used to clean the area and the area was anesthetized with 10 cc of 1% lidocaine with epinephrine and an 11 blade was used to make a approximately 5 to 6 mm incision overlying the superior anterior border of the fluctuant area.  Scissors and forceps were used to open the area and a significant amount of clear fluid was milked out, approximately 4  to 5 cc worth.  The area was packed with packing material and a dressing of 4 x 4's and an Ace wrap were applied to the area.  Patient tolerated the procedure well with no issues.  -Wound consult for packing and daily dressing changes  -Continue antibiotic management per primary team  -May need home services for wound treatment  -From discharge from orthopedic perspective once arrangements for for packing changes have been made      Results:     Lab Results   Component Value Date    WBC 8.6 12/05/2024    HGB 11.3 (L) 12/05/2024    HCT 35.3 (L) 12/05/2024    .0 12/05/2024    CREATSERUM 1.01 12/05/2024    BUN 22 12/05/2024     12/05/2024    K 4.2 12/05/2024     12/05/2024    CO2 30.0 12/05/2024     (H) 12/05/2024    CA 10.1 12/05/2024    ALB 4.1 11/18/2024    ALKPHO 72 11/18/2024    BILT 0.3 11/18/2024    TP 7.1 11/18/2024    AST 29 11/18/2024    ALT 38 11/18/2024    INR 1.18 12/05/2024    TSH 0.927 11/18/2021    PSA 1.05 11/18/2021    DDIMER <0.27 11/14/2016    ESRML 102 (H) 11/22/2024    CRP 3.30 (H) 12/02/2024    MG 2.0 09/22/2017    TROP <0.046 11/15/2016    CK 1,265 (H) 12/02/2024       No results found.          Jorge Urrutia MD  12/5/2024

## 2024-12-06 ENCOUNTER — APPOINTMENT (OUTPATIENT)
Dept: GENERAL RADIOLOGY | Facility: HOSPITAL | Age: 54
End: 2024-12-06
Attending: INTERNAL MEDICINE
Payer: COMMERCIAL

## 2024-12-06 LAB
INR BLD: 1.14 (ref 0.8–1.2)
PROTHROMBIN TIME: 15.3 SECONDS (ref 11.6–14.8)
UFH PPP CHRO-ACNC: 1.01 IU/ML

## 2024-12-06 PROCEDURE — 74018 RADEX ABDOMEN 1 VIEW: CPT | Performed by: INTERNAL MEDICINE

## 2024-12-06 RX ORDER — ENOXAPARIN SODIUM 150 MG/ML
120 INJECTION SUBCUTANEOUS EVERY 12 HOURS
Status: DISCONTINUED | OUTPATIENT
Start: 2024-12-06 | End: 2024-12-07

## 2024-12-06 NOTE — WOUND PROGRESS NOTE
Attempting to follow up with the pt.'s LLE wound, transporter is here to take the pt. to radiology, I will see the pt. later.

## 2024-12-06 NOTE — CM/SW NOTE
JASVIR spoke to Dr. Suarez who stated pt may be safe to discharge on Eliquis coverage. JASVIR confirmed Rx sent via e-script to pt's pharmacy.     JASVIR/CM contacted pt's Pharmacy [facility]'s via phone #: [phone] and spoke to [employer]. Per tech pt's OOP cost is $60 per month.      Free 30 day and/or $10 co-pay coupons have been added to pt's chart.      JASVIR updated liaisons Herb w/[facility] on possible weekend DC.    PLAN: DC home w/[facility] pending med clear     / to remain available for support and/or discharge planning.     Georgie Landis MSW, LSW [phone]

## 2024-12-06 NOTE — PROGRESS NOTES
[facility]  part of [facility] Infectious Disease  Progress Note    Jovan GUTHRIE Wilber Barrett. Patient Status:  Inpatient    1970 MRN [id number]   Location [facility] 5SW/SE Attending Candido Suarez DO   Hosp Day # 18 PCP Eric Sethi DO     Subjective:  Patient seen and examined sitting up in bed. States that he had been experiencing some abdominal pain overnight. Plans for abdominal XR this AM. Feeling better this morning. Now s/p I&D with ortho on 24. LLE erythema improving. Pain controlled. Remains afebrile. Denies n/v/d. Otherwise no new complaints.     Objective:  Blood pressure 135/75, pulse 70, temperature 98.4 °F (36.9 °C), temperature source Oral, resp. rate 18, height 6' 1\" (1.854 m), weight (!) 400 lb 3.2 oz (181.5 kg), SpO2 92%.    Intake/Output:    Intake/Output Summary (Last 24 hours) at 2024 0838  Last data filed at 2024 0500  Gross per 24 hour   Intake 700 ml   Output --   Net 700 ml       Physical Exam:  General: Awake, alert, non-tox, NAD.  HEENT:  Oropharynx clear, trachea ML.  Heart: RRR S1S2 no murmurs.  Lungs: Essentially CTA b/l, no rhonchi, rales, wheezes.  Abdomen: Soft, NT/ND.  BS present.  No guarding or rebound.  Extremity: +2 LE edema. Stable erythema. Dressing c/d/I.  Neurological: No focal deficits.  Derm:  Warm and dry.    Lab Data Review:  Lab Results   Component Value Date    WBC 8.6 2024    HGB 11.3 2024    HCT 35.3 2024    .0 2024    CREATSERUM 1.01 2024    BUN 22 2024     2024    K 4.2 2024     2024    CO2 30.0 2024     2024    CA 10.1 2024    INR 1.14 2024        Cultures:  Hospital Encounter on 24   1. Aerobic Bacterial Culture     Status: None (Preliminary result)    Collection Time: 24  2:40 PM    Specimen: Leg, left; Other   Result Value Ref Range    Aerobic Smear No WBCs seen N/A    Aerobic  Smear No organisms seen N/A   2. Blood Culture     Status: None    Collection Time: 11/19/24  8:48 AM    Specimen: Blood,peripheral   Result Value Ref Range    Blood Culture Result No Growth 5 Days N/A       Radiology:  No results found.      Assessment and Plan:  1.  LLE cellulitis in this patient presenting here with sepsis with threat to life  - Blood cultures NGTD.  - MRI with mild myositis.  - Initially on IV cefazolin without improvement, escalated to IV daptomycin and cefepime on 11/21.  - IV daptomycin stopped on 12/1 d/t rhabdomyolysis.  - Now s/p I&D with ortho on 12/5/24 -- cultures pending.  - Currently on IV cefepime with clinical improvement.    2.  LLE DVT  - On anticoagulation as per the primary team.    3.  History of psoriasis  - Patient reporting improvement of psoriasis since starting antibiotics.  - Would benefit from penicillin V 500 mg twice daily for suppression to be started once off cefepime.  - Will discuss on follow-up as outpatient.    4.  Recs  - Continue IV cefepime through 12/11/24.  Orders in med rec.  - PICC line in place.  - F/u WBC and fever curve.  - F/u cultures.  - Wound care as per ortho.   - Anticoagulation as per the primary team.  - Supportive care as per the primary team.  - Please draw weekly CBC with diff, CMP and CRP for duration of abx therapy and fax results to [facility] at [phone].  - Discussed plan of care with nursing.   - Discussed plan of care with patient. All questions addressed and understanding verbalized.  - Further recommendations pending clinical course.    Discussed case with ID attending/collaborating physician, Dr. Natalie Cassidy, who is in agreement with the above plan of care    Please note that this report has been produced using speech recognition software and may contain errors related to that system including, but not limited to, errors in grammar, punctuation, and spelling, as well as words and phrases that possibly may have been recognized  inappropriately.  If there are any questions or concerns, contact the dictating provider for clarification.     The 21st Century Cures Act makes medical notes like these available to patients in the interest of transparency. Please be advised this is a medical document. Medical documents are intended to carry relevant information, facts as evident, and the clinical opinion of the practitioner. The medical note is intended as peer to peer communication and may appear blunt or direct. It is written in medical language and may contain abbreviations or verbiage that are unfamiliar.     If you have any questions or concerns please call [facility] Infectious Disease at [phone].     Otoniel Dempsey, YOLANDA    12/6/2024  8:37 AM

## 2024-12-06 NOTE — PAYOR COMM NOTE
--------------  CONTINUED STAY REVIEW    Payor: [employer] STATE PPO  Subscriber #:  [id number]  Authorization Number: [id number]    Admit date: 11/18/24  Admit time:  4:58 PM    REVIEW DOCUMENTATION: 12/6 12/6/2024 Hospitalist Progress Note     [facility] Hospitalist Progress Note      CC: Hospital Follow up     PCP: Eric Sethi,          Assessment/Plan:   Jovan Merino - 54 year old male w MO, HTN, Afib, HFpEF, psoriasis admitted 11/18/2024 for LLE cellulitis, started on IV Ancef. Initially improving however 11/21 worse - abx broadened to vanc/zosyn, then cefepime and daptomycin. CT with cellulitis . ID/Rheum/Orhto consulted. MRI with LLE cellulitis, mild myositis in posterior aspect, no abscess, no OM.  PICC in place for extended IV abx coverage. 3rd LLE doppler 11/29/24 Left popliteal vein DVT. Thus far lovenox and coumadin started.      Sepsis due to LLE cellulitis  Immunosuppression due to psoriasis/Humira  - blood cx no growth  - IV ancef (11/18 - 21 )  - 11/21: worse, Consulted ID - transitioned to cefepime and daptomycin  - CT negative 11/21 for underlying abscess  - Rheum/Ortho on consult   - No concern for compartment syndrome or joint involvement at this time  - MRI with LLE cellulitis, mild myositis in posterior aspect, no abscess, no OM   - PICC in place for extended IV abx coverage  - noted 12/4 area of fluctuance above the medial aspect of left ankle. I&D done by Dr. Urrutia on 12/5, cultures sent.      Acute DVT  - LLE, Left popliteal vein  - full dose lovenox to coumadin   - will re-visit consideration of DOAC. I do not think BMI being high is contraindication for eliquis based on review. I will consider using eliquis and check price.      Partial quad tear  L knee effusion  - ortho consulted, appreciate recs     Headaches  - fioricet PRN     HFpEF - compensated  HTN  Parox Afib - low CV, not on AC  - Continue PTA lasix, flecainide, coreg, hydral, enalapril     Psoriasis  - On humira OP. Would  hold until leg better     Morbid obesity w [facility]  - Body mass index is 52.8 kg/m².   - Healthy diet & wt loss encouraged  - Has lost quite a bit of weight with Mounjaro already     DM2 - currently controlled w mounjaro  - Okay for reg diet, fCan hold on SSI/accuchecks     Depression   - seen by psych liaison  - started Zoloft 50mg daily      ACP: Full Code  Ppx: DVT: currently on full dose lovenox and coumadin. We discussed switched to eliquis as an option. Will obtain price as well.   Dispo  EDoD:  pending. Likely by Sunday. Discussed with wife who is a RN here.      F/u:   - PCP:  Eric Sethi, DO Candido Suarez DO  [facility] Hospitalist       Subjective:      No acute symptoms.   Some abdominal pain but now resolved, sent for KUB which was negative for anything acute.   Discussed anticoagulation.      OBJECTIVE:     Blood pressure 124/72, pulse 72, temperature 98.4 °F (36.9 °C), temperature source Oral, resp. rate 18, height 6' 1\" (1.854 m), weight (!) 400 lb 3.2 oz (181.5 kg), SpO2 93%.     Temp:  [98 °F (36.7 °C)-98.6 °F (37 °C)] 98.4 °F (36.9 °C)  Pulse:  [66-74] 72  Resp:  [17-18] 18  BP: (114-135)/(65-75) 124/72  SpO2:  [92 %-94 %] 93 %        Intake/Output:     Intake/Output Summary (Last 24 hours) at 12/6/2024 1136  Last data filed at 12/6/2024 1045      Gross per 24 hour   Intake 460 ml   Output 725 ml   Net -265 ml              Last 3 Weights   11/20/24 0528 (!) 400 lb 3.2 oz (181.5 kg)   11/19/24 0615 (!) 395 lb 3.2 oz (179.3 kg)   11/18/24 1705 (!) 394 lb 3.2 oz (178.8 kg)   11/18/24 1112 (!) 395 lb (179.2 kg)   11/17/21 1338 (!) 379 lb 3.2 oz (172 kg)   06/10/21 1222 (!) 364 lb (165.1 kg)         Exam   GEN: no distress  Pulm: CTABL  CV: RRR, no murmurs  ABD: Soft  MSK: swelling improved LLE compartments not tense  SKIN: warm, dry, psoriatic plaques, LLE with improved edema     Data Review:       Labs:            Recent Labs   Lab 12/02/24  0443 12/03/24  0543 12/05/24  1018   RBC 3.83*  4.14* 4.12*   HGB 10.4* 11.3* 11.3*   HCT 32.9* 35.5* 35.3*   MCV 85.9 85.7 85.7   MCH 27.2 27.3 27.4   MCHC 31.6 31.8 32.0   RDW 15.0 14.7 14.6   NEPRELIM 5.35 5.90 4.86   WBC 9.9 10.6 8.6   .0 403.0 373.0                  Recent Labs   Lab 12/03/24  0543 12/04/24  1711 12/05/24  1018   * 86 100*   BUN 21 19 22   CREATSERUM 0.88 0.92 1.01   EGFRCR 103 100 89   CA 10.2 9.8 10.1   * 134* 136   K 4.5 4.5 4.2    102 102   CO2 29.0 31.0 30.0         No results for input(s): \"ALT\", \"AST\", \"ALB\", \"AMYLASE\", \"LIPASE\", \"LDH\" in the last 168 hours.     Invalid input(s): \"ALPHOS\", \"TBIL\", \"DBIL\", \"TPROT\"        Imaging:  XR ABDOMEN (1 VIEW) (CPT=74018)     Result Date: 12/6/2024  CONCLUSION:          Nonobstructive bowel gas pattern.     Dictated by (CST): Gilberto Pan MD on 12/06/2024 at 10:17 AM     Finalized by (CST): Gilberto Pan MD on 12/06/2024 at 10:18 AM               Meds:      Scheduled Medications    traMADol  100 mg Oral q6h    sertraline  50 mg Oral Daily    warfarin  10 mg Oral Nightly    enoxaparin  140 mg Subcutaneous q12h    furosemide  40 mg Oral Daily    cefepime  2 g Intravenous Q8H    clotrimazole-betamethasone   Topical BID    docosanol   Topical BID    cetirizine  10 mg Oral Daily    enalapril  20 mg Oral BID    flecainide  150 mg Oral BID    hydrALAZINE  50 mg Oral BID    carvedilol  25 mg Oral BID with meals    magnesium oxide  200 mg Oral Nightly    dilTIAZem HCl ER Coated Beads  180 mg Oral Nightly         Medication Infusions           PRN Medications     zolpidem    HYDROmorphone    magnesium hydroxide    calcium carbonate    famotidine    acetaminophen    melatonin    polyethylene glycol (PEG 3350)    sennosides    guaiFENesin    ondansetron    prochlorperazine    HYDROmorphone    simethicone           MEDICATIONS ADMINISTERED IN LAST 1 DAY:  acetaminophen (Tylenol Extra Strength) tab 500 mg       Date Action Dose Route User    12/5/2024 1445 Given 500 mg Oral  Albina Pathak RN          calcium carbonate (Tums) chewable tab 1,000 mg       Date Action Dose Route User    12/6/2024 1023 Given 1,000 mg Oral Erickson Valencia RN    12/5/2024 1749 Given 1,000 mg Oral Albina Pathak RN          carvedilol (Coreg) tab 25 mg       Date Action Dose Route User    12/6/2024 1027 Given 25 mg Oral Erickson Valencia RN    12/5/2024 1719 Given 25 mg Oral Albina Pathak RN          ceFEPIme (Maxpime) 2 g in sodium chloride 0.9% 100 mL IVPB-MBP       Date Action Dose Route User    12/6/2024 1024 New Bag 2 g Intravenous Erickson Valencia RN    12/6/2024 0219 New Bag 2 g Intravenous Mercy Toledo RN    12/5/2024 1719 New Bag 2 g Intravenous Albina Pathak RN          cetirizine (ZyrTEC) tab 10 mg       Date Action Dose Route User    12/6/2024 1023 Given 10 mg Oral Erickson Valencia RN          dilTIAZem ER (CardIZEM CD) 24 hr cap 180 mg       Date Action Dose Route User    12/5/2024 2100 Given 180 mg Oral Mercy Toledo RN          enalapril (Vasotec) tab 20 mg       Date Action Dose Route User    12/6/2024 1023 Given 20 mg Oral Erickson Valencia RN    12/5/2024 2101 Given 20 mg Oral Mercy Toledo RN          enoxaparin (Lovenox) 150 MG/ML SUBQ injection 140 mg       Date Action Dose Route User    12/6/2024 0529 Given 140 mg Subcutaneous (Right Upper Abdomen) Mercy Toledo RN    12/5/2024 1719 Given 140 mg Subcutaneous (Left Lower Abdomen) Albina Pathak RN          flecainide (Tambocor) tab 150 mg       Date Action Dose Route User    12/6/2024 1023 Given 150 mg Oral Erickson Valencia RN    12/5/2024 2101 Given 150 mg Oral Mercy Toledo RN          furosemide (Lasix) tab 40 mg       Date Action Dose Route User    12/6/2024 1023 Given 40 mg Oral Valencia, Tonian, RN          hydrALAZINE (Apresoline) tab 50 mg       Date Action Dose Route User    12/6/2024 1023 Given 50 mg Oral Erickson Valencia RN    12/5/2024 2101 Given 50 mg Oral Tre,  CHELSEY [place]          HYDROmorphone (Dilaudid) 1 MG/ML injection 0.5 mg       Date Action Dose Route User    12/6/2024 0246 Given 0.5 mg Intravenous Mercy Toledo RN    12/5/2024 2108 Given 0.5 mg Intravenous Mercy Toledo RN          lidocaine 1%-EPINEPHrine 1:200,000 (Xylocaine-Epinephrine) injection       Date Action Dose Route User    12/5/2024 1441 Given 1 mL Intradermal Albina Pathak RN          magnesium oxide (Mag-Ox) tab 200 mg       Date Action Dose Route User    12/5/2024 2108 Given 200 mg Oral Mercy Toledo RN          sertraline (Zoloft) tab 50 mg       Date Action Dose Route User    12/6/2024 1023 Given 50 mg Oral Erickson Valencia RN          traMADol (Ultram) tab 100 mg       Date Action Dose Route User    12/6/2024 1023 Given 100 mg Oral Erickson Valencia RN    12/6/2024 0219 Given 100 mg Oral Mercy Toledo RN    12/5/2024 2101 Given 100 mg Oral Mercy Toledo RN    12/5/2024 1340 Given 100 mg Oral Albina Pathak RN          warfarin (Coumadin) tab 10 mg       Date Action Dose Route User    12/5/2024 2101 Given 10 mg Oral Mercy Toledo RN            Vitals (last day)       Date/Time Temp Pulse Resp BP SpO2 Weight O2 Device O2 Flow Rate (L/min) [facility]    12/06/24 1020 -- 72 -- 124/72 93 % -- None (Room air) -- TT    12/06/24 0747 98.4 °F (36.9 °C) 70 18 135/75 92 % -- None (Room air) -- TT    12/06/24 0528 98.4 °F (36.9 °C) 66 18 130/65 92 % -- None (Room air) -- EM    12/05/24 2058 98.6 °F (37 °C) 74 18 118/73 94 % -- None (Room air) -- EM    12/05/24 1718 98 °F (36.7 °C) 74 17 114/75 94 % -- None (Room air) -- AB    12/05/24 0902 98.1 °F (36.7 °C) 77 18 119/67 92 % -- None (Room air) -- AB    12/05/24 0507 98.7 °F (37.1 °C) 71 18 124/73 93 % -- None (Room air) -- RP

## 2024-12-06 NOTE — PROGRESS NOTES
12/06/24 1045   Wound 11/22/24 Leg Left;Lateral;Lower   Date First Assessed/Time First Assessed: 11/22/24 1115   Present on Original Admission: Yes  Primary Wound Type: (c) Other (comment)  Location: Leg  Wound Location Orientation: Left;Lateral;Lower  Wound Description (Comments): s/p I and D by Dr Urrutia 1...   Wound Image    Site Assessment Moist;Fragile;Painful;Pink;Red   Drainage Amount Scant   Drainage Description Serosanguineous;Sanguineous   Treatments Cleansed;Saline;Site Care  (culture collected per MD order)   Dressing 4x4s;Aquacel Foam;Kerlix roll;Tape;Iodoform gauze (packing);Ace bandage   Dressing Changed Changed   Dressing Status Clean;Dry;Intact;Dressing Changed   Wound Length (cm) 0.7 cm   Wound Width (cm) 0.3 cm   Wound Surface Area (cm^2) 0.21 cm^2   Wound Depth (cm) 0.3 cm   Wound Volume (cm^3) 0.063 cm^3   Margins Attached edges   Non-staged Wound Description Full thickness   Elizabeth-wound Assessment Fragile;Edema;Pink;Painful;Red  (improved erythema)   Wound Granulation Tissue Red;Pink   Wound Bed Granulation (%) 100 %   State of Healing Early/partial granulation   Wound Odor None   Shape   (open slit s/p bedside I and D)   Tunneling? No   Undermining? No   Sinus Tracts? No   Wound Follow Up   Follow up needed Yes     MD order for dressing recommendation, the pt. is back from radiology, asked if he needed pain meds and he declined. The pt. is s/p I and D at the bedside by Dr. Urrutia 12/5/24. See the wound assessment, the nurse is at the bedside, she noted a culture is needed. I cleansed, irrigated the wound with saline, culture was taken. Recommend to continue the 1/4 inch Iodoform strip packing into the depth, dry gauze, abd pad, Kerlix roll and 1 ace wrap daily and prn. Educated the pt. on the wound care and dressing, he noted he has someone at home that can help him. LLE elevated in bed, spoke with the nurse.

## 2024-12-06 NOTE — PROGRESS NOTES
[facility] Hospitalist Progress Note     CC: Hospital Follow up    PCP: Eric Sethi DO       Assessment/Plan:   Jovan Merino - 54 year old male w MO, HTN, Afib, HFpEF, psoriasis admitted 11/18/2024 for LLE cellulitis, started on IV Ancef. Initially improving however 11/21 worse - abx broadened to vanc/zosyn, then cefepime and daptomycin. CT with cellulitis . ID/Rheum/Orhto consulted. MRI with LLE cellulitis, mild myositis in posterior aspect, no abscess, no OM.  PICC in place for extended IV abx coverage. 3rd LLE doppler 11/29/24 Left popliteal vein DVT. Thus far lovenox and coumadin started.      Sepsis due to LLE cellulitis  Immunosuppression due to psoriasis/Humira  - blood cx no growth  - IV ancef (11/18 - 21 )  - 11/21: worse, Consulted ID - transitioned to cefepime and daptomycin  - CT negative 11/21 for underlying abscess  - Rheum/Ortho on consult   - No concern for compartment syndrome or joint involvement at this time  - MRI with LLE cellulitis, mild myositis in posterior aspect, no abscess, no OM   - PICC in place for extended IV abx coverage  - noted 12/4 area of fluctuance above the medial aspect of left ankle. I&D done by Dr. Urrutia on 12/5, cultures sent.     Acute DVT  - LLE, Left popliteal vein  - full dose lovenox to coumadin   - will re-visit consideration of DOAC. I do not think BMI being high is contraindication for eliquis based on review. I will consider using eliquis and check price.     Partial quad tear  L knee effusion  - ortho consulted, appreciate recs     Headaches  - fioricet PRN     HFpEF - compensated  HTN  Parox Afib - low CV, not on AC  - Continue PTA lasix, flecainide, coreg, hydral, enalapril     Psoriasis  - On humira OP. Would hold until leg better     Morbid obesity w [facility]  - Body mass index is 52.8 kg/m².   - Healthy diet & wt loss encouraged  - Has lost quite a bit of weight with Mounjaro already     DM2 - currently controlled w mounjaro  - Okay for reg diet, fCan hold on  SSI/accuchecks     Depression   - seen by psych liaison  - started Zoloft 50mg daily     ACP: Full Code  Ppx: DVT: currently on full dose lovenox and coumadin. We discussed switched to eliquis as an option. Will obtain price as well.   Dispo  EDoD:  pending. Likely by Sunday. Discussed with wife who is a RN here.      F/u:   - PCP:  DO Candido Agrawal DO  [facility] Hospitalist      Subjective:     No acute symptoms.   Some abdominal pain but now resolved, sent for KUB which was negative for anything acute.   Discussed anticoagulation.     OBJECTIVE:    Blood pressure 124/72, pulse 72, temperature 98.4 °F (36.9 °C), temperature source Oral, resp. rate 18, height 6' 1\" (1.854 m), weight (!) 400 lb 3.2 oz (181.5 kg), SpO2 93%.    Temp:  [98 °F (36.7 °C)-98.6 °F (37 °C)] 98.4 °F (36.9 °C)  Pulse:  [66-74] 72  Resp:  [17-18] 18  BP: (114-135)/(65-75) 124/72  SpO2:  [92 %-94 %] 93 %      Intake/Output:    Intake/Output Summary (Last 24 hours) at 12/6/2024 1136  Last data filed at 12/6/2024 1045  Gross per 24 hour   Intake 460 ml   Output 725 ml   Net -265 ml       Last 3 Weights   11/20/24 0528 (!) 400 lb 3.2 oz (181.5 kg)   11/19/24 0615 (!) 395 lb 3.2 oz (179.3 kg)   11/18/24 1705 (!) 394 lb 3.2 oz (178.8 kg)   11/18/24 1112 (!) 395 lb (179.2 kg)   11/17/21 1338 (!) 379 lb 3.2 oz (172 kg)   06/10/21 1222 (!) 364 lb (165.1 kg)       Exam   GEN: no distress  Pulm: CTABL  CV: RRR, no murmurs  ABD: Soft  MSK: swelling improved LLE compartments not tense  SKIN: warm, dry, psoriatic plaques, LLE with improved edema    Data Review:       Labs:     Recent Labs   Lab 12/02/24  0443 12/03/24  0543 12/05/24  1018   RBC 3.83* 4.14* 4.12*   HGB 10.4* 11.3* 11.3*   HCT 32.9* 35.5* 35.3*   MCV 85.9 85.7 85.7   MCH 27.2 27.3 27.4   MCHC 31.6 31.8 32.0   RDW 15.0 14.7 14.6   NEPRELIM 5.35 5.90 4.86   WBC 9.9 10.6 8.6   .0 403.0 373.0         Recent Labs   Lab 12/03/24  0543 12/04/24  8692  12/05/24  1018   * 86 100*   BUN 21 19 22   CREATSERUM 0.88 0.92 1.01   EGFRCR 103 100 89   CA 10.2 9.8 10.1   * 134* 136   K 4.5 4.5 4.2    102 102   CO2 29.0 31.0 30.0       No results for input(s): \"ALT\", \"AST\", \"ALB\", \"AMYLASE\", \"LIPASE\", \"LDH\" in the last 168 hours.    Invalid input(s): \"ALPHOS\", \"TBIL\", \"DBIL\", \"TPROT\"      Imaging:  XR ABDOMEN (1 VIEW) (CPT=74018)    Result Date: 12/6/2024  CONCLUSION:   Nonobstructive bowel gas pattern.     Dictated by (CST): Gilberto Pan MD on 12/06/2024 at 10:17 AM     Finalized by (CST): Gilberto Pan MD on 12/06/2024 at 10:18 AM             Meds:      traMADol  100 mg Oral q6h    sertraline  50 mg Oral Daily    warfarin  10 mg Oral Nightly    enoxaparin  140 mg Subcutaneous q12h    furosemide  40 mg Oral Daily    cefepime  2 g Intravenous Q8H    clotrimazole-betamethasone   Topical BID    docosanol   Topical BID    cetirizine  10 mg Oral Daily    enalapril  20 mg Oral BID    flecainide  150 mg Oral BID    hydrALAZINE  50 mg Oral BID    carvedilol  25 mg Oral BID with meals    magnesium oxide  200 mg Oral Nightly    dilTIAZem HCl ER Coated Beads  180 mg Oral Nightly         zolpidem    HYDROmorphone    magnesium hydroxide    calcium carbonate    famotidine    acetaminophen    melatonin    polyethylene glycol (PEG 3350)    sennosides    guaiFENesin    ondansetron    prochlorperazine    HYDROmorphone    simethicone

## 2024-12-06 NOTE — PHYSICAL THERAPY NOTE
Pt rec'd in bed reporting severe pain from wound cleaning and bandage change. Per pt he has been independent for ambulating in room the past few days and no longer requires IP PT. Will sign off at this time. Please reach out with new orders if pt mobility status changes.

## 2024-12-06 NOTE — PLAN OF CARE
Problem: Patient Centered Care  Goal: Patient preferences are identified and integrated in the patient's plan of care  Description: Interventions:  - What would you like us to know as we care for you? From home alone  - Provide timely, complete, and accurate information to patient/family  - Incorporate patient and family knowledge, values, beliefs, and cultural backgrounds into the planning and delivery of care  - Encourage patient/family to participate in care and decision-making at the level they choose  - Honor patient and family perspectives and choices  Outcome: Progressing     Problem: CARDIOVASCULAR - ADULT  Goal: Maintains optimal cardiac output and hemodynamic stability  Description: INTERVENTIONS:  - Monitor vital signs, rhythm, and trends  - Monitor for bleeding, hypotension and signs of decreased cardiac output  - Evaluate effectiveness of vasoactive medications to optimize hemodynamic stability  - Monitor arterial and/or venous puncture sites for bleeding and/or hematoma  - Assess quality of pulses, skin color and temperature  - Assess for signs of decreased coronary artery perfusion - ex. Angina  - Evaluate fluid balance, assess for edema, trend weights  Outcome: Progressing     Problem: RESPIRATORY - ADULT  Goal: Achieves optimal ventilation and oxygenation  Description: INTERVENTIONS:  - Assess for changes in respiratory status  - Assess for changes in mentation and behavior  - Position to facilitate oxygenation and minimize respiratory effort  - Oxygen supplementation based on oxygen saturation or ABGs  - Provide Smoking Cessation handout, if applicable  - Encourage broncho-pulmonary hygiene including cough, deep breathe, Incentive Spirometry  - Assess the need for suctioning and perform as needed  - Assess and instruct to report SOB or any respiratory difficulty  - Respiratory Therapy support as indicated  - Manage/alleviate anxiety  - Monitor for signs/symptoms of CO2 retention  Outcome:  Progressing     Problem: GASTROINTESTINAL - ADULT  Goal: Minimal or absence of nausea and vomiting  Description: INTERVENTIONS:  - Maintain adequate hydration with IV or PO as ordered and tolerated  - Nasogastric tube to low intermittent suction as ordered  - Evaluate effectiveness of ordered antiemetic medications  - Provide nonpharmacologic comfort measures as appropriate  - Advance diet as tolerated, if ordered  - Obtain nutritional consult as needed  - Evaluate fluid balance  Outcome: Progressing     Problem: GENITOURINARY - ADULT  Goal: Absence of urinary retention  Description: INTERVENTIONS:  - Assess patient’s ability to void and empty bladder  - Monitor intake/output and perform bladder scan as needed  - Follow urinary retention protocol/standard of care  - Consider collaborating with pharmacy to review patient's medication profile  - Implement strategies to promote bladder emptying  Outcome: Progressing     Problem: METABOLIC/FLUID AND ELECTROLYTES - ADULT  Goal: Electrolytes maintained within normal limits  Description: INTERVENTIONS:  - Monitor labs and rhythm and assess patient for signs and symptoms of electrolyte imbalances  - Administer electrolyte replacement as ordered  - Monitor response to electrolyte replacements, including rhythm and repeat lab results as appropriate  - Fluid restriction as ordered  - Instruct patient on fluid and nutrition restrictions as appropriate  Outcome: Progressing     Problem: SKIN/TISSUE INTEGRITY - ADULT  Goal: Skin integrity remains intact  Description: INTERVENTIONS  - Assess and document risk factors for pressure ulcer development  - Assess and document skin integrity  - Monitor for areas of redness and/or skin breakdown  - Initiate interventions, skin care algorithm/standards of care as needed  Outcome: Progressing  Goal: Incision(s), wounds(s) or drain site(s) healing without S/S of infection  Description: INTERVENTIONS:  - Assess and document risk factors for  pressure ulcer development  - Assess and document skin integrity  - Assess and document dressing/incision, wound bed, drain sites and surrounding tissue  - Implement wound care per orders  - Initiate isolation precautions as appropriate  - Initiate Pressure Ulcer prevention bundle as indicated  Outcome: Progressing     Problem: MUSCULOSKELETAL - ADULT  Goal: Return mobility to safest level of function  Description: INTERVENTIONS:  - Assess patient stability and activity tolerance for standing, transferring and ambulating w/ or w/o assistive devices  - Assist with transfers and ambulation using safe patient handling equipment as needed  - Ensure adequate protection for wounds/incisions during mobilization  - Obtain PT/OT consults as needed  - Advance activity as appropriate  - Communicate ordered activity level and limitations with patient/family  Outcome: Progressing     Problem: PAIN - ADULT  Goal: Verbalizes/displays adequate comfort level or patient's stated pain goal  Description: INTERVENTIONS:  - Encourage pt to monitor pain and request assistance  - Assess pain using appropriate pain scale  - Administer analgesics based on type and severity of pain and evaluate response  - Implement non-pharmacological measures as appropriate and evaluate response  - Consider cultural and social influences on pain and pain management  - Manage/alleviate anxiety  - Utilize distraction and/or relaxation techniques  - Monitor for opioid side effects  - Notify MD/LIP if interventions unsuccessful or patient reports new pain  - Anticipate increased pain with activity and pre-medicate as appropriate  Outcome: Progressing     Problem: SAFETY ADULT - FALL  Goal: Free from fall injury  Description: INTERVENTIONS:  - Assess pt frequently for physical needs  - Identify cognitive and physical deficits and behaviors that affect risk of falls.  - Taylor fall precautions as indicated by assessment.  - Educate pt/family on patient safety  including physical limitations  - Instruct pt to call for assistance with activity based on assessment  - Modify environment to reduce risk of injury  - Provide assistive devices as appropriate  - Consider OT/PT consult to assist with strengthening/mobility  - Encourage toileting schedule  Outcome: Progressing     Problem: DISCHARGE PLANNING  Goal: Discharge to home or other facility with appropriate resources  Description: INTERVENTIONS:  - Identify barriers to discharge w/pt and caregiver  - Include patient/family/discharge partner in discharge planning  - Arrange for needed discharge resources and transportation as appropriate  - Identify discharge learning needs (meds, wound care, etc)  - Arrange for interpreters to assist at discharge as needed  - Consider post-discharge preferences of patient/family/discharge partner  - Complete POLST form as appropriate  - Assess patient's ability to be responsible for managing their own health  - Refer to Case Management Department for coordinating discharge planning if the patient needs post-hospital services based on physician/LIP order or complex needs related to functional status, cognitive ability or social support system  Outcome: Progressing     Problem: Impaired Functional Mobility  Goal: Achieve highest/safest level of mobility/gait  Description: Interventions:  - Assess patient's functional ability and stability  - Promote increasing activity/tolerance for mobility and gait  - Educate and engage patient/family in tolerated activity level and precautions  - Recommend use of  RW for transfers and ambulation  - Recommend patient transfer to bedside chair toward strongest side  - When transferring patient, block weaker knee for safety  - Recommend use of chair position in bed 3 times per day  Outcome: Progressing     Problem: Impaired Activities of Daily Living  Goal: Achieve highest/safest level of independence in self care  Description: Interventions:  - Assess  ability and encourage patient to participate in ADLs to maximize function  - Promote sitting position while performing ADLs such as feeding, grooming, and bathing  - Educate and encourage patient/family in tolerated functional activity level and precautions during self-care  Outcome: Progressing     Problem: Patient/Family Goals  Goal: Patient/Family Long Term Goal  Description: Patient's Long Term Goal: Discharge from the hospital    Interventions:  - Monitor vital signs  - Monitor appropriate labs  - Monitor blood glucose levels  - Pain management  - Administer medications per order  - Follow MD orders  - Diagnostics per order  - Update / inform patient and family on plan of care  - Discharge planning  - See additional Care Plan goals for specific interventions  Outcome: Progressing  Goal: Patient/Family Short Term Goal  Description: Patient's Short Term Goal: Improve redness, swelling, and pain to left lower extremity     Interventions:   - Monitor vital signs  - Monitor appropriate labs  - Monitor blood glucose levels  - Pain management  - Administer medications per order  - Follow MD orders  - Diagnostics per order  - Update / inform patient and family on plan of care  - See additional Care Plan goals for specific interventions  Outcome: Progressing     Problem: HEMATOLOGIC - ADULT  Goal: Free from bleeding injury  Description: (Example usage: patient with low platelets)  INTERVENTIONS:  - Avoid intramuscular injections, enemas and rectal medication administration  - Ensure safe mobilization of patient  - Hold pressure on venipuncture sites to achieve adequate hemostasis  - Assess for signs and symptoms of internal bleeding  - Monitor lab trends  - Patient is to report abnormal signs of bleeding to staff  - Avoid use of toothpicks and dental floss  - Use electric shaver for shaving  - Use soft bristle tooth brush  - Limit straining and forceful nose blowing  Outcome: Progressing

## 2024-12-07 LAB
INR BLD: 1.24 (ref 0.8–1.2)
PROTHROMBIN TIME: 16.3 SECONDS (ref 11.6–14.8)
UFH PPP CHRO-ACNC: 0.42 IU/ML

## 2024-12-07 NOTE — PROGRESS NOTES
Infectious Disease Progress Note      Date of admission: 11/18/2024  1:05 PM     Reason for consult: Left lower extremity cellulitis    Referring physician: Linda Savage MD    Subjective: Patient is improving.  Pain is much improved since the I&D of his abscess.  No nausea vomiting or diarrhea.  No shortness breath.  No cough or sputum production    The rest of the systems were reviewed and found to be negative except was mentioned above    Medications:    apixaban    zolpidem    traMADol    sertraline    HYDROmorphone    furosemide    magnesium hydroxide    cefepime    clotrimazole-betamethasone    docosanol    cetirizine    calcium carbonate    famotidine    enalapril    flecainide    hydrALAZINE    carvedilol    acetaminophen    melatonin    polyethylene glycol (PEG 3350)    sennosides    guaiFENesin    ondansetron    prochlorperazine    HYDROmorphone    magnesium oxide    simethicone    dilTIAZem HCl ER Coated Beads     Allergies:  Allergies[1]    Physical Exam:  Vitals:    12/07/24 0933   BP: 120/73   Pulse: 77   Resp: 16   Temp: 98.8 °F (37.1 °C)     Vitals signs and nursing note reviewed.   Constitutional:       Appearance: Normal appearance.   HENT:      Head: Normocephalic and atraumatic.      Mouth: Mucous membranes are moist.   Neck:      Musculoskeletal: Neck supple.   Cardiovascular:      Rate and Rhythm: Normal rate.   Pulmonary:      Effort: Pulmonary effort is normal. No respiratory distress.   Musculoskeletal:      Right lower leg: No edema.      Left lower leg: Clean dressing noted  Skin:     General: Skin is warm and dry.   Neurological:      General: No focal deficit present.      Mental Status: Alert and oriented to person, place, and time.       Laboratory data:  I have reviewed all the lab results independently.  Lab Results   Component Value Date    INR 1.24 12/07/2024      Recent Labs   Lab 12/05/24  1018   RBC 4.12*   HGB 11.3*   HCT 35.3*   MCV 85.7   MCH 27.4   MCHC 32.0   RDW  14.6   NEPRELIM 4.86   WBC 8.6   .0      Microbiology data:  Hospital Encounter on 11/18/24   1. Aerobic Bacterial Culture     Status: None (Preliminary result)    Collection Time: 12/05/24  2:40 PM    Specimen: Leg, left; Other   Result Value Ref Range    Aerobic Culture Result No Growth 2 Days N/A    Aerobic Smear No WBCs seen N/A    Aerobic Smear No organisms seen N/A   2. Blood Culture     Status: None    Collection Time: 11/19/24  8:48 AM    Specimen: Blood,peripheral   Result Value Ref Range    Blood Culture Result No Growth 5 Days N/A      Radiology:  CT of the ankle on the left side:  Focal thin-walled rim-enhancing fluid collection along the medial aspect of the mid to distal tibia measuring 2.7 x 0.9 x 8 cm.  Diffuse cutaneous edema noted.  No osteomyelitis.  No acute fracture.    Impression:  Jovan Merino  is a 54 year old male with    Left lower extremity cellulitis, presenting here with sepsis with threat to life  Blood cultures with no growth to date  MRI showing mild myositis  Initially on IV cefazolin without improvement, escalated to IV daptomycin and cefepime on 11/21  Daptomycin stopped on 12/1 due to rhabdomyolysis  Currently on IV cefepime with clinical improvement  Plan to discharge on cefepime through 12/11/2024  Left lower extremity superficial skin abscess  Status post I&D with orthopedics on 12/5/2024  Cultures with no growth to date  DVT in the same leg  On anticoagulation as per primary team  History of psoriasis  Patient is reporting improvement in his psoriasis since going on antibiotics  Would benefit from penicillin V 500 mg twice daily for suppression to be started once off the cefepime  Will discuss when he follows up with us in the office    Recommendations:    Continue IV cefepime as previously planned through 12/11/2024  Follow-up with infectious disease next week, patient understands that he will need to call and schedule the appointment  Okay to discharge from ID  perspective once okay with other services  Anticoagulation as per primary team  Continue to monitor daily labs for antibiotic toxicities  Further recommendations will depend on the above work-up and clinical progress     The plan of care was discussed with the primary hospital team, Linda Savage MD     Recommendations were also discussed with the patient; all questions were answered.     Thank you for this consultation. Please don't hesitate to call the ID team for questions or any acute changes in patient's clinical condition.    Please note that this report has been produced using speech recognition software and may contain errors related to that system including, but not limited to, errors in grammar, punctuation, and spelling, as well as words and phrases that possibly may have been recognized inappropriately.  If there are any questions or concerns, contact the dictating provider for clarification.    The  [employer] Cures Act makes medical notes like these available to patients in the interest of transparency. Please be advised this is a medical document. Medical documents are intended to carry relevant information, facts as evident, and the clinical opinion of the practitioner. The medical note is intended as peer to peer communication and may appear blunt or direct. It is written in medical language and may contain abbreviations or verbiage that are unfamiliar.     Natalie Cassidy MD  [facility]. Tel: [phone]. Fax: [phone].     Jovan Merino Sr. : 1970 MRN: [id number] CSN: [id number]          [1]   Allergies  Allergen Reactions    Oxycodone ITCHING    Hydrocodone ITCHING and RASH

## 2024-12-07 NOTE — PROGRESS NOTES
[facility] Hospitalist Progress Note     CC: Hospital Follow up    PCP: Eric Sethi DO       Assessment/Plan:   Jovan Merino - 54 year old male w MO, HTN, Afib, HFpEF, psoriasis admitted 11/18/2024 for LLE cellulitis, started on IV Ancef. Initially improving however 11/21 worse - abx broadened to vanc/zosyn, then cefepime and daptomycin. CT with cellulitis . ID/Rheum/Orhto consulted. MRI with LLE cellulitis, mild myositis in posterior aspect, no abscess, no OM.  PICC in place for extended IV abx coverage. 3rd LLE doppler 11/29/24 Left popliteal vein DVT. Thus far lovenox and coumadin started.      Sepsis due to LLE cellulitis  Immunosuppression due to psoriasis/Humira  - blood cx no growth  - IV ancef (11/18 - 21 )  - 11/21: worse, Consulted ID - transitioned to cefepime and daptomycin  - CT negative 11/21 for underlying abscess  - Rheum/Ortho on consult   - No concern for compartment syndrome or joint involvement at this time  - MRI with LLE cellulitis, mild myositis in posterior aspect, no abscess, no OM   - PICC in place for extended IV abx coverage  - noted 12/4 area of fluctuance above the medial aspect of left ankle. I&D done by Dr. Urrutia on 12/5, cultures sent.     Acute DVT  - LLE, Left popliteal vein  - full dose lovenox to coumadin   - will re-visit consideration of DOAC. I do not think BMI being high is contraindication for eliquis based on review. I will consider using eliquis and check price.     Partial quad tear  L knee effusion  - ortho consulted, appreciate recs     Headaches  - fioricet PRN     HFpEF - compensated  HTN  Parox Afib - low CV, not on AC  - Continue PTA lasix, flecainide, coreg, hydral, enalapril     Psoriasis  - On humira OP. Would hold until leg better     Morbid obesity w [facility]  - Body mass index is 52.8 kg/m².   - Healthy diet & wt loss encouraged  - Has lost quite a bit of weight with Mounjaro already     DM2 - currently controlled w mounjaro  - Okay for reg diet, fCan hold on  SSI/accuchecks     Depression   - seen by psych liaison  - started Zoloft 50mg daily     ACP: Full Code  Ppx: DVT: currently on full dose lovenox and coumadin. We discussed switched to eliquis as an option. Will obtain price as well.   Dispo  EDoD:  pending. Plan for home tomorrow     F/u:   - PCP:  DO Linda Agrawal MD  [facility] Hospitalist      Subjective:     Feels much better, pain improved after I&D     OBJECTIVE:    Blood pressure 120/73, pulse 77, temperature 98.8 °F (37.1 °C), temperature source Oral, resp. rate 16, height 6' 1\" (1.854 m), weight (!) 400 lb 3.2 oz (181.5 kg), SpO2 92%.    Temp:  [98.6 °F (37 °C)-99.6 °F (37.6 °C)] 98.8 °F (37.1 °C)  Pulse:  [68-77] 77  Resp:  [16-18] 16  BP: (111-124)/(64-73) 120/73  SpO2:  [92 %-93 %] 92 %      Intake/Output:    Intake/Output Summary (Last 24 hours) at 12/7/2024 1213  Last data filed at 12/7/2024 0937  Gross per 24 hour   Intake 1550 ml   Output --   Net 1550 ml       Last 3 Weights   11/20/24 0528 (!) 400 lb 3.2 oz (181.5 kg)   11/19/24 0615 (!) 395 lb 3.2 oz (179.3 kg)   11/18/24 1705 (!) 394 lb 3.2 oz (178.8 kg)   11/18/24 1112 (!) 395 lb (179.2 kg)   11/17/21 1338 (!) 379 lb 3.2 oz (172 kg)   06/10/21 1222 (!) 364 lb (165.1 kg)       Exam   GEN: no distress  Pulm: CTABL  CV: RRR, no murmurs  ABD: Soft  MSK: swelling improved LLE compartments not tense  SKIN: warm, dry, psoriatic plaques, LLE with improved edema    Data Review:       Labs:     Recent Labs   Lab 12/02/24  0443 12/03/24  0543 12/05/24  1018   RBC 3.83* 4.14* 4.12*   HGB 10.4* 11.3* 11.3*   HCT 32.9* 35.5* 35.3*   MCV 85.9 85.7 85.7   MCH 27.2 27.3 27.4   MCHC 31.6 31.8 32.0   RDW 15.0 14.7 14.6   NEPRELIM 5.35 5.90 4.86   WBC 9.9 10.6 8.6   .0 403.0 373.0         Recent Labs   Lab 12/03/24  0543 12/04/24  1711 12/05/24  1018   * 86 100*   BUN 21 19 22   CREATSERUM 0.88 0.92 1.01   EGFRCR 103 100 89   CA 10.2 9.8 10.1   * 134* 136   K  4.5 4.5 4.2    102 102   CO2 29.0 31.0 30.0       No results for input(s): \"ALT\", \"AST\", \"ALB\", \"AMYLASE\", \"LIPASE\", \"LDH\" in the last 168 hours.    Invalid input(s): \"ALPHOS\", \"TBIL\", \"DBIL\", \"TPROT\"      Imaging:  XR ABDOMEN (1 VIEW) (CPT=74018)    Result Date: 12/6/2024  CONCLUSION:   Nonobstructive bowel gas pattern.     Dictated by (CST): Gilberto Pan MD on 12/06/2024 at 10:17 AM     Finalized by (CST): Gilberto Pan MD on 12/06/2024 at 10:18 AM             Meds:      enoxaparin  120 mg Subcutaneous q12h    traMADol  100 mg Oral q6h    sertraline  50 mg Oral Daily    warfarin  10 mg Oral Nightly    furosemide  40 mg Oral Daily    cefepime  2 g Intravenous Q8H    clotrimazole-betamethasone   Topical BID    docosanol   Topical BID    cetirizine  10 mg Oral Daily    enalapril  20 mg Oral BID    flecainide  150 mg Oral BID    hydrALAZINE  50 mg Oral BID    carvedilol  25 mg Oral BID with meals    magnesium oxide  200 mg Oral Nightly    dilTIAZem HCl ER Coated Beads  180 mg Oral Nightly         zolpidem    HYDROmorphone    magnesium hydroxide    calcium carbonate    famotidine    acetaminophen    melatonin    polyethylene glycol (PEG 3350)    sennosides    guaiFENesin    ondansetron    prochlorperazine    HYDROmorphone    simethicone

## 2024-12-07 NOTE — PLAN OF CARE
Problem: Patient Centered Care  Goal: Patient preferences are identified and integrated in the patient's plan of care  Description: Interventions:  - What would you like us to know as we care for you? From home alone  - Provide timely, complete, and accurate information to patient/family  - Incorporate patient and family knowledge, values, beliefs, and cultural backgrounds into the planning and delivery of care  - Encourage patient/family to participate in care and decision-making at the level they choose  - Honor patient and family perspectives and choices  Outcome: Progressing     Problem: CARDIOVASCULAR - ADULT  Goal: Maintains optimal cardiac output and hemodynamic stability  Description: INTERVENTIONS:  - Monitor vital signs, rhythm, and trends  - Monitor for bleeding, hypotension and signs of decreased cardiac output  - Evaluate effectiveness of vasoactive medications to optimize hemodynamic stability  - Monitor arterial and/or venous puncture sites for bleeding and/or hematoma  - Assess quality of pulses, skin color and temperature  - Assess for signs of decreased coronary artery perfusion - ex. Angina  - Evaluate fluid balance, assess for edema, trend weights  Outcome: Progressing     Problem: RESPIRATORY - ADULT  Goal: Achieves optimal ventilation and oxygenation  Description: INTERVENTIONS:  - Assess for changes in respiratory status  - Assess for changes in mentation and behavior  - Position to facilitate oxygenation and minimize respiratory effort  - Oxygen supplementation based on oxygen saturation or ABGs  - Provide Smoking Cessation handout, if applicable  - Encourage broncho-pulmonary hygiene including cough, deep breathe, Incentive Spirometry  - Assess the need for suctioning and perform as needed  - Assess and instruct to report SOB or any respiratory difficulty  - Respiratory Therapy support as indicated  - Manage/alleviate anxiety  - Monitor for signs/symptoms of CO2 retention  Outcome:  Progressing     Problem: GASTROINTESTINAL - ADULT  Goal: Minimal or absence of nausea and vomiting  Description: INTERVENTIONS:  - Maintain adequate hydration with IV or PO as ordered and tolerated  - Nasogastric tube to low intermittent suction as ordered  - Evaluate effectiveness of ordered antiemetic medications  - Provide nonpharmacologic comfort measures as appropriate  - Advance diet as tolerated, if ordered  - Obtain nutritional consult as needed  - Evaluate fluid balance  Outcome: Progressing     Problem: GENITOURINARY - ADULT  Goal: Absence of urinary retention  Description: INTERVENTIONS:  - Assess patient’s ability to void and empty bladder  - Monitor intake/output and perform bladder scan as needed  - Follow urinary retention protocol/standard of care  - Consider collaborating with pharmacy to review patient's medication profile  - Implement strategies to promote bladder emptying  Outcome: Progressing     Problem: METABOLIC/FLUID AND ELECTROLYTES - ADULT  Goal: Electrolytes maintained within normal limits  Description: INTERVENTIONS:  - Monitor labs and rhythm and assess patient for signs and symptoms of electrolyte imbalances  - Administer electrolyte replacement as ordered  - Monitor response to electrolyte replacements, including rhythm and repeat lab results as appropriate  - Fluid restriction as ordered  - Instruct patient on fluid and nutrition restrictions as appropriate  Outcome: Progressing     Problem: SKIN/TISSUE INTEGRITY - ADULT  Goal: Skin integrity remains intact  Description: INTERVENTIONS  - Assess and document risk factors for pressure ulcer development  - Assess and document skin integrity  - Monitor for areas of redness and/or skin breakdown  - Initiate interventions, skin care algorithm/standards of care as needed  Outcome: Progressing  Goal: Incision(s), wounds(s) or drain site(s) healing without S/S of infection  Description: INTERVENTIONS:  - Assess and document risk factors for  pressure ulcer development  - Assess and document skin integrity  - Assess and document dressing/incision, wound bed, drain sites and surrounding tissue  - Implement wound care per orders  - Initiate isolation precautions as appropriate  - Initiate Pressure Ulcer prevention bundle as indicated  Outcome: Progressing     Problem: MUSCULOSKELETAL - ADULT  Goal: Return mobility to safest level of function  Description: INTERVENTIONS:  - Assess patient stability and activity tolerance for standing, transferring and ambulating w/ or w/o assistive devices  - Assist with transfers and ambulation using safe patient handling equipment as needed  - Ensure adequate protection for wounds/incisions during mobilization  - Obtain PT/OT consults as needed  - Advance activity as appropriate  - Communicate ordered activity level and limitations with patient/family  Outcome: Progressing     Problem: Impaired Functional Mobility  Goal: Achieve highest/safest level of mobility/gait  Description: Interventions:  - Assess patient's functional ability and stability  - Promote increasing activity/tolerance for mobility and gait  - Educate and engage patient/family in tolerated activity level and precautions  - Recommend use of  RW for transfers and ambulation  - Recommend patient transfer to bedside chair toward strongest side  - When transferring patient, block weaker knee for safety  - Recommend use of chair position in bed 3 times per day  Outcome: Progressing     Problem: Impaired Activities of Daily Living  Goal: Achieve highest/safest level of independence in self care  Description: Interventions:  - Assess ability and encourage patient to participate in ADLs to maximize function  - Promote sitting position while performing ADLs such as feeding, grooming, and bathing  - Educate and encourage patient/family in tolerated functional activity level and precautions during self-care  Outcome: Progressing     Problem: PAIN - ADULT  Goal:  Verbalizes/displays adequate comfort level or patient's stated pain goal  Description: INTERVENTIONS:  - Encourage pt to monitor pain and request assistance  - Assess pain using appropriate pain scale  - Administer analgesics based on type and severity of pain and evaluate response  - Implement non-pharmacological measures as appropriate and evaluate response  - Consider cultural and social influences on pain and pain management  - Manage/alleviate anxiety  - Utilize distraction and/or relaxation techniques  - Monitor for opioid side effects  - Notify MD/LIP if interventions unsuccessful or patient reports new pain  - Anticipate increased pain with activity and pre-medicate as appropriate  Outcome: Progressing     Problem: SAFETY ADULT - FALL  Goal: Free from fall injury  Description: INTERVENTIONS:  - Assess pt frequently for physical needs  - Identify cognitive and physical deficits and behaviors that affect risk of falls.  - [place] fall precautions as indicated by assessment.  - Educate pt/family on patient safety including physical limitations  - Instruct pt to call for assistance with activity based on assessment  - Modify environment to reduce risk of injury  - Provide assistive devices as appropriate  - Consider OT/PT consult to assist with strengthening/mobility  - Encourage toileting schedule  Outcome: Progressing     Problem: DISCHARGE PLANNING  Goal: Discharge to home or other facility with appropriate resources  Description: INTERVENTIONS:  - Identify barriers to discharge w/pt and caregiver  - Include patient/family/discharge partner in discharge planning  - Arrange for needed discharge resources and transportation as appropriate  - Identify discharge learning needs (meds, wound care, etc)  - Arrange for interpreters to assist at discharge as needed  - Consider post-discharge preferences of patient/family/discharge partner  - Complete POLST form as appropriate  - Assess patient's ability to be  responsible for managing their own health  - Refer to Case Management Department for coordinating discharge planning if the patient needs post-hospital services based on physician/LIP order or complex needs related to functional status, cognitive ability or social support system  Outcome: Progressing     Problem: Patient/Family Goals  Goal: Patient/Family Long Term Goal  Description: Patient's Long Term Goal: Discharge from the hospital    Interventions:  - Monitor vital signs  - Monitor appropriate labs  - Monitor blood glucose levels  - Pain management  - Administer medications per order  - Follow MD orders  - Diagnostics per order  - Update / inform patient and family on plan of care  - Discharge planning  - See additional Care Plan goals for specific interventions  Outcome: Progressing  Goal: Patient/Family Short Term Goal  Description: Patient's Short Term Goal: Improve redness, swelling, and pain to left lower extremity     Interventions:   - Monitor vital signs  - Monitor appropriate labs  - Monitor blood glucose levels  - Pain management  - Administer medications per order  - Follow MD orders  - Diagnostics per order  - Update / inform patient and family on plan of care  - See additional Care Plan goals for specific interventions  Outcome: Progressing     Problem: HEMATOLOGIC - ADULT  Goal: Free from bleeding injury  Description: (Example usage: patient with low platelets)  INTERVENTIONS:  - Avoid intramuscular injections, enemas and rectal medication administration  - Ensure safe mobilization of patient  - Hold pressure on venipuncture sites to achieve adequate hemostasis  - Assess for signs and symptoms of internal bleeding  - Monitor lab trends  - Patient is to report abnormal signs of bleeding to staff  - Avoid use of toothpicks and dental floss  - Use electric shaver for shaving  - Use soft bristle tooth brush  - Limit straining and forceful nose blowing  Outcome: Progressing

## 2024-12-07 NOTE — PLAN OF CARE
Problem: Patient Centered Care  Goal: Patient preferences are identified and integrated in the patient's plan of care  Description: Interventions:  - What would you like us to know as we care for you? From home alone  - Provide timely, complete, and accurate information to patient/family  - Incorporate patient and family knowledge, values, beliefs, and cultural backgrounds into the planning and delivery of care  - Encourage patient/family to participate in care and decision-making at the level they choose  - Honor patient and family perspectives and choices  Outcome: Progressing     Problem: CARDIOVASCULAR - ADULT  Goal: Maintains optimal cardiac output and hemodynamic stability  Description: INTERVENTIONS:  - Monitor vital signs, rhythm, and trends  - Monitor for bleeding, hypotension and signs of decreased cardiac output  - Evaluate effectiveness of vasoactive medications to optimize hemodynamic stability  - Monitor arterial and/or venous puncture sites for bleeding and/or hematoma  - Assess quality of pulses, skin color and temperature  - Assess for signs of decreased coronary artery perfusion - ex. Angina  - Evaluate fluid balance, assess for edema, trend weights  Outcome: Progressing     Problem: RESPIRATORY - ADULT  Goal: Achieves optimal ventilation and oxygenation  Description: INTERVENTIONS:  - Assess for changes in respiratory status  - Assess for changes in mentation and behavior  - Position to facilitate oxygenation and minimize respiratory effort  - Oxygen supplementation based on oxygen saturation or ABGs  - Provide Smoking Cessation handout, if applicable  - Encourage broncho-pulmonary hygiene including cough, deep breathe, Incentive Spirometry  - Assess the need for suctioning and perform as needed  - Assess and instruct to report SOB or any respiratory difficulty  - Respiratory Therapy support as indicated  - Manage/alleviate anxiety  - Monitor for signs/symptoms of CO2 retention  Outcome:  Progressing     Problem: GASTROINTESTINAL - ADULT  Goal: Minimal or absence of nausea and vomiting  Description: INTERVENTIONS:  - Maintain adequate hydration with IV or PO as ordered and tolerated  - Nasogastric tube to low intermittent suction as ordered  - Evaluate effectiveness of ordered antiemetic medications  - Provide nonpharmacologic comfort measures as appropriate  - Advance diet as tolerated, if ordered  - Obtain nutritional consult as needed  - Evaluate fluid balance  Outcome: Progressing     Problem: GENITOURINARY - ADULT  Goal: Absence of urinary retention  Description: INTERVENTIONS:  - Assess patient’s ability to void and empty bladder  - Monitor intake/output and perform bladder scan as needed  - Follow urinary retention protocol/standard of care  - Consider collaborating with pharmacy to review patient's medication profile  - Implement strategies to promote bladder emptying  Outcome: Progressing     Problem: METABOLIC/FLUID AND ELECTROLYTES - ADULT  Goal: Electrolytes maintained within normal limits  Description: INTERVENTIONS:  - Monitor labs and rhythm and assess patient for signs and symptoms of electrolyte imbalances  - Administer electrolyte replacement as ordered  - Monitor response to electrolyte replacements, including rhythm and repeat lab results as appropriate  - Fluid restriction as ordered  - Instruct patient on fluid and nutrition restrictions as appropriate  Outcome: Progressing     Problem: SKIN/TISSUE INTEGRITY - ADULT  Goal: Skin integrity remains intact  Description: INTERVENTIONS  - Assess and document risk factors for pressure ulcer development  - Assess and document skin integrity  - Monitor for areas of redness and/or skin breakdown  - Initiate interventions, skin care algorithm/standards of care as needed  Outcome: Progressing  Goal: Incision(s), wounds(s) or drain site(s) healing without S/S of infection  Description: INTERVENTIONS:  - Assess and document risk factors for  pressure ulcer development  - Assess and document skin integrity  - Assess and document dressing/incision, wound bed, drain sites and surrounding tissue  - Implement wound care per orders  - Initiate isolation precautions as appropriate  - Initiate Pressure Ulcer prevention bundle as indicated  Outcome: Progressing     Problem: MUSCULOSKELETAL - ADULT  Goal: Return mobility to safest level of function  Description: INTERVENTIONS:  - Assess patient stability and activity tolerance for standing, transferring and ambulating w/ or w/o assistive devices  - Assist with transfers and ambulation using safe patient handling equipment as needed  - Ensure adequate protection for wounds/incisions during mobilization  - Obtain PT/OT consults as needed  - Advance activity as appropriate  - Communicate ordered activity level and limitations with patient/family  Outcome: Progressing     Problem: Impaired Functional Mobility  Goal: Achieve highest/safest level of mobility/gait  Description: Interventions:  - Assess patient's functional ability and stability  - Promote increasing activity/tolerance for mobility and gait  - Educate and engage patient/family in tolerated activity level and precautions  - Recommend use of  RW for transfers and ambulation  - Recommend patient transfer to bedside chair toward strongest side  - When transferring patient, block weaker knee for safety  - Recommend use of chair position in bed 3 times per day  Outcome: Progressing     Problem: Impaired Activities of Daily Living  Goal: Achieve highest/safest level of independence in self care  Description: Interventions:  - Assess ability and encourage patient to participate in ADLs to maximize function  - Promote sitting position while performing ADLs such as feeding, grooming, and bathing  - Educate and encourage patient/family in tolerated functional activity level and precautions during self-care  Outcome: Progressing     Problem: PAIN - ADULT  Goal:  Verbalizes/displays adequate comfort level or patient's stated pain goal  Description: INTERVENTIONS:  - Encourage pt to monitor pain and request assistance  - Assess pain using appropriate pain scale  - Administer analgesics based on type and severity of pain and evaluate response  - Implement non-pharmacological measures as appropriate and evaluate response  - Consider cultural and social influences on pain and pain management  - Manage/alleviate anxiety  - Utilize distraction and/or relaxation techniques  - Monitor for opioid side effects  - Notify MD/LIP if interventions unsuccessful or patient reports new pain  - Anticipate increased pain with activity and pre-medicate as appropriate  Outcome: Progressing     Problem: SAFETY ADULT - FALL  Goal: Free from fall injury  Description: INTERVENTIONS:  - Assess pt frequently for physical needs  - Identify cognitive and physical deficits and behaviors that affect risk of falls.  - [place] fall precautions as indicated by assessment.  - Educate pt/family on patient safety including physical limitations  - Instruct pt to call for assistance with activity based on assessment  - Modify environment to reduce risk of injury  - Provide assistive devices as appropriate  - Consider OT/PT consult to assist with strengthening/mobility  - Encourage toileting schedule  Outcome: Progressing     Problem: DISCHARGE PLANNING  Goal: Discharge to home or other facility with appropriate resources  Description: INTERVENTIONS:  - Identify barriers to discharge w/pt and caregiver  - Include patient/family/discharge partner in discharge planning  - Arrange for needed discharge resources and transportation as appropriate  - Identify discharge learning needs (meds, wound care, etc)  - Arrange for interpreters to assist at discharge as needed  - Consider post-discharge preferences of patient/family/discharge partner  - Complete POLST form as appropriate  - Assess patient's ability to be  responsible for managing their own health  - Refer to Case Management Department for coordinating discharge planning if the patient needs post-hospital services based on physician/LIP order or complex needs related to functional status, cognitive ability or social support system  Outcome: Progressing     Problem: Patient/Family Goals  Goal: Patient/Family Long Term Goal  Description: Patient's Long Term Goal: Discharge from the hospital    Interventions:  - Monitor vital signs  - Monitor appropriate labs  - Monitor blood glucose levels  - Pain management  - Administer medications per order  - Follow MD orders  - Diagnostics per order  - Update / inform patient and family on plan of care  - Discharge planning  - See additional Care Plan goals for specific interventions  Outcome: Progressing  Goal: Patient/Family Short Term Goal  Description: Patient's Short Term Goal: Improve redness, swelling, and pain to left lower extremity     Interventions:   - Monitor vital signs  - Monitor appropriate labs  - Monitor blood glucose levels  - Pain management  - Administer medications per order  - Follow MD orders  - Diagnostics per order  - Update / inform patient and family on plan of care  - See additional Care Plan goals for specific interventions  Outcome: Progressing     Problem: HEMATOLOGIC - ADULT  Goal: Free from bleeding injury  Description: (Example usage: patient with low platelets)  INTERVENTIONS:  - Avoid intramuscular injections, enemas and rectal medication administration  - Ensure safe mobilization of patient  - Hold pressure on venipuncture sites to achieve adequate hemostasis  - Assess for signs and symptoms of internal bleeding  - Monitor lab trends  - Patient is to report abnormal signs of bleeding to staff  - Avoid use of toothpicks and dental floss  - Use electric shaver for shaving  - Use soft bristle tooth brush  - Limit straining and forceful nose blowing  Outcome: Progressing

## 2024-12-07 NOTE — PROGRESS NOTES
[facility]  part of [facility]    Progress Note    Jovan Merino Sr. Patient Status:  Inpatient    1970 MRN [id number]   Location [facility] 5SW/SE Attending Candido Suarez DO   Hosp Day # 19 PCP Eric Sethi DO       Subjective:   Jovan Merino Sr. is a(n) 54 year old male with a left lower extremity cellulitis.  Symptoms improving on antibiotics.  States he had one dressing change. He has a family member that is a nurse that can help him with dressing changes at home.  Denies fevers, chills, night sweats, or other constitutional symptoms in the last 24 hours.  Denies chest pain or shortness of breath.    Objective:   Blood pressure 111/64, pulse 68, temperature 98.7 °F (37.1 °C), temperature source Oral, resp. rate 18, height 6' 1\" (1.854 m), weight (!) 400 lb 3.2 oz (181.5 kg), SpO2 92%.    NAD, Aox3  LLE:  -Skin swelling and redness of the lower leg diffusely, improved from previously  -Approximately 3 x 3 cm area of fluctuance of the medial ankle approximately 7 cm proximal to the tip of the medial malleolus with surrounding redness  -Sensations intact to light touch in the superficial peroneal, deep peroneal, tibial, saphenous, and sural nerve distributions.  -Firing gastroc, tibialis anterior, EHL, and FHL musculature    CT left ankle with contrast:  -2.7 x 0.9 x 8 cm thin-walled enhancing subcutaneous fluid collection along the medial aspect of the mid to distal tibia      Results:     Lab Results   Component Value Date    WBC 8.6 2024    HGB 11.3 (L) 2024    HCT 35.3 (L) 2024    .0 2024    CREATSERUM 1.01 2024    BUN 22 2024     2024    K 4.2 2024     2024    CO2 30.0 2024     (H) 2024    CA 10.1 2024    ALB 4.1 2024    ALKPHO 72 2024    BILT 0.3 2024    TP 7.1 2024    AST 29 2024    ALT 38 2024    INR 1.24 (H) 2024    TSH 0.927  11/18/2021    PSA 1.05 11/18/2021    DDIMER <0.27 11/14/2016    ESRML 102 (H) 11/22/2024    CRP 3.30 (H) 12/02/2024    MG 2.0 09/22/2017    TROP <0.046 11/15/2016    CK 1,265 (H) 12/02/2024       XR ABDOMEN (1 VIEW) (CPT=74018)    Result Date: 12/6/2024  CONCLUSION:   Nonobstructive bowel gas pattern.     Dictated by (CST): Gilberto Pan MD on 12/06/2024 at 10:17 AM     Finalized by (CST): Gilberto Pan MD on 12/06/2024 at 10:18 AM               Assessment and Plan:      54M with a left lower extremity infection and s/p I&D of Abscess 12/5/24 by Dr. Urrutia.    Continue daily dressing changes, family at home is a nurse and can help with dressing changes  PICC for IV abx  Dilaudid and Tramadol for pain  Lovenox and coumadin for LLE DVT  DC pending medically stable     FU 2 weeks    hSaron Cedeño PA-C

## 2024-12-08 LAB
INR BLD: 1.43 (ref 0.8–1.2)
PROTHROMBIN TIME: 18.2 SECONDS (ref 11.6–14.8)

## 2024-12-08 NOTE — PROGRESS NOTES
[facility]  part of [facility] Infectious Disease Progress Note    Jovan Merino Sr. Patient Status:  Inpatient    1970 MRN [id number]   Location [facility] 5SW/SE Attending Linda Savage MD   Hosp Day # 20 PCP Eric Sethi DO     Subjective:  Pt states LE feels better.     Objective:    Allergies:  Allergies[1]    Medications:    Current Facility-Administered Medications:     apixaban (Eliquis) tab 10 mg, 10 mg, Oral, BID    zolpidem (Ambien) tab 5 mg, 5 mg, Oral, Nightly PRN    traMADol (Ultram) tab 100 mg, 100 mg, Oral, q6h    sertraline (Zoloft) tab 50 mg, 50 mg, Oral, Daily    HYDROmorphone (Dilaudid) 1 MG/ML injection 1 mg, 1 mg, Intravenous, Q3H PRN    furosemide (Lasix) tab 40 mg, 40 mg, Oral, Daily    magnesium hydroxide (Milk of Magnesia) 400 MG/5ML oral suspension 30 mL, 30 mL, Oral, Q6H PRN    ceFEPIme (Maxpime) 2 g in sodium chloride 0.9% 100 mL IVPB-MBP, 2 g, Intravenous, Q8H    clotrimazole-betamethasone (Lotrisone) 1-0.05 % cream, , Topical, BID    docosanol (Abreva) 10 % cream, , Topical, BID    cetirizine (ZyrTEC) tab 10 mg, 10 mg, Oral, Daily    calcium carbonate (Tums) chewable tab 1,000 mg, 1,000 mg, Oral, TID PRN    famotidine (Pepcid) tab 20 mg, 20 mg, Oral, BID PRN    enalapril (Vasotec) tab 20 mg, 20 mg, Oral, BID    flecainide (Tambocor) tab 150 mg, 150 mg, Oral, BID    hydrALAZINE (Apresoline) tab 50 mg, 50 mg, Oral, BID    carvedilol (Coreg) tab 25 mg, 25 mg, Oral, BID with meals    acetaminophen (Tylenol Extra Strength) tab 500 mg, 500 mg, Oral, Q4H PRN    melatonin tab 3 mg, 3 mg, Oral, Nightly PRN    polyethylene glycol (PEG 3350) (Miralax) 17 g oral packet 17 g, 17 g, Oral, Daily PRN    sennosides (Senokot) tab 17.2 mg, 17.2 mg, Oral, Nightly PRN    guaiFENesin (Robitussin) 100 MG/5 ML oral liquid 200 mg, 200 mg, Oral, Q4H PRN    ondansetron (Zofran) 4 MG/2ML injection 4 mg, 4 mg, Intravenous, Q6H PRN    prochlorperazine (Compazine)  10 MG/2ML injection 5 mg, 5 mg, Intravenous, Q8H PRN    HYDROmorphone (Dilaudid) 1 MG/ML injection 0.5 mg, 0.5 mg, Intravenous, Q2H PRN    magnesium oxide (Mag-Ox) tab 200 mg, 200 mg, Oral, Nightly    simethicone (Mylicon) chewable tab 80 mg, 80 mg, Oral, TID PRN    dilTIAZem ER (CardIZEM CD) 24 hr cap 180 mg, 180 mg, Oral, Nightly    Physical Exam:  General: Alert, orientated x3.  Cooperative.  No apparent distress.  Vital Signs:  Blood pressure 125/68, pulse 69, temperature 97.8 °F (36.6 °C), temperature source Oral, resp. rate 18, height 185.4 cm (6' 1\"), weight (!) 400 lb 3.2 oz (181.5 kg), SpO2 93%.   Temp (24hrs), Av.5 °F (36.9 °C), Min:97.8 °F (36.6 °C), Max:98.8 °F (37.1 °C)      HEENT: Exam is unremarkable.  Without scleral icterus.  Mucous membranes are moist. PERRLA.  Oropharynx is clear.  Neck: No tenderness to palpitation.  Full range of motion to flexion and extension, lateral rotation and lateral flexion of cervical spine.  No JVD. Supple.   Lungs: Clear to auscultation bilaterally.  Cardiac: Regular rate and rhythm. No murmur.  Abdomen:  Soft, non-distended, non-tender, with no rebound or guarding.   Extremities:  No lower extremity edema noted.  Without clubbing or cyanosis.    Skin: LE wrapped. Images in epic reviewed, appears to be healing  Neurologic: Cranial nerves are grossly intact.  Motor strength and sensory examination is grossly normal.  No focal neurologic deficit.    Labs:  Lab Results   Component Value Date    INR 1.43 2024         Assessment/Plan:    1.  LLE cellulitis with abscess and myositis  -s/p I&D on 24, cultures NG  -s/p IV cefazolin  -on IV cefepime through 2024  2.  Rhabdomyolysis  -dapto stopped  3.  LLE DVT  -on anticoagulation  4.  Hx of psoriasis  -consider suppressive PCN VK 500mg BID after IV cefepime  5.  Dispo  -PICC in place, IV cefepime through   -follow up with ID at [facility], will need to extend cefepime if unable to get appointment by  12/11    If you have any questions or concerns please call [facility] Infectious Disease at [phone].     YOLANDA Membreno         [1]   Allergies  Allergen Reactions    Oxycodone ITCHING    Hydrocodone ITCHING and RASH

## 2024-12-08 NOTE — PLAN OF CARE
Problem: Patient Centered Care  Goal: Patient preferences are identified and integrated in the patient's plan of care  Description: Interventions:  - What would you like us to know as we care for you? From home alone  - Provide timely, complete, and accurate information to patient/family  - Incorporate patient and family knowledge, values, beliefs, and cultural backgrounds into the planning and delivery of care  - Encourage patient/family to participate in care and decision-making at the level they choose  - Honor patient and family perspectives and choices  Outcome: Progressing     Problem: CARDIOVASCULAR - ADULT  Goal: Maintains optimal cardiac output and hemodynamic stability  Description: INTERVENTIONS:  - Monitor vital signs, rhythm, and trends  - Monitor for bleeding, hypotension and signs of decreased cardiac output  - Evaluate effectiveness of vasoactive medications to optimize hemodynamic stability  - Monitor arterial and/or venous puncture sites for bleeding and/or hematoma  - Assess quality of pulses, skin color and temperature  - Assess for signs of decreased coronary artery perfusion - ex. Angina  - Evaluate fluid balance, assess for edema, trend weights  Outcome: Progressing     Problem: RESPIRATORY - ADULT  Goal: Achieves optimal ventilation and oxygenation  Description: INTERVENTIONS:  - Assess for changes in respiratory status  - Assess for changes in mentation and behavior  - Position to facilitate oxygenation and minimize respiratory effort  - Oxygen supplementation based on oxygen saturation or ABGs  - Provide Smoking Cessation handout, if applicable  - Encourage broncho-pulmonary hygiene including cough, deep breathe, Incentive Spirometry  - Assess the need for suctioning and perform as needed  - Assess and instruct to report SOB or any respiratory difficulty  - Respiratory Therapy support as indicated  - Manage/alleviate anxiety  - Monitor for signs/symptoms of CO2 retention  Outcome:  Progressing     Problem: GASTROINTESTINAL - ADULT  Goal: Minimal or absence of nausea and vomiting  Description: INTERVENTIONS:  - Maintain adequate hydration with IV or PO as ordered and tolerated  - Nasogastric tube to low intermittent suction as ordered  - Evaluate effectiveness of ordered antiemetic medications  - Provide nonpharmacologic comfort measures as appropriate  - Advance diet as tolerated, if ordered  - Obtain nutritional consult as needed  - Evaluate fluid balance  Outcome: Progressing     Problem: METABOLIC/FLUID AND ELECTROLYTES - ADULT  Goal: Electrolytes maintained within normal limits  Description: INTERVENTIONS:  - Monitor labs and rhythm and assess patient for signs and symptoms of electrolyte imbalances  - Administer electrolyte replacement as ordered  - Monitor response to electrolyte replacements, including rhythm and repeat lab results as appropriate  - Fluid restriction as ordered  - Instruct patient on fluid and nutrition restrictions as appropriate  Outcome: Progressing     Problem: SKIN/TISSUE INTEGRITY - ADULT  Goal: Skin integrity remains intact  Description: INTERVENTIONS  - Assess and document risk factors for pressure ulcer development  - Assess and document skin integrity  - Monitor for areas of redness and/or skin breakdown  - Initiate interventions, skin care algorithm/standards of care as needed  Outcome: Progressing  Goal: Incision(s), wounds(s) or drain site(s) healing without S/S of infection  Description: INTERVENTIONS:  - Assess and document risk factors for pressure ulcer development  - Assess and document skin integrity  - Assess and document dressing/incision, wound bed, drain sites and surrounding tissue  - Implement wound care per orders  - Initiate isolation precautions as appropriate  - Initiate Pressure Ulcer prevention bundle as indicated  Outcome: Progressing     Problem: MUSCULOSKELETAL - ADULT  Goal: Return mobility to safest level of function  Description:  INTERVENTIONS:  - Assess patient stability and activity tolerance for standing, transferring and ambulating w/ or w/o assistive devices  - Assist with transfers and ambulation using safe patient handling equipment as needed  - Ensure adequate protection for wounds/incisions during mobilization  - Obtain PT/OT consults as needed  - Advance activity as appropriate  - Communicate ordered activity level and limitations with patient/family  Outcome: Progressing     Problem: PAIN - ADULT  Goal: Verbalizes/displays adequate comfort level or patient's stated pain goal  Description: INTERVENTIONS:  - Encourage pt to monitor pain and request assistance  - Assess pain using appropriate pain scale  - Administer analgesics based on type and severity of pain and evaluate response  - Implement non-pharmacological measures as appropriate and evaluate response  - Consider cultural and social influences on pain and pain management  - Manage/alleviate anxiety  - Utilize distraction and/or relaxation techniques  - Monitor for opioid side effects  - Notify MD/LIP if interventions unsuccessful or patient reports new pain  - Anticipate increased pain with activity and pre-medicate as appropriate  Outcome: Progressing     Problem: SAFETY ADULT - FALL  Goal: Free from fall injury  Description: INTERVENTIONS:  - Assess pt frequently for physical needs  - Identify cognitive and physical deficits and behaviors that affect risk of falls.  - [place] fall precautions as indicated by assessment.  - Educate pt/family on patient safety including physical limitations  - Instruct pt to call for assistance with activity based on assessment  - Modify environment to reduce risk of injury  - Provide assistive devices as appropriate  - Consider OT/PT consult to assist with strengthening/mobility  - Encourage toileting schedule  Outcome: Progressing     Problem: DISCHARGE PLANNING  Goal: Discharge to home or other facility with appropriate  resources  Description: INTERVENTIONS:  - Identify barriers to discharge w/pt and caregiver  - Include patient/family/discharge partner in discharge planning  - Arrange for needed discharge resources and transportation as appropriate  - Identify discharge learning needs (meds, wound care, etc)  - Arrange for interpreters to assist at discharge as needed  - Consider post-discharge preferences of patient/family/discharge partner  - Complete POLST form as appropriate  - Assess patient's ability to be responsible for managing their own health  - Refer to Case Management Department for coordinating discharge planning if the patient needs post-hospital services based on physician/LIP order or complex needs related to functional status, cognitive ability or social support system  Outcome: Progressing     Problem: Impaired Functional Mobility  Goal: Achieve highest/safest level of mobility/gait  Description: Interventions:  - Assess patient's functional ability and stability  - Promote increasing activity/tolerance for mobility and gait  - Educate and engage patient/family in tolerated activity level and precautions  - Recommend use of  RW for transfers and ambulation  - Recommend patient transfer to bedside chair toward strongest side  - When transferring patient, block weaker knee for safety  - Recommend use of chair position in bed 3 times per day  Outcome: Progressing     Problem: HEMATOLOGIC - ADULT  Goal: Free from bleeding injury  Description: (Example usage: patient with low platelets)  INTERVENTIONS:  - Avoid intramuscular injections, enemas and rectal medication administration  - Ensure safe mobilization of patient  - Hold pressure on venipuncture sites to achieve adequate hemostasis  - Assess for signs and symptoms of internal bleeding  - Monitor lab trends  - Patient is to report abnormal signs of bleeding to staff  - Avoid use of toothpicks and dental floss  - Use electric shaver for shaving  - Use soft bristle  tooth brush  - Limit straining and forceful nose blowing  Outcome: Progressing

## 2024-12-08 NOTE — PROGRESS NOTES
[facility]  Progress Note    Jovan Merino Sr. Patient Status:  Inpatient    1970 MRN [id number]   Location [facility] 5SW/SE Attending Linda Savage MD   Hosp Day # 20 PCP Eric Sethi DO     SUBJECTIVE:  Interval History: Patient has no current complaints.  Pain: 3.  Patient denies chest pain, shortness of breath and calf pain.    Post-Op Day: 3    OBJECTIVE:  Blood pressure 128/80, pulse 70, temperature 98.6 °F (37 °C), temperature source Oral, resp. rate 16, height 6' 1\" (1.854 m), weight (!) 400 lb 3.2 oz (181.5 kg), SpO2 93%.     Ortho Exam:  Mild erythema and induration LLE.  Packing in place, no active drainage.  Neuro intact.       Data Review:  Recent Labs   Lab 24  0443 24  0543 24  1018   RBC 3.83* 4.14* 4.12*   HGB 10.4* 11.3* 11.3*   HCT 32.9* 35.5* 35.3*   MCV 85.9 85.7 85.7   MCH 27.2 27.3 27.4   MCHC 31.6 31.8 32.0   RDW 15.0 14.7 14.6   NEPRELIM 5.35 5.90 4.86   WBC 9.9 10.6 8.6   .0 403.0 373.0       Recent Labs   Lab 24  0543 24  1711 24  1018   * 86 100*   BUN 21 19 22   CREATSERUM 0.88 0.92 1.01   EGFRCR 103 100 89   CA 10.2 9.8 10.1   * 134* 136   K 4.5 4.5 4.2    102 102   CO2 29.0 31.0 30.0       ASSESSMENT/PLAN:  54M with a left lower extremity infection and s/p I&D of Abscess 24 by Dr. Urrutia.     Continue daily dressing changes, friend is a nurse and can help with dressing changes  PICC for IV abx  Lovenox and coumadin for LLE DVT  DC pending medically stable      FU 2 weeks with Dr. Urrutia.    Wu Bauman MD  2024  9:08 AM    Dragon speech recognition software was used to prepare this note. If a word or phrase is confusing, it is likely due to a failure of recognition. Please contact me with any questions or clarifications.

## 2024-12-08 NOTE — PROGRESS NOTES
[facility] Hospitalist Progress Note     CC: Hospital Follow up    PCP: Eric Sethi,        Assessment/Plan:   Jovan Merino - 54 year old male w MO, HTN, Afib, HFpEF, psoriasis admitted 11/18/2024 for LLE cellulitis, started on IV Ancef. Initially improving however 11/21 worse - abx broadened to vanc/zosyn, then cefepime and daptomycin. CT with cellulitis . ID/Rheum/Orhto consulted. MRI with LLE cellulitis, mild myositis in posterior aspect, no abscess, no OM.  PICC in place for extended IV abx coverage. 3rd LLE doppler 11/29/24 Left popliteal vein DVT. Now on Eliquis.      Sepsis due to LLE cellulitis  Immunosuppression due to psoriasis/Humira  - blood cx no growth  - IV ancef (11/18 - 21 )  - 11/21: worse, Consulted ID - transitioned to cefepime and daptomycin  - CT negative 11/21 for underlying abscess  - Rheum/Ortho on consult   - No concern for compartment syndrome or joint involvement at this time  - MRI with LLE cellulitis, mild myositis in posterior aspect, no abscess, no OM   - PICC in place for extended IV abx coverage  - noted 12/4 area of fluctuance above the medial aspect of left ankle. I&D done by Dr. Urrutia on 12/5, cultures sent.     Acute DVT  - LLE, Left popliteal vein  - full dose lovenox to coumadin   - will re-visit consideration of DOAC. I do not think BMI being high is contraindication for eliquis based on review. I will consider using eliquis and check price.     Partial quad tear  L knee effusion  - ortho consulted, appreciate recs     Headaches  - fioricet PRN     HFpEF - compensated  HTN  Parox Afib - low CV, not on AC  - Continue PTA lasix, flecainide, coreg, hydral, enalapril     Psoriasis  - On humira OP. Would hold until leg better     Morbid obesity w [facility]  - Body mass index is 52.8 kg/m².   - Healthy diet & wt loss encouraged  - Has lost quite a bit of weight with Mounjaro already     DM2 - currently controlled w mounjaro  - Okay for reg diet, fCan hold on SSI/accuchecks     Depression    - seen by psych liaison  - started Zoloft 50mg daily     ACP: Full Code  Ppx: DVT: . Eliquis  Dispo  EDoD:  pending. Plan for home tomorrow     F/u:   - PCP:  DO Linda Agrawal MD  [facility] Hospitalist      Subjective:     Still with some pain but doing better.     OBJECTIVE:    Blood pressure 128/80, pulse 70, temperature 98.6 °F (37 °C), temperature source Oral, resp. rate 16, height 6' 1\" (1.854 m), weight (!) 400 lb 3.2 oz (181.5 kg), SpO2 93%.    Temp:  [97.8 °F (36.6 °C)-98.7 °F (37.1 °C)] 98.6 °F (37 °C)  Pulse:  [68-74] 70  Resp:  [16-18] 16  BP: (103-128)/(62-80) 128/80  SpO2:  [93 %-95 %] 93 %      Intake/Output:    Intake/Output Summary (Last 24 hours) at 12/8/2024 1501  Last data filed at 12/8/2024 1027  Gross per 24 hour   Intake 1300 ml   Output --   Net 1300 ml       Last 3 Weights   11/20/24 0528 (!) 400 lb 3.2 oz (181.5 kg)   11/19/24 0615 (!) 395 lb 3.2 oz (179.3 kg)   11/18/24 1705 (!) 394 lb 3.2 oz (178.8 kg)   11/18/24 1112 (!) 395 lb (179.2 kg)   11/17/21 1338 (!) 379 lb 3.2 oz (172 kg)   06/10/21 1222 (!) 364 lb (165.1 kg)       Exam   GEN: no distress  Pulm: CTABL  CV: RRR, no murmurs  ABD: Soft  MSK: swelling improved LLE compartments not tense  SKIN: warm, dry, psoriatic plaques, LLE with improved edema    Data Review:       Labs:     Recent Labs   Lab 12/02/24  0443 12/03/24  0543 12/05/24  1018   RBC 3.83* 4.14* 4.12*   HGB 10.4* 11.3* 11.3*   HCT 32.9* 35.5* 35.3*   MCV 85.9 85.7 85.7   MCH 27.2 27.3 27.4   MCHC 31.6 31.8 32.0   RDW 15.0 14.7 14.6   NEPRELIM 5.35 5.90 4.86   WBC 9.9 10.6 8.6   .0 403.0 373.0         Recent Labs   Lab 12/03/24  0543 12/04/24  1711 12/05/24  1018   * 86 100*   BUN 21 19 22   CREATSERUM 0.88 0.92 1.01   EGFRCR 103 100 89   CA 10.2 9.8 10.1   * 134* 136   K 4.5 4.5 4.2    102 102   CO2 29.0 31.0 30.0       No results for input(s): \"ALT\", \"AST\", \"ALB\", \"AMYLASE\", \"LIPASE\", \"LDH\" in the last 168  hours.    Invalid input(s): \"ALPHOS\", \"TBIL\", \"DBIL\", \"TPROT\"      Imaging:  XR ABDOMEN (1 VIEW) (CPT=74018)    Result Date: 12/6/2024  CONCLUSION:   Nonobstructive bowel gas pattern.     Dictated by (CST): Gilberto Pan MD on 12/06/2024 at 10:17 AM     Finalized by (CST): Gilberto Pan MD on 12/06/2024 at 10:18 AM             Meds:      apixaban  10 mg Oral BID    traMADol  100 mg Oral q6h    sertraline  50 mg Oral Daily    furosemide  40 mg Oral Daily    cefepime  2 g Intravenous Q8H    clotrimazole-betamethasone   Topical BID    docosanol   Topical BID    cetirizine  10 mg Oral Daily    enalapril  20 mg Oral BID    flecainide  150 mg Oral BID    hydrALAZINE  50 mg Oral BID    carvedilol  25 mg Oral BID with meals    magnesium oxide  200 mg Oral Nightly    dilTIAZem HCl ER Coated Beads  180 mg Oral Nightly         zolpidem    HYDROmorphone    magnesium hydroxide    calcium carbonate    famotidine    acetaminophen    melatonin    polyethylene glycol (PEG 3350)    sennosides    guaiFENesin    ondansetron    prochlorperazine    HYDROmorphone    simethicone

## 2024-12-08 NOTE — PLAN OF CARE
Problem: Patient Centered Care  Goal: Patient preferences are identified and integrated in the patient's plan of care  Description: Interventions:  - What would you like us to know as we care for you? From home alone  - Provide timely, complete, and accurate information to patient/family  - Incorporate patient and family knowledge, values, beliefs, and cultural backgrounds into the planning and delivery of care  - Encourage patient/family to participate in care and decision-making at the level they choose  - Honor patient and family perspectives and choices  Outcome: Progressing     Problem: CARDIOVASCULAR - ADULT  Goal: Maintains optimal cardiac output and hemodynamic stability  Description: INTERVENTIONS:  - Monitor vital signs, rhythm, and trends  - Monitor for bleeding, hypotension and signs of decreased cardiac output  - Evaluate effectiveness of vasoactive medications to optimize hemodynamic stability  - Monitor arterial and/or venous puncture sites for bleeding and/or hematoma  - Assess quality of pulses, skin color and temperature  - Assess for signs of decreased coronary artery perfusion - ex. Angina  - Evaluate fluid balance, assess for edema, trend weights  Outcome: Progressing     Problem: RESPIRATORY - ADULT  Goal: Achieves optimal ventilation and oxygenation  Description: INTERVENTIONS:  - Assess for changes in respiratory status  - Assess for changes in mentation and behavior  - Position to facilitate oxygenation and minimize respiratory effort  - Oxygen supplementation based on oxygen saturation or ABGs  - Provide Smoking Cessation handout, if applicable  - Encourage broncho-pulmonary hygiene including cough, deep breathe, Incentive Spirometry  - Assess the need for suctioning and perform as needed  - Assess and instruct to report SOB or any respiratory difficulty  - Respiratory Therapy support as indicated  - Manage/alleviate anxiety  - Monitor for signs/symptoms of CO2 retention  Outcome:  Progressing     Problem: GASTROINTESTINAL - ADULT  Goal: Minimal or absence of nausea and vomiting  Description: INTERVENTIONS:  - Maintain adequate hydration with IV or PO as ordered and tolerated  - Nasogastric tube to low intermittent suction as ordered  - Evaluate effectiveness of ordered antiemetic medications  - Provide nonpharmacologic comfort measures as appropriate  - Advance diet as tolerated, if ordered  - Obtain nutritional consult as needed  - Evaluate fluid balance  Outcome: Progressing     Problem: METABOLIC/FLUID AND ELECTROLYTES - ADULT  Goal: Electrolytes maintained within normal limits  Description: INTERVENTIONS:  - Monitor labs and rhythm and assess patient for signs and symptoms of electrolyte imbalances  - Administer electrolyte replacement as ordered  - Monitor response to electrolyte replacements, including rhythm and repeat lab results as appropriate  - Fluid restriction as ordered  - Instruct patient on fluid and nutrition restrictions as appropriate  Outcome: Progressing     Problem: SKIN/TISSUE INTEGRITY - ADULT  Goal: Skin integrity remains intact  Description: INTERVENTIONS  - Assess and document risk factors for pressure ulcer development  - Assess and document skin integrity  - Monitor for areas of redness and/or skin breakdown  - Initiate interventions, skin care algorithm/standards of care as needed  Outcome: Progressing  Goal: Incision(s), wounds(s) or drain site(s) healing without S/S of infection  Description: INTERVENTIONS:  - Assess and document risk factors for pressure ulcer development  - Assess and document skin integrity  - Assess and document dressing/incision, wound bed, drain sites and surrounding tissue  - Implement wound care per orders  - Initiate isolation precautions as appropriate  - Initiate Pressure Ulcer prevention bundle as indicated  Outcome: Progressing     Problem: MUSCULOSKELETAL - ADULT  Goal: Return mobility to safest level of function  Description:  INTERVENTIONS:  - Assess patient stability and activity tolerance for standing, transferring and ambulating w/ or w/o assistive devices  - Assist with transfers and ambulation using safe patient handling equipment as needed  - Ensure adequate protection for wounds/incisions during mobilization  - Obtain PT/OT consults as needed  - Advance activity as appropriate  - Communicate ordered activity level and limitations with patient/family  Outcome: Progressing     Problem: Impaired Functional Mobility  Goal: Achieve highest/safest level of mobility/gait  Description: Interventions:  - Assess patient's functional ability and stability  - Promote increasing activity/tolerance for mobility and gait  - Educate and engage patient/family in tolerated activity level and precautions  - Recommend use of  RW for transfers and ambulation  - Recommend patient transfer to bedside chair toward strongest side  - When transferring patient, block weaker knee for safety  - Recommend use of chair position in bed 3 times per day  Outcome: Progressing     Problem: Impaired Activities of Daily Living  Goal: Achieve highest/safest level of independence in self care  Description: Interventions:  - Assess ability and encourage patient to participate in ADLs to maximize function  - Promote sitting position while performing ADLs such as feeding, grooming, and bathing  - Educate and encourage patient/family in tolerated functional activity level and precautions during self-care  Outcome: Progressing     Problem: PAIN - ADULT  Goal: Verbalizes/displays adequate comfort level or patient's stated pain goal  Description: INTERVENTIONS:  - Encourage pt to monitor pain and request assistance  - Assess pain using appropriate pain scale  - Administer analgesics based on type and severity of pain and evaluate response  - Implement non-pharmacological measures as appropriate and evaluate response  - Consider cultural and social influences on pain and pain  management  - Manage/alleviate anxiety  - Utilize distraction and/or relaxation techniques  - Monitor for opioid side effects  - Notify MD/LIP if interventions unsuccessful or patient reports new pain  - Anticipate increased pain with activity and pre-medicate as appropriate  Outcome: Progressing     Problem: SAFETY ADULT - FALL  Goal: Free from fall injury  Description: INTERVENTIONS:  - Assess pt frequently for physical needs  - Identify cognitive and physical deficits and behaviors that affect risk of falls.  - [place] fall precautions as indicated by assessment.  - Educate pt/family on patient safety including physical limitations  - Instruct pt to call for assistance with activity based on assessment  - Modify environment to reduce risk of injury  - Provide assistive devices as appropriate  - Consider OT/PT consult to assist with strengthening/mobility  - Encourage toileting schedule  Outcome: Progressing     Problem: DISCHARGE PLANNING  Goal: Discharge to home or other facility with appropriate resources  Description: INTERVENTIONS:  - Identify barriers to discharge w/pt and caregiver  - Include patient/family/discharge partner in discharge planning  - Arrange for needed discharge resources and transportation as appropriate  - Identify discharge learning needs (meds, wound care, etc)  - Arrange for interpreters to assist at discharge as needed  - Consider post-discharge preferences of patient/family/discharge partner  - Complete POLST form as appropriate  - Assess patient's ability to be responsible for managing their own health  - Refer to Case Management Department for coordinating discharge planning if the patient needs post-hospital services based on physician/LIP order or complex needs related to functional status, cognitive ability or social support system  Outcome: Progressing     Problem: Patient/Family Goals  Goal: Patient/Family Long Term Goal  Description: Patient's Long Term Goal: Discharge from  the hospital    Interventions:  - Monitor vital signs  - Monitor appropriate labs  - Monitor blood glucose levels  - Pain management  - Administer medications per order  - Follow MD orders  - Diagnostics per order  - Update / inform patient and family on plan of care  - Discharge planning  - See additional Care Plan goals for specific interventions  Outcome: Progressing  Goal: Patient/Family Short Term Goal  Description: Patient's Short Term Goal: Improve redness, swelling, and pain to left lower extremity     Interventions:   - Monitor vital signs  - Monitor appropriate labs  - Monitor blood glucose levels  - Pain management  - Administer medications per order  - Follow MD orders  - Diagnostics per order  - Update / inform patient and family on plan of care  - See additional Care Plan goals for specific interventions  Outcome: Progressing     Problem: HEMATOLOGIC - ADULT  Goal: Free from bleeding injury  Description: (Example usage: patient with low platelets)  INTERVENTIONS:  - Avoid intramuscular injections, enemas and rectal medication administration  - Ensure safe mobilization of patient  - Hold pressure on venipuncture sites to achieve adequate hemostasis  - Assess for signs and symptoms of internal bleeding  - Monitor lab trends  - Patient is to report abnormal signs of bleeding to staff  - Avoid use of toothpicks and dental floss  - Use electric shaver for shaving  - Use soft bristle tooth brush  - Limit straining and forceful nose blowing  Outcome: Progressing

## 2024-12-09 ENCOUNTER — APPOINTMENT (OUTPATIENT)
Dept: CT IMAGING | Facility: HOSPITAL | Age: 54
End: 2024-12-09
Attending: HOSPITALIST
Payer: COMMERCIAL

## 2024-12-09 VITALS
RESPIRATION RATE: 18 BRPM | WEIGHT: 315 LBS | OXYGEN SATURATION: 93 % | SYSTOLIC BLOOD PRESSURE: 111 MMHG | DIASTOLIC BLOOD PRESSURE: 75 MMHG | HEART RATE: 72 BPM | TEMPERATURE: 99 F | HEIGHT: 73 IN | BODY MASS INDEX: 41.75 KG/M2

## 2024-12-09 PROCEDURE — 74176 CT ABD & PELVIS W/O CONTRAST: CPT | Performed by: HOSPITALIST

## 2024-12-09 RX ORDER — CEFEPIME 1 G/50ML
2 INJECTION, SOLUTION INTRAVENOUS EVERY 8 HOURS
Qty: 1800 ML | Refills: 0 | Status: SHIPPED | OUTPATIENT
Start: 2024-12-09 | End: 2024-12-15

## 2024-12-09 RX ORDER — TRAMADOL HYDROCHLORIDE 50 MG/1
TABLET ORAL EVERY 6 HOURS PRN
Qty: 50 TABLET | Refills: 0 | Status: SHIPPED | OUTPATIENT
Start: 2024-12-09 | End: 2024-12-09

## 2024-12-09 RX ORDER — TRAMADOL HYDROCHLORIDE 50 MG/1
TABLET ORAL EVERY 6 HOURS PRN
Qty: 50 TABLET | Refills: 0 | Status: SHIPPED | OUTPATIENT
Start: 2024-12-09

## 2024-12-09 NOTE — PLAN OF CARE
Problem: Patient Centered Care  Goal: Patient preferences are identified and integrated in the patient's plan of care  Description: Interventions:  - What would you like us to know as we care for you? From home alone  - Provide timely, complete, and accurate information to patient/family  - Incorporate patient and family knowledge, values, beliefs, and cultural backgrounds into the planning and delivery of care  - Encourage patient/family to participate in care and decision-making at the level they choose  - Honor patient and family perspectives and choices  Outcome: Progressing     Problem: CARDIOVASCULAR - ADULT  Goal: Maintains optimal cardiac output and hemodynamic stability  Description: INTERVENTIONS:  - Monitor vital signs, rhythm, and trends  - Monitor for bleeding, hypotension and signs of decreased cardiac output  - Evaluate effectiveness of vasoactive medications to optimize hemodynamic stability  - Monitor arterial and/or venous puncture sites for bleeding and/or hematoma  - Assess quality of pulses, skin color and temperature  - Assess for signs of decreased coronary artery perfusion - ex. Angina  - Evaluate fluid balance, assess for edema, trend weights  Outcome: Progressing     Problem: RESPIRATORY - ADULT  Goal: Achieves optimal ventilation and oxygenation  Description: INTERVENTIONS:  - Assess for changes in respiratory status  - Assess for changes in mentation and behavior  - Position to facilitate oxygenation and minimize respiratory effort  - Oxygen supplementation based on oxygen saturation or ABGs  - Provide Smoking Cessation handout, if applicable  - Encourage broncho-pulmonary hygiene including cough, deep breathe, Incentive Spirometry  - Assess the need for suctioning and perform as needed  - Assess and instruct to report SOB or any respiratory difficulty  - Respiratory Therapy support as indicated  - Manage/alleviate anxiety  - Monitor for signs/symptoms of CO2 retention  Outcome:  Progressing     Problem: GASTROINTESTINAL - ADULT  Goal: Minimal or absence of nausea and vomiting  Description: INTERVENTIONS:  - Maintain adequate hydration with IV or PO as ordered and tolerated  - Nasogastric tube to low intermittent suction as ordered  - Evaluate effectiveness of ordered antiemetic medications  - Provide nonpharmacologic comfort measures as appropriate  - Advance diet as tolerated, if ordered  - Obtain nutritional consult as needed  - Evaluate fluid balance  Outcome: Progressing     Problem: GENITOURINARY - ADULT  Goal: Absence of urinary retention  Description: INTERVENTIONS:  - Assess patient’s ability to void and empty bladder  - Monitor intake/output and perform bladder scan as needed  - Follow urinary retention protocol/standard of care  - Consider collaborating with pharmacy to review patient's medication profile  - Implement strategies to promote bladder emptying  Outcome: Progressing     Problem: METABOLIC/FLUID AND ELECTROLYTES - ADULT  Goal: Electrolytes maintained within normal limits  Description: INTERVENTIONS:  - Monitor labs and rhythm and assess patient for signs and symptoms of electrolyte imbalances  - Administer electrolyte replacement as ordered  - Monitor response to electrolyte replacements, including rhythm and repeat lab results as appropriate  - Fluid restriction as ordered  - Instruct patient on fluid and nutrition restrictions as appropriate  Outcome: Progressing     Problem: SKIN/TISSUE INTEGRITY - ADULT  Goal: Skin integrity remains intact  Description: INTERVENTIONS  - Assess and document risk factors for pressure ulcer development  - Assess and document skin integrity  - Monitor for areas of redness and/or skin breakdown  - Initiate interventions, skin care algorithm/standards of care as needed  Outcome: Progressing  Goal: Incision(s), wounds(s) or drain site(s) healing without S/S of infection  Description: INTERVENTIONS:  - Assess and document risk factors for  pressure ulcer development  - Assess and document skin integrity  - Assess and document dressing/incision, wound bed, drain sites and surrounding tissue  - Implement wound care per orders  - Initiate isolation precautions as appropriate  - Initiate Pressure Ulcer prevention bundle as indicated  Outcome: Progressing     Problem: MUSCULOSKELETAL - ADULT  Goal: Return mobility to safest level of function  Description: INTERVENTIONS:  - Assess patient stability and activity tolerance for standing, transferring and ambulating w/ or w/o assistive devices  - Assist with transfers and ambulation using safe patient handling equipment as needed  - Ensure adequate protection for wounds/incisions during mobilization  - Obtain PT/OT consults as needed  - Advance activity as appropriate  - Communicate ordered activity level and limitations with patient/family  Outcome: Progressing     Problem: PAIN - ADULT  Goal: Verbalizes/displays adequate comfort level or patient's stated pain goal  Description: INTERVENTIONS:  - Encourage pt to monitor pain and request assistance  - Assess pain using appropriate pain scale  - Administer analgesics based on type and severity of pain and evaluate response  - Implement non-pharmacological measures as appropriate and evaluate response  - Consider cultural and social influences on pain and pain management  - Manage/alleviate anxiety  - Utilize distraction and/or relaxation techniques  - Monitor for opioid side effects  - Notify MD/LIP if interventions unsuccessful or patient reports new pain  - Anticipate increased pain with activity and pre-medicate as appropriate  Outcome: Progressing     Problem: SAFETY ADULT - FALL  Goal: Free from fall injury  Description: INTERVENTIONS:  - Assess pt frequently for physical needs  - Identify cognitive and physical deficits and behaviors that affect risk of falls.  - [place] fall precautions as indicated by assessment.  - Educate pt/family on patient safety  including physical limitations  - Instruct pt to call for assistance with activity based on assessment  - Modify environment to reduce risk of injury  - Provide assistive devices as appropriate  - Consider OT/PT consult to assist with strengthening/mobility  - Encourage toileting schedule  Outcome: Progressing     Problem: DISCHARGE PLANNING  Goal: Discharge to home or other facility with appropriate resources  Description: INTERVENTIONS:  - Identify barriers to discharge w/pt and caregiver  - Include patient/family/discharge partner in discharge planning  - Arrange for needed discharge resources and transportation as appropriate  - Identify discharge learning needs (meds, wound care, etc)  - Arrange for interpreters to assist at discharge as needed  - Consider post-discharge preferences of patient/family/discharge partner  - Complete POLST form as appropriate  - Assess patient's ability to be responsible for managing their own health  - Refer to Case Management Department for coordinating discharge planning if the patient needs post-hospital services based on physician/LIP order or complex needs related to functional status, cognitive ability or social support system  Outcome: Progressing     Problem: Impaired Functional Mobility  Goal: Achieve highest/safest level of mobility/gait  Description: Interventions:  - Assess patient's functional ability and stability  - Promote increasing activity/tolerance for mobility and gait  - Educate and engage patient/family in tolerated activity level and precautions  - Recommend use of  RW for transfers and ambulation  - Recommend patient transfer to bedside chair toward strongest side  - When transferring patient, block weaker knee for safety  - Recommend use of chair position in bed 3 times per day  Outcome: Progressing     Problem: Impaired Activities of Daily Living  Goal: Achieve highest/safest level of independence in self care  Description: Interventions:  - Assess  ability and encourage patient to participate in ADLs to maximize function  - Promote sitting position while performing ADLs such as feeding, grooming, and bathing  - Educate and encourage patient/family in tolerated functional activity level and precautions during self-care  Outcome: Progressing     Problem: Patient/Family Goals  Goal: Patient/Family Long Term Goal  Description: Patient's Long Term Goal: Discharge from the hospital    Interventions:  - Monitor vital signs  - Monitor appropriate labs  - Monitor blood glucose levels  - Pain management  - Administer medications per order  - Follow MD orders  - Diagnostics per order  - Update / inform patient and family on plan of care  - Discharge planning  - See additional Care Plan goals for specific interventions  Outcome: Progressing  Goal: Patient/Family Short Term Goal  Description: Patient's Short Term Goal: Improve redness, swelling, and pain to left lower extremity     Interventions:   - Monitor vital signs  - Monitor appropriate labs  - Monitor blood glucose levels  - Pain management  - Administer medications per order  - Follow MD orders  - Diagnostics per order  - Update / inform patient and family on plan of care  - See additional Care Plan goals for specific interventions  Outcome: Progressing     Problem: HEMATOLOGIC - ADULT  Goal: Free from bleeding injury  Description: (Example usage: patient with low platelets)  INTERVENTIONS:  - Avoid intramuscular injections, enemas and rectal medication administration  - Ensure safe mobilization of patient  - Hold pressure on venipuncture sites to achieve adequate hemostasis  - Assess for signs and symptoms of internal bleeding  - Monitor lab trends  - Patient is to report abnormal signs of bleeding to staff  - Avoid use of toothpicks and dental floss  - Use electric shaver for shaving  - Use soft bristle tooth brush  - Limit straining and forceful nose blowing  Outcome: Progressing

## 2024-12-09 NOTE — CM/SW NOTE
12/09/24 1400   Discharge disposition   Expected discharge disposition Home-Health   Post Acute Care Provider   ([facility])   DME/Infusion Providers [facility]   Discharge transportation Private car     JASVIR confirmed with Dr. Savage who stated pt is medically ready for discharge today.      updates attached them via [facility] to [facility].  Mili [facility] made aware of discharge through [employer] Messages.    Sudeep w/ [facility] aware pt is discharging today and will skip 2:00 AM dose.  Sudeep aware pt needs extenders.    JASVIR confirmed that [facility] contact information added to AVS.    JASVIR asked RN to give pt Eliquis coupon.    PLAN: DC home with [facility] and [facility]    Georgie Landis MSW, LSW [phone]

## 2024-12-09 NOTE — PROGRESS NOTES
[facility]  part of [facility] Infectious Disease  Progress Note    Jovan Merino . Patient Status:  Inpatient    1970 MRN [id number]   Location [facility] 5SW/SE Attending Linda Savage MD   Hosp Day # 21 PCP Eric Sethi,      Subjective:  Patient seen and examined sitting up in bed. Reports feeling better at the moment. Had been experiencing some abdominal pain earlier that has since resolved with pain medication. Continues to tolerate IV antibiotics. Denies F/C. Denies n/v/d. Otherwise no new complaints.     Objective:  Blood pressure 127/68, pulse 67, temperature 98.2 °F (36.8 °C), temperature source Oral, resp. rate 16, height 6' 1\" (1.854 m), weight (!) 400 lb 3.2 oz (181.5 kg), SpO2 92%.    Intake/Output:    Intake/Output Summary (Last 24 hours) at 2024 0833  Last data filed at 2024 1718  Gross per 24 hour   Intake 400 ml   Output --   Net 400 ml       Physical Exam:  General: Awake, alert, non-tox, NAD.  HEENT:  Oropharynx clear, trachea ML.  Heart: RRR S1S2 no murmurs.  Lungs: Essentially CTA b/l, no rhonchi, rales, wheezes.  Abdomen: Soft, NT/ND.  BS present.  No guarding or rebound.  Extremity: LLE wrapped with dressing c/d/i.  Neurological: No focal deficits.  Derm:  Warm and dry.    Lab Data Review:        Cultures:  Hospital Encounter on 24   1. Aerobic Bacterial Culture     Status: None    Collection Time: 24  2:40 PM    Specimen: Leg, left; Other   Result Value Ref Range    Aerobic Culture Result No Growth 3 Days N/A    Aerobic Smear No WBCs seen N/A    Aerobic Smear No organisms seen N/A   2. Blood Culture     Status: None    Collection Time: 24  8:48 AM    Specimen: Blood,peripheral   Result Value Ref Range    Blood Culture Result No Growth 5 Days N/A       Radiology:  XR ABDOMEN (1 VIEW) (CPT=74018)    Result Date: 2024  CONCLUSION:   Nonobstructive bowel gas pattern.     Dictated by (CST):  Gilberto Pan MD on 12/06/2024 at 10:17 AM     Finalized by (CST): Gilberto Pan MD on 12/06/2024 at 10:18 AM             Assessment and Plan:  1.  LLE cellulitis in this patient presenting here with sepsis with threat to life  - Blood cultures NGTD.  - MRI with mild myositis.  - Initially on IV cefazolin without improvement, escalated to IV daptomycin and cefepime on 11/21.  - IV daptomycin stopped on 12/1 d/t rhabdomyolysis.  - Currently on IV cefepime with clinical improvement.    2.  LLE superficial abscess  - S/p I&D with ortho on 12/5/24 -- cultures NGTD.    3.  LLE DVT  - On anticoagulation as per the primary team.    4.  H/o psoriasis  - Patient reporting improvement of psoriasis since starting antibiotics.  - Would benefit from penicillin V 500 mg twice daily for suppression to be started once off cefepime.  - Will discuss on follow-up as outpatient.    5.  Recs  - Continue IV cefepime at this time. Plan to discharge on same, IV cefepime,  now through 12/15/24.  Orders in med rec.  - PICC line in place.  - F/u WBC and fever curve.  - F/u cultures.  - Wound care as per ortho.   - Anticoagulation as per the primary team.  - Supportive care as per the primary team.  - Discussed plan of care with primary team, Dr. Linda Savage.  - Please draw weekly CBC with diff, CMP and CRP for duration of abx therapy and fax results to [facility] at [phone].  - Discussed plan of care with nursing.   - Discussed plan of care with patient. All questions addressed and understanding verbalized.  - Further recommendations pending clinical course.    Discussed case with ID attending/collaborating physician, Dr. Natalie Cassidy, who is in agreement with the above plan of care    Please note that this report has been produced using speech recognition software and may contain errors related to that system including, but not limited to, errors in grammar, punctuation, and spelling, as well as words and phrases that  possibly may have been recognized inappropriately.  If there are any questions or concerns, contact the dictating provider for clarification.     The 21st Century Cures Act makes medical notes like these available to patients in the interest of transparency. Please be advised this is a medical document. Medical documents are intended to carry relevant information, facts as evident, and the clinical opinion of the practitioner. The medical note is intended as peer to peer communication and may appear blunt or direct. It is written in medical language and may contain abbreviations or verbiage that are unfamiliar.     If you have any questions or concerns please call [facility] Infectious Disease at [phone].     Otoniel Dempsey, APRODELL    12/9/2024  8:32 AM

## 2024-12-09 NOTE — PLAN OF CARE
Problem: Patient Centered Care  Goal: Patient preferences are identified and integrated in the patient's plan of care  Description: Interventions:  - What would you like us to know as we care for you? From home alone  - Provide timely, complete, and accurate information to patient/family  - Incorporate patient and family knowledge, values, beliefs, and cultural backgrounds into the planning and delivery of care  - Encourage patient/family to participate in care and decision-making at the level they choose  - Honor patient and family perspectives and choices  Outcome: Progressing     Problem: CARDIOVASCULAR - ADULT  Goal: Maintains optimal cardiac output and hemodynamic stability  Description: INTERVENTIONS:  - Monitor vital signs, rhythm, and trends  - Monitor for bleeding, hypotension and signs of decreased cardiac output  - Evaluate effectiveness of vasoactive medications to optimize hemodynamic stability  - Monitor arterial and/or venous puncture sites for bleeding and/or hematoma  - Assess quality of pulses, skin color and temperature  - Assess for signs of decreased coronary artery perfusion - ex. Angina  - Evaluate fluid balance, assess for edema, trend weights  Outcome: Progressing     Problem: RESPIRATORY - ADULT  Goal: Achieves optimal ventilation and oxygenation  Description: INTERVENTIONS:  - Assess for changes in respiratory status  - Assess for changes in mentation and behavior  - Position to facilitate oxygenation and minimize respiratory effort  - Oxygen supplementation based on oxygen saturation or ABGs  - Provide Smoking Cessation handout, if applicable  - Encourage broncho-pulmonary hygiene including cough, deep breathe, Incentive Spirometry  - Assess the need for suctioning and perform as needed  - Assess and instruct to report SOB or any respiratory difficulty  - Respiratory Therapy support as indicated  - Manage/alleviate anxiety  - Monitor for signs/symptoms of CO2 retention  Outcome:  Progressing     Problem: GASTROINTESTINAL - ADULT  Goal: Minimal or absence of nausea and vomiting  Description: INTERVENTIONS:  - Maintain adequate hydration with IV or PO as ordered and tolerated  - Nasogastric tube to low intermittent suction as ordered  - Evaluate effectiveness of ordered antiemetic medications  - Provide nonpharmacologic comfort measures as appropriate  - Advance diet as tolerated, if ordered  - Obtain nutritional consult as needed  - Evaluate fluid balance  Outcome: Progressing     Problem: GENITOURINARY - ADULT  Goal: Absence of urinary retention  Description: INTERVENTIONS:  - Assess patient’s ability to void and empty bladder  - Monitor intake/output and perform bladder scan as needed  - Follow urinary retention protocol/standard of care  - Consider collaborating with pharmacy to review patient's medication profile  - Implement strategies to promote bladder emptying  Outcome: Progressing     Problem: METABOLIC/FLUID AND ELECTROLYTES - ADULT  Goal: Electrolytes maintained within normal limits  Description: INTERVENTIONS:  - Monitor labs and rhythm and assess patient for signs and symptoms of electrolyte imbalances  - Administer electrolyte replacement as ordered  - Monitor response to electrolyte replacements, including rhythm and repeat lab results as appropriate  - Fluid restriction as ordered  - Instruct patient on fluid and nutrition restrictions as appropriate  Outcome: Progressing     Problem: SKIN/TISSUE INTEGRITY - ADULT  Goal: Skin integrity remains intact  Description: INTERVENTIONS  - Assess and document risk factors for pressure ulcer development  - Assess and document skin integrity  - Monitor for areas of redness and/or skin breakdown  - Initiate interventions, skin care algorithm/standards of care as needed  Outcome: Progressing  Goal: Incision(s), wounds(s) or drain site(s) healing without S/S of infection  Description: INTERVENTIONS:  - Assess and document risk factors for  pressure ulcer development  - Assess and document skin integrity  - Assess and document dressing/incision, wound bed, drain sites and surrounding tissue  - Implement wound care per orders  - Initiate isolation precautions as appropriate  - Initiate Pressure Ulcer prevention bundle as indicated  Outcome: Progressing     Problem: MUSCULOSKELETAL - ADULT  Goal: Return mobility to safest level of function  Description: INTERVENTIONS:  - Assess patient stability and activity tolerance for standing, transferring and ambulating w/ or w/o assistive devices  - Assist with transfers and ambulation using safe patient handling equipment as needed  - Ensure adequate protection for wounds/incisions during mobilization  - Obtain PT/OT consults as needed  - Advance activity as appropriate  - Communicate ordered activity level and limitations with patient/family  Outcome: Progressing     Problem: PAIN - ADULT  Goal: Verbalizes/displays adequate comfort level or patient's stated pain goal  Description: INTERVENTIONS:  - Encourage pt to monitor pain and request assistance  - Assess pain using appropriate pain scale  - Administer analgesics based on type and severity of pain and evaluate response  - Implement non-pharmacological measures as appropriate and evaluate response  - Consider cultural and social influences on pain and pain management  - Manage/alleviate anxiety  - Utilize distraction and/or relaxation techniques  - Monitor for opioid side effects  - Notify MD/LIP if interventions unsuccessful or patient reports new pain  - Anticipate increased pain with activity and pre-medicate as appropriate  Outcome: Progressing     Problem: SAFETY ADULT - FALL  Goal: Free from fall injury  Description: INTERVENTIONS:  - Assess pt frequently for physical needs  - Identify cognitive and physical deficits and behaviors that affect risk of falls.  - Grant fall precautions as indicated by assessment.  - Educate pt/family on patient safety  including physical limitations  - Instruct pt to call for assistance with activity based on assessment  - Modify environment to reduce risk of injury  - Provide assistive devices as appropriate  - Consider OT/PT consult to assist with strengthening/mobility  - Encourage toileting schedule  Outcome: Progressing     Problem: DISCHARGE PLANNING  Goal: Discharge to home or other facility with appropriate resources  Description: INTERVENTIONS:  - Identify barriers to discharge w/pt and caregiver  - Include patient/family/discharge partner in discharge planning  - Arrange for needed discharge resources and transportation as appropriate  - Identify discharge learning needs (meds, wound care, etc)  - Arrange for interpreters to assist at discharge as needed  - Consider post-discharge preferences of patient/family/discharge partner  - Complete POLST form as appropriate  - Assess patient's ability to be responsible for managing their own health  - Refer to Case Management Department for coordinating discharge planning if the patient needs post-hospital services based on physician/LIP order or complex needs related to functional status, cognitive ability or social support system  Outcome: Progressing     Problem: Impaired Functional Mobility  Goal: Achieve highest/safest level of mobility/gait  Description: Interventions:  - Assess patient's functional ability and stability  - Promote increasing activity/tolerance for mobility and gait  - Educate and engage patient/family in tolerated activity level and precautions  - Recommend use of  RW for transfers and ambulation  - Recommend patient transfer to bedside chair toward strongest side  - When transferring patient, block weaker knee for safety  - Recommend use of chair position in bed 3 times per day  Outcome: Progressing     Problem: Impaired Activities of Daily Living  Goal: Achieve highest/safest level of independence in self care  Description: Interventions:  - Assess  ability and encourage patient to participate in ADLs to maximize function  - Promote sitting position while performing ADLs such as feeding, grooming, and bathing  - Educate and encourage patient/family in tolerated functional activity level and precautions during self-care  Outcome: Progressing     Problem: HEMATOLOGIC - ADULT  Goal: Free from bleeding injury  Description: (Example usage: patient with low platelets)  INTERVENTIONS:  - Avoid intramuscular injections, enemas and rectal medication administration  - Ensure safe mobilization of patient  - Hold pressure on venipuncture sites to achieve adequate hemostasis  - Assess for signs and symptoms of internal bleeding  - Monitor lab trends  - Patient is to report abnormal signs of bleeding to staff  - Avoid use of toothpicks and dental floss  - Use electric shaver for shaving  - Use soft bristle tooth brush  - Limit straining and forceful nose blowing  Outcome: Progressing

## 2024-12-09 NOTE — DISCHARGE SUMMARY
General Medicine Discharge Summary     Patient ID:  Jovan Merino Sr.  54 year old  11/4/1970    Admit date: 11/18/2024    Discharge date and time: 12/09/24    Attending Physician: Linda aSvage MD     Primary Care Physician: Eric Sethi DO     Reason for admission: fever, LLE redness    Discharge Diagnoses: Cellulitis of left lower leg [L03.116]    Discharged Condition: stable    Disposition: home    Consults:   Consultants         Provider   Role Specialty     Stefan Sutton DO      Consulting Physician Internal Medicine     Urrutia, MD Jorge      Consulting Physician SURGERY, ORTHOPEDIC     Brodie, Kailee Reynolds DO      Consulting Physician INFECTIOUS DISEASES              HPI: Per Dr. Parra    Jovan Merino Sr. - 54 year old male presenting with above CC.     History obtained from patient w/ additional history from review of outside records in care everywhere.  Progressive pain, redness, swelling of LLE for past 5 days. Assoc with fevers. Progressively worse. No specific open sores or lesions but does have psoriasis and has dry flaky skin w some plaques. Is on humira for this     In ED, T 98.8, VSS. CMP/CBC okay. . Given IVP toradol, IV ancef. LLE doppler ordered. D/w Dr. Horne, admitting for cellulitis.    Hospital Course:     Jovan Merino - 54 year old male w MO, HTN, Afib, HFpEF, psoriasis admitted 11/18/2024 for LLE cellulitis, started on IV Ancef. Initially improving however 11/21 worse - abx broadened to vanc/zosyn, then cefepime and daptomycin. CT with cellulitis . ID/Rheum/Orhto consulted. MRI with LLE cellulitis, mild myositis in posterior aspect, no abscess, no OM.  PICC in place for extended IV abx coverage. 3rd LLE doppler 11/29/24 Left popliteal vein DVT. Now on Eliquis. Hospital course with severe abdominal pain and firmness near hernia concerning for incarcerated hernia, required IV dilaudid, symptoms self resolved. CT A/P without obstruction, d/w patient regarding follow-up with  general surgeon in the next few months after completion of abx and DOAC for DVT. Also noted renal lesion, will need follow-up imaging as well. Stable for discharge home on 12/9/24.      Sepsis due to LLE cellulitis  Immunosuppression due to psoriasis/Humira  - blood cx no growth  - IV ancef (11/18 - 21 )  - 11/21: worse, Consulted ID - transitioned to cefepime and daptomycin  - CT negative 11/21 for underlying abscess  - Rheum/Ortho on consult   - No concern for compartment syndrome or joint involvement at this time  - MRI with LLE cellulitis, mild myositis in posterior aspect, no abscess, no OM   - PICC in place for extended IV abx coverage  - noted 12/4 area of fluctuance above the medial aspect of left ankle. I&D done by Dr. Urrutia on 12/5, cultures sent, NGTD     Acute DVT  - LLE, Left popliteal vein  - switched to Eliquis, plan for 3 months AC    Periumbilical hernia  - s/p repair x2 with Dr. Harris and Dr. Louis in the past   - CT A/P without obstruction, did have severe pain and felt hardened mass this AM, concern for intermittent obstruction but now resolved  - d/w patient regarding follow-up with surgery in the next few months, cannot have surgery until off AC for DVT and infection cleared    R renal lesion  - 2.3 cm soft tissue density at superior pole of R kidney- could be ?complex cyst, solid renal neoplasm, vs. cortical lobulation due to renal scarring  - will need additional non emergent imaging as outpatient,      Partial quad tear  L knee effusion  - ortho consulted, appreciate recs     Headaches  - fioricet PRN     HFpEF - compensated  HTN  Parox Afib - low CV, not on AC  - Continue PTA lasix, flecainide, coreg, hydral, enalapril     Psoriasis  - On humira OP. Would hold until leg better     Morbid obesity w [facility]  - Body mass index is 52.8 kg/m².   - Healthy diet & wt loss encouraged  - Has lost quite a bit of weight with Mounjaro already     DM2 - currently controlled w mounjaro  - Okay for reg  diet, fCan hold on SSI/accuchecks     Depression   - seen by psych liaison  - started Zoloft 50mg daily     Exam   GEN: no distress  Pulm: CTABL  CV: RRR, no murmurs  ABD: Soft  MSK: swelling improved LLE compartments not tense  SKIN: warm, dry, psoriatic plaques, LLE with improved edema    Operative Procedures:      Imaging: No results found.        Home Medication Changes:     I reconciled current and discharge medications on day of discharge. These medication changes have been made as below         Medication List        START taking these medications      apixaban 5 MG Tabs  Commonly known as: Eliquis  Take 2 tabs (10mg) by mouth twice daily for 7 days, then take 1 tab (5mg) by mouth twice daily thereafter.     cefepime HCl 1 GM/50ML Soln  Commonly known as: MAXIPIME  Inject 100 mL (2 g total) into the vein every 8 (eight) hours for 6 days. Check once weekly CBC, CMP, CRP and ESR and fax to [phone].  PICC care q week.  Flushes per [facility] protocol.     sertraline 50 MG Tabs  Commonly known as: Zoloft  Take 1 tablet (50 mg total) by mouth daily.  Start taking on: December 10, 2024     traMADol 50 MG Tabs  Commonly known as: Ultram  Take 1-2 tablets ( mg total) by mouth every 6 (six) hours as needed for Pain.            CONTINUE taking these medications      carvedilol 25 MG Tabs  Commonly known as: Coreg     dilTIAZem HCl ER Coated Beads 180 MG Cp24  Commonly known as: CARDIZEM CD     diphenhydrAMINE 25 MG Caps  Commonly known as: Benadryl     enalapril 20 MG Tabs  Commonly known as: Vasotec  TAKE 1 TABLET BY MOUTH TWICE DAILY     fexofenadine 180 MG Tabs  Commonly known as: Allegra     Flecainide Acetate 150 MG Tabs  Commonly known as: TAMBOCOR     furosemide 40 MG Tabs  Commonly known as: Lasix     Humira (2 Pen) 40 MG/0.4ML Ajkt  Generic drug: Adalimumab     hydrALAZINE 50 MG Tabs  Commonly known as: Apresoline     Mounjaro 5 MG/0.5ML Soaj  Generic drug: Tirzepatide     Multiple Vitamin Tabs      omega-3 fatty acids 1000 MG Caps  Commonly known as: Fish Oil            STOP taking these medications      ibuprofen 200 MG Tabs  Commonly known as: Motrin               Where to Get Your Medications        These medications were sent to [id number] - [place], [place] - [address] AT [facility], [phone], [phone]  [address], [facility] [address]      Phone: [phone]   apixaban 5 MG Tabs  sertraline 50 MG Tabs  traMADol 50 MG Tabs       You can get these medications from any pharmacy    Bring a paper prescription for each of these medications  cefepime HCl 1 GM/50ML Soln         Activity: activity as tolerated  Diet: regular diet  Wound Care: keep wound clean and dry  Code Status: Full Code  O2: n/a    Follow-up with:    PCP   Specialist ID, ortho, general surgery       FU   Follow-up Information       Eric Sethi DO Follow up in 1 week(s).    Specialty: Family Practice  Contact information:  [address]  [facility] [address] [phone]               Jorge Urrutia MD Follow up in 2 week(s).    Specialty: SURGERY, ORTHOPEDIC  Contact information:  [address]   [address]  [facility] [address]  [phone]               Tommy Mejia APRN Follow up in 1 week(s).    Specialty: Nurse Practitioner  Contact information:  [address]  [place] [address]  [phone]               Ford Louis MD Follow up in 2 week(s).    Specialty: SURGERY, GENERAL  Contact information:  [address]  [address]  [facility] [address] [phone]                             DC instructions:      Other Discharge Instructions:         Sometimes managing your health at home requires assistance.  The [facility] team has recognized your preference to use [facility].  They can be reached at [phone].  A representative from the home health agency will contact you or your family to schedule your first  visit.          IV antibiotic pharmacy:     [facility]  Phone: [phone]  Fax: [phone]      Needs INR drawn in 2 days. Results to be sent to Dr. Sethi, please scheduled follow-up in 1-2 weeks after discharge.       Referrals for Individual Counseling & Psychiatry are listed below. All providers are in network with your insurance and offer virtual services. Please call to schedule an appointment.     Lv Mishra MA, CHRISTOPHER  Master's-Level Clinician  [address]  [address]  [place], [place] [id number] [phone] phone    Ashley Navarro MA  Master's-Level Clinician  [address]  [address]  [place] [address]  [phone] phone    Jordyn Chavarria MA  Master's-Level Clinician  [address] [facility] [address]  [place] [address]  [phone] phone    Asuncion Marrufo PSYD  Master's-Level Clinician, Psychologist  [address] [place] [address] [address]  [place], [place] [address] [phone] phone    Adelina Almonte MA, DEVI, PHD  Master's-Level Clinician  [address] [facility] [address]  [place], [place] [id number] [phone] phone    Psychiatry Referrals:    [facility]  [address] [facility] [address]  [place] [address]  [phone]     [facility]   [address] [facility] [address]  [place] [address]  [phone]          Follow-up with labs: none    Total Time Coordinating Care: 31 minutes    Patient had opportunity to ask questions and state understand and agree with therapeutic plan as outlined      Linda Savage MD  [facility] Hospitalist

## 2024-12-09 NOTE — PAYOR COMM NOTE
--------------  CONTINUED STAY REVIEW    Payor: [facility] OUT OF STATE PPO  Subscriber #:  [id number]  Authorization Number: [id number]    Admit date: 11/18/24  Admit time:  4:58 PM    12/7/24           Assessment/Plan:   Jovan Merino - 54 year old male w MO, HTN, Afib, HFpEF, psoriasis admitted 11/18/2024 for LLE cellulitis, started on IV Ancef. Initially improving however 11/21 worse - abx broadened to vanc/zosyn, then cefepime and daptomycin. CT with cellulitis . ID/Rheum/Orhto consulted. MRI with LLE cellulitis, mild myositis in posterior aspect, no abscess, no OM.  PICC in place for extended IV abx coverage. 3rd LLE doppler 11/29/24 Left popliteal vein DVT. Thus far lovenox and coumadin started.      Sepsis due to LLE cellulitis  Immunosuppression due to psoriasis/Humira  - blood cx no growth  - IV ancef (11/18 - 21 )  - 11/21: worse, Consulted ID - transitioned to cefepime and daptomycin  - CT negative 11/21 for underlying abscess  - Rheum/Ortho on consult   - No concern for compartment syndrome or joint involvement at this time  - MRI with LLE cellulitis, mild myositis in posterior aspect, no abscess, no OM   - PICC in place for extended IV abx coverage  - noted 12/4 area of fluctuance above the medial aspect of left ankle. I&D done by Dr. Urrutia on 12/5, cultures sent.      Acute DVT  - LLE, Left popliteal vein  - full dose lovenox to coumadin   - will re-visit consideration of DOAC. I do not think BMI being high is contraindication for eliquis based on review. I will consider using eliquis and check price.      Partial quad tear  L knee effusion  - ortho consulted, appreciate recs     Headaches  - fioricet PRN     HFpEF - compensated  HTN  Parox Afib - low CV, not on AC  - Continue PTA lasix, flecainide, coreg, hydral, enalapril     Psoriasis  - On humira OP. Would hold until leg better     [place] [facility]  - Body mass index is 52.8 kg/m².   - Healthy diet & wt loss encouraged  - Has lost quite a bit of  weight with Mounjaro already     DM2 - currently controlled w mounjaro  - Okay for reg diet, Can hold on SSI/accuchecks       Subjective:    Feels much better, pain improved after I&D      Temp:  [98.6 °F (37 °C)-99.6 °F (37.6 °C)] 98.8 °F (37.1 °C)  Pulse:  [68-77] 77  Resp:  [16-18] 16  BP: (111-124)/(64-73) 120/73  SpO2:  [92 %-93 %] 92 %     Pulm: CTABL  CV: RRR, no murmurs  ABD: Soft  MSK: swelling improved LLE compartments not tense  SKIN: warm, dry, psoriatic plaques, LLE with improved edema    Lab 12/02/24  0443 12/03/24  0543 12/05/24  1018   RBC 3.83* 4.14* 4.12*   HGB 10.4* 11.3* 11.3*   HCT 32.9* 35.5* 35.3*   MCV 85.9 85.7 85.7   MCH 27.2 27.3 27.4   MCHC 31.6 31.8 32.0   RDW 15.0 14.7 14.6   NEPRELIM 5.35 5.90 4.86   WBC 9.9 10.6 8.6   .0 403.0 373.0      Lab 12/03/24  0543 12/04/24  1711 12/05/24  1018   * 86 100*   BUN 21 19 22   CREATSERUM 0.88 0.92 1.01   EGFRCR 103 100 89   CA 10.2 9.8 10.1   * 134* 136   K 4.5 4.5 4.2    102 102   CO2 29.0 31.0 30.0        Meds:       enoxaparin  120 mg Subcutaneous q12h    traMADol  100 mg Oral q6h    sertraline  50 mg Oral Daily    warfarin  10 mg Oral Nightly    furosemide  40 mg Oral Daily    cefepime  2 g Intravenous Q8H    clotrimazole-betamethasone   Topical BID    docosanol   Topical BID    cetirizine  10 mg Oral Daily    enalapril  20 mg Oral BID    flecainide  150 mg Oral BID    hydrALAZINE  50 mg Oral BID    carvedilol  25 mg Oral BID with meals    magnesium oxide  200 mg Oral Nightly    dilTIAZem HCl ER Coated Beads  180 mg Oral Nightly                             12/8/24            Assessment/Plan:   Wilber Jovan - 54 year old male w MO, HTN, Afib, HFpEF, psoriasis admitted 11/18/2024 for LLE cellulitis, started on IV Ancef. Initially improving however 11/21 worse - abx broadened to vanc/zosyn, then cefepime and daptomycin. CT with cellulitis . ID/Rheum/Orhto consulted. MRI with LLE cellulitis, mild myositis in posterior  aspect, no abscess, no OM.  PICC in place for extended IV abx coverage. 3rd LLE doppler 11/29/24 Left popliteal vein DVT. Now on Eliquis.      Sepsis due to LLE cellulitis  Immunosuppression due to psoriasis/Humira  - blood cx no growth  - IV ancef (11/18 - 21 )  - 11/21: worse, Consulted ID - transitioned to cefepime and daptomycin  - CT negative 11/21 for underlying abscess  - Rheum/Ortho on consult   - No concern for compartment syndrome or joint involvement at this time  - MRI with LLE cellulitis, mild myositis in posterior aspect, no abscess, no OM   - PICC in place for extended IV abx coverage  - noted 12/4 area of fluctuance above the medial aspect of left ankle. I&D done by Dr. Urrutia on 12/5, cultures sent.      Acute DVT  - LLE, Left popliteal vein  - full dose lovenox to coumadin   - will re-visit consideration of DOAC. I do not think BMI being high is contraindication for eliquis based on review. I will consider using eliquis and check price.      Partial quad tear  L knee effusion  - ortho consulted, appreciate recs     Headaches  - fioricet PRN     HFpEF - compensated  HTN  Parox Afib - low CV, not on AC  - Continue PTA lasix, flecainide, coreg, hydral, enalapril     Psoriasis  - On humira OP. Would hold until leg better     Morbid obesity w correction  - Body mass index is 52.8 kg/m².   - Healthy diet & wt loss encouraged  - Has lost quite a bit of weight with Mounjaro already     DM2 - currently controlled w mounjaro  - Okay for reg diet, fCan hold on SSI/accuchecks     Subjective:   Still with some pain but doing better.      Temp:  [97.8 °F (36.6 °C)-98.7 °F (37.1 °C)] 98.6 °F (37 °C)  Pulse:  [68-74] 70  Resp:  [16-18] 16  BP: (103-128)/(62-80) 128/80  SpO2:  [93 %-95 %] 93 %     Pulm: CTABL  CV: RRR, no murmurs  ABD: Soft  MSK: swelling improved LLE compartments not tense  SKIN: warm, dry, psoriatic plaques, LLE with improved edema     Lab 12/02/24  0443 12/03/24  0543 12/05/24  1018   RBC 3.83* 4.14*  4.12*   HGB 10.4* 11.3* 11.3*   HCT 32.9* 35.5* 35.3*   MCV 85.9 85.7 85.7   MCH 27.2 27.3 27.4   MCHC 31.6 31.8 32.0   RDW 15.0 14.7 14.6   NEPRELIM 5.35 5.90 4.86   WBC 9.9 10.6 8.6   .0 403.0 373.0                     MEDICATIONS ADMINISTERED IN LAST 1 DAY:  acetaminophen (Tylenol Extra Strength) tab 500 mg       Date Action Dose Route User    12/8/2024 1142 Given 500 mg Oral Bren Mercado RN          apixaban (Eliquis) tab 10 mg       Date Action Dose Route User    12/8/2024 2005 Given 10 mg Oral Brant Schwartz RN    12/8/2024 0813 Given 10 mg Oral Bren Mercado RN          calcium carbonate (Tums) chewable tab 1,000 mg       Date Action Dose Route User    12/8/2024 1026 Given 1,000 mg Oral Bren Mercado RN          carvedilol (Coreg) tab 25 mg       Date Action Dose Route User    12/8/2024 1718 Given 25 mg Oral Bren Mercado RN    12/8/2024 0813 Given 25 mg Oral Bren Mercado RN          ceFEPIme (Maxpime) 2 g in sodium chloride 0.9% 100 mL IVPB-MBP       Date Action Dose Route User    12/9/2024 0127 New Bag 2 g Intravenous Brant Schwartz RN    12/8/2024 1718 New Bag 2 g Intravenous Bren Mercado RN    12/8/2024 1027 New Bag 2 g Intravenous Bren Mercado RN          cetirizine (ZyrTEC) tab 10 mg       Date Action Dose Route User    12/8/2024 0813 Given 10 mg Oral Bren Mercado RN          clotrimazole-betamethasone (Lotrisone) 1-0.05 % cream       Date Action Dose Route User    12/8/2024 1442 Given (none) Topical Bren Mercado RN          dilTIAZem ER (CardIZEM CD) 24 hr cap 180 mg       Date Action Dose Route User    12/8/2024 2006 Given 180 mg Oral Brant Schwartz RN          enalapril (Vasotec) tab 20 mg       Date Action Dose Route User    12/8/2024 2006 Given 20 mg Oral Brant Schwartz RN    12/8/2024 0813 Given 20 mg Oral Bren Mercado RN          famotidine (Pepcid) tab 20 mg       Date Action Dose Route User    12/9/2024 0455 Given 20 mg Oral Brant Schwartz RN          flecainide (Tambocor) tab 150 mg        Date Action Dose Route User    12/8/2024 2006 Given 150 mg Oral Brant Schwartz RN    12/8/2024 1025 Given 150 mg Oral Bren Mercado RN          furosemide (Lasix) tab 40 mg       Date Action Dose Route User    12/8/2024 0813 Given 40 mg Oral Bren Mercado RN          hydrALAZINE (Apresoline) tab 50 mg       Date Action Dose Route User    12/8/2024 2006 Given 50 mg Oral Brant Schwartz RN    12/8/2024 0813 Given 50 mg Oral Bren Mercado RN          HYDROmorphone (Dilaudid) 1 MG/ML injection 1 mg       Date Action Dose Route User    12/9/2024 0526 Given 1 mg Intravenous Brant Schwartz RN          magnesium hydroxide (Milk of Magnesia) 400 MG/5ML oral suspension 30 mL       Date Action Dose Route User    12/9/2024 0530 Given 30 mL Oral Brant Schwartz RN          magnesium oxide (Mag-Ox) tab 200 mg       Date Action Dose Route User    12/8/2024 2009 Given 200 mg Oral Brant Schwartz RN          melatonin tab 3 mg       Date Action Dose Route User    12/8/2024 2009 Given 3 mg Oral Brant Schwartz RN          sertraline (Zoloft) tab 50 mg       Date Action Dose Route User    12/8/2024 0813 Given 50 mg Oral Bren Mercado RN          simethicone (Mylicon) chewable tab 80 mg       Date Action Dose Route User    12/9/2024 0455 Given 80 mg Oral Brant Schwartz RN    12/8/2024 1441 Given 80 mg Oral Bren Mercado RN          traMADol (Ultram) tab 100 mg       Date Action Dose Route User    12/9/2024 0127 Given 100 mg Oral Brant Schwartz RN    12/8/2024 2005 Given 100 mg Oral Brant Schwartz RN    12/8/2024 1441 Given 100 mg Oral Bren Mercado RN    12/8/2024 0813 Given 100 mg Oral Bren Mercado RN            Vitals (last day)       Date/Time Temp Pulse Resp BP SpO2 Weight O2 Device O2 Flow Rate (L/min) [facility]    12/09/24 0425 98.2 °F (36.8 °C) 67 16 127/68 92 % -- None (Room air) -- AS    12/08/24 2004 98.3 °F (36.8 °C) 76 16 109/64 92 % -- None (Room air) -- AS    12/08/24 1715 98.7 °F (37.1 °C) 76 18 118/64 94 % -- None (Room air) -- CM     12/08/24 0811 98.6 °F (37 °C) 70 16 128/80 93 % -- None (Room air) -- CM    12/08/24 0512 97.8 °F (36.6 °C) 69 18 125/68 93 % -- None (Room air) -- AS

## 2024-12-10 NOTE — PLAN OF CARE
Patient discharged with friend to home with IV for long term ABX. PICC line education was given, wound care dressing was reviewed. Addressed patient's question and verbalizes understanding.     Problem: Patient Centered Care  Goal: Patient preferences are identified and integrated in the patient's plan of care  Description: Interventions:  - What would you like us to know as we care for you? From home alone  - Provide timely, complete, and accurate information to patient/family  - Incorporate patient and family knowledge, values, beliefs, and cultural backgrounds into the planning and delivery of care  - Encourage patient/family to participate in care and decision-making at the level they choose  - Honor patient and family perspectives and choices  12/9/2024 1823 by Erickson Valencia, RN  Outcome: Adequate for Discharge  12/9/2024 1240 by Erickson Valencia, RN  Outcome: Progressing     Problem: CARDIOVASCULAR - ADULT  Goal: Maintains optimal cardiac output and hemodynamic stability  Description: INTERVENTIONS:  - Monitor vital signs, rhythm, and trends  - Monitor for bleeding, hypotension and signs of decreased cardiac output  - Evaluate effectiveness of vasoactive medications to optimize hemodynamic stability  - Monitor arterial and/or venous puncture sites for bleeding and/or hematoma  - Assess quality of pulses, skin color and temperature  - Assess for signs of decreased coronary artery perfusion - ex. Angina  - Evaluate fluid balance, assess for edema, trend weights  12/9/2024 1823 by Erickson Valencia, RN  Outcome: Adequate for Discharge  12/9/2024 1240 by Erickson Valencia, RN  Outcome: Progressing     Problem: RESPIRATORY - ADULT  Goal: Achieves optimal ventilation and oxygenation  Description: INTERVENTIONS:  - Assess for changes in respiratory status  - Assess for changes in mentation and behavior  - Position to facilitate oxygenation and minimize respiratory effort  - Oxygen supplementation based on oxygen  saturation or ABGs  - Provide Smoking Cessation handout, if applicable  - Encourage broncho-pulmonary hygiene including cough, deep breathe, Incentive Spirometry  - Assess the need for suctioning and perform as needed  - Assess and instruct to report SOB or any respiratory difficulty  - Respiratory Therapy support as indicated  - Manage/alleviate anxiety  - Monitor for signs/symptoms of CO2 retention  12/9/2024 1823 by Erickson Valencia RN  Outcome: Adequate for Discharge  12/9/2024 1240 by Erickson Valencia RN  Outcome: Progressing     Problem: GASTROINTESTINAL - ADULT  Goal: Minimal or absence of nausea and vomiting  Description: INTERVENTIONS:  - Maintain adequate hydration with IV or PO as ordered and tolerated  - Nasogastric tube to low intermittent suction as ordered  - Evaluate effectiveness of ordered antiemetic medications  - Provide nonpharmacologic comfort measures as appropriate  - Advance diet as tolerated, if ordered  - Obtain nutritional consult as needed  - Evaluate fluid balance  12/9/2024 1823 by Erickson Valencia RN  Outcome: Adequate for Discharge  12/9/2024 1240 by Erickson Valencia RN  Outcome: Progressing     Problem: GENITOURINARY - ADULT  Goal: Absence of urinary retention  Description: INTERVENTIONS:  - Assess patient’s ability to void and empty bladder  - Monitor intake/output and perform bladder scan as needed  - Follow urinary retention protocol/standard of care  - Consider collaborating with pharmacy to review patient's medication profile  - Implement strategies to promote bladder emptying  12/9/2024 1823 by Erickson Valencia RN  Outcome: Adequate for Discharge  12/9/2024 1240 by Erickson Valencia RN  Outcome: Progressing     Problem: METABOLIC/FLUID AND ELECTROLYTES - ADULT  Goal: Electrolytes maintained within normal limits  Description: INTERVENTIONS:  - Monitor labs and rhythm and assess patient for signs and symptoms of electrolyte imbalances  - Administer electrolyte replacement  as ordered  - Monitor response to electrolyte replacements, including rhythm and repeat lab results as appropriate  - Fluid restriction as ordered  - Instruct patient on fluid and nutrition restrictions as appropriate  12/9/2024 1823 by Erickson Valencia RN  Outcome: Adequate for Discharge  12/9/2024 1240 by Erickson Valencia RN  Outcome: Progressing     Problem: SKIN/TISSUE INTEGRITY - ADULT  Goal: Skin integrity remains intact  Description: INTERVENTIONS  - Assess and document risk factors for pressure ulcer development  - Assess and document skin integrity  - Monitor for areas of redness and/or skin breakdown  - Initiate interventions, skin care algorithm/standards of care as needed  12/9/2024 1823 by Erickson Valencia RN  Outcome: Adequate for Discharge  12/9/2024 1240 by Erickson Valencia RN  Outcome: Progressing  Goal: Incision(s), wounds(s) or drain site(s) healing without S/S of infection  Description: INTERVENTIONS:  - Assess and document risk factors for pressure ulcer development  - Assess and document skin integrity  - Assess and document dressing/incision, wound bed, drain sites and surrounding tissue  - Implement wound care per orders  - Initiate isolation precautions as appropriate  - Initiate Pressure Ulcer prevention bundle as indicated  12/9/2024 1823 by Erickson Valencia RN  Outcome: Adequate for Discharge  12/9/2024 1240 by Erickson Valencia RN  Outcome: Progressing     Problem: MUSCULOSKELETAL - ADULT  Goal: Return mobility to safest level of function  Description: INTERVENTIONS:  - Assess patient stability and activity tolerance for standing, transferring and ambulating w/ or w/o assistive devices  - Assist with transfers and ambulation using safe patient handling equipment as needed  - Ensure adequate protection for wounds/incisions during mobilization  - Obtain PT/OT consults as needed  - Advance activity as appropriate  - Communicate ordered activity level and limitations with  patient/family  12/9/2024 1823 by Erickson Valencia RN  Outcome: Adequate for Discharge  12/9/2024 1240 by Erickson Valencia RN  Outcome: Progressing     Problem: PAIN - ADULT  Goal: Verbalizes/displays adequate comfort level or patient's stated pain goal  Description: INTERVENTIONS:  - Encourage pt to monitor pain and request assistance  - Assess pain using appropriate pain scale  - Administer analgesics based on type and severity of pain and evaluate response  - Implement non-pharmacological measures as appropriate and evaluate response  - Consider cultural and social influences on pain and pain management  - Manage/alleviate anxiety  - Utilize distraction and/or relaxation techniques  - Monitor for opioid side effects  - Notify MD/LIP if interventions unsuccessful or patient reports new pain  - Anticipate increased pain with activity and pre-medicate as appropriate  12/9/2024 1823 by Erickson Valencia RN  Outcome: Adequate for Discharge  12/9/2024 1240 by Erickson Valencia RN  Outcome: Progressing     Problem: SAFETY ADULT - FALL  Goal: Free from fall injury  Description: INTERVENTIONS:  - Assess pt frequently for physical needs  - Identify cognitive and physical deficits and behaviors that affect risk of falls.  - [place] fall precautions as indicated by assessment.  - Educate pt/family on patient safety including physical limitations  - Instruct pt to call for assistance with activity based on assessment  - Modify environment to reduce risk of injury  - Provide assistive devices as appropriate  - Consider OT/PT consult to assist with strengthening/mobility  - Encourage toileting schedule  12/9/2024 1823 by Erickson Valencia RN  Outcome: Adequate for Discharge  12/9/2024 1240 by Erickson Valencia RN  Outcome: Progressing     Problem: DISCHARGE PLANNING  Goal: Discharge to home or other facility with appropriate resources  Description: INTERVENTIONS:  - Identify barriers to discharge w/pt and caregiver  -  Include patient/family/discharge partner in discharge planning  - Arrange for needed discharge resources and transportation as appropriate  - Identify discharge learning needs (meds, wound care, etc)  - Arrange for interpreters to assist at discharge as needed  - Consider post-discharge preferences of patient/family/discharge partner  - Complete POLST form as appropriate  - Assess patient's ability to be responsible for managing their own health  - Refer to Case Management Department for coordinating discharge planning if the patient needs post-hospital services based on physician/LIP order or complex needs related to functional status, cognitive ability or social support system  12/9/2024 1823 by Erickson Valencia, RN  Outcome: Adequate for Discharge  12/9/2024 1240 by Erickson Valencia, RN  Outcome: Progressing     Problem: Impaired Functional Mobility  Goal: Achieve highest/safest level of mobility/gait  Description: Interventions:  - Assess patient's functional ability and stability  - Promote increasing activity/tolerance for mobility and gait  - Educate and engage patient/family in tolerated activity level and precautions  - Recommend use of  RW for transfers and ambulation  - Recommend patient transfer to bedside chair toward strongest side  - When transferring patient, block weaker knee for safety  - Recommend use of chair position in bed 3 times per day  12/9/2024 1823 by Erickson Valencia, RN  Outcome: Adequate for Discharge  12/9/2024 1240 by Erickson Valencia, RN  Outcome: Progressing     Problem: Impaired Activities of Daily Living  Goal: Achieve highest/safest level of independence in self care  Description: Interventions:  - Assess ability and encourage patient to participate in ADLs to maximize function  - Promote sitting position while performing ADLs such as feeding, grooming, and bathing  - Educate and encourage patient/family in tolerated functional activity level and precautions during  self-care  12/9/2024 1823 by Erickson Valencia RN  Outcome: Adequate for Discharge  12/9/2024 1240 by Erickson Valencia RN  Outcome: Progressing     Problem: Patient/Family Goals  Goal: Patient/Family Long Term Goal  Description: Patient's Long Term Goal: Discharge from the hospital    Interventions:  - Monitor vital signs  - Monitor appropriate labs  - Monitor blood glucose levels  - Pain management  - Administer medications per order  - Follow MD orders  - Diagnostics per order  - Update / inform patient and family on plan of care  - Discharge planning  - See additional Care Plan goals for specific interventions  12/9/2024 1823 by Erickson Valencia RN  Outcome: Adequate for Discharge  12/9/2024 1240 by Erickson Valencia RN  Outcome: Progressing  Goal: Patient/Family Short Term Goal  Description: Patient's Short Term Goal: Improve redness, swelling, and pain to left lower extremity     Interventions:   - Monitor vital signs  - Monitor appropriate labs  - Monitor blood glucose levels  - Pain management  - Administer medications per order  - Follow MD orders  - Diagnostics per order  - Update / inform patient and family on plan of care  - See additional Care Plan goals for specific interventions  12/9/2024 1823 by Erickson Valencia RN  Outcome: Adequate for Discharge  12/9/2024 1240 by Erickson Valencia RN  Outcome: Progressing     Problem: HEMATOLOGIC - ADULT  Goal: Free from bleeding injury  Description: (Example usage: patient with low platelets)  INTERVENTIONS:  - Avoid intramuscular injections, enemas and rectal medication administration  - Ensure safe mobilization of patient  - Hold pressure on venipuncture sites to achieve adequate hemostasis  - Assess for signs and symptoms of internal bleeding  - Monitor lab trends  - Patient is to report abnormal signs of bleeding to staff  - Avoid use of toothpicks and dental floss  - Use electric shaver for shaving  - Use soft bristle tooth brush  - Limit straining and  forceful nose blowing  12/9/2024 1823 by Erickson Valencia, RN  Outcome: Adequate for Discharge  12/9/2024 1240 by Erickson Valencia, RN  Outcome: Progressing

## 2024-12-10 NOTE — PAYOR COMM NOTE
--------------  DISCHARGE REVIEW    Payor: [employer] OUT OF STATE PPO  Subscriber #:  [id number]  Authorization Number: [id number]    Admit date: 11/18/24  Admit time:   4:58 PM  Discharge Date: 12/9/2024  6:30 PM     Admitting Physician: Narayan Parra MD  Attending Physician:  No att. providers found  Primary Care Physician: Eric Sethi DO       Discharge Summary Notes    No notes of this type exist for this encounter.         JASVIR confirmed with Dr. Savage who stated pt is medically ready for discharge today.       updates attached them via [employer] to [employer].  [facility] made aware of discharge through [employer] Messages.     Sudeep w/ [facility] aware pt is discharging today and will skip 2:00 AM dose.  Sudeep aware pt needs extenders.     JASVIR confirmed that [facility] contact information added to AVS.     JASVIR asked RN to give pt Eliquis coupon.     PLAN[facility]

## 2025-02-04 NOTE — TELEPHONE ENCOUNTER
Spoke with patient and advised he has a ppo, referral not required.  Encouraged him to contact health plan to make sure specialist is in his network No

## 2025-04-30 PROBLEM — E11.65 TYPE 2 DIABETES MELLITUS WITH HYPERGLYCEMIA, WITHOUT LONG-TERM CURRENT USE OF INSULIN (CMD): Chronic | Status: ACTIVE | Noted: 2024-09-25

## 2025-04-30 PROBLEM — I10 PRIMARY HYPERTENSION: Chronic | Status: ACTIVE | Noted: 2019-04-02

## (undated) DEVICE — Device

## (undated) DEVICE — TIP COVER ACCESSORY

## (undated) DEVICE — SUTURE VLOC 180 0 12\" 0316

## (undated) DEVICE — CANNULA SEAL

## (undated) DEVICE — 40580 - THE PINK PAD - ADVANCED TRENDELENBURG POSITIONING KIT: Brand: 40580 - THE PINK PAD - ADVANCED TRENDELENBURG POSITIONING KIT

## (undated) DEVICE — COVER WAND RF DETECT

## (undated) DEVICE — MEGA SUTURECUT ND: Brand: ENDOWRIST

## (undated) DEVICE — BLADELESS OBTURATOR: Brand: WECK VISTA

## (undated) DEVICE — INSUFFLATION NEEDLE TO ESTABLISH PNEUMOPERITONEUM.: Brand: INSUFFLATION NEEDLE

## (undated) DEVICE — ARM DRAPE

## (undated) DEVICE — VISUALIZATION SYSTEM: Brand: CLEARIFY

## (undated) DEVICE — FENESTRATED BIPOLAR FORCEPS: Brand: ENDOWRIST

## (undated) DEVICE — DERMABOND LIQUID ADHESIVE

## (undated) DEVICE — STERILE SYNTHETIC POLYISOPRENE POWDER-FREE SURGICAL GLOVES WITH HYDROGEL COATING, SMOOTH FINISH, STRAIGHT FINGER: Brand: PROTEXIS

## (undated) DEVICE — MONOPOLAR CURVED SCISSORS: Brand: ENDOWRIST

## (undated) DEVICE — STANDARD HYPODERMIC NEEDLE,POLYPROPYLENE HUB: Brand: MONOJECT

## (undated) DEVICE — COLUMN DRAPE

## (undated) DEVICE — SUTURE MONOCRYL 4-0 PS-2

## (undated) DEVICE — ROBOTIC GENERAL: Brand: MEDLINE INDUSTRIES, INC.

## (undated) NOTE — MR AVS SNAPSHOT
Zion Nowak [address] 201 [address] [place] [address] 185 [phone]               Thank you for choosing us for your health care visit with Gumaro Bird NP.   We are glad to serve you and happy to provide yo diet and maintain fluids at 32 to 64 ounces per day.  Common high sodium foods include frozen dinners, soups (not homemade), some cereal, vegetable juice, canned vegetables, lunch meats, processed meats like hotdogs, sausage, cartwright, pepperoni, soy sauce, pr Take 2 tablets by mouth daily.                    Results of Recent Testing     COMP METABOLIC PANEL (14)      Component Value Standard Range & Units    Glucose 95 70-99 mg/dL    Sodium 141 136-144 mmol/L    Potassium 4.4 3.3-5.1 mmol/L    Chloride 110 95-1 Tips for making healthy food choices  -   Enjoy your food, but eat less. Fully enjoy your food when eating. Don’t eat while distracted and slow down. Avoid over sized portions. Don’t eat while when you’re bored.      EAT THESE FOODS MORE OFTEN: EAT T

## (undated) NOTE — MR AVS SNAPSHOT
[facility]  [address] [place] [address], [address] [place] [address]               Thank you for choosing us for your health care visit with eRx Sanchez. DO Rosie.   We are glad to serve you and happy to provide you with this summary Assoc Dx:  Paroxysmal atrial fibrillation ([facility] [I48.0]          Reason for Today's Visit     Knee Pain           Medical Issues Discussed Today     Atrial fibrillation ([facility]    Primary osteoarthritis of right knee    -  Primary      Instructions and Info

## (undated) NOTE — LETTER
[facility]  [facility]     PICC INSERTION CONSENT     I agree to have a Peripherally Inserted Central Catheter (PICC) placed in my arm.   1. The PICC insertion procedure, care, maintenance, risks, benefits, and complications have been explained to me by my physician, ________________________, and I understand them.   2. I understand that this may not be the only way I can receive my medication. I understand that my physician has determined that the PICC would be the safest and most effective means of administering my medication at this time. If there are other options of giving medication into my veins those options have been explained to me by my physician and I have chosen this one.   3. I realize a nurse who has been specially trained and certified by the hospital and ’s representative to insert a PICC will perform this procedure. My catheter will be inserted by _____________________________.   4. I have been informed by my doctor of the nature and purpose of this procedure and the risks involved and the possibility of complications. I realize that this is an invasive procedure and has certain risks such as air embolism (air entering the catheter or my vein), arterial puncture (a tearing of one of my arteries), infection, irregular heartbeat and venous thrombosis (a blood clot in a vein) nerve injury and fracture of the catheter with or without migration.   5. In order to numb the area where the line will be placed, a small amount of anesthetic medication will be injected as ordered by my physician.   6. I understand that while the catheter will be placed in my upper arm the end of the catheter will come to rest in an area near my heart.     7. The person performing this procedure has discussed the potential benefits, risks, and side effects of the PICC; the likelihood of achieving goals; and potential problems that might occur during recuperation. They also discussed  reasonable alternatives to the PICC, including risks, benefits, and side effects related to the alternatives and risks related to not receiving this procedure.    8.  I have expressed any questions about this procedure to my physician or the PICC Proceduralist and he/she has answered them.  I certify that I have read and understand this consent to the insertion of a PICC.      _________________________________________________________   Date     Time     Patient/Guardian Signature       ____________________________________   Printed name of Patient/Guardian          ________________________________________________________________    Date        Time                   Witnessing RN Signature      Patient Name: Jovan GUTHRIE Wilber Castorena     : 1970                 Printed: 2024     Medical Record #: [id number]

## (undated) NOTE — ED AVS SNAPSHOT
[facility]  Zaynab Goddard    [address] [place] [address]    Phone:  [phone]    Fax:  [phone]           Albert Cates   MRN: [id number]    Department:  THE [facility]   Date of Visit:  1/ Cecile Alatorre [facility] 1   [phone]       To Check ER Wait Times:  TEXT 'ERwait' to 07801      Click [email or web address]      Or call [phone]    If you have any problems with your follow-up, please call our  at [phone].     Maxx murcia I have read and understand the instructions given to me by my caregivers. [facility] Pharm[facility]        Pharmacy Address Phone Number   [facility] [address] [facility] ([address]) [address] [place] [address].  [facility]